# Patient Record
Sex: MALE | Race: WHITE | NOT HISPANIC OR LATINO | Employment: OTHER | URBAN - METROPOLITAN AREA
[De-identification: names, ages, dates, MRNs, and addresses within clinical notes are randomized per-mention and may not be internally consistent; named-entity substitution may affect disease eponyms.]

---

## 2021-03-17 ENCOUNTER — OFFICE VISIT (OUTPATIENT)
Dept: UROLOGY | Facility: CLINIC | Age: 81
End: 2021-03-17
Payer: COMMERCIAL

## 2021-03-17 ENCOUNTER — TELEPHONE (OUTPATIENT)
Dept: UROLOGY | Facility: CLINIC | Age: 81
End: 2021-03-17

## 2021-03-17 DIAGNOSIS — C61 PROSTATE CANCER (H): Primary | ICD-10-CM

## 2021-03-17 LAB
ALBUMIN UR-MCNC: NEGATIVE MG/DL
APPEARANCE UR: CLEAR
BILIRUB UR QL STRIP: NEGATIVE
COLOR UR AUTO: NORMAL
GLUCOSE UR STRIP-MCNC: NEGATIVE MG/DL
HGB UR QL STRIP: NEGATIVE
KETONES UR STRIP-MCNC: NEGATIVE MG/DL
LEUKOCYTE ESTERASE UR QL STRIP: NEGATIVE
NITRATE UR QL: NEGATIVE
PH UR STRIP: 7 PH (ref 5–7)
PSA SERPL-MCNC: 21.5 UG/L (ref 0–4)
SOURCE: NORMAL
SP GR UR STRIP: 1 (ref 1–1.03)
UROBILINOGEN UR STRIP-MCNC: 0 MG/DL (ref 0–2)

## 2021-03-17 PROCEDURE — 81003 URINALYSIS AUTO W/O SCOPE: CPT | Performed by: UROLOGY

## 2021-03-17 PROCEDURE — 84153 ASSAY OF PSA TOTAL: CPT | Performed by: UROLOGY

## 2021-03-17 PROCEDURE — 36415 COLL VENOUS BLD VENIPUNCTURE: CPT | Performed by: UROLOGY

## 2021-03-17 PROCEDURE — 99203 OFFICE O/P NEW LOW 30 MIN: CPT | Performed by: UROLOGY

## 2021-03-17 NOTE — PROGRESS NOTES
S: Patient is a pleasant 80-year-old male with history of prostate cancer status post radiation in 2001.  He has no urinary problems.  Has no bone pain or weight loss.  He has PSA has been going up for last several years.  In 2017 it was 8 and most recent PSA in 2020 it was 16.  He has not had any PSA done recently.  He is healthy otherwise.  No current outpatient medications on file.     Not on File  No past medical history on file.  No past surgical history on file.   No family history on file.  Social History     Socioeconomic History     Marital status:      Spouse name: Not on file     Number of children: Not on file     Years of education: Not on file     Highest education level: Not on file   Occupational History     Not on file   Social Needs     Financial resource strain: Not on file     Food insecurity     Worry: Not on file     Inability: Not on file     Transportation needs     Medical: Not on file     Non-medical: Not on file   Tobacco Use     Smoking status: Not on file   Substance and Sexual Activity     Alcohol use: Not on file     Drug use: Not on file     Sexual activity: Not on file   Lifestyle     Physical activity     Days per week: Not on file     Minutes per session: Not on file     Stress: Not on file   Relationships     Social connections     Talks on phone: Not on file     Gets together: Not on file     Attends Adventism service: Not on file     Active member of club or organization: Not on file     Attends meetings of clubs or organizations: Not on file     Relationship status: Not on file     Intimate partner violence     Fear of current or ex partner: Not on file     Emotionally abused: Not on file     Physically abused: Not on file     Forced sexual activity: Not on file   Other Topics Concern     Not on file   Social History Narrative     Not on file       REVIEW OF SYSTEMS  =================  C: NEGATIVE for fever, chills, change in weight  I: NEGATIVE for worrisome rashes,  moles or lesions  E/M: NEGATIVE for ear, mouth and throat problems  R: NEGATIVE for significant cough or SHORTNESS OF BREATH  CV:  NEGATIVE for chest pain, palpitations or peripheral edema  GI: NEGATIVE for nausea, abdominal pain, heartburn, or change in bowel habits  NEURO: NEGATIVE numbness/weakness  : see HPI  PSYCH: NEGATIVE depression/anxiety  LYmph: no new enlarged lymph nodes  Ortho: no new trauma/movements      Physical Exam:  There were no vitals taken for this visit.    Patient is pleasant, in no acute distress, good general condition.  Heart:  negative, PMI normal  Lung: no evidence of respiratory distress    Abdomen: Soft, nondistended, non tender. No masses. No rebound or guarding.   Exam: no cva tenderness  Skin: Warm and dry.  No redness.  Neuro: grossly normal  Musculaskeletal: moving all extremities  Psych normal mood and affect  Musculoskeletal  moving all extremities  Hematologic/Lymphatic/Immunologic: normal ant/post cervical, axillary, supraclavicular and inguinal nodes    Assessment/Plan:   Pleasant 80-year-old male with history of prostate cancer status post radiation treatment with PSA elevation for last several years.  We will check his PSA today.  Hormonal therapy briefly discussed with patient.

## 2021-03-17 NOTE — TELEPHONE ENCOUNTER
Left message for patient to call [phone] to advise of PSA result, Per MD result note, Patient needs Bone scan and CT scan and appointment for follow up to discuss. Orders in Epic.   Beatriz Katz CMA

## 2021-03-31 ENCOUNTER — HOSPITAL ENCOUNTER (OUTPATIENT)
Dept: NUCLEAR MEDICINE | Facility: CLINIC | Age: 81
Setting detail: NUCLEAR MEDICINE
End: 2021-03-31
Attending: UROLOGY
Payer: COMMERCIAL

## 2021-03-31 ENCOUNTER — HOSPITAL ENCOUNTER (OUTPATIENT)
Dept: CT IMAGING | Facility: CLINIC | Age: 81
Discharge: HOME | End: 2021-03-31
Attending: UROLOGY | Admitting: UROLOGY
Payer: COMMERCIAL

## 2021-03-31 DIAGNOSIS — C61 PROSTATE CANCER (H): ICD-10-CM

## 2021-03-31 LAB
CREAT BLD-MCNC: 0.9 MG/DL (ref 0.66–1.25)
GFR SERPL CREATININE-BSD FRML MDRD: 81 ML/MIN/{1.73_M2}

## 2021-03-31 PROCEDURE — 74177 CT ABD & PELVIS W/CONTRAST: CPT

## 2021-03-31 PROCEDURE — 250N000009 HC RX 250: Performed by: RADIOLOGY

## 2021-03-31 PROCEDURE — A9561 TC99M OXIDRONATE: HCPCS | Performed by: UROLOGY

## 2021-03-31 PROCEDURE — 82565 ASSAY OF CREATININE: CPT

## 2021-03-31 PROCEDURE — 250N000011 HC RX IP 250 OP 636: Performed by: RADIOLOGY

## 2021-03-31 PROCEDURE — 78306 BONE IMAGING WHOLE BODY: CPT

## 2021-03-31 PROCEDURE — 343N000001 HC RX 343: Performed by: UROLOGY

## 2021-03-31 RX ORDER — KIT FOR THE PREPARATION OF TECHNETIUM TC 99M MEBROFENIN 45 MG/10ML
5 INJECTION, POWDER, LYOPHILIZED, FOR SOLUTION INTRAVENOUS ONCE
Status: DISCONTINUED | OUTPATIENT
Start: 2021-03-31 | End: 2021-04-01 | Stop reason: HOSPADM

## 2021-03-31 RX ORDER — IOPAMIDOL 755 MG/ML
500 INJECTION, SOLUTION INTRAVASCULAR ONCE
Status: COMPLETED | OUTPATIENT
Start: 2021-03-31 | End: 2021-03-31

## 2021-03-31 RX ADMIN — Medication 26.3 MILLICURIE: at 07:57

## 2021-03-31 RX ADMIN — IOPAMIDOL 75 ML: 755 INJECTION, SOLUTION INTRAVENOUS at 08:10

## 2021-03-31 RX ADMIN — SODIUM CHLORIDE 60 ML: 9 INJECTION, SOLUTION INTRAVENOUS at 08:10

## 2021-04-21 ENCOUNTER — OFFICE VISIT (OUTPATIENT)
Dept: UROLOGY | Facility: CLINIC | Age: 81
End: 2021-04-21
Payer: COMMERCIAL

## 2021-04-21 VITALS — DIASTOLIC BLOOD PRESSURE: 79 MMHG | OXYGEN SATURATION: 100 % | HEART RATE: 54 BPM | SYSTOLIC BLOOD PRESSURE: 133 MMHG

## 2021-04-21 DIAGNOSIS — C61 PROSTATE CANCER (H): Primary | ICD-10-CM

## 2021-04-21 PROCEDURE — 99213 OFFICE O/P EST LOW 20 MIN: CPT | Performed by: UROLOGY

## 2021-04-21 NOTE — PATIENT INSTRUCTIONS
Cryotherapy for Prostate Cancer  Cryotherapy (also called cryosurgery or cryoablation) is the use of very cold temperatures to freeze and kill prostate cancer cells as well as most of the prostate. Even though it is sometimes being called cryosurgery, it is not actually a type of surgery.    When is cryotherapy used?  Cryotherapy is sometimes used if the cancer has come back after radiation therapy. It may be an option to treat men with low risk early-stage prostate cancer who cannot have surgery or radiation therapy. However, most doctors do not use cryotherapy as the first treatment for prostate cancer..    How is cryotherapy done?  This type of procedure requires spinal or epidural anesthesia (the lower half of your body is numbed) or general anesthesia (you are asleep).    The doctor uses transrectal ultrasound (TRUS) to guide several hollow probes (needles) through the skin between the anus and scrotum and into the prostate. Very cold gases are then passed through the needles to freeze and destroy the prostate. To be sure the prostate is destroyed without too much damage to nearby tissues, the doctor carefully watches the ultrasound during the procedure. Warm saltwater is passed through a catheter in the urethra during the procedure to keep it from freezing. The catheter is left in place for several weeks afterward to allow the bladder to empty while you recover.    After the procedure, you might need to stay in the hospital overnight, but many men go home the same day.    Cryotherapy is less invasive than surgery, so there is usually less blood loss, a shorter hospital stay, shorter recovery period, and less pain. But compared with surgery or radiation therapy, doctors know much less about the long-term effectiveness of cryotherapy. And as with brachytherapy, this may not be a good option for men with large prostate glands.    Possible side effects of cryotherapy  Side effects from cryotherapy tend to be worse  if it is done in men who have already had radiation therapy, compared to men who have it as the first form of treatment.    Most men have blood in their urine for a day or two after the procedure, as well as soreness in the area where the needles were placed. Swelling of the penis or scrotum is also common.    Freezing might also affect the bladder and rectum, which can lead to pain, burning sensations, and the need to empty the bladder and bowels often. Most men recover normal bowel and bladder function over time.    Freezing often damages the nerves near the prostate that control erections. Erectile dysfunction is more common after cryotherapy than after radical prostatectomy. For information on coping with erection problems and other sexuality issues, see Sexuality for the Man With Cancer.    Urinary incontinence (having problems controlling urine) is rare in men who have cryotherapy as their first treatment for prostate cancer, but it is more common in men who have already had radiation therapy.    After cryotherapy, less than 1% of men develop a fistula (an abnormal connection) between the rectum and bladder. This rare but serious problem can allow urine to leak into the rectum and often requires surgery to repair.    Written by  References         Patient Education     Hormone Therapy for Prostate Cancer  Androgens, like testosterone, are male hormones. They're made in the testicles. Prostate cancer cells need androgens to grow. Reducing the amount of androgens in the body or blocking prostate cancer cells from using them can help treat prostate cancer. You may hear this called androgen deprivation therapy or ADT. This treatment can shrink tumors or help keep them from growing. It does not cure prostate cancer, but it can help control it.   Hormone therapy may be used alone. Or, it might be used with other treatments, like radiation therapy or chemotherapy. It may be used non-stop (continuous ADT), or in  cycles (intermittent ADT). You will work with your healthcare provider to decide what's best for you.   How the therapy is done  Hormone therapy can be done in many ways, such as:     LHRH (luteinizing hormone-release hormone) agonists.  These are medicines that lower the amount of hormones made by the testicles. These are given as shots or as small implants that are put just under the skin (often on the inside of the arm). This is done from once a month to once a year. The implants give a steady dose of medicine over time. When starting an LHRH agonist, there's a brief spike in the testosterone level. This is called a flare. Depending on where the cancer is, it can cause pain and other problems. Because of this, LHRH agonists are often given along with anti-androgens (see below).    LHRH (luteinizing hormone-release hormone) antagonists.  This medicine works like the LHRH agonists, but it doesn't cause a spike in testosterone and tumor flare. It's given as a shot once a month.    Anti-androgens. These medicines block the receptors on cancer cells so they can't attach to androgens as a way to grow. These come in pill form and are taken by mouth. They are often used along with other forms of hormone therapy.    CYP17 inhibitors. These medicines stop the prostate cancer cells themselves, and other cells in the body from making androgens. They're taken as pills. They're most often used along with other forms of hormone therapy. (Which are needed to keep the testicles from making androgens.)    Other medicines. These may include estrogens or other medicines (such as ketoconazole) that help lower the levels of androgens in the body. They are used much less often than the medicines listed above.    Orchiectomy. This is surgery to remove the testicles. This stops the body from making most androgens. Artificial (prosthetic) testicles can be put in the scrotum to give the look of real testicles.  Possible side effects   Side  effects are much the same for most kinds of hormone therapy, but they can vary a bit between medicines. Possible side effects include:     Bone thinning (osteoporosis) and higher risk of breaking a bone    Breast-area tenderness or growth or breast tissue    Decrease in size of penis and testicles    Diarrhea    Hair loss    Problems getting or keeping an erection    Possible increased risk of heart disease, stroke, and diabetes    Less interest in sex    Loss of muscle mass and strength    Low red blood cell count (anemia)    Mood changes, such as depression, irritability, or anxiety    Nausea    Sudden increases in body heat (hot flashes)    Tiredness    Trouble with memory and concentration    Weight gain    Increased cholesterol levels    Dizziness    Muscle and joint pain  Coping with side effects  Some of the side effects are short-term. Others are long-lasting. It depends on the type of hormone therapy used, and how it affects your body. Most of the side effects of orchiectomy will last the rest of your life. Your healthcare provider can tell you more. To help cope with side effects, try the tips below.     Talk with your healthcare provider about your symptoms. You may be given medicines that can help you feel better and reduce problems.    If you have hot flashes, don t take hot showers. Don t use hot tubs or saunas.    Don t eat spicy food or drink alcohol. Avoid caffeine.    Get regular physical activity.    Eat a healthy diet.    Keep mentally active.    Work with your partner to manage sexual changes.    Try counseling or support groups.  Follow-up care  During the course of your treatment, you ll have regular visits with your healthcare provider. You may also need tests, like blood tests or a DEXA scan to check for osteoporosis. These let your healthcare provider check your health and see how well treatment is working. You and your healthcare provider will discuss the test results, as well as any side  effects you have. You ll also discuss whether you need other cancer treatments.   To learn more  For more information about cancer and treatment, visit the websites listed below:    American Cancer Society    National Cancer [place]    Ellie Sanchez last reviewed this educational content on 12/1/2019 2000-2021 [employer]. All rights reserved. This information is not intended as a substitute for professional medical care. Always follow your healthcare professional's instructions.

## 2021-04-21 NOTE — PROGRESS NOTES
Chief Complaint   Patient presents with     RECHANABEL JACKSON Godfrey is a 80 year old male who presents today for follow up of   Chief Complaint   Patient presents with     RECHECK    f/u for prostate cancer s/p XRT many years ago with recent psa of 21.5.  Recent CT and bone scan did not show any obvious mets.  There is longevity in family (father 90's, mother 100's).      No current outpatient medications on file.     No Known Allergies   No past medical history on file.  No past surgical history on file.  No family history on file.  Social History     Socioeconomic History     Marital status:      Spouse name: Not on file     Number of children: Not on file     Years of education: Not on file     Highest education level: Not on file   Occupational History     Not on file   Social Needs     Financial resource strain: Not on file     Food insecurity     Worry: Not on file     Inability: Not on file     Transportation needs     Medical: Not on file     Non-medical: Not on file   Tobacco Use     Smoking status: Never Smoker     Smokeless tobacco: Never Used   Substance and Sexual Activity     Alcohol use: Not on file     Drug use: Not on file     Sexual activity: Not on file   Lifestyle     Physical activity     Days per week: Not on file     Minutes per session: Not on file     Stress: Not on file   Relationships     Social connections     Talks on phone: Not on file     Gets together: Not on file     Attends Hinduism service: Not on file     Active member of club or organization: Not on file     Attends meetings of clubs or organizations: Not on file     Relationship status: Not on file     Intimate partner violence     Fear of current or ex partner: Not on file     Emotionally abused: Not on file     Physically abused: Not on file     Forced sexual activity: Not on file   Other Topics Concern     Not on file   Social History Narrative     Not on file       REVIEW OF SYSTEMS  =================  C:  NEGATIVE for fever, chills, change in weight  I: NEGATIVE for worrisome rashes, moles or lesions  E/M: NEGATIVE for ear, mouth and throat problems  R: NEGATIVE for significant cough or SHORTNESS OF BREATH  CV:  NEGATIVE for chest pain, palpitations or peripheral edema  GI: NEGATIVE for nausea, abdominal pain, heartburn, or change in bowel habits  NEURO: NEGATIVE numbness/weakness  : see HPI  PSYCH: NEGATIVE depression/anxiety  LYmph: no new enlarged lymph nodes  Ortho: no new trauma/movements    Physical Exam:  /79 (BP Location: Right arm, Patient Position: Chair, Cuff Size: Adult Regular)   Pulse 54   SpO2 100%    Patient is pleasant, in no acute distress, good general condition.  Lung: no evidence of respiratory distress    Abdomen: Soft, nondistended, non tender. No masses. No rebound or guarding.   Exam: normal male  Skin: Warm and dry.  No redness.  Psych: normal mood and affect  Neuro: alert and oriented  Musculaskeletal: moving all extremities    CT ABDOMEN AND PELVIS WITH CONTRAST 3/31/2021 8:19 AM     CLINICAL HISTORY: Prostate cancer (H), new diagnosis.     TECHNIQUE: CT scan of the abdomen and pelvis was performed following  injection of IV contrast. Multiplanar reformats were obtained. Dose  reduction techniques were used.     CONTRAST: 75mL, Isovue 370     COMPARISON: None.     FINDINGS:   LOWER CHEST: There are a few scattered small nodules in the inferior  right middle lobe numbering at least 4 measuring up to 0.4 cm.  Visualized portions of the lung bases and mediastinal contents are  otherwise unremarkable.     HEPATOBILIARY: Mild intrahepatic biliary ductal dilatation is noted.  This could be a normal appearance of the bile duct with mild  prominence due to the high resolution of the scan. Subcentimeter  hypoattenuating lesion in the right lobe of liver (image 41 series 3)  is of doubtful clinical significance and is too small to characterize.  It likely represents a cyst. Liver  otherwise enhances normally. The  gallbladder is grossly unremarkable. Small hypoattenuating structure  adjacent to head of pancreas just anterior to the inferior vena cava  and anterolateral to the aorta measures 2.6 x 1.7 x 1.7 cm in  transverse, AP and craniocaudal dimensions, respectively. This is of  uncertain clinical significance and etiology and has an average  density of slightly over 20 Hounsfield units on this postcontrast  study. This likely represents a hyperdense cystic structure, although  other lesions are difficult to exclude given slight increased density.  No definite extrahepatic common bile duct dilatation is seen. No  choledocholithiasis or cholelithiasis.     PANCREAS: Hypoattenuating structure in the neck/body of the pancreas  dorsally (image 51 series 3) measures up to 1.0 cm in diameter and  could represent pancreatic cyst or side branch IPMN. Pancreas  otherwise enhances normally. No pancreatic ductal dilatation is seen.     SPLEEN: Normal.     ADRENAL GLANDS: Normal.     KIDNEYS/BLADDER: The kidneys enhance normally without hydronephrosis,  nephrolithiasis, hydroureter, or ureteral calculus. Subcentimeter left  hypoattenuating renal lesion (image 14 series 2) likely represents a  cyst but is too small to characterize. Urinary bladder is grossly  unremarkable.     BOWEL: Colon is grossly of normal caliber without pericolonic  inflammatory change to suggest acute diverticulitis. The bowel is  somewhat difficult to follow due to the relative lack of  intraperitoneal adipose tissue limiting the bowel evaluation. Appendix  is not definitely seen. No pericecal inflammatory changes are  identified. Small bowel is grossly of normal caliber and appearance  given the limitations as described above. Stomach contains a moderate  amount of fluid and air but is otherwise unremarkable.     PELVIC ORGANS: Prostate gland heterogeneously enhances and contains a  few dystrophic calcifications. Seminal  vesicles are unremarkable.     ADDITIONAL FINDINGS: No adenopathy, free air, or free fluid is seen in  the peritoneal cavity. There is mild nonaneurysmal atherosclerosis.     MUSCULOSKELETAL: Tiny focal sclerotic lesions in the bilateral  proximal femurs and in the pelvis likely represent benign bone islands  given their small size and significant density. No definite aggressive  osseous lesion is seen. Levoconvex curvature of lower lumbar spine  centered at L4-5 is noted. There is disc height loss seen throughout  the lumbar spine but is worst at L4-5 and at L5-S1. No acute fracture  or malalignment is identified.                                                                      IMPRESSION:   1.  Multiple tiny focal sclerotic lesions in the bilateral proximal  femurs and in the pelvis likely represent benign bone islands given  their small size and significantly increased density. Blastic osseous  metastasis are considered less likely. No other evidence for osseous  metastasis.  2.  Hypoattenuating structure near the head of the pancreas, aorta,  inferior vena cava, and hepatic hilum and is a density slightly  greater than that expected for a simple cyst but still could represent  a mildly hyperdense cyst. This could represent an enteric duplication  cyst. Other lesions are considered less likely.  3.  Hypoattenuating structure in the neck/body of the pancreas  measuring up to 1 cm in diameter could represent benign cysts. Side  branch intraductal papillary mucinous neoplasm (IPMN) is also in the  differential and follow-up in one year is recommended. MRI is  preferred, but this can also be done with CT.  4.  Evaluation of solid and hollow organs of the abdomen and pelvis is  limited due to relative lack of intraperitoneal adipose tissue.  5. Multiple small nodules are seen in the visualized portions of the  inferior right lung. Further evaluation with CT chest is recommended  to evaluate the remaining portions  of the lungs. Otherwise,  recommendations for one or multiple incidental lung nodules < 6mm :    Low risk patients: No routine follow-up.    High risk patients: Optional follow-up CT at 12 months; if  unchanged, no further follow-up.     *Low Risk: Minimal or absent history of smoking or other known risk  factors.  *Nonsolid (ground glass) or partly solid nodules may require longer  follow-up to exclude indolent adenocarcinoma.  *Recommendations based on Guidelines for the Management of Incidental  Pulmonary Nodules Detected at CT: From the Fleischner Society 2017,  Radiology 2017.  6. No definite metastases are otherwise seen on today's study.     MACARIO MANCERA MD    Assessment/Plan:   (C61) Prostate cancer (H)  (primary encounter diagnosis)  Comment: not mets seen.  CT scan reviewed.  Patient will follow up with primary MD with regard to its findings.  Plan: Treatment options for prostate cancer after radiation therapy discussed.  We discussed observation, hormone therapy, and cryotherapy.  Surgery would be extremely risky given history of radiation in the past.  Information about these treatments provided to patient today.  Patient will call me with regard to his decision.

## 2024-10-22 ENCOUNTER — TRANSFERRED RECORDS (OUTPATIENT)
Dept: HEALTH INFORMATION MANAGEMENT | Facility: CLINIC | Age: 84
End: 2024-10-22
Payer: MEDICARE

## 2024-10-22 ENCOUNTER — HOSPITAL ENCOUNTER (OUTPATIENT)
Age: 84
End: 2024-10-22
Attending: STUDENT IN AN ORGANIZED HEALTH CARE EDUCATION/TRAINING PROGRAM
Payer: MEDICARE

## 2024-10-22 ENCOUNTER — LAB REQUISITION (OUTPATIENT)
Dept: LAB | Facility: CLINIC | Age: 84
End: 2024-10-22

## 2024-10-22 PROCEDURE — 87070 CULTURE OTHR SPECIMN AEROBIC: CPT | Performed by: PHYSICIAN ASSISTANT

## 2024-10-22 PROCEDURE — 87075 CULTR BACTERIA EXCEPT BLOOD: CPT | Performed by: PHYSICIAN ASSISTANT

## 2024-10-23 ENCOUNTER — APPOINTMENT (OUTPATIENT)
Dept: CARDIOLOGY | Facility: CLINIC | Age: 84
DRG: 871 | End: 2024-10-23
Attending: STUDENT IN AN ORGANIZED HEALTH CARE EDUCATION/TRAINING PROGRAM
Payer: COMMERCIAL

## 2024-10-23 ENCOUNTER — HOSPITAL ENCOUNTER (INPATIENT)
Facility: CLINIC | Age: 84
LOS: 2 days | Discharge: ANOTHER HOSPITAL | DRG: 871 | End: 2024-10-25
Attending: STUDENT IN AN ORGANIZED HEALTH CARE EDUCATION/TRAINING PROGRAM | Admitting: HOSPITALIST
Payer: COMMERCIAL

## 2024-10-23 DIAGNOSIS — S41.002A: ICD-10-CM

## 2024-10-23 DIAGNOSIS — S41.002A: Primary | ICD-10-CM

## 2024-10-23 PROBLEM — A41.9 SEPTIC SHOCK (H): Status: ACTIVE | Noted: 2024-10-23

## 2024-10-23 PROBLEM — R65.21 SEPTIC SHOCK (H): Status: ACTIVE | Noted: 2024-10-23

## 2024-10-23 PROBLEM — C61 PROSTATE CANCER (H): Status: ACTIVE | Noted: 2024-10-23

## 2024-10-23 LAB
ALBUMIN SERPL BCG-MCNC: 2.2 G/DL (ref 3.5–5.2)
ALBUMIN SERPL BCG-MCNC: 2.2 G/DL (ref 3.5–5.2)
ALBUMIN UR-MCNC: 30 MG/DL
ALP SERPL-CCNC: 88 U/L (ref 40–150)
ALT SERPL W P-5'-P-CCNC: 20 U/L (ref 0–70)
ANION GAP SERPL CALCULATED.3IONS-SCNC: 3 MMOL/L (ref 7–15)
ANION GAP SERPL CALCULATED.3IONS-SCNC: 4 MMOL/L (ref 7–15)
ANION GAP SERPL CALCULATED.3IONS-SCNC: 5 MMOL/L (ref 7–15)
ANION GAP SERPL CALCULATED.3IONS-SCNC: 7 MMOL/L (ref 7–15)
ANION GAP SERPL CALCULATED.3IONS-SCNC: 7 MMOL/L (ref 7–15)
APPEARANCE UR: CLEAR
AST SERPL W P-5'-P-CCNC: 22 U/L (ref 0–45)
BACTERIA #/AREA URNS HPF: ABNORMAL /HPF
BASOPHILS # BLD AUTO: 0 10E3/UL (ref 0–0.2)
BASOPHILS NFR BLD AUTO: 0 %
BILIRUB SERPL-MCNC: 0.7 MG/DL
BILIRUB UR QL STRIP: NEGATIVE
BUN SERPL-MCNC: 12.5 MG/DL (ref 8–23)
BUN SERPL-MCNC: 13 MG/DL (ref 8–23)
BUN SERPL-MCNC: 13.3 MG/DL (ref 8–23)
BUN SERPL-MCNC: 13.4 MG/DL (ref 8–23)
BUN SERPL-MCNC: 14.2 MG/DL (ref 8–23)
BUN SERPL-MCNC: 14.2 MG/DL (ref 8–23)
BUN SERPL-MCNC: 15.1 MG/DL (ref 8–23)
CALCIUM SERPL-MCNC: 7.4 MG/DL (ref 8.8–10.4)
CALCIUM SERPL-MCNC: 7.5 MG/DL (ref 8.8–10.4)
CALCIUM SERPL-MCNC: 7.5 MG/DL (ref 8.8–10.4)
CALCIUM SERPL-MCNC: 7.6 MG/DL (ref 8.8–10.4)
CALCIUM SERPL-MCNC: 7.7 MG/DL (ref 8.8–10.4)
CHLORIDE SERPL-SCNC: 92 MMOL/L (ref 98–107)
CHLORIDE SERPL-SCNC: 93 MMOL/L (ref 98–107)
CHLORIDE SERPL-SCNC: 93 MMOL/L (ref 98–107)
CHLORIDE SERPL-SCNC: 94 MMOL/L (ref 98–107)
CHLORIDE SERPL-SCNC: 94 MMOL/L (ref 98–107)
CHLORIDE SERPL-SCNC: 95 MMOL/L (ref 98–107)
CHLORIDE SERPL-SCNC: 95 MMOL/L (ref 98–107)
COLOR UR AUTO: YELLOW
CREAT SERPL-MCNC: 0.58 MG/DL (ref 0.67–1.17)
CREAT SERPL-MCNC: 0.6 MG/DL (ref 0.67–1.17)
CREAT SERPL-MCNC: 0.62 MG/DL (ref 0.67–1.17)
CREAT SERPL-MCNC: 0.62 MG/DL (ref 0.67–1.17)
CREAT SERPL-MCNC: 0.63 MG/DL (ref 0.67–1.17)
CREAT SERPL-MCNC: 0.65 MG/DL (ref 0.67–1.17)
CREAT SERPL-MCNC: 0.68 MG/DL (ref 0.67–1.17)
CRP SERPL-MCNC: 91.29 MG/L
EGFRCR SERPLBLD CKD-EPI 2021: >90 ML/MIN/1.73M2
EOSINOPHIL # BLD AUTO: 0 10E3/UL (ref 0–0.7)
EOSINOPHIL NFR BLD AUTO: 0 %
ERYTHROCYTE [DISTWIDTH] IN BLOOD BY AUTOMATED COUNT: 13.7 % (ref 10–15)
GLUCOSE BLDC GLUCOMTR-MCNC: 138 MG/DL (ref 70–99)
GLUCOSE SERPL-MCNC: 104 MG/DL (ref 70–99)
GLUCOSE SERPL-MCNC: 108 MG/DL (ref 70–99)
GLUCOSE SERPL-MCNC: 108 MG/DL (ref 70–99)
GLUCOSE SERPL-MCNC: 113 MG/DL (ref 70–99)
GLUCOSE SERPL-MCNC: 117 MG/DL (ref 70–99)
GLUCOSE SERPL-MCNC: 136 MG/DL (ref 70–99)
GLUCOSE SERPL-MCNC: 99 MG/DL (ref 70–99)
GLUCOSE UR STRIP-MCNC: NEGATIVE MG/DL
HCO3 SERPL-SCNC: 21 MMOL/L (ref 22–29)
HCO3 SERPL-SCNC: 21 MMOL/L (ref 22–29)
HCO3 SERPL-SCNC: 23 MMOL/L (ref 22–29)
HCO3 SERPL-SCNC: 23 MMOL/L (ref 22–29)
HCO3 SERPL-SCNC: 24 MMOL/L (ref 22–29)
HCO3 SERPL-SCNC: 24 MMOL/L (ref 22–29)
HCO3 SERPL-SCNC: 25 MMOL/L (ref 22–29)
HCT VFR BLD AUTO: 30.9 % (ref 40–53)
HGB BLD-MCNC: 11 G/DL (ref 13.3–17.7)
HGB UR QL STRIP: ABNORMAL
IMM GRANULOCYTES # BLD: 0.1 10E3/UL
IMM GRANULOCYTES NFR BLD: 1 %
INR PPP: 1.53 (ref 0.85–1.15)
KETONES UR STRIP-MCNC: 5 MG/DL
LEUKOCYTE ESTERASE UR QL STRIP: NEGATIVE
LVEF ECHO: NORMAL
LYMPHOCYTES # BLD AUTO: 1.2 10E3/UL (ref 0.8–5.3)
LYMPHOCYTES NFR BLD AUTO: 10 %
MCH RBC QN AUTO: 30.3 PG (ref 26.5–33)
MCHC RBC AUTO-ENTMCNC: 35.6 G/DL (ref 31.5–36.5)
MCV RBC AUTO: 85 FL (ref 78–100)
MONOCYTES # BLD AUTO: 0.7 10E3/UL (ref 0–1.3)
MONOCYTES NFR BLD AUTO: 6 %
MRSA DNA SPEC QL NAA+PROBE: NEGATIVE
MUCOUS THREADS #/AREA URNS LPF: PRESENT /LPF
NEUTROPHILS # BLD AUTO: 9.5 10E3/UL (ref 1.6–8.3)
NEUTROPHILS NFR BLD AUTO: 83 %
NITRATE UR QL: NEGATIVE
NRBC # BLD AUTO: 0 10E3/UL
NRBC BLD AUTO-RTO: 0 /100
OSMOLALITY SERPL: 260 MMOL/KG (ref 280–301)
OSMOLALITY UR: 632 MMOL/KG (ref 100–1200)
PH UR STRIP: 6 [PH] (ref 5–7)
PHOSPHATE SERPL-MCNC: 3.4 MG/DL (ref 2.5–4.5)
PLATELET # BLD AUTO: 162 10E3/UL (ref 150–450)
POTASSIUM SERPL-SCNC: 4.3 MMOL/L (ref 3.4–5.3)
POTASSIUM SERPL-SCNC: 4.3 MMOL/L (ref 3.4–5.3)
POTASSIUM SERPL-SCNC: 4.4 MMOL/L (ref 3.4–5.3)
POTASSIUM SERPL-SCNC: 4.4 MMOL/L (ref 3.4–5.3)
POTASSIUM SERPL-SCNC: 4.5 MMOL/L (ref 3.4–5.3)
POTASSIUM SERPL-SCNC: 4.5 MMOL/L (ref 3.4–5.3)
POTASSIUM SERPL-SCNC: 5 MMOL/L (ref 3.4–5.3)
PROCALCITONIN SERPL IA-MCNC: 0.56 NG/ML
PROT SERPL-MCNC: 4.5 G/DL (ref 6.4–8.3)
RBC # BLD AUTO: 3.63 10E6/UL (ref 4.4–5.9)
RBC URINE: 31 /HPF
SA TARGET DNA: POSITIVE
SODIUM SERPL-SCNC: 120 MMOL/L (ref 135–145)
SODIUM SERPL-SCNC: 121 MMOL/L (ref 135–145)
SODIUM SERPL-SCNC: 121 MMOL/L (ref 135–145)
SODIUM SERPL-SCNC: 122 MMOL/L (ref 135–145)
SODIUM SERPL-SCNC: 123 MMOL/L (ref 135–145)
SODIUM UR-SCNC: 45 MMOL/L
SP GR UR STRIP: 1.01 (ref 1–1.03)
SQUAMOUS EPITHELIAL: <1 /HPF
UROBILINOGEN UR STRIP-MCNC: NORMAL MG/DL
WBC # BLD AUTO: 11.4 10E3/UL (ref 4–11)
WBC URINE: 6 /HPF

## 2024-10-23 PROCEDURE — 84145 PROCALCITONIN (PCT): CPT | Performed by: STUDENT IN AN ORGANIZED HEALTH CARE EDUCATION/TRAINING PROGRAM

## 2024-10-23 PROCEDURE — 84300 ASSAY OF URINE SODIUM: CPT | Performed by: HOSPITALIST

## 2024-10-23 PROCEDURE — 83935 ASSAY OF URINE OSMOLALITY: CPT | Performed by: HOSPITALIST

## 2024-10-23 PROCEDURE — 250N000009 HC RX 250

## 2024-10-23 PROCEDURE — 81001 URINALYSIS AUTO W/SCOPE: CPT | Performed by: STUDENT IN AN ORGANIZED HEALTH CARE EDUCATION/TRAINING PROGRAM

## 2024-10-23 PROCEDURE — 85025 COMPLETE CBC W/AUTO DIFF WBC: CPT | Performed by: STUDENT IN AN ORGANIZED HEALTH CARE EDUCATION/TRAINING PROGRAM

## 2024-10-23 PROCEDURE — 36415 COLL VENOUS BLD VENIPUNCTURE: CPT

## 2024-10-23 PROCEDURE — 93306 TTE W/DOPPLER COMPLETE: CPT | Mod: 26 | Performed by: INTERNAL MEDICINE

## 2024-10-23 PROCEDURE — 85610 PROTHROMBIN TIME: CPT | Performed by: STUDENT IN AN ORGANIZED HEALTH CARE EDUCATION/TRAINING PROGRAM

## 2024-10-23 PROCEDURE — 99207 PR APP CREDIT; MD BILLING SHARED VISIT: CPT | Performed by: STUDENT IN AN ORGANIZED HEALTH CARE EDUCATION/TRAINING PROGRAM

## 2024-10-23 PROCEDURE — 87641 MR-STAPH DNA AMP PROBE: CPT | Performed by: HOSPITALIST

## 2024-10-23 PROCEDURE — 80069 RENAL FUNCTION PANEL: CPT

## 2024-10-23 PROCEDURE — 258N000003 HC RX IP 258 OP 636: Performed by: HOSPITALIST

## 2024-10-23 PROCEDURE — 99223 1ST HOSP IP/OBS HIGH 75: CPT | Mod: 95 | Performed by: HOSPITALIST

## 2024-10-23 PROCEDURE — 250N000011 HC RX IP 250 OP 636: Performed by: STUDENT IN AN ORGANIZED HEALTH CARE EDUCATION/TRAINING PROGRAM

## 2024-10-23 PROCEDURE — 200N000001 HC R&B ICU

## 2024-10-23 PROCEDURE — 86140 C-REACTIVE PROTEIN: CPT | Performed by: STUDENT IN AN ORGANIZED HEALTH CARE EDUCATION/TRAINING PROGRAM

## 2024-10-23 PROCEDURE — 36415 COLL VENOUS BLD VENIPUNCTURE: CPT | Performed by: HOSPITALIST

## 2024-10-23 PROCEDURE — 80048 BASIC METABOLIC PNL TOTAL CA: CPT | Performed by: STUDENT IN AN ORGANIZED HEALTH CARE EDUCATION/TRAINING PROGRAM

## 2024-10-23 PROCEDURE — G0463 HOSPITAL OUTPT CLINIC VISIT: HCPCS

## 2024-10-23 PROCEDURE — 36415 COLL VENOUS BLD VENIPUNCTURE: CPT | Performed by: STUDENT IN AN ORGANIZED HEALTH CARE EDUCATION/TRAINING PROGRAM

## 2024-10-23 PROCEDURE — 93306 TTE W/DOPPLER COMPLETE: CPT

## 2024-10-23 PROCEDURE — 3E033XZ INTRODUCTION OF VASOPRESSOR INTO PERIPHERAL VEIN, PERCUTANEOUS APPROACH: ICD-10-PCS | Performed by: HOSPITALIST

## 2024-10-23 PROCEDURE — 83930 ASSAY OF BLOOD OSMOLALITY: CPT | Performed by: HOSPITALIST

## 2024-10-23 PROCEDURE — 250N000011 HC RX IP 250 OP 636

## 2024-10-23 RX ORDER — VANCOMYCIN HYDROCHLORIDE 1 G/200ML
1000 INJECTION, SOLUTION INTRAVENOUS EVERY 12 HOURS
Status: DISCONTINUED | OUTPATIENT
Start: 2024-10-23 | End: 2024-10-24

## 2024-10-23 RX ORDER — ONDANSETRON 2 MG/ML
4 INJECTION INTRAMUSCULAR; INTRAVENOUS EVERY 6 HOURS PRN
Status: DISCONTINUED | OUTPATIENT
Start: 2024-10-23 | End: 2024-10-25 | Stop reason: HOSPADM

## 2024-10-23 RX ORDER — VIT C/E/ZN/COPPR/LUTEIN/ZEAXAN 60 MG-6 MG
1 CAPSULE ORAL DAILY
COMMUNITY

## 2024-10-23 RX ORDER — NICOTINE POLACRILEX 4 MG
15-30 LOZENGE BUCCAL
Status: DISCONTINUED | OUTPATIENT
Start: 2024-10-23 | End: 2024-10-25 | Stop reason: HOSPADM

## 2024-10-23 RX ORDER — SODIUM CHLORIDE 9 MG/ML
INJECTION, SOLUTION INTRAVENOUS CONTINUOUS
Status: DISCONTINUED | OUTPATIENT
Start: 2024-10-23 | End: 2024-10-25

## 2024-10-23 RX ORDER — ONDANSETRON 4 MG/1
4 TABLET, ORALLY DISINTEGRATING ORAL EVERY 6 HOURS PRN
Status: DISCONTINUED | OUTPATIENT
Start: 2024-10-23 | End: 2024-10-25 | Stop reason: HOSPADM

## 2024-10-23 RX ORDER — DEXTROSE MONOHYDRATE 25 G/50ML
25-50 INJECTION, SOLUTION INTRAVENOUS
Status: DISCONTINUED | OUTPATIENT
Start: 2024-10-23 | End: 2024-10-25 | Stop reason: HOSPADM

## 2024-10-23 RX ORDER — ROPIVACAINE IN 0.9% SOD CHL/PF 0.1 %
.01-.125 PLASTIC BAG, INJECTION (ML) EPIDURAL CONTINUOUS
Status: DISCONTINUED | OUTPATIENT
Start: 2024-10-23 | End: 2024-10-24

## 2024-10-23 RX ORDER — CEFTRIAXONE 2 G/1
2 INJECTION, POWDER, FOR SOLUTION INTRAMUSCULAR; INTRAVENOUS EVERY 24 HOURS
Status: DISCONTINUED | OUTPATIENT
Start: 2024-10-23 | End: 2024-10-23

## 2024-10-23 RX ORDER — CEFTRIAXONE 2 G/1
2 INJECTION, POWDER, FOR SOLUTION INTRAMUSCULAR; INTRAVENOUS EVERY 24 HOURS
Status: DISCONTINUED | OUTPATIENT
Start: 2024-10-23 | End: 2024-10-25

## 2024-10-23 RX ADMIN — NOREPINEPHRINE BITARTRATE 0.03 MCG/KG/MIN: 0.02 INJECTION, SOLUTION INTRAVENOUS at 01:10

## 2024-10-23 RX ADMIN — VANCOMYCIN HYDROCHLORIDE 1000 MG: 1 INJECTION, SOLUTION INTRAVENOUS at 09:06

## 2024-10-23 RX ADMIN — CEFTRIAXONE SODIUM 2 G: 2 INJECTION, POWDER, FOR SOLUTION INTRAMUSCULAR; INTRAVENOUS at 18:15

## 2024-10-23 RX ADMIN — VANCOMYCIN HYDROCHLORIDE 1000 MG: 1 INJECTION, SOLUTION INTRAVENOUS at 20:44

## 2024-10-23 RX ADMIN — SODIUM CHLORIDE: 9 INJECTION, SOLUTION INTRAVENOUS at 14:10

## 2024-10-23 RX ADMIN — SODIUM CHLORIDE: 9 INJECTION, SOLUTION INTRAVENOUS at 03:30

## 2024-10-23 ASSESSMENT — ACTIVITIES OF DAILY LIVING (ADL)
ADLS_ACUITY_SCORE: 0

## 2024-10-23 NOTE — DISCHARGE INSTRUCTIONS
"L posterior upper scapula wound(s): Every 3 days and prn  Clean with Vashe soak on gauze and apply to wound. Allow to stay on wound for 5-10 minutes. Then gently clean wound/surrounding skin and pat dry.  Cut a piece of Aquacel Ag to fit wound and apply.  Cover with Mepilex Border 6x6 dressing.     It is better to leave the wound covered instead of allowing it to \"air out.\" If you run out of these supplies before seeing your doctor, you can keep the wound covered with a non-stick bandage with gentle tape border or can order the Mepilex Border 6x6 (or \"silicone foam bandage 6x6\") on [employer]. You also can clean the wound in the shower with mild soap/water, rinse, and pat dry once you run out of Vashe.    [facility] and to schedule an appointment with dermatology to look at your wound.  "

## 2024-10-23 NOTE — H&P
[facility]    History and Physical - Hospitalist Service       Date of Admission:  10/23/2024    Assessment & Plan      Jayme Donahue is a 84 year old male admitted on 10/23/2024. He comes in for urosepsis.    UROSEPSIS  Noted at OSH and sent to [facility] for treatment.  Needs pressor on transport and will continue as needed.  Received 2.5 L of fluid and will continue.  Ceftriaxone and vancomycin.  Will get MRSA swab to see if can stop vanco  Urine cx at OSH?  Documents to be loaded.    HYPONATREMIA  Na was 118 at OSH and 120 here.  Likely due to dehydration.  Continue IVF and follow Na q6.    LEFT SCAPULAR LESION  Has been there for months per patient and has not had it evaluated.  Wound try to get biopsy when appropriate as it looks malignant.    HX PROSTATE CANCER  Initially treated in 2020?  PSA 7/2024 was 54 and in 9/2024 was 57.  Unclear what treatment he has gotten.    WEAKNESS  Due to illness and add PT/OT    CARDIAC MURMUR  Difficult to hear so will ask day provider to reassess.    DNR  Wife is POA        Diet:  regular  DVT Prophylaxis: Enoxaparin (Lovenox) SQ  Palmer Catheter: PRESENT, indication: ICU only: hourly urine output needed for patient care  Lines: None     Cardiac Monitoring: ACTIVE order. Indication: ICU  Code Status: No CPR- Do NOT Intubate      Clinically Significant Risk Factors Present on Admission         # Hyponatremia: Lowest Na = 120 mmol/L in last 2 days, will monitor as appropriate  # Hypochloremia: Lowest Cl = 92 mmol/L in last 2 days, will monitor as appropriate      # Hypoalbuminemia: Lowest albumin = 2.2 g/dL at 10/23/2024  1:26 AM, will monitor as appropriate       # Circulatory Shock: required vasopressors within past 24 hours                     Disposition Plan     Medically Ready for Discharge:            Carnia Luevano MD  Hospitalist Service  [facility]  Securely message with [employer] (more info)  Text page via [employer]  Paging/Directory     ______________________________________________________________________    Chief Complaint   weakness    History is obtained from the patient    History of Present Illness   Jayme Donahue is a 84 year old male who was transferred from OSH due to urosepsis and needing ICU level of care.  He states he ate bad ice cream on 10/18 and vomited all night.  He has felt poorly since then; weak and unable to drink much fluids.  No diarrhea/constipation.  No fever/chills. Denies abd pain. No dsypnea or oxygen need.  He states no trouble with urination and no hx retention.      Past Medical History    Past Medical History:   Diagnosis Date    Prostate cancer (H)        Past Surgical History   History reviewed. No pertinent surgical history.    Prior to Admission Medications   None        Review of Systems    The 10 point Review of Systems is negative other than noted in the HPI or here.     Social History   I have reviewed this patient's social history and updated it with pertinent information if needed.  Social History     Tobacco Use    Smoking status: Never    Smokeless tobacco: Never   Substance Use Topics    Alcohol use: Not Currently    Drug use: Never   Lives at home with wife, independent.      Family History           Allergies   No Known Allergies     Physical Exam   Vital Signs: Temp: 97  F (36.1  C) Temp src: Axillary BP: 97/62 Pulse: 66   Resp: 18 SpO2: 96 % O2 Device: None (Room air)    Weight: 130 lbs 0 oz    General Appearance: Wd thin ill appearing man  Respiratory: lcta,no dypsnea  Cardiovascular: rrr, 3/6 systolic murmur???  GI: no distension or abd pain on palpation  Skin: clear except on left scapula area round area of skin with abrasion? Raised edges and small area of eschar.      Medical Decision Making         Data     I have personally reviewed the following data over the past 24 hrs:    N/A  \   N/A   / N/A     120 (L) 92 (L) 15.1 /  117 (H)   4.4 24 0.68 \     ALT: N/A AST: N/A AP:  N/A TBILI: N/A   ALB: 2.2 (L) TOT PROTEIN: N/A LIPASE: N/A       Imaging results reviewed over the past 24 hrs:   No results found for this or any previous visit (from the past 24 hours).    As the provider for the telehealth service, I attest that I introduced myself to the patient and provided my credentials. Based on review of the patient s chart and/or a discussion with members of the patient s treatment team, I determined that telemedicine via a real-time, two-way, interactive audio and video platform is an appropriate means of providing this service. The patient and I mutually agreed that this visit is appropriate for telemedicine as well.  The RN, or tech on duty in the ER, assisted me with the patient.  The patient was located at [facility] and I am located in [place], [place].  The video portion of the visit was approximately 20 mins.

## 2024-10-23 NOTE — PHARMACY-VANCOMYCIN DOSING SERVICE
Pharmacy Vancomycin Initial Note  Date of Service 2024  Patient's  1940  84 year old, male    Indication: Sepsis and Urinary Tract Infection    Current estimated CrCl = Estimated Creatinine Clearance: 67.5 mL/min (based on SCr of 0.68 mg/dL).    Creatinine for last 3 days  10/23/2024:  1:26 AM Creatinine 0.68 mg/dL    Recent Vancomycin Level(s) for last 3 days  No results found for requested labs within last 3 days.      Vancomycin IV Administrations (past 72 hours)        No vancomycin orders with administrations in past 72 hours.                    Nephrotoxins and other renal medications (From now, onward)      Start     Dose/Rate Route Frequency Ordered Stop    10/23/24 0130  norepinephrine (LEVOPHED) 4 mg in  mL PERIPHERAL infusion         0.01-0.125 mcg/kg/min × 59 kg  2.2-27.7 mL/hr  Intravenous CONTINUOUS 10/23/24 0111              Contrast Orders - past 72 hours (72h ago, onward)      None            Insight[employer] Prediction of Planned Initial Vancomycin Regimen  Loading dose: Received 20mg/kg at OSH 10/22@2100 (1gm vs 1250mg)  Regimen: 1000 mg IV every 12 hours.  Start time: 03:15 on 10/23/2024  Exposure target: AUC24 (range)400-600 mg/L.hr   AUC24,ss: 668 mg/L.hr  Probability of AUC24 > 400: 94 %  Ctrough,ss: 21.5 mg/L  Probability of Ctrough,ss > 20: 57 %  Probability of nephrotoxicity (Lodise J CARLOS ): 20 %          Plan:  Start vancomycin  1000 mg IV q12h.  Note that this regimen is predicted to exceed AUC goal if therapy continues beyond 3 days but opted to use premade strength d/t IV fluid shortage.  Vancomycin monitoring method: AUC  Vancomycin therapeutic monitoring goal: 400-600 mg*h/L  Pharmacy will check vancomycin levels as appropriate in 1-3 Days.   Serum creatinine levels will be ordered daily for the first week of therapy and at least twice weekly for subsequent weeks.      Migdalia Mcwilliams, [facility]

## 2024-10-23 NOTE — PROGRESS NOTES
Intensivist Progress note    Chart reviewed including H&P, Progress notes, medications, laboratory studies and imaging.  85 YO with prostate cancer presented to outside hospital. Found to have hyponatremia.  States ate ice cream 10-18 and has felt poorly since that time.  Nausea and poor oral intake, denies diarrhea.  On presentation found to have Na 118, was given 2.5L.  Urinalysis done and was diagnosed with presumed urosepsis.  Transferred to [facility], started on Levophed, ceftriaxone and vancomycin.    Vitals  AF [id number]  /60  NE 0.04  RA 98%    Labs:  Na 120 to 123, K 4.43, HCO3 24, Glu 117, BIN 15, creat 0.68.  WBC 11.4, Hb 11. CRP 91. Ca 7.7, albumin 2.2.  Urine Na 45.      Assessment and Plan:    Renal- hyponatremia.  Spot urine sodium would suggest SIADH but history suggest hypovolemia; urine sodium may reflect the 2.5 L saline received at outside hospital.  Serum sodium is correcting slowly at appropriate rate, plan 8-10 meq per 24 hours.  If difficulting correcting need consider SIADH and obtain urine and serum osmoles.    2. ID- presumed urosepsis versus GI related illness.  Elevated CRP, WBC mildly elevated.  Need outside urinalysis.  Can check procalcitonin to assist to assess for infection.  - Continue ceftriaxone and vancomycin for now.  Await MRSA swab  - Await outside culture results    3. CV- sepsis with hypotension, inappropriate heart rate response to hypotension.,  Low serum calcium.  No history of being on beta blocker or calcium channel blocker.  - Check ionzed calcium and replete if low  - Continue to wean vasopressors      10-23 [id number] Addendum:  Urine laboratory studies reviewed. Consistent with SIADH; based on urine osmolality need to fluid restrict to 1L, this will correct sodium.      Jenifer Hernandez MD  [phone]

## 2024-10-23 NOTE — PROGRESS NOTES
Skin affirmation note    Admitting nurse completed full skin assessment, Phillip score and Phillip interventions. This writer agrees with the initial skin assessment findings.

## 2024-10-23 NOTE — MEDICATION SCRIBE - ADMISSION MEDICATION HISTORY
"Medication Scribe Admission Medication History    Admission medication history is complete. The information provided in this note is only as accurate as the sources available at the time of the update.    Information Source(s): Patient via in-person    Pertinent Information: Patient does not take any prescription medication. Supplements have not been taken in \"several days\" per patient due to current condition    Changes made to PTA medication list:  Added: All meds on PTA list  Deleted: None  Changed: None    Allergies reviewed with patient and updates made in EHR: yes    Medication History Completed By: Shanae Ferrell 10/23/2024 9:32 AM    PTA Med List   Medication Sig Last Dose/Taking    Capsicum, Cayenne, (CAYENNE PEPPER PO) Take 1 capsule by mouth daily. Past Month Morning    Misc Natural Products (SAW PALMETTO) CAPS Take 1 capsule by mouth daily. Past Month Morning    multivitamin  with lutein (OCUVITE WITH LUTEIN) CAPS per capsule Take 1 capsule by mouth daily. Past Month Morning     "

## 2024-10-23 NOTE — PROGRESS NOTES
End Of Shift Note    Situation: Urosepsis, dehydration    Plan: Rehydration, ABX    Subjective/Objective:    Neuro: A & O x 4    Cardiac: SR, Levophed for MAP over 65, afebrile    Resp: room air, LS clear dim    GI/: Palmer for retention placed at [place]. (Hx prostate ca, has undergone radiation )Swallow intact    Endo: WDL, non diabetic    MSK: Bedrest, normally walks with cane    Skin: Left scapula wopund, pt unsure how it happened    LDAs: PIV x 2, NS and Levo running

## 2024-10-23 NOTE — PROGRESS NOTES
"[facility]    Medicine Progress Note - Hospitalist Service    Date of Admission:  10/23/2024    Assessment & Plan   Jayme Donahue is a 84 year old male admitted on 10/23/2024. He comes in for hypotension and hyponatremia in the setting of dehydration/decreased intake.    Sepsis, unknown etiology (Gram positive bacteremia vs UTI)  Hypotension  Generalized weakness  WBC 11.4, Pro-Denis 0.56, UA with few bacteria, BP 80/50 requiring Levophed.  CRP 91.  He required vasopressors on transport to maintain adequate BP.  Patient reports ingesting some \"bad ice cream\" 10/18 and has not had great intake since that time.  Notable fatigue without any diarrhea or vomiting.  He received 2.5 L of fluid prior to admission and was started on IVF maintenance.  Started empirically on Ceftriaxone and vancomycin. Blood cultures from OSH with gram + organism and UA with gram + edgardo. In addition, scapular lesion also positive for gram + edgardo which could correlate to UA, but will await for final culture results and susceptibilities.   - Continue empiric Rocephin and vancomycin IV  - Awaiting urine cultures from outside hospital  - Continue Levophed to maintain MAP >65 mmHg, wean as able  - Continue IVF maintenance at 75 ml/hr  - Daily CBC  - PT/OT/care management consulted    Hyponatremia  Na was 118 at OSH and 120 here. Likely due to dehydration in setting of decreased oral intake.  Urine sodium 45, urine osmolality 632 which could be reflective of fluid bolus at OSH vs SIADH.  - Continue IVF and follow Na every 4 hours  - Goal correction is 8-10 mEq every 24 hours  - Regular diet  - Strict intake and output    Left scapular lesion  Has been there for months per patient and has not had it evaluated.  3.3 cm x 4 cm x 0.1 cm.  - Inpatient dermatology consulted  - WOC consulted  - Will need outpatient follow-up with PCP and dermatology for further evaluation for malignancy with skin biopsy    History of prostate " cancer  Initially [facility] in 2020, but [facility]. PSA 7/2024 was 54 and in 9/2024 was 57. Unclear what treatment he has gotten.  - Continue with outpatient follow-up    Systolic cardiac murmur  Murmur heard on exam that is 4/6.  Patient notes a history of murmur, however no previous workup or echocardiograms to compare.  - TTE pending    Moderate protein malnutrition  BMI 20.9, albumin 2.2, chronic wound with poor healing well indicating poor nutritional status.  -Ensure supplementation 3 times daily with meals        Diet: Regular Diet Adult    DVT Prophylaxis: Pneumatic Compression Devices  Palmer Catheter: PRESENT, indication: ICU only: hourly urine output needed for patient care  Lines: None     Cardiac Monitoring: ACTIVE order. Indication: ICU  Code Status: No CPR- Do NOT Intubate      Clinically Significant Risk Factors Present on Admission         # Hyponatremia: Lowest Na = 120 mmol/L in last 2 days, will monitor as appropriate  # Hypochloremia: Lowest Cl = 92 mmol/L in last 2 days, will monitor as appropriate      # Hypoalbuminemia: Lowest albumin = 2.2 g/dL at 10/23/2024  5:19 AM, will monitor as appropriate  # Coagulation Defect: INR = 1.53 (Ref range: 0.85 - 1.15) and/or PTT = N/A, will monitor for bleeding      # Circulatory Shock: required vasopressors within past 24 hours                     Disposition Plan     Medically Ready for Discharge: Anticipated in 2-4 Days             Albino Abel DO  Hospitalist Service  [facility]  Securely message with [employer] (more info)  Text page via [facility] Paging/Directory   ______________________________________________________________________    Interval History   No acute events overnight.  Patient notes that he feels better about the same if not better than when he did when he first came into the hospital.  Reports overall fatigue and malaise.  Denies any vomiting, nausea, or diarrhea.  Denies shortness of breath  or cough.  No new rashes other than the left scapular lesion that he says has been roughly the same.    Physical Exam   Vital Signs: Temp: 97.6  F (36.4  C) Temp src: Oral BP: 93/50 Pulse: 78   Resp: 20 SpO2: 100 % O2 Device: None (Room air)    Weight: 130 lbs 0 oz    Constitutional: awake, alert, cooperative, no apparent distress, and appears stated age  Respiratory: No increased work of breathing, good air exchange, clear to auscultation bilaterally, no crackles or wheezing  Cardiovascular: Normal apical impulse, regular rate and rhythm, normal S1 and S2, no S3 or S4, and no murmur noted  GI: No scars, normal bowel sounds, soft, non-distended, non-tender, no masses palpated, no hepatosplenomegally  Skin: Large lesion over scapula with rolled edges and pink granulation tissue at base, ecchymosis surrounding  Musculoskeletal: There is no redness, warmth, or swelling of the joints.  Full range of motion noted.  Motor strength is 5 out of 5 all extremities bilaterally.  Tone is normal.    Medical Decision Making       50 MINUTES SPENT BY ME on the date of service doing chart review, history, exam, documentation & further activities per the note.      Data     I have personally reviewed the following data over the past 24 hrs:    11.4 (H)  \   11.0 (L)   / 162     121 (L) 93 (L) 13.4 /  136 (H)   4.3 23 0.65 (L) \     ALT: 20 AST: 22 AP: 88 TBILI: 0.7   ALB: 2.2 (L) TOT PROTEIN: 4.5 (L) LIPASE: N/A     Procal: 0.56 (H) CRP: 91.29 (H) Lactic Acid: N/A       INR:  1.53 (H) PTT:  N/A   D-dimer:  N/A Fibrinogen:  N/A       Imaging results reviewed over the past 24 hrs:   No results found for this or any previous visit (from the past 24 hours).

## 2024-10-23 NOTE — CONSULTS
"[facility]  WO Nurse Inpatient Assessment     Consulted for: left posterior upper scapula    Summary: Pt with chronic wound present \"for a few months\" of unknown etiology. As pt doesn't relay pressure to area/wound doesn't appear pressure-related, wound appearance is concerning for possible malignancy. Recommend pt follow up with PCP and dermatology at [facility] after discharge.    Patient History (according to provider note(s):      Jayme Donahue is a 84 year old male admitted on 10/23/2024. He comes in for urosepsis.     UROSEPSIS  Noted at OSH and sent to [facility] for treatment.  Needs pressor on transport and will continue as needed.  Received 2.5 L of fluid and will continue.  Ceftriaxone and vancomycin.  Will get MRSA swab to see if can stop vanco  Urine cx at OSH?  Documents to be loaded.     HYPONATREMIA  Na was 118 at OSH and 120 here.  Likely due to dehydration.  Continue IVF and follow Na q6.     LEFT SCAPULAR LESION  Has been there for months per patient and has not had it evaluated.  Wound try to get biopsy when appropriate as it looks malignant.     HX PROSTATE CANCER  Initially treated in 2020?  PSA 7/2024 was 54 and in 9/2024 was 57.  Unclear what treatment he has gotten.    Assessment:      Areas visualized during today's visit: Focused: L posterior upper scapula    Wound location: L posterior upper scapula    Last photo: 10/23/24  Wound due to: Unknown Etiology - appearance concerning for possible malignancy  Wound history/plan of care: pt has been covering with a \"plastic covering\" so it doesn't drain on his clothes, tries to let it \"dry out\"  Wound base: >95% pale pink, 5% dark red/black, rolled edges     Palpation of the wound bed: normal      Drainage: moderate     Description of drainage: serosanguinous     Measurements (length x width x depth, in cm): 3.3cm x 4cm x 0.1cm     Tunneling: N/A     Undermining: N/A  Periwound skin: Intact      Color:  pale purple      " "Temperature: normal   Odor: none  Pain: mild and during dressing change,  \"I can feel it\"  Pain interventions prior to dressing change: patient tolerated well, slow and gentle cares , and distraction  Treatment goal: Infection control/prevention and Protection  STATUS: initial assessment  Supplies ordered: supplies stored on unit, discussed with RN, and discussed with patient       Treatment Plan:     L posterior upper scapula wound(s): Every 3 days and prn  Clean with Vashe soak on gauze and apply to wound. Allow to stay on wound for 5-10 minutes. Then gently clean wound/surrounding skin and pat dry.  Cut a piece of Aquacel Ag to fit wound and apply.  Cover with Mepilex Border 6x6 dressing.   Leave dressing in place and remove only for scheduled wound cares. No need to peel back for Q12H skin checks    Orders: Written    RECOMMEND PRIMARY TEAM ORDER: Case management consult - WOC will order to help coordinate [facility] follow up (if appropriate)  Education provided: plan of care, Off-loading pressure, and need for [facility] follow up with PCP and dermatology  Discussed plan of care with: Patient and Nurse  WOC nurse follow-up plan: weekly  Notify WOC if wound(s) deteriorate.  Nursing to notify the Provider(s) and re-consult the WOC Nurse if new skin concern.    DATA:     Current support surface: Standard  Low air loss (MARK pump, Isolibrium, Pulsate)  Containment of urine/stool:  n/a  BMI: Body mass index is 20.98 kg/m .   Active diet order: Orders Placed This Encounter      Regular Diet Adult     Output: I/O last 3 completed shifts:  In: 263.04 [P.O.:240; I.V.:23.04]  Out: 750 [Urine:750]     Labs:   Recent Labs   Lab 10/23/24  0519   ALBUMIN 2.2*   HGB 11.0*   INR 1.53*   WBC 11.4*     Pressure injury risk assessment:   Sensory Perception: 3-->slightly limited  Moisture: 4-->rarely moist  Activity: 3-->walks occasionally  Mobility: 3-->slightly limited  Nutrition: 2-->probably inadequate  Friction and Shear: 2-->potential " problem  Phillip Score: 17    Berta Montero RN, CWOCN   Pager no longer is use, please contact through [employer] group: [facility] [place]  Dept. Office Number: [facility]

## 2024-10-23 NOTE — PROGRESS NOTES
"[facility] [place]    Patient admitted to room 1002 at approximately 0045 via cart from emergency room. Patient was accompanied by transport tech.     Verbal SBAR report received from EMS and carol REED prior to patient arrival.     Patient trasferred to bed via air darrion. Patient alert and oriented X 3. The patient is not having any pain.  . Admission vital signs: Blood pressure 97/62, pulse 66, temperature 97  F (36.1  C), temperature source Axillary, resp. rate 18, height 1.676 m (5' 6\"), weight 59 kg (130 lb), SpO2 96%. Patient was oriented to plan of care, call light, bed controls, tv, telephone, bathroom, and visiting hours.     Risk Assessment    The following safety risks were identified during admission: fall. Yellow risk band applied: YES.     Skin Initial Assessment    This writer admitted this patient and completed a full skin assessment and Phillip score in the Adult PCS flowsheet.   Photo documentation of skin problem and/or wound competed via [employer] application (located under Media):  Yes    Appropriate interventions initiated as needed.     Secondary skin check completed by Aline REED.    Phillip Risk Assessment  Sensory Perception: 3-->slightly limited  Moisture: 3-->occasionally moist  Activity: 2-->chairfast  Mobility: 2-->very limited  Nutrition: 2-->probably inadequate  Friction and Shear: 1-->problem  Phillip Score: 13  Moisture Interventions: Urinary collection device  Nutrition Interventions: Maintain adequate hydration  Friction/Shear Interventions: HOB 30 degrees or less  Sensory/Activity/Mobility Interventions: Encourage activity/OOB, Turn: left/right positioning, Pillows between bony prominence  Mattress: Low air loss (MARK pump, Isolibrium, Skin Guard, Pulsate, Isoair, etc.)  Bed Frame: Standard width and length    Education    Patient has a [place] to Observation order: No  Observation education completed and documented: N/A      Belkis Brock RN      "

## 2024-10-23 NOTE — PLAN OF CARE
"End Of Shift Note      Subjective/Objective:    Neuro: alert and orientated x 4.  Wife and neighbor came to visit.     Cardiac: sinus rhythm.  Off levophed drip this afternoon.      Resp: lungs clear, diminished, on room air     GI/: mclain catheter placed in [facility], good urine output    Endo: blood sugar checks every 4 hours, stable.  BMP checks every 4 hours, last sodium 122.    MSK: assist of 2, walker and transfer belt.  Gait unsteady, sat in chair     Skin: buttocks red and blanchable.  Wound nurse evaluated left shoulder wound.    LDAs: PIV x 2      Goal Outcome Evaluation:  Problem: Adult Inpatient Plan of Care  Goal: Plan of Care Review  Description: The Plan of Care Review/Shift note should be completed every shift.  The Outcome Evaluation is a brief statement about your assessment that the patient is improving, declining, or no change.  This information will be displayed automatically on your shift  note.  Outcome: Progressing  Flowsheets (Taken 10/23/2024 1824)  Plan of Care Reviewed With: patient  Goal: Patient-Specific Goal (Individualized)  Description: You can add care plan individualizations to a care plan. Examples of Individualization might be:  \"Parent requests to be called daily at 9am for status\", \"I have a hard time hearing out of my right ear\", or \"Do not touch me to wake me up as it startles  me\".  Outcome: Progressing  Goal: Absence of Hospital-Acquired Illness or Injury  Outcome: Progressing  Intervention: Identify and Manage Fall Risk  Recent Flowsheet Documentation  Taken 10/23/2024 1600 by Genie Castro, RN  Safety Promotion/Fall Prevention:   activity supervised   lighting adjusted  Taken 10/23/2024 1200 by Genie Castro, RN  Safety Promotion/Fall Prevention:   activity supervised   lighting adjusted  Taken 10/23/2024 0800 by Genie Castro, RN  Safety Promotion/Fall Prevention:   activity supervised   lighting adjusted  Intervention: Prevent Skin Injury  Recent Flowsheet " Documentation  Taken 10/23/2024 1600 by Genie Castro RN  Body Position:   left   right   turned  Skin Protection: adhesive use limited  Taken 10/23/2024 1400 by Genie Castro RN  Body Position:   left   right   turned  Taken 10/23/2024 1200 by Genie Castro RN  Body Position:   left   right   turned  Skin Protection: adhesive use limited  Taken 10/23/2024 1000 by Genie Castro RN  Body Position: position changed independently  Taken 10/23/2024 0800 by Genie Castro RN  Body Position:   left   right   turned  Skin Protection: adhesive use limited  Goal: Optimal Comfort and Wellbeing  Outcome: Progressing  Intervention: Provide Person-Centered Care  Recent Flowsheet Documentation  Taken 10/23/2024 1600 by Genie Castro RN  Trust Relationship/Rapport:   care explained   choices provided  Taken 10/23/2024 1200 by Genie Castro RN  Trust Relationship/Rapport:   care explained   choices provided  Taken 10/23/2024 0800 by Genie Castro RN  Trust Relationship/Rapport:   care explained   choices provided  Goal: Readiness for Transition of Care  Outcome: Progressing     Problem: Risk for Delirium  Goal: Optimal Coping  Outcome: Progressing  Goal: Improved Behavioral Control  Outcome: Progressing  Intervention: Minimize Safety Risk  Recent Flowsheet Documentation  Taken 10/23/2024 1600 by Genie Castro RN  Enhanced Safety Measures: review medications for side effects with activity  Trust Relationship/Rapport:   care explained   choices provided  Taken 10/23/2024 1200 by Genie Castro RN  Enhanced Safety Measures: review medications for side effects with activity  Trust Relationship/Rapport:   care explained   choices provided  Taken 10/23/2024 0800 by Genie Castro RN  Enhanced Safety Measures: review medications for side effects with activity  Trust Relationship/Rapport:   care explained   choices provided  Goal: Improved Attention and Thought Clarity  Outcome: Progressing  Intervention: Maximize Cognitive  Function  Recent Flowsheet Documentation  Taken 10/23/2024 1600 by Genie Castro, DEREK  Sensory Stimulation Regulation: lighting decreased  Reorientation Measures:   calendar in view   clock in view  Taken 10/23/2024 1200 by Genie Castro RN  Sensory Stimulation Regulation: lighting decreased  Reorientation Measures:   calendar in view   clock in view  Taken 10/23/2024 0800 by Genie Castro RN  Sensory Stimulation Regulation: lighting decreased  Reorientation Measures:   calendar in view   clock in view  Goal: Improved Sleep  Outcome: Progressing     Problem: Sepsis/Septic Shock  Goal: Optimal Coping  Outcome: Progressing  Goal: Absence of Bleeding  Outcome: Progressing  Goal: Blood Glucose Level Within Targeted Range  Outcome: Progressing  Goal: Absence of Infection Signs and Symptoms  Outcome: Progressing  Intervention: Promote Recovery  Recent Flowsheet Documentation  Taken 10/23/2024 1600 by Genie Castro RN  Activity Management: activity adjusted per tolerance  Taken 10/23/2024 1200 by Genie Castro, RN  Activity Management: activity adjusted per tolerance  Taken 10/23/2024 0800 by Genie Castro RN  Activity Management: activity adjusted per tolerance  Goal: Optimal Nutrition Intake  Outcome: Progressing         Plan of Care Reviewed With: patient

## 2024-10-24 ENCOUNTER — APPOINTMENT (OUTPATIENT)
Dept: MRI IMAGING | Facility: CLINIC | Age: 84
DRG: 871 | End: 2024-10-24
Attending: STUDENT IN AN ORGANIZED HEALTH CARE EDUCATION/TRAINING PROGRAM
Payer: COMMERCIAL

## 2024-10-24 ENCOUNTER — APPOINTMENT (OUTPATIENT)
Dept: OCCUPATIONAL THERAPY | Facility: CLINIC | Age: 84
DRG: 871 | End: 2024-10-24
Attending: STUDENT IN AN ORGANIZED HEALTH CARE EDUCATION/TRAINING PROGRAM
Payer: COMMERCIAL

## 2024-10-24 ENCOUNTER — APPOINTMENT (OUTPATIENT)
Dept: PHYSICAL THERAPY | Facility: CLINIC | Age: 84
DRG: 871 | End: 2024-10-24
Attending: STUDENT IN AN ORGANIZED HEALTH CARE EDUCATION/TRAINING PROGRAM
Payer: COMMERCIAL

## 2024-10-24 LAB
ANION GAP SERPL CALCULATED.3IONS-SCNC: 5 MMOL/L (ref 7–15)
ANION GAP SERPL CALCULATED.3IONS-SCNC: 6 MMOL/L (ref 7–15)
ANION GAP SERPL CALCULATED.3IONS-SCNC: 6 MMOL/L (ref 7–15)
ANION GAP SERPL CALCULATED.3IONS-SCNC: 7 MMOL/L (ref 7–15)
ANION GAP SERPL CALCULATED.3IONS-SCNC: 7 MMOL/L (ref 7–15)
ANION GAP SERPL CALCULATED.3IONS-SCNC: 8 MMOL/L (ref 7–15)
BASOPHILS # BLD AUTO: 0 10E3/UL (ref 0–0.2)
BASOPHILS NFR BLD AUTO: 0 %
BUN SERPL-MCNC: 10.1 MG/DL (ref 8–23)
BUN SERPL-MCNC: 10.9 MG/DL (ref 8–23)
BUN SERPL-MCNC: 12.5 MG/DL (ref 8–23)
BUN SERPL-MCNC: 9.1 MG/DL (ref 8–23)
BUN SERPL-MCNC: 9.3 MG/DL (ref 8–23)
BUN SERPL-MCNC: 9.4 MG/DL (ref 8–23)
CALCIUM SERPL-MCNC: 7.5 MG/DL (ref 8.8–10.4)
CALCIUM SERPL-MCNC: 7.6 MG/DL (ref 8.8–10.4)
CALCIUM SERPL-MCNC: 7.7 MG/DL (ref 8.8–10.4)
CALCIUM SERPL-MCNC: 7.8 MG/DL (ref 8.8–10.4)
CHLORIDE SERPL-SCNC: 92 MMOL/L (ref 98–107)
CHLORIDE SERPL-SCNC: 93 MMOL/L (ref 98–107)
CHLORIDE SERPL-SCNC: 94 MMOL/L (ref 98–107)
CHLORIDE SERPL-SCNC: 94 MMOL/L (ref 98–107)
CREAT SERPL-MCNC: 0.47 MG/DL (ref 0.67–1.17)
CREAT SERPL-MCNC: 0.49 MG/DL (ref 0.67–1.17)
CREAT SERPL-MCNC: 0.51 MG/DL (ref 0.67–1.17)
CREAT SERPL-MCNC: 0.54 MG/DL (ref 0.67–1.17)
CREAT SERPL-MCNC: 0.54 MG/DL (ref 0.67–1.17)
CREAT SERPL-MCNC: 0.57 MG/DL (ref 0.67–1.17)
CRP SERPL-MCNC: 61.9 MG/L
EGFRCR SERPLBLD CKD-EPI 2021: >90 ML/MIN/1.73M2
EOSINOPHIL # BLD AUTO: 0.1 10E3/UL (ref 0–0.7)
EOSINOPHIL NFR BLD AUTO: 1 %
ERYTHROCYTE [DISTWIDTH] IN BLOOD BY AUTOMATED COUNT: 13.8 % (ref 10–15)
GLUCOSE SERPL-MCNC: 100 MG/DL (ref 70–99)
GLUCOSE SERPL-MCNC: 103 MG/DL (ref 70–99)
GLUCOSE SERPL-MCNC: 106 MG/DL (ref 70–99)
GLUCOSE SERPL-MCNC: 125 MG/DL (ref 70–99)
GLUCOSE SERPL-MCNC: 94 MG/DL (ref 70–99)
GLUCOSE SERPL-MCNC: 99 MG/DL (ref 70–99)
HCO3 SERPL-SCNC: 21 MMOL/L (ref 22–29)
HCO3 SERPL-SCNC: 21 MMOL/L (ref 22–29)
HCO3 SERPL-SCNC: 22 MMOL/L (ref 22–29)
HCO3 SERPL-SCNC: 23 MMOL/L (ref 22–29)
HCO3 SERPL-SCNC: 24 MMOL/L (ref 22–29)
HCO3 SERPL-SCNC: 25 MMOL/L (ref 22–29)
HCT VFR BLD AUTO: 31.3 % (ref 40–53)
HGB BLD-MCNC: 11.3 G/DL (ref 13.3–17.7)
HOLD SPECIMEN: NORMAL
IMM GRANULOCYTES # BLD: 0.1 10E3/UL
IMM GRANULOCYTES NFR BLD: 1 %
LYMPHOCYTES # BLD AUTO: 1.8 10E3/UL (ref 0.8–5.3)
LYMPHOCYTES NFR BLD AUTO: 19 %
MCH RBC QN AUTO: 30.6 PG (ref 26.5–33)
MCHC RBC AUTO-ENTMCNC: 36.1 G/DL (ref 31.5–36.5)
MCV RBC AUTO: 85 FL (ref 78–100)
MONOCYTES # BLD AUTO: 0.9 10E3/UL (ref 0–1.3)
MONOCYTES NFR BLD AUTO: 9 %
NEUTROPHILS # BLD AUTO: 6.7 10E3/UL (ref 1.6–8.3)
NEUTROPHILS NFR BLD AUTO: 70 %
NRBC # BLD AUTO: 0 10E3/UL
NRBC BLD AUTO-RTO: 0 /100
PLATELET # BLD AUTO: 183 10E3/UL (ref 150–450)
POTASSIUM SERPL-SCNC: 4.3 MMOL/L (ref 3.4–5.3)
POTASSIUM SERPL-SCNC: 4.5 MMOL/L (ref 3.4–5.3)
POTASSIUM SERPL-SCNC: 4.5 MMOL/L (ref 3.4–5.3)
POTASSIUM SERPL-SCNC: 4.6 MMOL/L (ref 3.4–5.3)
POTASSIUM SERPL-SCNC: 4.6 MMOL/L (ref 3.4–5.3)
POTASSIUM SERPL-SCNC: 4.8 MMOL/L (ref 3.4–5.3)
RBC # BLD AUTO: 3.69 10E6/UL (ref 4.4–5.9)
SODIUM SERPL-SCNC: 119 MMOL/L (ref 135–145)
SODIUM SERPL-SCNC: 121 MMOL/L (ref 135–145)
SODIUM SERPL-SCNC: 121 MMOL/L (ref 135–145)
SODIUM SERPL-SCNC: 123 MMOL/L (ref 135–145)
SODIUM SERPL-SCNC: 124 MMOL/L (ref 135–145)
SODIUM SERPL-SCNC: 124 MMOL/L (ref 135–145)
VANCOMYCIN SERPL-MCNC: 8 UG/ML
WBC # BLD AUTO: 9.6 10E3/UL (ref 4–11)

## 2024-10-24 PROCEDURE — 97165 OT EVAL LOW COMPLEX 30 MIN: CPT | Mod: GO

## 2024-10-24 PROCEDURE — 36415 COLL VENOUS BLD VENIPUNCTURE: CPT | Performed by: STUDENT IN AN ORGANIZED HEALTH CARE EDUCATION/TRAINING PROGRAM

## 2024-10-24 PROCEDURE — 250N000013 HC RX MED GY IP 250 OP 250 PS 637: Performed by: INTERNAL MEDICINE

## 2024-10-24 PROCEDURE — 99233 SBSQ HOSP IP/OBS HIGH 50: CPT | Performed by: STUDENT IN AN ORGANIZED HEALTH CARE EDUCATION/TRAINING PROGRAM

## 2024-10-24 PROCEDURE — 97530 THERAPEUTIC ACTIVITIES: CPT | Mod: GP

## 2024-10-24 PROCEDURE — 87149 DNA/RNA DIRECT PROBE: CPT | Performed by: STUDENT IN AN ORGANIZED HEALTH CARE EDUCATION/TRAINING PROGRAM

## 2024-10-24 PROCEDURE — 97161 PT EVAL LOW COMPLEX 20 MIN: CPT | Mod: GP

## 2024-10-24 PROCEDURE — 255N000002 HC RX 255 OP 636: Performed by: STUDENT IN AN ORGANIZED HEALTH CARE EDUCATION/TRAINING PROGRAM

## 2024-10-24 PROCEDURE — 87077 CULTURE AEROBIC IDENTIFY: CPT | Performed by: STUDENT IN AN ORGANIZED HEALTH CARE EDUCATION/TRAINING PROGRAM

## 2024-10-24 PROCEDURE — 250N000013 HC RX MED GY IP 250 OP 250 PS 637: Performed by: STUDENT IN AN ORGANIZED HEALTH CARE EDUCATION/TRAINING PROGRAM

## 2024-10-24 PROCEDURE — 120N000004 HC R&B MS OVERFLOW

## 2024-10-24 PROCEDURE — 86140 C-REACTIVE PROTEIN: CPT | Performed by: STUDENT IN AN ORGANIZED HEALTH CARE EDUCATION/TRAINING PROGRAM

## 2024-10-24 PROCEDURE — 97535 SELF CARE MNGMENT TRAINING: CPT | Mod: GO

## 2024-10-24 PROCEDURE — 250N000011 HC RX IP 250 OP 636: Performed by: STUDENT IN AN ORGANIZED HEALTH CARE EDUCATION/TRAINING PROGRAM

## 2024-10-24 PROCEDURE — 85025 COMPLETE CBC W/AUTO DIFF WBC: CPT | Performed by: STUDENT IN AN ORGANIZED HEALTH CARE EDUCATION/TRAINING PROGRAM

## 2024-10-24 PROCEDURE — 80048 BASIC METABOLIC PNL TOTAL CA: CPT | Performed by: STUDENT IN AN ORGANIZED HEALTH CARE EDUCATION/TRAINING PROGRAM

## 2024-10-24 PROCEDURE — 80202 ASSAY OF VANCOMYCIN: CPT | Performed by: STUDENT IN AN ORGANIZED HEALTH CARE EDUCATION/TRAINING PROGRAM

## 2024-10-24 PROCEDURE — 258N000003 HC RX IP 258 OP 636: Performed by: STUDENT IN AN ORGANIZED HEALTH CARE EDUCATION/TRAINING PROGRAM

## 2024-10-24 PROCEDURE — 72158 MRI LUMBAR SPINE W/O & W/DYE: CPT

## 2024-10-24 PROCEDURE — 87040 BLOOD CULTURE FOR BACTERIA: CPT | Performed by: STUDENT IN AN ORGANIZED HEALTH CARE EDUCATION/TRAINING PROGRAM

## 2024-10-24 PROCEDURE — A9585 GADOBUTROL INJECTION: HCPCS | Performed by: STUDENT IN AN ORGANIZED HEALTH CARE EDUCATION/TRAINING PROGRAM

## 2024-10-24 PROCEDURE — 258N000003 HC RX IP 258 OP 636: Performed by: INTERNAL MEDICINE

## 2024-10-24 RX ORDER — VANCOMYCIN HYDROCHLORIDE 1 G/200ML
1000 INJECTION, SOLUTION INTRAVENOUS EVERY 8 HOURS
Status: DISCONTINUED | OUTPATIENT
Start: 2024-10-24 | End: 2024-10-25 | Stop reason: HOSPADM

## 2024-10-24 RX ORDER — ACETAMINOPHEN 325 MG/1
650 TABLET ORAL EVERY 6 HOURS PRN
Status: DISCONTINUED | OUTPATIENT
Start: 2024-10-24 | End: 2024-10-25 | Stop reason: HOSPADM

## 2024-10-24 RX ORDER — SODIUM CHLORIDE 1 G/1
1 TABLET ORAL
Status: DISCONTINUED | OUTPATIENT
Start: 2024-10-24 | End: 2024-10-25 | Stop reason: HOSPADM

## 2024-10-24 RX ORDER — GADOBUTROL 604.72 MG/ML
6 INJECTION INTRAVENOUS ONCE
Status: COMPLETED | OUTPATIENT
Start: 2024-10-24 | End: 2024-10-24

## 2024-10-24 RX ADMIN — SODIUM CHLORIDE 1 G: 1 TABLET ORAL at 11:52

## 2024-10-24 RX ADMIN — SODIUM CHLORIDE 1 G: 1 TABLET ORAL at 08:02

## 2024-10-24 RX ADMIN — SODIUM CHLORIDE: 9 INJECTION, SOLUTION INTRAVENOUS at 22:37

## 2024-10-24 RX ADMIN — SODIUM CHLORIDE: 9 INJECTION, SOLUTION INTRAVENOUS at 11:25

## 2024-10-24 RX ADMIN — VANCOMYCIN HYDROCHLORIDE 1000 MG: 1 INJECTION, SOLUTION INTRAVENOUS at 18:39

## 2024-10-24 RX ADMIN — GADOBUTROL 6 ML: 604.72 INJECTION INTRAVENOUS at 16:37

## 2024-10-24 RX ADMIN — ACETAMINOPHEN 650 MG: 325 TABLET ORAL at 03:02

## 2024-10-24 RX ADMIN — CEFTRIAXONE SODIUM 2 G: 2 INJECTION, POWDER, FOR SOLUTION INTRAMUSCULAR; INTRAVENOUS at 17:36

## 2024-10-24 RX ADMIN — SODIUM CHLORIDE: 9 INJECTION, SOLUTION INTRAVENOUS at 03:39

## 2024-10-24 RX ADMIN — SODIUM CHLORIDE 1 G: 1 TABLET ORAL at 17:36

## 2024-10-24 RX ADMIN — VANCOMYCIN HYDROCHLORIDE 1000 MG: 1 INJECTION, SOLUTION INTRAVENOUS at 09:49

## 2024-10-24 RX ADMIN — ACETAMINOPHEN 650 MG: 325 TABLET ORAL at 15:30

## 2024-10-24 ASSESSMENT — ACTIVITIES OF DAILY LIVING (ADL)
ADLS_ACUITY_SCORE: 0
DEPENDENT_IADLS:: INDEPENDENT
ADLS_ACUITY_SCORE: 0

## 2024-10-24 NOTE — PROGRESS NOTES
Attempted MRI paperwork x 2.  Patient not wanting to complete MRI, says he is ready to get to TCU and his back hurts right now because his chair is reclined.  Explained purpose of MRI and he is not understanding.  He reports it is not necessary because he is heading to rehab soon and to not worry about his back, but he would be ok with doing the MRI tomorrow.  Every time RN sits down with patient to discuss, he is focused on RN retrieving items for him or the phone rings. Attempting to give tylenol at this time, but he currently is on the phone, giving his family his requests on how to divide his personal property and farmland.  He has taken one tablet of tylenol, will re approach again when off phone for second tablet and completion of MRI form and to re-explain purpose of imaging to encourage his understanding and compliance.

## 2024-10-24 NOTE — PHARMACY-VANCOMYCIN DOSING SERVICE
Pharmacy Vancomycin Note  Date of Service 2024  Patient's  1940   84 year old, male    Indication: Sepsis and Urinary Tract Infection  Day of Therapy: 3  Current vancomycin regimen: 1000 mg IV q12h  Current vancomycin monitoring method: AUC  Current vancomycin therapeutic monitoring goal: 400-600 mg*h/L    InsightRX Prediction of Current Vancomycin Regimen    Loading dose: 1250 mg IV once  Regimen: 1000 mg IV every 12 hours.  Exposure target: AUC24 (range)400-600 mg/L.hr   AUC24,ss: 388 mg/L.hr  Probability of AUC24 > 400: 43 %  Ctrough,ss: 9.1 mg/L  Probability of Ctrough,ss > 20: 0 %  Probability of nephrotoxicity (Lodise J CARLOS ): 5 %      Current estimated CrCl = Estimated Creatinine Clearance: 80.5 mL/min (A) (based on SCr of 0.57 mg/dL (L)).    Creatinine for last 3 days  10/23/2024:  1:26 AM Creatinine 0.68 mg/dL;  5:19 AM Creatinine 0.62 mg/dL;  5:19 AM Creatinine 0.62 mg/dL; 10:20 AM Creatinine 0.65 mg/dL;  2:08 PM Creatinine 0.60 mg/dL;  6:58 PM Creatinine 0.63 mg/dL;  9:44 PM Creatinine 0.58 mg/dL  10/24/2024:  1:53 AM Creatinine 0.54 mg/dL;  6:36 AM Creatinine 0.54 mg/dL; 10:07 AM Creatinine 0.57 mg/dL    Recent Vancomycin Levels (past 3 days)  10/24/2024:  6:36 AM Vancomycin 8.0 ug/mL    Vancomycin IV Administrations (past 72 hours)                     vancomycin (VANCOCIN) 1,000 mg in 200 mL dextrose intermittent infusion (mg) 1,000 mg New Bag 10/24/24 0949     1,000 mg New Bag 10/23/24 2044     1,000 mg New Bag  0906                    Nephrotoxins and other renal medications (From now, onward)      Start     Dose/Rate Route Frequency Ordered Stop    10/23/24 0900  vancomycin (VANCOCIN) 1,000 mg in 200 mL dextrose intermittent infusion         1,000 mg  200 mL/hr over 1 Hours Intravenous EVERY 12 HOURS 10/23/24 0323                 Contrast Orders - past 72 hours (72h ago, onward)      None            Interpretation of levels and current regimen:  Vancomycin level is reflective of  AUC less than 400    Has serum creatinine changed greater than 50% in last 72 hours: No    Renal Function appears relatively stable.    InsightRX Prediction of Planned New Vancomycin Regimen    Loading dose: N/A  Regimen: 1000 mg IV every 8 hours.  Start time: 18:00 on 10/24/2024  Exposure target: AUC24 (range)400-600 mg/L.hr   AUC24,ss: 581 mg/L.hr  Probability of AUC24 > 400: 99 %  Ctrough,ss: 15.7 mg/L  Probability of Ctrough,ss > 20: 14 %  Probability of nephrotoxicity (Lodise J CALROS 2009): 11 %      Plan:  Adjust vancomycin to 1000 mg IV every 8 hours to help achieve an AUC in the goal range.  Vancomycin monitoring method: AUC  Vancomycin therapeutic monitoring goal: 400-600 mg*h/L  Pharmacy will check vancomycin levels as appropriate in 1-3 Days.  Serum creatinine levels will be ordered every 48 hours.    Dimas Flores RPH

## 2024-10-24 NOTE — PLAN OF CARE
"Pt's VSS throughout the night. He did not sleep very well, only sleeping short periods of time. He is becoming more confused to events happening and forgot he was in the hospital, talking about the cardiac monitor \"putting things into him\".  He would occas pull on his mclain catheter and accidentally pulled out one IV.   He c/o back pain and wanted to \"get up and sit at the table so I can sleep better.... I take a pillow and put it on the table then sleep on it\". Unsure if this is actually how he is sleeping at home. Tylenol given for back pain with some relief but pt did not sleep much after this time.   Mclain cath patent with good output. Urine foul smelling, cloudy.   Sodium level falling throughout the night. NS increased to 125 ml/hr.   VSS, O2sat >94% RA.       "

## 2024-10-24 NOTE — PLAN OF CARE
Goal Outcome Evaluation:      Plan of Care Reviewed With: patient, spouse          Outcome Evaluation: TCU

## 2024-10-24 NOTE — PROGRESS NOTES
Per [facility] the pt had corynabacterium striatium in their BC/Urine/Wound, they sent the labs in for sensitivities to the [facility] and stated it will be about 3 days for them to result.  paged with this update.

## 2024-10-24 NOTE — PROGRESS NOTES
"[facility]    Medicine Progress Note - Hospitalist Service    Date of Admission:  10/23/2024    Assessment & Plan   Jayme Donahue is a 84 year old male admitted on 10/23/2024. He comes in for hypotension and hyponatremia in the setting of dehydration/decreased intake.    Sepsis, unknown etiology (Gram positive bacteremia vs UTI)  Corynebacterium striatum bacteremia   Concern for endocarditis and/or osteomyelitis    Hypotension  Generalized weakness  WBC 11.4, Pro-Denis 0.56, UA with few bacteria, BP 80/50 requiring Levophed.  CRP 91.  He required vasopressors on transport to maintain adequate BP.  Patient reports ingesting some \"bad ice cream\" 10/18 and has not had great intake since that time.  Notable fatigue without any diarrhea or vomiting.  He received 2.5 L of fluid prior to admission and was started on IVF maintenance.  Started empirically on Ceftriaxone and vancomycin. Blood cultures from OSH with gram + organism and UA with gram + edgardo. In addition, scapular lesion also positive for gram + edgardo which could correlate to UA, but will await for final culture results and susceptibilities.  10/24: Patient was weaned off vasopressors 10/23 without any recurrence of hypotension.  Patient notes significant back pain with hospital bed requiring chair to be able to sleep at night.  Patient denies any night sweats or fevers prior to admission, however depending on blood culture results may warrant lumbar imaging for investigation of vertebral osteomyelitis (although lower on the differential).  Our staff called [facility] and initial results showed Corynebacterium striatum in wound, urine, and blood cultures. Sent to [place] labs for final susceptibilities. Antibiogram from [facility] shows susceptibility to Vancomycin. Corynebacterium striatum can cause endocarditis in rare cases.   - Continue empiric Rocephin and vancomycin IV, awaiting final culture results to de-escalate  - Vitals per unit " routine  - Continue IVF maintenance at 125 ml/hr  - Repeat blood cultures  - MRI lumbar spine w/ and w/out contrast   - Daily CBC and BMP  - PT/OT/care management consulted, recommending TCU  - Infection Disease IP consult     Hyponatremia  Na was 118 at OSH and 120 here. Likely due to dehydration in setting of decreased oral intake.  Urine sodium 45, urine osmolality 632 which could be reflective of fluid bolus at OSH vs SIADH.  - Continue  ml/hr and follow Na every 6 hours  - Goal correction is 8-10 mEq every 24 hours  - Fluid restriction 1200 mL  - NaCl tabs 1 g 3 times daily  - Regular diet  - Strict intake and output  - Removed Palmer catheter    Left scapular lesion  Has been there for months per patient and has not had it evaluated.  3.3 cm x 4 cm x 0.1 cm.  - Unable to consult dermatology here at [facility] or inpatient  - WOC consulted  - Will need outpatient follow-up with PCP and dermatology for further evaluation for malignancy with skin biopsy    History of prostate cancer  Initially treated in 2020, but [facility]. PSA 7/2024 was 54 and in 9/2024 was 57. Unclear what treatment he has gotten.  - Continue with outpatient follow-up    Severe mitral regurgitation  Flail scallop vs valvular vegetation  Murmur heard on exam that is 4/6.  Patient notes a history of murmur, however no previous workup or echocardiograms to compare.  TTE with evidence of severe (4+) mitral regurgitation, moderate mitral valve prolapse, and mobile echo density on the atrial side of the mitral valve indicating either flail scallop or a vegetation.  Patient may warrant transfer for LINDSEY if initial blood culture results are positive for Staph aureus.  -Will need outpatient cardiology follow-up for potential valvular intervention vs further workup    Moderate protein malnutrition  BMI 20.9, albumin 2.2, chronic wound with poor healing well indicating poor nutritional status.  -Ensure supplementation 3 times  daily with meals        Diet: Regular Diet Adult  Snacks/Supplements Adult: Ensure Enlive; With Meals  Fluid restriction 1200 ML FLUID    DVT Prophylaxis: Pneumatic Compression Devices  Palmer Catheter: Not present  Lines: None     Cardiac Monitoring: None  Code Status: No CPR- Do NOT Intubate      Clinically Significant Risk Factors         # Hyponatremia: Lowest Na = 119 mmol/L in last 2 days, will monitor as appropriate  # Hypochloremia: Lowest Cl = 92 mmol/L in last 2 days, will monitor as appropriate      # Hypoalbuminemia: Lowest albumin = 2.2 g/dL at 10/23/2024  5:19 AM, will monitor as appropriate    # Coagulation Defect: INR = 1.53 (Ref range: 0.85 - 1.15) and/or PTT = N/A, will monitor for bleeding                         Disposition Plan     Medically Ready for Discharge: Anticipated in 2-4 Days             Albino Abel DO  Hospitalist Service  [facility]  Securely message with [employer] (more info)  Text page via [employer] Paging/Directory   ______________________________________________________________________    Interval History   No acute events overnight.  Patient with notable low back pain exacerbated by hospital bed.  Patient noted significant relief when sitting in the chair and was able to get some good sleep.  He was hesitant about removing the Palmer catheter, however I discussed infectious risk and concerns for prolonged retention the longer he left the catheter in place.  He was agreeable with the plan.  He otherwise had no acute concerns or complaints.  No further episodes of fever, hypotension, or infection.    Physical Exam   Vital Signs: Temp: 97.8  F (36.6  C) Temp src: Oral BP: 132/89 Pulse: 71   Resp: 18 SpO2: 97 % O2 Device: None (Room air)    Weight: 130 lbs 0 oz    Constitutional: awake, alert, cooperative, no apparent distress, and appears stated age  Respiratory: No increased work of breathing, good air exchange, clear to auscultation bilaterally, no crackles or  wheezing  Cardiovascular: Normal apical impulse, regular rate and rhythm, normal S1 and S2, no S3 or S4, and no murmur noted  GI: No scars, normal bowel sounds, soft, non-distended, non-tender, no masses palpated, no hepatosplenomegally  Skin: Large lesion over scapula with rolled edges and pink granulation tissue at base, ecchymosis surrounding  Musculoskeletal: Tenderness over spinous process L4-L5.  Full range of motion noted.  Motor strength is 5 out of 5 all extremities bilaterally.  Tone is normal.    Medical Decision Making       50 MINUTES SPENT BY ME on the date of service doing chart review, history, exam, documentation & further activities per the note.      Data     I have personally reviewed the following data over the past 24 hrs:    9.6  \   11.3 (L)   / 183     123 (L) 94 (L) 10.1 /  103 (H)   4.3 24 0.57 (L) \       Imaging results reviewed over the past 24 hrs:   Recent Results (from the past 24 hours)   Echocardiogram Complete   Result Value    LVEF  60-65%    Narrative    [id number]  [id number]  [id number]  [id number]^[email or web address]^JACQUELYN     [facility]  Echocardiography Laboratory  [address] [place] [address]     Name: FABIOLA ELI  MRN: [id number]  : 1940  Study Date: 10/23/2024 02:13 PM  Age: 84 yrs  Gender: Male  Patient Location: [facility]  Reason For Study: Cardiac Murmur  Ordering Physician: JACQUELYN KISER  Performed By: Corinna Holm RDCS     BSA: 1.7 m2  Height: 66 in  Weight: 130 lb  HR: 74  BP: 99/61 mmHg  ______________________________________________________________________________  Procedure  Complete Portable Echo Adult.  ______________________________________________________________________________  Interpretation Summary     Left ventricular systolic function is normal.  The visual ejection fraction is 60-65%.  No regional wall motion abnormalities noted.  There is severe (4+) mitral regurgitation.  Moderate mitral valve prolapse, posterior leaflet  There is mobile  echodensity on atrial side of mitral valve that likely  represents flail scallop although vegetation is also in differential. Flail  chord also noted.  Suggest LINDSEY for further evaluation if clinically appropriate. The study was  technically adequate. There is no comparison study available.  ______________________________________________________________________________  Left Ventricle  The left ventricle is normal in size. There is normal left ventricular wall  thickness. Left ventricular systolic function is normal. The visual ejection  fraction is 60-65%. Left ventricular diastolic function is indeterminate. No  regional wall motion abnormalities noted.     Right Ventricle  The right ventricle is normal size. The right ventricular systolic function is  normal.     Atria  Normal left atrial size. Right atrial size is normal.     Mitral Valve  Moderate mitral valve prolapse, posterior leaflet. There is severe (4+) mitral  regurgitation. The mitral regurgitant jet is anteriorly directed, which is  consistent with posterior leaflet pathology.     Tricuspid Valve  There is mild (1+) tricuspid regurgitation. The right ventricular systolic  pressure is approximated at 20.8 mmHg plus the right atrial pressure. IVC  diameter <2.1 cm collapsing >50% with sniff suggests a normal RA pressure of 3  mmHg.     Aortic Valve  There is mild trileaflet aortic sclerosis. There is mild (1+) aortic  regurgitation. No aortic stenosis is present.     Pulmonic Valve  The pulmonic valve is not well visualized.     Vessels  The aortic root is normal size.     Pericardium  There is no pericardial effusion.     Rhythm  Sinus rhythm was noted.  ______________________________________________________________________________  MMode/2D Measurements & Calculations     IVSd: 1.0 cm  LVIDd: 4.6 cm  LVIDs: 3.2 cm  LVPWd: 0.95 cm  FS: 32.2 %  LV mass(C)d: 157.0 grams  LV mass(C)dI: 94.3 grams/m2  Ao root diam: 3.7 cm  LA dimension: 2.9 cm  asc Aorta  Diam: 3.2 cm  LA/Ao: 0.78  Ao root diam index Ht(cm/m): 2.2  Ao root diam index BSA (cm/m2): 2.2  Asc Ao diam index BSA (cm/m2): 1.9  Asc Ao diam index Ht(cm/m): 1.9  LA Volume (BP): 50.6 ml     LA Volume Index (BP): 30.3 ml/m2  RV Base: 3.6 cm  RWT: 0.41  TAPSE: 2.2 cm     Doppler Measurements & Calculations  MV E max moises: 111.0 cm/sec  MV A max moises: 92.8 cm/sec  MV E/A: 1.2  MV dec slope: 470.9 cm/sec2  MV dec time: 0.24 sec  MR PISA: 11.9 cm2  MR ERO: 0.90 cm2  MR volume: 111.6 ml  PA acc time: 0.11 sec  TR max moises: 227.8 cm/sec  TR max P.8 mmHg  E/E' avg: 10.7  Lateral E/e': 11.1  Medial E/e': 10.3     RV S Moises: 13.7 cm/sec     ______________________________________________________________________________  Report approved by: Shemar Nice 10/23/2024 03:54 PM

## 2024-10-24 NOTE — PLAN OF CARE
"  End Of Shift Note    Subjective/Objective:    Neuro: alert and orientated, but some intermittent confusion noted this morning.  Does appear forgetful at times, when it comes to medical issues-such as his fluid restriction or how to use the walker.  He is very independent, and is ready to go to rehab.    Cardiac: Noted murmur in history, has ECHO 10/23.  No telemetry required.    Resp: room air, stable    GI/: mclain removed at 1130-patient had first void of 75 ml around 1630.  No stools this shift.  Last BM 10/23    Endo: Not known to be diabetic.    MSK: weak, uses walker and transfer belt and moves well for short distances in room with assist of one.     Skin:  dressing clean, dry and intact to left shoulder.  Buttocks red, intact and blanchable    LDAs: PIV infusing x 1    Currently at MRI.  Serial sodiums ordered, value improving.  On 1200 ml fluid restriction.     Problem: Adult Inpatient Plan of Care  Goal: Plan of Care Review  Description: The Plan of Care Review/Shift note should be completed every shift.  The Outcome Evaluation is a brief statement about your assessment that the patient is improving, declining, or no change.  This information will be displayed automatically on your shift  note.  Outcome: Progressing  Flowsheets (Taken 10/24/2024 1709)  Plan of Care Reviewed With: patient  Overall Patient Progress: improving  Goal: Patient-Specific Goal (Individualized)  Description: You can add care plan individualizations to a care plan. Examples of Individualization might be:  \"Parent requests to be called daily at 9am for status\", \"I have a hard time hearing out of my right ear\", or \"Do not touch me to wake me up as it startles  me\".  Outcome: Progressing  Goal: Absence of Hospital-Acquired Illness or Injury  Outcome: Progressing  Intervention: Identify and Manage Fall Risk  Recent Flowsheet Documentation  Taken 10/24/2024 1600 by Genie Castro RN  Safety Promotion/Fall Prevention:   activity " supervised   assistive device/personal items within reach   nonskid shoes/slippers when out of bed   patient and family education  Taken 10/24/2024 0800 by Genie Castro RN  Safety Promotion/Fall Prevention:   activity supervised   assistive device/personal items within reach   nonskid shoes/slippers when out of bed   patient and family education  Intervention: Prevent and Manage VTE (Venous Thromboembolism) Risk  Recent Flowsheet Documentation  Taken 10/24/2024 1600 by Genie Castro RN  VTE Prevention/Management: SCDs off (sequential compression devices)  Taken 10/24/2024 0800 by Genie Castro RN  VTE Prevention/Management: SCDs off (sequential compression devices)  Goal: Optimal Comfort and Wellbeing  Outcome: Progressing  Intervention: Monitor Pain and Promote Comfort  Recent Flowsheet Documentation  Taken 10/24/2024 1530 by Genie Castro RN  Pain Management Interventions: medication (see MAR)  Taken 10/24/2024 0800 by Genie Castro RN  Pain Management Interventions: repositioned  Goal: Readiness for Transition of Care  Outcome: Progressing     Problem: Risk for Delirium  Goal: Optimal Coping  Outcome: Progressing  Goal: Improved Behavioral Control  Outcome: Progressing  Intervention: Minimize Safety Risk  Recent Flowsheet Documentation  Taken 10/24/2024 1600 by Genie Castro RN  Enhanced Safety Measures: review medications for side effects with activity  Taken 10/24/2024 0800 by Genie Castro RN  Enhanced Safety Measures: review medications for side effects with activity  Goal: Improved Attention and Thought Clarity  Outcome: Progressing  Intervention: Maximize Cognitive Function  Recent Flowsheet Documentation  Taken 10/24/2024 1600 by Genie Castro RN  Sensory Stimulation Regulation: lighting decreased  Reorientation Measures:   calendar in view   clock in view  Taken 10/24/2024 0800 by Genie Castro RN  Sensory Stimulation Regulation: lighting decreased  Reorientation Measures:   calendar in view    clock in view  Goal: Improved Sleep  Outcome: Progressing     Problem: Sepsis/Septic Shock  Goal: Optimal Coping  Outcome: Progressing  Goal: Absence of Bleeding  Outcome: Progressing  Goal: Blood Glucose Level Within Targeted Range  Outcome: Progressing  Intervention: Optimize Glycemic Control  Recent Flowsheet Documentation  Taken 10/24/2024 1600 by Genie Castro RN  Hyperglycemia Management: blood glucose monitored  Taken 10/24/2024 0800 by Genie Castro RN  Hyperglycemia Management: blood glucose monitored  Goal: Absence of Infection Signs and Symptoms  Outcome: Progressing  Intervention: Promote Stabilization  Recent Flowsheet Documentation  Taken 10/24/2024 1600 by Genie Castro RN  Fluid/Electrolyte Management: fluids restricted  Taken 10/24/2024 0800 by Genie Castro RN  Fluid/Electrolyte Management: fluids restricted  Intervention: Promote Recovery  Recent Flowsheet Documentation  Taken 10/24/2024 1600 by Genie Castro, RN  Activity Management:   activity adjusted per tolerance   activity encouraged   standing at bedside  Taken 10/24/2024 1156 by Genie Castro, RN  Activity Management: ambulated to bathroom  Taken 10/24/2024 0800 by Genie Castro RN  Activity Management:   activity adjusted per tolerance   activity encouraged   standing at bedside  Goal: Optimal Nutrition Intake  Outcome: Progressing   Goal Outcome Evaluation:      Plan of Care Reviewed With: patient    Overall Patient Progress: improvingOverall Patient Progress: improving

## 2024-10-24 NOTE — PROGRESS NOTES
10/24/24 1200   Appointment Info   Signing Clinician's Name / Credentials (OT) Rochelle Nelson, OTR/L   Living Environment   People in Home spouse   Current Living Arrangements house   Home Accessibility no concerns   Living Environment Comments ramp to enter home. All needs met on main level.   Self-Care   Equipment Currently Used at Home none   Fall history within last six months no   Activity/Exercise/Self-Care Comment at baseline: independent with all ADLs/IADLs and related mobility. no AE used at baseline, however does have a walker at home if needed.   General Information   Onset of Illness/Injury or Date of Surgery 10/23/24   Referring Physician Albino Lawler,    Patient/Family Therapy Goal Statement (OT) to return home.   Additional Occupational Profile Info/Pertinent History of Current Problem Jayme Donahue is a 84 year old male admitted on 10/23/2024. He comes in for urosepsis.   Existing Precautions/Restrictions no known precautions/restrictions   Cognitive Status Examination   Orientation Status orientation to person, place and time   Cognitive Status Comments answers all questions appropriately. Very pleasant.   Pain Assessment   Patient Currently in Pain Yes, see Vital Sign flowsheet  (back pain. States he has not slept much lately d/t this.)   Strength Comprehensive (MMT)   Comment, General Manual Muscle Testing (MMT) Assessment B UE strength: WNL. General strength feels weaker than baseline. Fatigues with activity.   Transfers   Transfer Comments CGA for sit to stand using FWW. Needs cues to lean forward to grasp walker.   Balance   Balance Comments Ambulates to/from doorway with CGA and FWW.   Activities of Daily Living   BADL Assessment/Intervention toileting;lower body dressing   Lower Body Dressing Assessment/Training   Comment, (Lower Body Dressing) Not assessed- will address tomorrow.   Toileting   Comment, (Toileting) Per RN pt just returned from bathroom. per RN Ax1 for toilet transfer.  "  Clinical Impression   Criteria for Skilled Therapeutic Interventions Met (OT) Yes, treatment indicated   OT Diagnosis decreased independence with ADLs and related mobility   OT Problem List-Impairments impacting ADL problems related to;activity tolerance impaired;balance;strength   Assessment of Occupational Performance 3-5 Performance Deficits   Identified Performance Deficits LB dressing, toilet transfer, functional mobility, showering   Planned Therapy Interventions (OT) ADL retraining;progressive activity/exercise   Clinical Decision Making Complexity (OT) problem focused assessment/low complexity   Risk & Benefits of therapy have been explained patient;participants included;participants voiced agreement with care plan;current/potential barriers reviewed;risks/benefits reviewed;care plan/treatment goals reviewed;evaluation/treatment results reviewed   OT Total Evaluation Time   OT Eval, Low Complexity Minutes (06769) 10   OT Goals   Therapy Frequency (OT) Daily   OT Predicted Duration/Target Date for Goal Attainment 10/31/24   OT Goals Lower Body Dressing;Hygiene/Grooming;Toilet Transfer/Toileting   OT: Hygiene/Grooming supervision/stand-by assist;while standing   OT: Lower Body Dressing Supervision/stand-by assist   OT: Toilet Transfer/Toileting Supervision/stand-by assist   Interventions   Interventions Quick Adds Self-Care/Home Management   Self-Care/Home Management   Self-Care/Home Mgmt/ADL, Compensatory, Meal Prep Minutes (27134) 10   Symptoms Noted During/After Treatment (Meal Preparation/Planning Training) fatigue   Treatment Detail/Skilled Intervention Reports walk to/from doorway as \"5/10\" on OMNI effort scale. Needs education/cues on general body mechanics for sit to stand. Educated pt on IP OT role, POC- verbalizes understanding. Pt left seated up in chair with all needs within reach and chair alarm on.   OT Discharge Planning   OT Plan daily. LB dressing, toilet transfer, standing g/h   OT " Discharge Recommendation (DC Rec) Transitional Care Facility   OT Rationale for DC Rec TCU to increase independence with ADLs and related mobility. Depending on length of stay may be able to progress to home with home therapy   OT Brief overview of current status CGA and FWW. Needs cues   Total Session Time   Timed Code Treatment Minutes 10   Total Session Time (sum of timed and untimed services) 20

## 2024-10-24 NOTE — CONSULTS
Care Management Initial Consult    General Information  Assessment completed with: Patient, Spouse Pamela    Type of CM/SW Visit: Initial Assessment    Primary Care Provider verified and updated as needed: Yes   Readmission within the last 30 days: no previous admission in last 30 days      Reason for Consult: discharge planning  Advance Care Planning:          Communication Assessment  Patient's communication style: spoken language (English or Bilingual)    Hearing Difficulty or Deaf: no   Wear Glasses or Blind: no    Cognitive  Cognitive/Neuro/Behavioral: .WDL except, mood/behavior  Level of Consciousness: alert, intermittent confusion  Arousal Level: opens eyes spontaneously  Orientation: oriented x 4  Mood/Behavior: calm, cooperative  Best Language: 0 - No aphasia  Speech: clear, spontaneous, illogical    Living Environment:   People in home: spouse     Current living Arrangements: house      Able to return to prior arrangements: yes     Family/Social Support:  Care provided by: self  Provides care for: no one  Marital Status:   Support system: Wife  Pamela       Description of Support System: Supportive, Involved    Support Assessment: Adequate family and caregiver support, Adequate social supports    Current Resources:   Patient receiving home care services: No     Community Resources: None  Equipment currently used at home: none  Supplies currently used at home: None    Employment/Financial:  Employment Status: retired        Financial Concerns: none      Does the patient's insurance plan have a 3 day qualifying hospital stay waiver?  Yes     Which insurance plan 3 day waiver is available? Alternative insurance waiver    Will the waiver be used for post-acute placement? Yes    Lifestyle & Psychosocial Needs:  Social Drivers of Health     Food Insecurity: Low Risk  (10/23/2024)    Food Insecurity     Within the past 12 months, did you worry that your food would run out before you got money to buy  more?: No     Within the past 12 months, did the food you bought just not last and you didn t have money to get more?: No   Depression: Not on file   Housing Stability: Low Risk  (10/23/2024)    Housing Stability     Do you have housing? : Yes     Are you worried about losing your housing?: No   Tobacco Use: Low Risk  (10/23/2024)    Patient History     Smoking Tobacco Use: Never     Smokeless Tobacco Use: Never     Passive Exposure: Not on file   Financial Resource Strain: Low Risk  (10/23/2024)    Financial Resource Strain     Within the past 12 months, have you or your family members you live with been unable to get utilities (heat, electricity) when it was really needed?: No   Alcohol Use: Not on file   Transportation Needs: Low Risk  (10/23/2024)    Transportation Needs     Within the past 12 months, has lack of transportation kept you from medical appointments, getting your medicines, non-medical meetings or appointments, work, or from getting things that you need?: No   Physical Activity: Not on file   Interpersonal Safety: High Risk (10/23/2024)    Interpersonal Safety     Do you feel physically and emotionally safe where you currently live?: No     Within the past 12 months, have you been hit, slapped, kicked or otherwise physically hurt by someone?: No     Within the past 12 months, have you been humiliated or emotionally abused in other ways by your partner or ex-partner?: No   Stress: Not on file   Social Connections: Not on file   Health Literacy: Not on file     Functional Status:  Prior to admission patient needed assistance:   Dependent ADLs:: Independent  Dependent IADLs:: Independent     Mental Health Status:  Mental Health Status: No Current Concerns       Chemical Dependency Status:  Chemical Dependency Status: No Current Concerns           Values/Beliefs:  Spiritual, Cultural Beliefs, Quaker Practices, Values that affect care: no             Discussed  Partnership in Safe Discharge Planning   document with patient/family: Yes      Additional Information:  Care Management met with patient at bedside to complete initial assessment. CM also spoke briefly with patient's wife Pamela yesterday.     Patient resides with wife in a farmhouse in [place]. At baseline, patient is independent with all ADLs and IADLs. Patient continues to drive and manages own medications. Patient has no services or HHC at home and does not use any DME.     PT/OT saw patient his AM and are recommending TCU cares at this time. Discussed TCU with patient and also discussed HHC at home if patient improves. Patient is in agreement with TCU but would like to be as close to home ([place]). TCU referrals sent to:    Destination    Service Provider Request Status Services Address Phone Fax Patient Preferred   [facility]-LTC/SNF (SNF) Pending - Request Sent -- [address] [facility] [id number] [phone]    [facility]SWING BED UNIT (IP) Pending - Request Sent -- [address] [facility] [id number] [phone]    [facility]. (SNF) Pending - Request Sent -- [address] [address] [phone] --   Current Capacity last updated by Juliane Spence MA on 12/4/2023 10:54 AM    Peter: [phone]            [facility] [place] (SNF) Pending - Request Sent -- [address], [address], [facility] [address] [phone] --     CM will reassess if patient's recommendation changes.     Plan: TCU - refs pending. Patient prefers [place] SNF/Swing bed  Transport: Family/friends vs Agency - patient is considering both. Patient is aware of private pay costs associated with transport      [facility]  Inpatient Care Coordinator   [facility] [phone]  [facility] [phone]

## 2024-10-24 NOTE — PROGRESS NOTES
10/24/24 1135   Appointment Info   Signing Clinician's Name / Credentials (PT) Damari Gil, PT   Living Environment   People in Home spouse   Current Living Arrangements house   Home Accessibility no concerns   Living Environment Comments ramp to enter home, all needs met on main level   Self-Care   Equipment Currently Used at Home none   Fall history within last six months no   Activity/Exercise/Self-Care Comment indep with mobility without device, ADL's. has 2WW and 4WW at home if needed.   General Information   Onset of Illness/Injury or Date of Surgery 10/23/24   Referring Physician Albino Lawler DO   Patient/Family Therapy Goals Statement (PT) return home   Pertinent History of Current Problem (include personal factors and/or comorbidities that impact the POC) Jayme Donahue is a 84 year old male admitted on 10/23/2024. He comes in for urosepsis.   Cognition   Affect/Mental Status (Cognition) WFL   Orientation Status (Cognition) oriented x 4   Follows Commands (Cognition) WFL   Pain Assessment   Patient Currently in Pain Yes, see Vital Sign flowsheet  (no pain complaints during sesssion, reports back pain is improved with sitting in chair)   Range of Motion (ROM)   Range of Motion ROM is WFL   Strength (Manual Muscle Testing)   Strength (Manual Muscle Testing) Deficits observed during functional mobility   Strength Comments general LE weakness from reduced mobility   Bed Mobility   Comment, (Bed Mobility) up in chair, bed mobility not addressed   Transfers   Comment, (Transfers) sit>stand from recliner with 2WW and CGA   Gait/Stairs (Locomotion)   [place] Level (Gait) contact guard   Assistive Device (Gait) walker, front-wheeled   Distance in Feet (Gait) 30   Pattern (Gait) step-through   Deviations/Abnormal Patterns (Gait) gait speed decreased   Clinical Impression   Criteria for Skilled Therapeutic Intervention Yes, treatment indicated   PT Diagnosis (PT) impaired functional mobility   Influenced by  the following impairments LE weakness, reduced activity tolerance, back pain   Functional limitations due to impairments impaired transfers, gait   Clinical Presentation (PT Evaluation Complexity) stable   Clinical Presentation Rationale clinical reasoning   Clinical Decision Making (Complexity) low complexity   Planned Therapy Interventions (PT) balance training;gait training;home exercise program;patient/family education;strengthening;transfer training;progressive activity/exercise;risk factor education;home program guidelines   Risk & Benefits of therapy have been explained evaluation/treatment results reviewed;care plan/treatment goals reviewed;risks/benefits reviewed;current/potential barriers reviewed;participants voiced agreement with care plan;participants included;patient   PT Total Evaluation Time   PT Eval, Low Complexity Minutes (68331) 10   Physical Therapy Goals   PT Frequency 5x/week   PT Predicted Duration/Target Date for Goal Attainment 10/31/24   PT Goals Transfers;Gait   PT: Transfers Supervision/stand-by assist;Bed to/from chair;Assistive device   PT: Gait Supervision/stand-by assist;Standard walker;100 feet  (2WW v. no device)   Interventions   Interventions Quick Adds Therapeutic Activity   Therapeutic Activity   Therapeutic Activities: dynamic activities to improve functional performance Minutes (72078) 8   Symptoms Noted During/After Treatment Fatigue   Treatment Detail/Skilled Intervention edu on role of PT to assess mobility and assist in safe discharge plan, see above for mobility in room. use of 2WW for mobility given pt reports of feeling weaker than baseline, fatigues quickly. pt also having just returned from bathroom with nursing staff. discussed continued PT to increase indep and ease with mobility, pt in agreement. remains in chair, chair alarm on and call light in reach.   PT Discharge Planning   PT Plan Thurs 1/5; progress gait with 2WW, LE strengthening/endurancce   PT Discharge  Recommendation (DC Rec) Transitional Care Facility   PT Rationale for DC Rec rec TCU today as pt requires Ax1 for mobility in room, using walker and typically does not use device, pt needs to be indep with mobility in order to return home   PT Brief overview of current status sit>stand with CGA, amb 30 feet with 2WW and CGA   PT Equipment Needed at Discharge   (has 2WW and 4WW)   Physical Therapy Time and Intention   Timed Code Treatment Minutes 8   Total Session Time (sum of timed and untimed services) 18

## 2024-10-25 ENCOUNTER — HOSPITAL ENCOUNTER (INPATIENT)
Facility: CLINIC | Age: 84
LOS: 19 days | Discharge: NURSING, REHAB OR LONG-TERM CARE | DRG: 853 | End: 2024-11-13
Attending: INTERNAL MEDICINE | Admitting: HOSPITALIST
Payer: MEDICARE

## 2024-10-25 VITALS
WEIGHT: 130 LBS | DIASTOLIC BLOOD PRESSURE: 71 MMHG | BODY MASS INDEX: 20.89 KG/M2 | RESPIRATION RATE: 16 BRPM | HEART RATE: 82 BPM | SYSTOLIC BLOOD PRESSURE: 112 MMHG | TEMPERATURE: 97.6 F | HEIGHT: 66 IN | OXYGEN SATURATION: 100 %

## 2024-10-25 DIAGNOSIS — A41.9 SEPTIC SHOCK (H): ICD-10-CM

## 2024-10-25 DIAGNOSIS — K04.7 PERIAPICAL ABSCESS: ICD-10-CM

## 2024-10-25 DIAGNOSIS — Z95.2 S/P MVR (MITRAL VALVE REPLACEMENT): ICD-10-CM

## 2024-10-25 DIAGNOSIS — R78.81 BACTEREMIA: Primary | ICD-10-CM

## 2024-10-25 DIAGNOSIS — I05.9 ENDOCARDITIS OF MITRAL VALVE: ICD-10-CM

## 2024-10-25 DIAGNOSIS — C61 PROSTATE CANCER (H): ICD-10-CM

## 2024-10-25 DIAGNOSIS — R65.21 SEPTIC SHOCK (H): ICD-10-CM

## 2024-10-25 DIAGNOSIS — K02.9 DENTAL CARIES: ICD-10-CM

## 2024-10-25 DIAGNOSIS — R33.9 URINARY RETENTION: ICD-10-CM

## 2024-10-25 DIAGNOSIS — T14.8XXA OPEN WOUND: ICD-10-CM

## 2024-10-25 DIAGNOSIS — Z29.9 PREVENTIVE MEASURE: ICD-10-CM

## 2024-10-25 LAB
ANION GAP SERPL CALCULATED.3IONS-SCNC: 6 MMOL/L (ref 7–15)
ANION GAP SERPL CALCULATED.3IONS-SCNC: 9 MMOL/L (ref 7–15)
BUN SERPL-MCNC: 8.5 MG/DL (ref 8–23)
BUN SERPL-MCNC: 8.7 MG/DL (ref 8–23)
CALCIUM SERPL-MCNC: 7.7 MG/DL (ref 8.8–10.4)
CALCIUM SERPL-MCNC: 7.8 MG/DL (ref 8.8–10.4)
CHLORIDE SERPL-SCNC: 91 MMOL/L (ref 98–107)
CHLORIDE SERPL-SCNC: 92 MMOL/L (ref 98–107)
CREAT SERPL-MCNC: 0.5 MG/DL (ref 0.67–1.17)
CREAT SERPL-MCNC: 0.52 MG/DL (ref 0.67–1.17)
CRP SERPL-MCNC: 52.42 MG/L
EGFRCR SERPLBLD CKD-EPI 2021: >90 ML/MIN/1.73M2
EGFRCR SERPLBLD CKD-EPI 2021: >90 ML/MIN/1.73M2
ERYTHROCYTE [DISTWIDTH] IN BLOOD BY AUTOMATED COUNT: 13.5 % (ref 10–15)
GLUCOSE SERPL-MCNC: 101 MG/DL (ref 70–99)
GLUCOSE SERPL-MCNC: 99 MG/DL (ref 70–99)
HCO3 SERPL-SCNC: 21 MMOL/L (ref 22–29)
HCO3 SERPL-SCNC: 25 MMOL/L (ref 22–29)
HCT VFR BLD AUTO: 33.5 % (ref 40–53)
HGB BLD-MCNC: 12.1 G/DL (ref 13.3–17.7)
HOLD SPECIMEN: NORMAL
MCH RBC QN AUTO: 30.2 PG (ref 26.5–33)
MCHC RBC AUTO-ENTMCNC: 36.1 G/DL (ref 31.5–36.5)
MCV RBC AUTO: 84 FL (ref 78–100)
PLATELET # BLD AUTO: 209 10E3/UL (ref 150–450)
POTASSIUM SERPL-SCNC: 4.2 MMOL/L (ref 3.4–5.3)
POTASSIUM SERPL-SCNC: 4.2 MMOL/L (ref 3.4–5.3)
RBC # BLD AUTO: 4.01 10E6/UL (ref 4.4–5.9)
SODIUM SERPL-SCNC: 121 MMOL/L (ref 135–145)
SODIUM SERPL-SCNC: 122 MMOL/L (ref 135–145)
SODIUM SERPL-SCNC: 122 MMOL/L (ref 135–145)
SODIUM SERPL-SCNC: 123 MMOL/L (ref 135–145)
VANCOMYCIN SERPL-MCNC: 15.7 UG/ML
WBC # BLD AUTO: 8.7 10E3/UL (ref 4–11)

## 2024-10-25 PROCEDURE — 250N000011 HC RX IP 250 OP 636: Performed by: STUDENT IN AN ORGANIZED HEALTH CARE EDUCATION/TRAINING PROGRAM

## 2024-10-25 PROCEDURE — 250N000013 HC RX MED GY IP 250 OP 250 PS 637: Performed by: STUDENT IN AN ORGANIZED HEALTH CARE EDUCATION/TRAINING PROGRAM

## 2024-10-25 PROCEDURE — 82947 ASSAY GLUCOSE BLOOD QUANT: CPT | Performed by: STUDENT IN AN ORGANIZED HEALTH CARE EDUCATION/TRAINING PROGRAM

## 2024-10-25 PROCEDURE — 80048 BASIC METABOLIC PNL TOTAL CA: CPT | Performed by: STUDENT IN AN ORGANIZED HEALTH CARE EDUCATION/TRAINING PROGRAM

## 2024-10-25 PROCEDURE — 99207 PR NO CHARGE LOS: CPT | Performed by: INTERNAL MEDICINE

## 2024-10-25 PROCEDURE — G0427 INPT/ED TELECONSULT70: HCPCS | Mod: 95 | Performed by: STUDENT IN AN ORGANIZED HEALTH CARE EDUCATION/TRAINING PROGRAM

## 2024-10-25 PROCEDURE — 36415 COLL VENOUS BLD VENIPUNCTURE: CPT | Performed by: STUDENT IN AN ORGANIZED HEALTH CARE EDUCATION/TRAINING PROGRAM

## 2024-10-25 PROCEDURE — 84295 ASSAY OF SERUM SODIUM: CPT | Performed by: STUDENT IN AN ORGANIZED HEALTH CARE EDUCATION/TRAINING PROGRAM

## 2024-10-25 PROCEDURE — 36415 COLL VENOUS BLD VENIPUNCTURE: CPT | Performed by: HOSPITALIST

## 2024-10-25 PROCEDURE — 120N000001 HC R&B MED SURG/OB

## 2024-10-25 PROCEDURE — 99223 1ST HOSP IP/OBS HIGH 75: CPT | Mod: AI | Performed by: HOSPITALIST

## 2024-10-25 PROCEDURE — 85018 HEMOGLOBIN: CPT | Performed by: STUDENT IN AN ORGANIZED HEALTH CARE EDUCATION/TRAINING PROGRAM

## 2024-10-25 PROCEDURE — 84295 ASSAY OF SERUM SODIUM: CPT | Performed by: HOSPITALIST

## 2024-10-25 PROCEDURE — 86140 C-REACTIVE PROTEIN: CPT | Performed by: STUDENT IN AN ORGANIZED HEALTH CARE EDUCATION/TRAINING PROGRAM

## 2024-10-25 PROCEDURE — 250N000011 HC RX IP 250 OP 636

## 2024-10-25 PROCEDURE — 99207 PR NO BILLABLE SERVICE THIS VISIT: CPT | Performed by: STUDENT IN AN ORGANIZED HEALTH CARE EDUCATION/TRAINING PROGRAM

## 2024-10-25 PROCEDURE — 250N000013 HC RX MED GY IP 250 OP 250 PS 637: Performed by: HOSPITALIST

## 2024-10-25 PROCEDURE — 80202 ASSAY OF VANCOMYCIN: CPT

## 2024-10-25 RX ORDER — CALCIUM CARBONATE 500 MG/1
1000 TABLET, CHEWABLE ORAL 4 TIMES DAILY PRN
Status: DISCONTINUED | OUTPATIENT
Start: 2024-10-25 | End: 2024-11-04

## 2024-10-25 RX ORDER — LIDOCAINE 40 MG/G
CREAM TOPICAL
Status: DISCONTINUED | OUTPATIENT
Start: 2024-10-25 | End: 2024-10-31

## 2024-10-25 RX ORDER — SODIUM CHLORIDE 1 G/1
1 TABLET ORAL
Status: DISCONTINUED | OUTPATIENT
Start: 2024-10-25 | End: 2024-10-26

## 2024-10-25 RX ORDER — AMOXICILLIN 250 MG
2 CAPSULE ORAL 2 TIMES DAILY PRN
Status: DISCONTINUED | OUTPATIENT
Start: 2024-10-25 | End: 2024-11-04

## 2024-10-25 RX ORDER — ACETAMINOPHEN 650 MG/1
650 SUPPOSITORY RECTAL EVERY 4 HOURS PRN
Status: DISCONTINUED | OUTPATIENT
Start: 2024-10-25 | End: 2024-11-04

## 2024-10-25 RX ORDER — AMOXICILLIN 250 MG
1 CAPSULE ORAL 2 TIMES DAILY PRN
Status: DISCONTINUED | OUTPATIENT
Start: 2024-10-25 | End: 2024-11-04

## 2024-10-25 RX ORDER — VANCOMYCIN HCL IN 5 % DEXTROSE 1.25 G/25
1250 PLASTIC BAG, INJECTION (ML) INTRAVENOUS EVERY 12 HOURS
Status: DISCONTINUED | OUTPATIENT
Start: 2024-10-25 | End: 2024-10-28

## 2024-10-25 RX ORDER — ONDANSETRON 4 MG/1
4 TABLET, ORALLY DISINTEGRATING ORAL EVERY 6 HOURS PRN
Status: DISCONTINUED | OUTPATIENT
Start: 2024-10-25 | End: 2024-11-04

## 2024-10-25 RX ORDER — ACETAMINOPHEN 325 MG/1
650 TABLET ORAL EVERY 4 HOURS PRN
Status: DISCONTINUED | OUTPATIENT
Start: 2024-10-25 | End: 2024-11-04

## 2024-10-25 RX ORDER — PROCHLORPERAZINE 25 MG
12.5 SUPPOSITORY, RECTAL RECTAL EVERY 12 HOURS PRN
Status: DISCONTINUED | OUTPATIENT
Start: 2024-10-25 | End: 2024-11-04

## 2024-10-25 RX ORDER — PROCHLORPERAZINE MALEATE 5 MG/1
5 TABLET ORAL EVERY 6 HOURS PRN
Status: DISCONTINUED | OUTPATIENT
Start: 2024-10-25 | End: 2024-11-04

## 2024-10-25 RX ORDER — DOXYCYCLINE 100 MG/1
100 CAPSULE ORAL EVERY 12 HOURS SCHEDULED
Status: DISCONTINUED | OUTPATIENT
Start: 2024-10-25 | End: 2024-10-30

## 2024-10-25 RX ORDER — ONDANSETRON 2 MG/ML
4 INJECTION INTRAMUSCULAR; INTRAVENOUS EVERY 6 HOURS PRN
Status: DISCONTINUED | OUTPATIENT
Start: 2024-10-25 | End: 2024-11-04

## 2024-10-25 RX ADMIN — VANCOMYCIN HYDROCHLORIDE 1000 MG: 1 INJECTION, SOLUTION INTRAVENOUS at 02:16

## 2024-10-25 RX ADMIN — SODIUM CHLORIDE 1 G: 1 TABLET ORAL at 09:28

## 2024-10-25 RX ADMIN — VANCOMYCIN HYDROCHLORIDE 1000 MG: 1 INJECTION, SOLUTION INTRAVENOUS at 10:29

## 2024-10-25 RX ADMIN — ACETAMINOPHEN 650 MG: 325 TABLET ORAL at 16:53

## 2024-10-25 RX ADMIN — DOXYCYCLINE HYCLATE 100 MG: 100 CAPSULE ORAL at 19:49

## 2024-10-25 RX ADMIN — SODIUM CHLORIDE 1 G: 1 TABLET ORAL at 12:50

## 2024-10-25 RX ADMIN — SODIUM CHLORIDE TAB 1 GM 1 G: 1 TAB at 21:15

## 2024-10-25 RX ADMIN — VANCOMYCIN HYDROCHLORIDE 1250 MG: 1.25 INJECTION, SOLUTION INTRAVITREAL at 21:08

## 2024-10-25 ASSESSMENT — ACTIVITIES OF DAILY LIVING (ADL)
ADLS_ACUITY_SCORE: 0

## 2024-10-25 NOTE — CONSULTS
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 Corynebacterium endocarditis requires at least 4 weeks of therapy with vancomycin, while wound infection/bacteremia would be ~2 weeks. We do not want to undertreat an endocarditis (as this could lead to valve destruction and disease metastasis) or overtreat a skin-sourced infection (as no antibiotic/intravascular device is without risk).  Continue vancomycin  Discontinue ceftriaxone  Will follow-up 10/23 blood cultures.  If any further growth in these blood cultures, would continue daily blood culture collection until we document clearance.    Discussion:  85yo M who presented to [facility] with sepsis likely 2/2 disseminated Corynebacterium striatum infection, currently on vancomycin and ceftriaxone.    Unclear what the initial source of his Corynebacterium infection is, though my highest suspicion is his chronic left scapular skin lesions given the typical ecology of this organism.     He states he has \"always had a murmur\", possibly representing lifelong valvular pathology - this context is an increased risk for Corynebacterium striatum endocarditis (tends to prefer prosthetic or abnormal valve tissue). Given this, as well as his disseminated growth of the organism, I strongly recommend LINDSEY to clarify the nature of the mobile mass on his mitral valve. This would help delineate his treatment duration.     For now, would narrow to vancomycin monotherapy and keep an eye on his blood cultures - further positives should " "prompt more daily culture collection until we document clearance.    Cyril Chase MD  Division of Infectious Diseases and International Medicine  P: [phone]    I spent at least 60 minutes on the day of this encounter on chart review, patient interview/exam, and note preparation. This visit was performed remotely as a telemedicine encounter using the [employer] platform.        History of Present Illness:     83yo M with h/o prostate cancer (no documentation of treatment history, apparently treated with radiation in 2002 per pt) who presented to OSH presumably on 10/22 (no records available or described in transfer/admission notes) with complaints of weakness and poor PO intake. Per H&P, only detail pt provided is that he ate \"bad ice cream\" on 10/18 and had nausea/vomiting that evening, with progressive weakness and poor PO intake since that time, with no fevers or chills noted. Denied abdominal pain, diarrhea.     At presentation at OSH ([facility]), pt was noted to be hypnatremic to 118. Blood cultures were collected on 10/22 which grew Corynebacterium striatum in 2 of 2 bottles. Similarly, his urine culture the same day grew Corynebacterium striatum, as did a wound culture (unclear what source this is - perhaps his scapular lesions? No records available and not described in H&P). The wound culture also has a yet-to-be-identified second organism per lab (called them today). All isolates have been sent to the [facility] for susceptibility testing. It's unclear from sparse documentation if hypotension was present on admission at OSH or if this developed afterward, but he eventually required pressor support and was started on ceftriaxone and vancomycin at some point before transfer.     On presentation here, pt was noted to be afebrile with WBC 11.4, procal 0.56. He was continued on vancomycin and ceftriaxone. Repeat blood cultures were collected and have shown no growth to date. Admitting " "telehealth physician noted a possible systolic murmur. A TTE was performed 10/23 demonstrating \"mobile echodensity on atrial side of mitral valve that likely represents flail scallop although vegetation is also in differential\". Pt was also complaining of back pain, prompting MR lumbar spine being ordered which did not show rajat paraspinal abscess or osteomyelitis.     Pt has a left scapular lesion that has apparently been present for several months per patient. There is no dermatology available at [facility], so pt has been instructed to follow-up as an outpatient for evaluation of possible malignancy.          Review of Systems:     10-system ROS performed and negative unless otherwise stated above.         Current Antimicrobials     Vancomycin  Ceftriaxone       Past Medical History:     Past Medical History:   Diagnosis Date    Prostate cancer (H)      History reviewed. No pertinent surgical history.       Family History:   History reviewed. No pertinent family history.       Social History:     Social History     Socioeconomic History    Marital status:      Spouse name: Not on file    Number of children: Not on file    Years of education: Not on file    Highest education level: Not on file   Occupational History    Not on file   Tobacco Use    Smoking status: Never    Smokeless tobacco: Never   Substance and Sexual Activity    Alcohol use: Not Currently    Drug use: Never    Sexual activity: Not on file   Other Topics Concern    Not on file   Social History Narrative    Not on file     Social Drivers of Health     Financial Resource Strain: Low Risk  (10/23/2024)    Financial Resource Strain     Within the past 12 months, have you or your family members you live with been unable to get utilities (heat, electricity) when it was really needed?: No   Food Insecurity: Low Risk  (10/23/2024)    Food Insecurity     Within the past 12 months, did you worry that your food would run out before you got money to buy " more?: No     Within the past 12 months, did the food you bought just not last and you didn t have money to get more?: No   Transportation Needs: Low Risk  (10/23/2024)    Transportation Needs     Within the past 12 months, has lack of transportation kept you from medical appointments, getting your medicines, non-medical meetings or appointments, work, or from getting things that you need?: No   Physical Activity: Not on file   Stress: Not on file   Social Connections: Not on file   Interpersonal Safety: High Risk (10/23/2024)    Interpersonal Safety     Do you feel physically and emotionally safe where you currently live?: No     Within the past 12 months, have you been hit, slapped, kicked or otherwise physically hurt by someone?: No     Within the past 12 months, have you been humiliated or emotionally abused in other ways by your partner or ex-partner?: No   Housing Stability: Low Risk  (10/23/2024)    Housing Stability     Do you have housing? : Yes     Are you worried about losing your housing?: No          Physical Exam:   Physical exam performed via the [employer] cart with bedside nursing assistance.    Ranges for vital signs:  Temp:  [97  F (36.1  C)-97.9  F (36.6  C)] 97.3  F (36.3  C)  Pulse:  [74-90] 74  Resp:  [16-20] 16  BP: ()/(70-84) 94/71  SpO2:  [97 %-99 %] 98 %    Intake/Output Summary (Last 24 hours) at 10/25/2024 1223  Last data filed at 10/25/2024 1002  Gross per 24 hour   Intake 3790.17 ml   Output 1650 ml   Net 2140.17 ml     Exam:  GENERAL:  well-developed, well-nourished, sitting in bed in no acute distress.   ENT:  Head is normocephalic, atraumatic. Oropharynx is moist without exudates or ulcers.  EYES:  Eyes have anicteric sclerae.    NECK:  Supple.  LUNGS:  Breathing easily on room aira.  SKIN:  No acute rashes.  Scapular wound not examined ([facility] photos reviewed).  NEUROLOGIC:  Grossly nonfocal.         Laboratory Data:   Reviewed.  Pertinent for:    Microbiology:  Culture    Date Value Ref Range Status   10/24/2024 No growth after 12 hours  Preliminary   10/22/2024 Culture in progress  Preliminary   10/22/2024 Corynebacterium striatum (A)  Preliminary     Comment:     Organism identified by client.   10/22/2024 Culture in progress  Preliminary   10/22/2024 No anaerobic organisms isolated after 1 day  Preliminary   10/22/2024 Culture in progress  Preliminary     Metabolic Studies       Recent Labs   Lab Test 10/25/24  0619 10/25/24  0205 10/24/24  2227 10/24/24  1803 10/24/24  1405 10/24/24  1007 10/23/24  0519 10/23/24  0126   * 123* 124* 121* 124* 123*   < > 120*   POTASSIUM 4.2 4.2 4.5 4.5 4.8 4.3   < > 4.4   CHLORIDE 91* 92* 93* 92* 94* 94*   < > 92*   CO2 21* 25 25 21* 23 24   < > 24   ANIONGAP 9 6* 6* 8 7 5*   < > 4*   BUN 8.7 8.5 9.1 9.4 9.3 10.1   < > 15.1   CR 0.50* 0.52* 0.49* 0.51* 0.47* 0.57*   < > 0.68   GFRESTIMATED >90 >90 >90 >90 >90 >90   < > >90   GLC 99 101* 99 125* 100* 103*   < > 117*   AMALIA 7.8* 7.7* 7.8* 7.7* 7.6* 7.5*   < > 7.7*   PHOS  --   --   --   --   --   --   --  3.4    < > = values in this interval not displayed.      Hepatic Studies    Recent Labs   Lab Test 10/23/24  0519 10/23/24  0126   BILITOTAL 0.7  --    ALKPHOS 88  --    ALBUMIN 2.2* 2.2*   AST 22  --    ALT 20  --      Hematology Studies      Recent Labs   Lab Test 10/25/24  0619 10/24/24  0637 10/23/24  0519   WBC 8.7 9.6 11.4*   HGB 12.1* 11.3* 11.0*   HCT 33.5* 31.3* 30.9*    183 162     Urine Studies     Recent Labs   Lab Test 10/23/24  0456 03/17/21  1113   URINEPH 6.0 7.0   NITRITE Negative Negative   LEUKEST Negative Negative   WBCU 6*  --      Vancomycin Levels     Recent Labs   Lab Test 10/24/24  0636   VANCOMYCIN 8.0         Latest Ref Rng & Units 10/25/2024     6:19 AM 10/25/2024     2:05 AM 10/24/2024    10:27 PM 10/24/2024     6:03 PM 10/24/2024     2:05 PM   Transplant Immunosuppression Labs   Creat 0.67 - 1.17 mg/dL 0.50  0.52  0.49  0.51  0.47    Urea Nitrogen 8.0 -  23.0 mg/dL 8.7  8.5  9.1  9.4  9.3    WBC 4.0 - 11.0 10e3/uL 8.7               Imaging:   MR LUMBAR SPINE W/O & W CONTRAST  Result Date: 10/24/2024  IMPRESSION: 1.  Endplate edema and enhancement on the right at L4-L5 and on the left at L5-S1. No endplate erosion or significant paraspinal or epidural phlegmon. Favor this to represent Modic type I degenerative endplate change, though early changes of discitis could be considered in the appropriate clinical context. Short-term follow-up imaging could be considered to assess for stability. 2.  Multilevel spondylosis with stenoses at L4-L5 and L5-S1, as detailed. No spinal canal stenosis.    Echocardiogram Complete  Result Date: 10/23/2024  Interpretation Summary  Left ventricular systolic function is normal. The visual ejection fraction is 60-65%. No regional wall motion abnormalities noted. There is severe (4+) mitral regurgitation. Moderate mitral valve prolapse, posterior leaflet There is mobile echodensity on atrial side of mitral valve that likely represents flail scallop although vegetation is also in differential. Flail chord also noted. Suggest LINDSEY for further evaluation if clinically appropriate. The study was technically adequate. There is no comparison study available.      Video-Visit Details  Type of service:  Video Telemedicine Visit   Video Start Time: 12:59pm  Video End Time: 1:22pm  Originating Location (pt. Location): [facility]  Distant Location (provider location):  Offsite  Platform used for Video Visit: John

## 2024-10-25 NOTE — PROGRESS NOTES
"[facility]    Medicine Progress Note - Hospitalist Service    Date of Admission:  10/23/2024    Assessment & Plan   Jayme Donahue is a 84 year old male admitted on 10/23/2024. He comes in for hypotension and hyponatremia in the setting of dehydration/decreased intake.    Sepsis (Corynebacterium striatum bacteremia)  Corynebacterium striatum bacteremia   Concern for endocarditis and/or vertebral osteomyelitis    Hypotension - resolved  Generalized weakness  Lumbago  WBC 11.4, Pro-Denis 0.56, UA with few bacteria, BP 80/50 requiring Levophed.  CRP 91.  He required vasopressors on transport to maintain adequate BP.  Patient reports ingesting some \"bad ice cream\" 10/18 and has not had great intake since that time.  Notable fatigue without any diarrhea or vomiting.  He received 2.5 L of fluid prior to admission and was started on IVF maintenance.  Started empirically on Ceftriaxone and vancomycin. Blood cultures from OSH with gram + organism and UA with gram + edgardo. In addition, scapular lesion also positive for gram + edgarod which could correlate to UA, but will await for final culture results and susceptibilities.  10/24: Patient was weaned off vasopressors 10/23 without any recurrence of hypotension.  Patient notes significant back pain with hospital bed requiring chair to be able to sleep at night.  Patient denies any night sweats or fevers prior to admission, however depending on blood culture results may warrant lumbar imaging for investigation of vertebral osteomyelitis (although lower on the differential).  Our staff called [facility] lab and initial results showed Corynebacterium striatum in wound, urine, and blood cultures. Sent to [place] labs for final susceptibilities. Antibiogram from [facility] shows susceptibility to Vancomycin. Corynebacterium striatum can cause endocarditis in rare cases. 10/25: Corynebacterium culture pending.  Patient still complaining of back pain.  Has remained afebrile " with decreasing CRP.  Repeat blood cultures pending.  MRI lumbar spine shows endplate edema and enhancement of L4-L5 which could represent degenerative endplate changes although early discitis could not be ruled out definitively.  - Continue vancomycin IV  - Vitals per unit routine  - Discontinued IVF  - Repeat blood cultures (10/24) NGTD  - Daily CBC and BMP  - PT/OT/care management consulted, recommending TCU  - Infection Disease IP consult   - Initiating transfer for LINDSEY and further workup of endocarditis    Addendum: Spoke with ID on the phone, recommending transfer for LINDSEY and further evaluation for endocarditis given high suspicion and previous known mitral murmur.  Will de-escalate antibiotics to vancomycin monotherapy.  Awaiting culture data from 10/23, however if it remains negative will treat this as definitive clearance.    Hyponatremia  Na was 118 at OSH and 120 here. Likely due to dehydration in setting of decreased oral intake.  Urine sodium 45, urine osmolality 632 which could be reflective of fluid bolus at OSH vs SIADH.  - Discontinue IVF, assessing for more fluid restriction and improvement with salt tabs  - Goal correction is 8-10 mEq every 24 hours  - Fluid restriction 1200 mL  - NaCl tabs 1 g 3 times daily  - Regular diet  - Strict intake and output    Left scapular lesion  Has been there for months per patient and has not had it evaluated.  3.3 cm x 4 cm x 0.1 cm.  Wound culture positive for corynebacterium stratum, which is present in both blood and urine.  - WOC consulted  - Will need outpatient follow-up with PCP and dermatology for further evaluation for malignancy with skin biopsy    History of prostate cancer  Initially treated in 2020, but [facility]. PSA 7/2024 was 54 and in 9/2024 was 57. Unclear what treatment he has gotten.  - Continue with outpatient follow-up    Severe mitral regurgitation  Flail scallop vs valvular vegetation  Murmur heard on exam that is  4/6.  Patient notes a history of murmur, however no previous workup or echocardiograms to compare.  TTE with evidence of severe (4+) mitral regurgitation, moderate mitral valve prolapse, and mobile echo density on the atrial side of the mitral valve indicating either flail scallop or a vegetation.  Patient may warrant transfer for LINDSEY if repeat blood culture results are positive for corynebacterium while remaining on vancomycin.  -Will need outpatient cardiology follow-up for potential valvular intervention vs further workup    Moderate protein malnutrition  BMI 20.9, albumin 2.2, chronic wound with poor healing well indicating poor nutritional status.  -Ensure supplementation 3 times daily with meals        Diet: Regular Diet Adult  Snacks/Supplements Adult: Ensure Enlive; With Meals  Fluid restriction 1200 ML FLUID    DVT Prophylaxis: Pneumatic Compression Devices  Palmer Catheter: Not present  Lines: None     Cardiac Monitoring: None  Code Status: No CPR- Do NOT Intubate      Clinically Significant Risk Factors         # Hyponatremia: Lowest Na = 119 mmol/L in last 2 days, will monitor as appropriate  # Hypochloremia: Lowest Cl = 91 mmol/L in last 2 days, will monitor as appropriate      # Hypoalbuminemia: Lowest albumin = 2.2 g/dL at 10/23/2024  5:19 AM, will monitor as appropriate    # Coagulation Defect: INR = 1.53 (Ref range: 0.85 - 1.15) and/or PTT = N/A, will monitor for bleeding                  # Financial/Environmental Concerns: none         Disposition Plan     Medically Ready for Discharge: Anticipated in 2-4 Days             Albino Abel DO  Hospitalist Service  [facility]  Securely message with [employer] (more info)  Text page via [employer] Paging/Directory   ______________________________________________________________________    Interval History   No acute events overnight.  Patient's sodium has been difficult to increase over the last couple days on IVF.  Awaiting final  culture results and final discussion with infectious disease.  Patient continues to have persistent low back pain that is relieved by sitting in the chair and PRNs.  He otherwise has no acute concerns or complaints.      Physical Exam   Vital Signs: Temp: 97.3  F (36.3  C) Temp src: Oral BP: 94/71 Pulse: 74   Resp: 16 SpO2: 98 % O2 Device: None (Room air)    Weight: 130 lbs 0 oz    Constitutional: awake, alert, cooperative, no apparent distress, and appears stated age  Respiratory: No increased work of breathing, good air exchange, clear to auscultation bilaterally, no crackles or wheezing  Cardiovascular: Normal apical impulse, regular rate and rhythm, normal S1 and S2, no S3 or S4, and no murmur noted  GI: No scars, normal bowel sounds, soft, non-distended, non-tender, no masses palpated, no hepatosplenomegally  Skin: Large lesion over scapula with rolled edges and pink granulation tissue at base, ecchymosis surrounding  Musculoskeletal: Tenderness over spinous process L4-L5.  Full range of motion noted.  Motor strength is 5 out of 5 all extremities bilaterally.  Tone is normal.    Medical Decision Making       45 MINUTES SPENT BY ME on the date of service doing chart review, history, exam, documentation & further activities per the note.      Data     I have personally reviewed the following data over the past 24 hrs:    8.7  \   12.1 (L)   / 209     121 (L) 91 (L) 8.7 /  99   4.2 21 (L) 0.50 (L) \     Procal: N/A CRP: 52.42 (H) Lactic Acid: N/A         Imaging results reviewed over the past 24 hrs:   Recent Results (from the past 24 hours)   MR LUMBAR SPINE W/O & W CONTRAST    Narrative    EXAM: MR LUMBAR SPINE W/O and W CONTRAST  LOCATION: [facility]  DATE: 10/24/2024    INDICATION: Vertebral osteomyelitis; Low back pain; rule out infection; Low back pain with rest or at night; No known automatically detected potential contraindications to imaging.  COMPARISON: None.  CONTRAST: 6 mL  [facility]  TECHNIQUE: Routine Lumbar Spine MRI without and with IV contrast.    FINDINGS:   Nomenclature is based on 5 lumbar type vertebral bodies. Normal alignment. Normal vertebral body heights. Diffuse heterogeneous marrow signal intensity. Mild-to-moderate endplate edema and enhancement on the right at L4-L5. Mild endplate edema and   enhancement on the left at L5-S1. No significant endplate erosion. Normal distal spinal cord and cauda equina with conus medullaris at mid L1. Perineural cysts are noted remodeling the sacrum. Mild presacral edema. Diffuse subcutaneous edema within the   low back, nonspecific. Unremarkable visualized bony pelvis.    T12-L1: Minimal disc space narrowing with mild disc bulge. No herniation. Minimal facet arthropathy. No spinal canal or foraminal stenosis.     L1-L2: Normal disc height. Minimal disc bulge without herniation. Mild facet arthropathy. No spinal canal or foraminal stenosis.    L2-L3: Mild disc space narrowing. Mild disc bulge without herniation. Mild facet arthropathy. No spinal canal or foraminal stenosis.     L3-L4: Mild disc space narrowing with mild disc bulge. No herniation. Mild facet arthropathy. No spinal canal or foraminal stenosis.    L4-L5: Moderate to severe disc space narrowing, preferentially on the right, with mild to moderate endplate edema and enhancement. No significant herniation. Mild marginal osteophyte. Mild facet arthropathy. No spinal canal stenosis. Mild right lateral   recess and foraminal stenoses. No left foraminal stenosis.    L5-S1: Moderate to severe disc space narrowing on the left with preferential left-sided marginal osteophyte. No herniation. Mild facet arthropathy. No spinal canal stenosis. Moderate left foraminal stenosis. Mild right foraminal stenosis.      Impression    IMPRESSION:  1.  Endplate edema and enhancement on the right at L4-L5 and on the left at L5-S1. No endplate erosion or significant paraspinal or epidural phlegmon.  Favor this to represent Modic type I degenerative endplate change, though early changes of discitis   could be considered in the appropriate clinical context. Short-term follow-up imaging could be considered to assess for stability.  2.  Multilevel spondylosis with stenoses at L4-L5 and L5-S1, as detailed. No spinal canal stenosis.

## 2024-10-25 NOTE — PROGRESS NOTES
Care Management Follow Up    Length of Stay (days): 2    Expected Discharge Date: 10/25/2024     Concerns to be Addressed: discharge planning     Patient plan of care discussed at interdisciplinary rounds: Yes    Anticipated Discharge Disposition: Transitional Care              Anticipated Discharge Services: None  Anticipated Discharge DME: None    Patient/family educated on Medicare website which has current facility and service quality ratings: yes  Education Provided on the Discharge Plan: Yes  Patient/Family in Agreement with the Plan: yes    Referrals Placed by CM/SW:    Private pay costs discussed: Not applicable    Discussed  Partnership in Safe Discharge Planning  document with patient/family: No     Handoff Completed: No, handoff not indicated or clinically appropriate    Additional Information:  Per MD at IDT rounds pt is NOT medically stable for discharge today. CM continues to follow for discharge planning.    Per chart review only VA insurance listed, no medicare noted. CM met bedside with patient and discussed, pt stated he does have medicare.CM found pt has Medicare A&B Beneficiary ID:[id number]    CM updated current TCU referrals with medicare information. CM placed calls to pending TCUs. Patient would like to remain as close to [place] as possible. Paper copy fax sent to pts preferred TCU in [facility]. See internal notes for details. [facility] and [facility] both to review patient further on Monday.    Destination    Service Provider Request Status Services Address Phone Fax Patient Preferred   [facility] BED UNIT (IP) Pending - Request Sent -- [address] [facility] [address] [phone]    Internal Comment last updated by Matilde Monroe RN 10/25/2024  2:12 PM    10/25- spoke with RN at hospital floor of building who stated they dont do epic and will need paper copy faxed. CM faxed paper copy to facility for  consideration.-[facility] (SNF) Pending - Request Sent -- [address] [address] [phone] --   Current Capacity last updated by Juliane Spence MA on 12/4/2023 10:54 AM    Peter: [phone]            Internal Comment last updated by Matilde Monroe, RN 10/25/2024  2:15 PM    10/25- called and lvm for lola in admissions-[facility]isabet [facility]             [facility] (SNF) Pending - Request Sent -- [address], [address] [id number] [phone] --   Internal Comment last updated by Matilde Monroe RN 10/25/2024  2:16 PM    10/25 1425-lvm for Agyamiletha with medicare information  10/25 1253 Note seen that Ana is reviewing- I did let Suad know also-sb            [facility] ([facility]) Pending - Request Sent -- [address] [facility] [address] [phone] --   Current Capacity last updated by Thom Wells MA on 10/10/2024 10:56 AM    admissions - [facility] [phone]            Internal Comment last updated by Matilde Monroe RN 10/25/2024  2:21 PM    10/25- lvm for Aracelis in admissions-[facility] [place] () Pending - Request Sent -- [address] [facility] [address] [phone] --   [facility] ([facility]) Pending - Request Sent -- [address] [facility] [address] [phone] --   Current Capacity last updated by Doni Rogers, RN on 9/1/2023  3:01 PM    Has secure memory unit            [facility] (SNF) Declined  Bed Not Available -- [address], [facility] [address] [phone]    Internal Comment last updated by Sammi Garcia 10/25/2024  9:46 AM    10/25/24 0937: I called admissions and I left a voicemail for Migdalia. SA              UPDATE: Per MD pt will be transferring for LINDSEY.    Plan: TCU-ref pending    Elisabet Monroe RNCM  Care Transitions Registered  Nurse  Tele: [phone]

## 2024-10-25 NOTE — PLAN OF CARE
Goal Outcome Evaluation:      Plan of Care Reviewed With: patient    Overall Patient Progress: improvingOverall Patient Progress: improving     Pt remains med surg status; able to ambulate with walker, one person assist, and gait belt.  Pt has slept in chair all night complaining of back pain when in bed.  He remains on NS drip at 125ml/ hour; no other needs noted at this time.

## 2024-10-25 NOTE — H&P
[facility]    History and Physical - Hospitalist Service       Date of Admission:  10/25/2024    Assessment & Plan      Jayme Donahue is an 84 year old male who has been transferred from [facility] to [facility] for LINDSEY and ongoing management of Corynebacterium bacteremia and an abnormality that was seen on his mitral valve on TTE.     Corynebacterium striatum bacteremia  Corynebacterium UTI  Septic shock secondary to above, resolved  Severe mitral regurgitation  Possible endocarditis  Low back pain  Generalized weakness  Jayme Donahue initially developed nausea and poor appetite on 10/18/2024.  He presented to an outside hospital and was found to be hyponatremic with a sodium of 118 and diagnosed with sepsis secondary to a UTI.  Subsequently became hypotensive and was transferred to [facility] ICU and started on vasopressors on 10/23/2024.  He was treated with vancomycin and Rocephin.  Levophed weaned off on the night of 10/23/2024.  Blood cultures returned positive for corynebacterium striatum. TTE on 10/23/2024 showed severe mitral regurgitation and a mobile echodensity on the atrial side of the mitral valve that likely represents flail scallop although vegetation is also in the differential.  Complained of low back pain, lumbar spine MRI on 10/24/2024 showed some likely degenerative endplate changes at right L4-L5 and left L5-S1, although early changes of discitis could also be in the differential.  ID was consulted on 10/25/2024 and recommended continuing vancomycin and transferring to a facility that could perform LINDSEY for further evaluation of the mitral valve abnormality.  Admit to inpatient.  Continue vancomycin, pharmacy to dose.  Add doxycycline for Stenotrophomonas maltophilia growing on culture from left scapular skin lesion.  Blood culture on 10/24/2024 negative to date, follow-up on results.  LINDSEY ordered,  anticipate this will be done on Monday, 10/28/2024.  Consult ID, appreciate their assistance.  PT/OT consult, appreciate their assistance.  Care transitions consult, appreciate their assistance.  Anticipate need for TCU upon discharge for ongoing therapies and IV antibiotics.    Hyponatremia  Sodium reportedly 118 at outside hospital when he first presented.  Felt to be secondary to dehydration, sodium mildly improved with IV fluids.  Sodium 120 on 10/23/2024 when he arrived at [facility].  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  He was started on salt tablets and placed on a fluid restriction.  Sodium ranged between 119-123 while he was at [facility].  Has some lower extremity edema, but otherwise appears close to euvolemic.  Check sodium now and monitor every 6 hours.  Continue salt tablets and fluid restriction for now.  Will ask Nephrology to evaluate the patient, appreciate their assistance.    Left scapular lesion  Lesion culture positive for Stenotrophomonas maltophilia  Appearance is concerning for malignancy, see photos in epic on 10/23/2024.  Patient states this lesion has been present for several months, he is not sure how it started.  He has been covering it at home to manage drainage from the lesion.  WOC nurse consulted at [facility].  Continue wound cares as ordered by WOC nurse at previous facility.  Culture of lesion from outside hospital is now growing Stenotrophomonas maltophilia.  Will add doxycycline.  ID consulted as noted above.    History of prostate cancer  Reportedly treated in 2020, but details unclear.  PSA in July 2024 was 54, PSA in September 2024 was 57.  Follow-up with outpatient provider regarding further evaluation/management.    Moderate protein calorie malnutrition  Consult dietitian.          Diet: Combination Diet Regular Diet Adult    DVT Prophylaxis: Pneumatic Compression Devices  Palmer Catheter: Not present  Lines: None     Cardiac Monitoring:  "None  Code Status: No CPR- Do NOT Intubate      Clinically Significant Risk Factors Present on Admission         # Hyponatremia: Lowest Na = 119 mmol/L in last 2 days, will monitor as appropriate  # Hypochloremia: Lowest Cl = 91 mmol/L in last 2 days, will monitor as appropriate         # Coagulation Defect: INR = 1.53 (Ref range: 0.85 - 1.15) and/or PTT = N/A, will monitor for bleeding                # Financial/Environmental Concerns:           Disposition Plan     Medically Ready for Discharge: Anticipated in 2-4 Days           Yovani Aldrich MD  Hospitalist Service  [facility]  Securely message with [employer] (more info)  Text page via [facility] Paging/Directory     ______________________________________________________________________    Chief Complaint   Corynebacterium bacteremia and possible endocarditis, transferred from [facility] to [facility] for LINDSEY and ongoing management    History is obtained from the patient    History of Present Illness   Jayme Donahue is an 84 year old male who has been transferred from [facility] for LINDSEY and ongoing management of Corynebacterium bacteremia and an abnormality that was seen on his mitral valve on TTE.  He initially started to feel ill on October 18 after eating some ice cream.  Had nausea and poor appetite.  No diarrhea.  He presented to an outside hospital and was found to have hyponatremia with a sodium of 118.  He was also diagnosed with a UTI and sepsis.  He became hypotensive and was transferred to the [facility] for ICU level care including vasopressors.  Blood culture came back positive for corynebacterium stratum.  Transthoracic echocardiogram was obtained and showed an abnormality on his mitral valve, which per cardiology report \"likely represents flail scallop although vegetation is also in the differential.\"  He had an infectious disease telemedicine consult on " 10/25/2024 and it was recommended that he be transferred to a facility that could perform transesophageal echocardiogram for further evaluation of the mitral valve abnormality.    He was seen in his room on the medical unit.  Currently feels pretty good.  Has some low back pain with movement, no discomfort at rest.  Denies fevers, chills, sweats, chest pain, palpitations, shortness breath, nausea, abdominal pain, urinary symptoms.  Has had some loose stools.  He was not taking any prescription medications prior to this current illness.  Denies any significant past medical history.  Patient's wife was updated by phone this evening.    Past Medical History    Past Medical History:   Diagnosis Date    Cataract     Iliac artery aneurysm (H)     Prostate cancer (H)      Past Surgical History   History reviewed. No pertinent surgical history.    Prior to Admission Medications   Prior to Admission Medications   Prescriptions Last Dose Informant Patient Reported? Taking?   Capsicum, Cayenne, (CAYENNE PEPPER PO) Past Month Self Yes Yes   Sig: Take 1 capsule by mouth daily.   Misc Natural Products (SAW PALMETTO) CAPS Past Month Self Yes Yes   Sig: Take 1 capsule by mouth daily.   multivitamin  with lutein (OCUVITE WITH LUTEIN) CAPS per capsule Past Month Self Yes Yes   Sig: Take 1 capsule by mouth daily.      Facility-Administered Medications: None        Review of Systems    The 10 point Review of Systems is negative other than noted in the HPI or here.    Social History   I have reviewed this patient's social history and updated it with pertinent information if needed.  Social History     Tobacco Use    Smoking status: Never    Smokeless tobacco: Never   Substance Use Topics    Alcohol use: Not Currently    Drug use: Never     Allergies   No Known Allergies     Physical Exam   Vital Signs: Temp: 98.1  F (36.7  C) Temp src: Oral BP: 127/76     Resp: 17 SpO2: 98 % O2 Device: None (Room air)    Weight: 0 lbs 0  oz    Constitutional: awake, alert, cooperative, no apparent distress, laying in the hospital bed  Respiratory: no increased work of breathing, clear to auscultation bilaterally, no crackles or wheezing  Cardiovascular: regular rate and rhythm, normal S1 and S2, systolic murmur noted  GI: normal bowel sounds, soft, non-distended, non-tender  Skin: warm, dry, lesion over left scapula - see photo in Epic from 10/23/2024  Musculoskeletal: 1-2+ bilateral lower extremity pitting edema present  Neurologic: awake, alert, oriented, moves all extremities    Medical Decision Making       80 MINUTES SPENT BY ME on the date of service doing chart review, history, exam, documentation & further activities per the note.      Data   NOTE: Data reviewed over the past 24 hrs contributes toward MDM complexity

## 2024-10-25 NOTE — PROGRESS NOTES
Ana from [facility] called for update on possible transferring him there,  updated that they called.

## 2024-10-25 NOTE — PROGRESS NOTES
"Transfer Type: [facility]  Transfer Triage Note    Date of call: 10/25/24  Time of call: 2:59 PM    Current Patient Location:  [facility]  Current Level of Care: Med Surg    Vitals: Temp: 97.3  F (36.3  C) Temp src: Oral BP: 94/71 Pulse: 74   Resp: 16 SpO2: 98 % Height: 167.6 cm (5' 6\") Weight: 59 kg (130 lb)  O2 Device: None (Room air) at    Diagnosis: Corynebacterium striatum bacteremia   Reason for requested transfer: Further diagnostic work up, management, and consultation for specialized care   Isolation Needs: None    Care everywhere has been updated and reviewed: Yes  Necessary images have been sent through PACS: Yes    If patient is transferring for specialty care or specific procedure, the specialist required has participated in the transfer call and agreed with need for transfer and anticipated timeline: No    Transfer accepted: Yes  Stability of Patient: Patient is vitally stable, with no critical labs, and will likely remain stable throughout the transfer process  Is the patient appropriate for [facility]? No, What specific [facility] needs are anticipated? Cardiology - LINDSEY, ID, NSG  Level of Care Needed: Med Surg  Telemetry Needed:  None  Expected Time of Arrival for Transfer: 8-24 hours  Arrival Location:  [facility]     Recommendations for Management and Stabilization: Not needed    Additional Comments:     84 year old male with PMH of prostate cancer who was admitted to [facility] from OSH ([facility]) with hyponatremia to 118 and Corynebacterium striatium bacteremia (2/2 bottles). Urine culture and wound culture from scapular lesion also grew Corynebacterium striatum. Developed hypotension/sepsis and required ICU transfer for pressors and Vanc/CTX at the time of transfer to [facility]. Since hospitalization at [facility], TTE on 10/23 showed mobile echodensity on atrial side of mitral valve that likely represents flail scallop though vegetation on differential\". Due to " back pain, MRI L spine also obtained which did not show e/o abscess or OM. Of note, patient also has a chronic left scapular wound that has not previously been evaluated by Dermatology.    Na remains low, now improved to 122 from 118 at the time of admission. Receiving fluid restriction and salt tabs. Currently getting Na checks q6h.    ID was consulted and recommend transfer to facility capable of performing LINDSEY to evaluate for endocarditis. Blood cultures from 10/24 with NGTD. Accepted to med/surg bed without telemetry at [facility].     Adriana Rios MD

## 2024-10-26 ENCOUNTER — APPOINTMENT (OUTPATIENT)
Dept: OCCUPATIONAL THERAPY | Facility: CLINIC | Age: 84
End: 2024-10-26
Attending: HOSPITALIST
Payer: MEDICARE

## 2024-10-26 ENCOUNTER — APPOINTMENT (OUTPATIENT)
Dept: PHYSICAL THERAPY | Facility: CLINIC | Age: 84
End: 2024-10-26
Attending: HOSPITALIST
Payer: MEDICARE

## 2024-10-26 LAB
ANION GAP SERPL CALCULATED.3IONS-SCNC: 5 MMOL/L (ref 7–15)
BACTERIA WND CULT: NORMAL
BUN SERPL-MCNC: 7.8 MG/DL (ref 8–23)
CALCIUM SERPL-MCNC: 7.9 MG/DL (ref 8.8–10.4)
CHLORIDE SERPL-SCNC: 96 MMOL/L (ref 98–107)
CREAT SERPL-MCNC: 0.53 MG/DL (ref 0.67–1.17)
EGFRCR SERPLBLD CKD-EPI 2021: >90 ML/MIN/1.73M2
ENTEROCOCCUS FAECALIS: NOT DETECTED
ENTEROCOCCUS FAECIUM: NOT DETECTED
ERYTHROCYTE [DISTWIDTH] IN BLOOD BY AUTOMATED COUNT: 13.9 % (ref 10–15)
GLUCOSE SERPL-MCNC: 87 MG/DL (ref 70–99)
HCO3 SERPL-SCNC: 27 MMOL/L (ref 22–29)
HCT VFR BLD AUTO: 31.4 % (ref 40–53)
HGB BLD-MCNC: 11.3 G/DL (ref 13.3–17.7)
LISTERIA SPECIES (DETECTED/NOT DETECTED): NOT DETECTED
MCH RBC QN AUTO: 30 PG (ref 26.5–33)
MCHC RBC AUTO-ENTMCNC: 36 G/DL (ref 31.5–36.5)
MCV RBC AUTO: 83 FL (ref 78–100)
PLATELET # BLD AUTO: 224 10E3/UL (ref 150–450)
POTASSIUM SERPL-SCNC: 4.3 MMOL/L (ref 3.4–5.3)
RBC # BLD AUTO: 3.77 10E6/UL (ref 4.4–5.9)
SODIUM SERPL-SCNC: 124 MMOL/L (ref 135–145)
SODIUM SERPL-SCNC: 125 MMOL/L (ref 135–145)
SODIUM SERPL-SCNC: 128 MMOL/L (ref 135–145)
STAPHYLOCOCCUS AUREUS: NOT DETECTED
STAPHYLOCOCCUS EPIDERMIDIS: NOT DETECTED
STAPHYLOCOCCUS LUGDUNENSIS: NOT DETECTED
STAPHYLOCOCCUS SPECIES: NOT DETECTED
STREPTOCOCCUS AGALACTIAE: NOT DETECTED
STREPTOCOCCUS ANGINOSUS GROUP: NOT DETECTED
STREPTOCOCCUS PNEUMONIAE: NOT DETECTED
STREPTOCOCCUS PYOGENES: NOT DETECTED
STREPTOCOCCUS SPECIES: NOT DETECTED
WBC # BLD AUTO: 8.2 10E3/UL (ref 4–11)

## 2024-10-26 PROCEDURE — 84295 ASSAY OF SERUM SODIUM: CPT | Performed by: HOSPITALIST

## 2024-10-26 PROCEDURE — 99232 SBSQ HOSP IP/OBS MODERATE 35: CPT | Performed by: INTERNAL MEDICINE

## 2024-10-26 PROCEDURE — 87086 URINE CULTURE/COLONY COUNT: CPT | Performed by: INTERNAL MEDICINE

## 2024-10-26 PROCEDURE — 36415 COLL VENOUS BLD VENIPUNCTURE: CPT | Performed by: INTERNAL MEDICINE

## 2024-10-26 PROCEDURE — 97161 PT EVAL LOW COMPLEX 20 MIN: CPT | Mod: GP

## 2024-10-26 PROCEDURE — 97530 THERAPEUTIC ACTIVITIES: CPT | Mod: GP

## 2024-10-26 PROCEDURE — 80048 BASIC METABOLIC PNL TOTAL CA: CPT | Performed by: HOSPITALIST

## 2024-10-26 PROCEDURE — 84295 ASSAY OF SERUM SODIUM: CPT | Performed by: INTERNAL MEDICINE

## 2024-10-26 PROCEDURE — 87040 BLOOD CULTURE FOR BACTERIA: CPT | Performed by: INTERNAL MEDICINE

## 2024-10-26 PROCEDURE — 250N000011 HC RX IP 250 OP 636

## 2024-10-26 PROCEDURE — 36415 COLL VENOUS BLD VENIPUNCTURE: CPT | Performed by: HOSPITALIST

## 2024-10-26 PROCEDURE — 97535 SELF CARE MNGMENT TRAINING: CPT | Mod: GO

## 2024-10-26 PROCEDURE — 250N000013 HC RX MED GY IP 250 OP 250 PS 637: Performed by: INTERNAL MEDICINE

## 2024-10-26 PROCEDURE — 99222 1ST HOSP IP/OBS MODERATE 55: CPT | Performed by: INTERNAL MEDICINE

## 2024-10-26 PROCEDURE — 250N000013 HC RX MED GY IP 250 OP 250 PS 637: Performed by: HOSPITALIST

## 2024-10-26 PROCEDURE — 97165 OT EVAL LOW COMPLEX 30 MIN: CPT | Mod: GO

## 2024-10-26 PROCEDURE — 120N000001 HC R&B MED SURG/OB

## 2024-10-26 PROCEDURE — 85014 HEMATOCRIT: CPT | Performed by: HOSPITALIST

## 2024-10-26 RX ORDER — TAMSULOSIN HYDROCHLORIDE 0.4 MG/1
0.4 CAPSULE ORAL DAILY
Status: DISCONTINUED | OUTPATIENT
Start: 2024-10-26 | End: 2024-11-13 | Stop reason: HOSPADM

## 2024-10-26 RX ADMIN — SODIUM CHLORIDE TAB 1 GM 1 G: 1 TAB at 12:38

## 2024-10-26 RX ADMIN — SODIUM CHLORIDE TAB 1 GM 1 G: 1 TAB at 09:58

## 2024-10-26 RX ADMIN — VANCOMYCIN HYDROCHLORIDE 1250 MG: 1.25 INJECTION, SOLUTION INTRAVITREAL at 21:34

## 2024-10-26 RX ADMIN — DOXYCYCLINE HYCLATE 100 MG: 100 CAPSULE ORAL at 21:34

## 2024-10-26 RX ADMIN — VANCOMYCIN HYDROCHLORIDE 1250 MG: 1.25 INJECTION, SOLUTION INTRAVITREAL at 09:59

## 2024-10-26 RX ADMIN — DOXYCYCLINE HYCLATE 100 MG: 100 CAPSULE ORAL at 09:58

## 2024-10-26 NOTE — PLAN OF CARE
Goal Outcome Evaluation:    Orientation: Aox4, calm and cooperative    Pain management : No c/o pain, PRN given on route     Vitals/Tele: VSS, on RA    IV Access/drains:  PIV SL    Diet: Regular diet    Mobility: Ax1 GB + Walker    GI/: Cont.    Wound/Skin: blanchable redness to sacrum/coccyx    Discharge Plan: TBD      See Flow sheets for assessment

## 2024-10-26 NOTE — PROGRESS NOTES
[facility]    Hospitalist Progress Note    Interval History   - Feels thirsty, encouraged fluid restriction. Reports he urinated over 10 times last night.  - Discussed with Nephrology, improving sodium can hold consult  - Likely LINDSEY on Monday    Assessment & Plan   Summary: Jayme Donahue is a 84 year old male with PMH prostate cancer, who was admitted on 10/25/2024 as a transfer from [facility] with Corynebacterium bacteremia and concern for endocarditis.    Corynebacterium striatum bacteremia  Corynebacterium UTI  Septic shock secondary to above, resolved  Severe mitral regurgitation  Possible endocarditis  Low back pain  Generalized weakness  Jayme Donahue initially developed nausea and poor appetite on 10/18/2024.  He presented to an outside hospital and was found to be hyponatremic with a sodium of 118 and diagnosed with sepsis secondary to a UTI.  Subsequently became hypotensive and was transferred to [facility] ICU and started on vasopressors on 10/23/2024.  He was treated with vancomycin and Rocephin.  Levophed weaned off on the night of 10/23/2024.  Blood cultures returned positive for corynebacterium striatum. TTE on 10/23/2024 showed severe mitral regurgitation and a mobile echodensity on the atrial side of the mitral valve that likely represents flail scallop although vegetation is also in the differential.  Complained of low back pain, lumbar spine MRI on 10/24/2024 showed some likely degenerative endplate changes at right L4-L5 and left L5-S1, although early changes of discitis could also be in the differential.  ID was consulted on 10/25/2024 and recommended continuing vancomycin and transferring to a facility that could perform LINDSEY for further evaluation of the mitral valve abnormality.  - Continue vancomycin, pharmacy to dose.  - Add doxycycline for Stenotrophomonas maltophilia growing on culture from left scapular skin lesion.  - Blood culture on  10/24/2024 growing GPCs likely corynebacterium, repeat blood cultures 10/26 and check daily until negative  - LINDSEY ordered, anticipate this will be done on Monday, 10/28/2024.  - Consult ID, appreciate their assistance.  - PT/OT consult, appreciate their assistance.  - Care transitions consult, appreciate their assistance.  Anticipate need for TCU upon discharge for ongoing therapies and IV antibiotics.    Hyponatremia, suspect SIADH  Sodium reportedly 118 at outside hospital when he first presented.  Felt to be secondary to dehydration, sodium mildly improved with IV fluids.  Sodium 120 on 10/23/2024 when he arrived at [facility].  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  He was started on salt tablets and placed on a fluid restriction.  Sodium ranged between 119-123 while he was at [facility].   Sodium improved to 128 with fluid restriction 10/26  - Continue fluid restriction  - Decrease sodium checks to q8h  - Stop salt tablets  - Nephrology consult not needed today    Left scapular lesion  Lesion culture positive for Stenotrophomonas maltophilia  Appearance is concerning for malignancy, see photos in epic on 10/23/2024.  Patient states this lesion has been present for several months, he is not sure how it started.  He has been covering it at home to manage drainage from the lesion.  - WOC nurse consulted at [facility].  - Continue wound cares as ordered by WOC nurse at previous facility.  -  Continue doxycycline    History of prostate cancer  LUTS  Reportedly treated in 2020, but details unclear.  PSA in July 2024 was 54, PSA in September 2024 was 57. Having urinary urgency, LUTS this admission  - Start tamsulosin empirically  - Monitor for retention check PVRs    Moderate protein calorie malnutrition  - Consult dietitian.    Clinically Significant Risk Factors Present on Admission         # Hyponatremia: Lowest Na = 121 mmol/L in last 2 days, will monitor as appropriate  # Hypochloremia:  Lowest Cl = 91 mmol/L in last 2 days, will monitor as appropriate                   # Severe Malnutrition: based on nutrition assessment     # Financial/Environmental Concerns:            PT/OT: ordered  Diet: Combination Diet Regular Diet Adult  Fluid restriction 1200 ML FLUID    DVT Prophylaxis: Pneumatic Compression Devices  Palmer Catheter: Not present  Lines: None     Cardiac Monitoring: None  Code Status: No CPR- Do NOT Intubate    Medically Ready for Discharge: Anticipated in 2-4 Days      Shin Rodríguez MD  Hospitalist Service  [facility]  Securely message with [employer] (more info)  Text page via mDialog Paging/Directory     Data reviewed today: I reviewed all new labs and imaging results over the last 24 hours.    Physical Exam   Temp: 98  F (36.7  C) Temp src: Oral BP: 117/61 Pulse: 69   Resp: 18 SpO2: 95 % O2 Device: None (Room air)    There were no vitals filed for this visit.  Vital Signs with Ranges  Temp:  [97.6  F (36.4  C)-98.1  F (36.7  C)] 98  F (36.7  C)  Pulse:  [69-82] 69  Resp:  [16-18] 18  BP: (112-127)/(61-76) 117/61  SpO2:  [95 %-100 %] 95 %  I/O last 3 completed shifts:  In: -   Out: 2650 [Urine:2650]  O2 requirements: not needed    Constitutional: Male in NAD  HEENT: Eyes nonicteric, oral mucosa moist  Cardiovascular: RRR, normal S1/2, no m/r/g  Respiratory: CTAB, no wheezing or crackles  Vascular: No LE pitting edema  GI: Normoactive bowel sounds, nontender, nondistended  Skin/Integumen: No rashes  Neuro/Psych: Appropriate affect and mood. A&Ox3, moves all extremities    Medications   Current Facility-Administered Medications   Medication Dose Route Frequency Provider Last Rate Last Admin     Current Facility-Administered Medications   Medication Dose Route Frequency Provider Last Rate Last Admin    doxycycline hyclate (VIBRAMYCIN) capsule 100 mg  100 mg Oral Q12H [facility] (08/20) Yovani Aldrich MD   100 mg at 10/26/24 0958    sodium chloride (PF) 0.9% PF  flush 3 mL  3 mL Intracatheter Q8H Yovani Aldrich MD   3 mL at 10/26/24 1002    sodium chloride tablet 1 g  1 g Oral TID w/meals Yovani Aldrich MD   1 g at 10/26/24 1238    tamsulosin (FLOMAX) capsule 0.4 mg  0.4 mg Oral Daily Shin Rodríguez MD        vancomycin (VANCOCIN) 1,250 mg in 250 mL dextrose intermittent infusion  1,250 mg Intravenous Q12H Antoinette Sinclair RPH   1,250 mg at 10/26/24 0959       Data   Recent Labs   Lab 10/26/24  0834 10/26/24  0025 10/25/24  1930 10/25/24  1219 10/25/24  0619 10/25/24  0205 10/24/24  1007 10/24/24  0637 10/23/24  0944 10/23/24  0519 10/23/24  0126 10/23/24  0126   WBC 8.2  --   --   --  8.7  --   --  9.6  --  11.4*   < >  --    HGB 11.3*  --   --   --  12.1*  --   --  11.3*  --  11.0*   < >  --    MCV 83  --   --   --  84  --   --  85  --  85   < >  --      --   --   --  209  --   --  183  --  162   < >  --    INR  --   --   --   --   --   --   --   --   --  1.53*  --   --    *  128* 124* 122*   < > 121* 123*   < >  --    < > 123*  123*  --  120*   POTASSIUM 4.3  --   --   --  4.2 4.2   < >  --    < > 4.5  4.5  --  4.4   CHLORIDE 96*  --   --   --  91* 92*   < >  --    < > 95*  95*  --  92*   CO2 27  --   --   --  21* 25   < >  --    < > 21*  21*  --  24   BUN 7.8*  --   --   --  8.7 8.5   < >  --    < > 14.2  14.2  --  15.1   CR 0.53*  --   --   --  0.50* 0.52*   < >  --    < > 0.62*  0.62*  --  0.68   ANIONGAP 5*  --   --   --  9 6*   < >  --    < > 7  7  --  4*   AMALIA 7.9*  --   --   --  7.8* 7.7*   < >  --    < > 7.6*  7.6*  --  7.7*   GLC 87  --   --   --  99 101*   < >  --    < > 108*  108*  --  117*   ALBUMIN  --   --   --   --   --   --   --   --   --  2.2*  --  2.2*   PROTTOTAL  --   --   --   --   --   --   --   --   --  4.5*  --   --    BILITOTAL  --   --   --   --   --   --   --   --   --  0.7  --   --    ALKPHOS  --   --   --   --   --   --   --   --   --  88  --   --    ALT  --   --   --   --   --   --   --    --   --  20  --   --    AST  --   --   --   --   --   --   --   --   --  22  --   --     < > = values in this interval not displayed.       Imaging:   No results found for this or any previous visit (from the past 24 hours).

## 2024-10-26 NOTE — PHARMACY-VANCOMYCIN DOSING SERVICE
Pharmacy Vancomycin Note  Date of Service 2024  Patient's  1940   84 year old, male    Indication: Bacteremia and possible endocarditis  Day of Therapy: 4  Current vancomycin regimen:  1000 mg IV q8h  Current vancomycin monitoring method: AUC  Current vancomycin therapeutic monitoring goal: 400-600 mg*h/L    InsightRX Prediction of Current Vancomycin Regimen  Regimen: 1000 mg IV every 8 hours.  Start time: 22:29 on 10/25/2024  Exposure target: AUC24 (range)400-600 mg/L.hr   AUC24,ss: 591 mg/L.hr  Probability of AUC24 > 400: 100 %  Ctrough,ss: 17 mg/L  Probability of Ctrough,ss > 20: 14 %  Probability of nephrotoxicity (Lodise J CARLOS ): 13 %    Current estimated CrCl = Estimated Creatinine Clearance: 91.8 mL/min (A) (based on SCr of 0.5 mg/dL (L)).    Creatinine for last 3 days  10/23/2024:  1:26 AM Creatinine 0.68 mg/dL;  5:19 AM Creatinine 0.62 mg/dL;  5:19 AM Creatinine 0.62 mg/dL; 10:20 AM Creatinine 0.65 mg/dL;  2:08 PM Creatinine 0.60 mg/dL;  6:58 PM Creatinine 0.63 mg/dL;  9:44 PM Creatinine 0.58 mg/dL  10/24/2024:  1:53 AM Creatinine 0.54 mg/dL;  6:36 AM Creatinine 0.54 mg/dL; 10:07 AM Creatinine 0.57 mg/dL;  2:05 PM Creatinine 0.47 mg/dL;  6:03 PM Creatinine 0.51 mg/dL; 10:27 PM Creatinine 0.49 mg/dL  10/25/2024:  2:05 AM Creatinine 0.52 mg/dL;  6:19 AM Creatinine 0.50 mg/dL    Recent Vancomycin Levels (past 3 days)  10/24/2024:  6:36 AM Vancomycin 8.0 ug/mL  10/25/2024:  7:30 PM Vancomycin 15.7 ug/mL    Vancomycin IV Administrations (past 72 hours)                     vancomycin (VANCOCIN) 1,000 mg in 200 mL dextrose intermittent infusion (mg) 1,000 mg New Bag 10/25/24 1029     1,000 mg New Bag  0216     1,000 mg New Bag 10/24/24 1839    vancomycin (VANCOCIN) 1,000 mg in 200 mL dextrose intermittent infusion (mg) 1,000 mg New Bag 10/24/24 0949     1,000 mg New Bag 10/23/24 2044     1,000 mg New Bag  0906                    Nephrotoxins and other renal medications (From now, onward)       Start     Dose/Rate Route Frequency Ordered Stop    10/25/24 2100  vancomycin (VANCOCIN) 1,250 mg in 250 mL dextrose intermittent infusion         1,250 mg  over 90 Minutes Intravenous EVERY 12 HOURS 10/25/24 2032                 Contrast Orders - past 72 hours (72h ago, onward)      None            Interpretation of levels and current regimen:  Vancomycin level is reflective of -600    Has serum creatinine changed greater than 50% in last 72 hours: No    Urine output:  unable to determine, appears to have regular occurences    Renal Function: Stable    InsightRX Prediction of Planned New Vancomycin Regimen  Regimen: 1250 mg IV every 12 hours.  Start time: 21:00 on 10/25/2024  Exposure target: AUC24 (range)400-600 mg/L.hr   AUC24,ss: 494 mg/L.hr  Probability of AUC24 > 400: 92 %  Ctrough,ss: 12.6 mg/L  Probability of Ctrough,ss > 20: 0 %  Probability of nephrotoxicity (Lodise J CARLOS 2009): 8 %      Plan:  Given 84yr old and vanco level has almost doubled with last dose increase, will change to Vancomycin 1250mg IV q12h  Vancomycin monitoring method: AUC  Vancomycin therapeutic monitoring goal: 400-600 mg*h/L  Pharmacy will check vancomycin levels as appropriate in 1-3 Days.  Serum creatinine levels will be ordered daily for the first week of therapy and at least twice weekly for subsequent weeks.    Antoinette Sinclair [facility]

## 2024-10-26 NOTE — CONSULTS
[facility]    Infectious Disease Consultation     Date of Admission:  10/25/2024  Date of Consult (When I saw the patient): 10/26/24    Assessment & Plan   Jayme Donahue is a 84 year old male who was admitted on 10/25/2024.     Impression:  83 yo with PMH prostate cancer, who was admitted on 10/25/2024 as a transfer from [facility] with Corynebacterium bacteremia and concern for endocarditis.   Admitted with weakness and poor oral intake to an outside hospital  Blood cultures from 10/22 grew out Corynebacterium striatum, 2 out of 2  The same day he also had positive urine cultures for Corynebacterium stratum  And a wound culture from the scapular lesion positive for Corynebacterium striatum  With TTE done on 10/23 positive for a mobile echodensity on the atrial side of the mitral valve that likely represents flail scallop although vegetation is also in differential  Patient also complained of back pain but there is no abscesses or osteomyelitis on the back MRI  Of note patient has a left scapular lesion that has been present for several months  He is currently on vancomycin  He was transferred to [facility] for a LINDSEY    Recommendations   Continue on vancomycin   Follow up on the LINDSEY   Given positive blood culture for gram-positive bacilli from 10/20 repeat a set of blood cultures today  Consider biopsy on the shoulder chronic wound       Kike Belcher MD    Reason for Consult   Reason for consult: I was asked to evaluate this patient for corynebacterium striatum bacteremia.    Primary Care Physician   [facility]    Chief Complaint   Nausea   Fever     History is obtained from the patient and medical records    History of Present Illness   Jayme Donahue is a 84 year old male who presents as a transfer from outside hospital with Corynebacterium bacteremia with spillover in the urine as well  Has a chronic wound on the left scapula unclear etiology the wound was  cultured also tested positive for Corynebacterium    Past Medical History   I have reviewed this patient's medical history and updated it with pertinent information if needed.   Past Medical History:   Diagnosis Date    Cataract     Iliac artery aneurysm (H)     Prostate cancer (H)        Past Surgical History   I have reviewed this patient's surgical history and updated it with pertinent information if needed.  History reviewed. No pertinent surgical history.    Prior to Admission Medications   Prior to Admission Medications   Prescriptions Last Dose Informant Patient Reported? Taking?   Capsicum, Cayenne, (CAYENNE PEPPER PO) Past Month Self Yes Yes   Sig: Take 1 capsule by mouth daily.   Misc Natural Products (SAW PALMETTO) CAPS Past Month Self Yes Yes   Sig: Take 1 capsule by mouth daily.   multivitamin  with lutein (OCUVITE WITH LUTEIN) CAPS per capsule Past Month Self Yes Yes   Sig: Take 1 capsule by mouth daily.      Facility-Administered Medications: None     Allergies   No Known Allergies    Immunization History     There is no immunization history on file for this patient.    Social History   I have reviewed this patient's social history and updated it with pertinent information if needed. Jayme Donahue  reports that he has never smoked. He has never used smokeless tobacco. He reports that he does not currently use alcohol. He reports that he does not use drugs.    Family History   I have reviewed this patient's family history and updated it with pertinent information if needed.   History reviewed. No pertinent family history.    Review of Systems   The 10 point Review of Systems is negative    Physical Exam   Temp: 98  F (36.7  C) Temp src: Oral BP: 117/61 Pulse: 69   Resp: 18 SpO2: 95 % O2 Device: None (Room air)    Vital Signs with Ranges  Temp:  [97.6  F (36.4  C)-98.1  F (36.7  C)] 98  F (36.7  C)  Pulse:  [69-82] 69  Resp:  [16-18] 18  BP: (112-127)/(61-76) 117/61  SpO2:  [95 %-100 %] 95 %  0 lbs 0  "oz  There is no height or weight on file to calculate BMI.    GENERAL APPEARANCE:  awake  EYES: Eyes grossly normal to inspection  NECK: no adenopathy  RESP: lungs clear   CV: regular rates and rhythm  LYMPHATICS: normal ant/post cervical and supraclavicular nodes  ABDOMEN: soft, nontender  MS: extremities normal  SKIN: wound on the scapula         Data   All laboratory and imaging data in the past 24 hours reviewed  No results for input(s): \"CULT\" in the last 168 hours.  No lab results found.    Invalid input(s): \"UC\"       All cultures:  Recent Labs   Lab 10/24/24  1444 10/22/24  1830 10/22/24  1725 10/22/24  1710 10/22/24  1645   CULTURE Positive on the 2nd day of incubation*  Gram positive bacilli, resembling diphtheroids* Corynebacterium striatum* Corynebacterium striatum*  Corynebacterium species* Culture in progress  1+ Gram negative bacilli*  4+ Normal arminda  1+ Stenotrophomonas maltophilia*  4+ Mixed Anaerobic Organisms Present Corynebacterium striatum*      Blood culture:  Results for orders placed or performed during the hospital encounter of 10/23/24   Blood Culture Arm, Right    Collection Time: 10/24/24  2:44 PM    Specimen: Arm, Right; Blood   Result Value Ref Range    Culture Positive on the 2nd day of incubation (A)     Culture Gram positive bacilli, resembling diphtheroids (AA)       Urine culture:  No results found for this or any previous visit.          "

## 2024-10-26 NOTE — PROGRESS NOTES
Patient packed up and all belongings sent with him to travel via ambulance to [facility] to help manage his endocarditis and bacteremia. Alert and oriented, forgetful pleasant. Disheveled appearance. Denies pain. Vitals stable. No nausea or emesis. Edematous buttocks down to feet 2+ with pitting edema in legs and hips. Wife updated to plan of care. Wife was here and Md talked with them. Piv in place saline locked. Left shoulder wound with dressing in place not changed today. Wife given phone number to [facility] unit 66.

## 2024-10-26 NOTE — CONSULTS
"CLINICAL NUTRITION SERVICES  -  ASSESSMENT NOTE      Recommendations Ordered by Registered Dietitian (RD):   Ensure Plus High Protein 3x/day      Malnutrition:   Severe malnutrition          REASON FOR ASSESSMENT  Jayme Donahue is a 84 year old male seen by Registered Dietitian for Provider Order - Malnutrition. Pt transferred from [facility] for LINDSEY and ongoing management of bacteremia and abnormality seen on mitral valve. Pt originally began feeling ill 10/18/24 presenting to outside hospital with hyponatremia.     NUTRITION HISTORY  Pt reports following a regular diet at home. Reportedly his appetite has been poor for the past 2 weeks. Weight of 130 lbs has been stable for months.       CURRENT NUTRITION ORDERS  Diet Order:     Regular + 1200 ml Fluid Restriction     Current Intake/Tolerance:  OK intake thus far, ate half of scrambled eggs and banana for breakfast today.       NUTRITION FOCUSED PHYSICAL ASSESSMENT FOR DIAGNOSING MALNUTRITION     Observed:    Muscle wasting (refer to documentation in Malnutrition section) and Subcutaneous fat loss (refer to documentation in Malnutrition section)    ANTHROPOMETRICS  Height: 5' 6\"  Weight: No weight this admission, will order weight. Was 130 lbs (59 kg) on 10/23 at [facility].   Weight Status:  Normal BMI  IBW: 64.5 kg    Weight History:   Wt Readings from Last 10 Encounters:   10/23/24 59 kg (130 lb)       LABS  Labs reviewed  Na 128, Cl 96, Cr 0.53    MEDICATIONS  Medications reviewed  NaCl 1 g TID    ASSESSED NUTRITION NEEDS PER APPROVED PRACTICE GUIDELINES:    Dosing Weight 59 kg (actual)   Estimated Energy Needs: 8792-8329 kcals (25-30 Kcal/Kg)  Justification: maintenance  Estimated Protein Needs: 70-90+ grams protein (1.2-1.5 g pro/Kg)  Justification: hypercatabolism with acute illness  Estimated Fluid Needs: 1200  mL   Justification: Restriction per MD, hyponatremic     MALNUTRITION:  % Weight Loss:  None noted - pt reports stable for " months.   % Intake:  </= 50% for >/= 5 days (severe malnutrition)  Subcutaneous Fat Loss:  Global, severe  Muscle Loss:  Global, severe  Fluid Retention:  None noted    Malnutrition Diagnosis: Severe malnutrition  In Context of:  Acute on chronic illness or disease    NUTRITION DIAGNOSIS:  Inadequate oral intake related to low appetite as evidenced by eating </= 50% needs for the past 2 weeks, observed severe fat and muscle loss     INTERVENTIONS  Recommendations / Nutrition Prescription  Assisted patient in ordering lunch today. See above for other recommendations.     Goals  Pt to consume at least 75% of three nutritionally adequate meals per day (or equivalent via snacks/supplements).       MONITORING AND EVALUATION:  Progress towards goals will be monitored and evaluated per protocol and Practice Guidelines    Nicole High, ROSALIA, LD, CNSC

## 2024-10-26 NOTE — PLAN OF CARE
Orientation: A&Ox4, forgetful.    Vitals/Tele: VSS RA, denies pain this shift.    IV Access/drains: PIV SL    Diet: Regular, FR <1200ml    Mobility: A1 GB&W    Na: 122, 124    GI/: Continent B&B.     Wound/Skin: BLE edema +2. LUE scapula wound, covered with mepilex - CDI.     Consults: Cards following. PT/OT, nutrition, WOC.    Discharge Plan: Pending LINDSEY.       See Flow sheets for assessment

## 2024-10-26 NOTE — PROGRESS NOTES
"   10/26/24 0840   Appointment Info   Signing Clinician's Name / Credentials (OT) MAYTE Petty   Student Supervision On-site supervision provided   Rehab Comments (OT) 1 eval, 2 selfcare   Living Environment   People in Home spouse   Current Living Arrangements house   Home Accessibility stairs to enter home   Number of Stairs, Main Entrance 5   Stair Railings, Main Entrance railings safe and in good condition   Transportation Anticipated family or friend will provide   Living Environment Comments Pt reports 5 stairs to enter home however uses a ramp. Pt reports a walk-in shower w/ grabbars, and shower chair. Tall toilet.   Self-Care   Usual Activity Tolerance good   Current Activity Tolerance moderate   Equipment Currently Used at Home walker, standard;walker, rolling   Fall history within last six months no   Activity/Exercise/Self-Care Comment Pt reports (I) ADLs   Instrumental Activities of Daily Living (IADL)   Previous Responsibilities finances;meal prep;housekeeping;laundry;driving   IADL Comments Pt reports sharing IADLs with wife. \"We both cook, clean, and do finances\".   General Information   Onset of Illness/Injury or Date of Surgery 10/25/24   Referring Physician Yovani Aldrich MD   Patient/Family Therapy Goal Statement (OT) return home   Additional Occupational Profile Info/Pertinent History of Current Problem chart review: Pt is a \"84 year old male who has been transferred from [facility] to [facility] for LINDSEY and ongoing management of Corynebacterium bacteremia and an abnormality that was seen on his mitral valve on TTE\"   Existing Precautions/Restrictions fall   Cognitive Status Examination   Orientation Status orientation to person, place and time   Visual Perception   Visual Impairment/Limitations   (wears for driving)   Sensory   Sensory Comments no n/t   Pain Assessment   Patient Currently in Pain Yes, see Vital Sign " flowsheet  (lower back pain 4/10 with movement)   Range of Motion Comprehensive   General Range of Motion no range of motion deficits identified   Strength Comprehensive (MMT)   Comment, General Manual Muscle Testing (MMT) Assessment not formally assessed   Bed Mobility   Bed Mobility supine-sit;sit-supine   Supine-Sit Ashe (Bed Mobility) minimum assist (75% patient effort)   Sit-Supine [place]he (Bed Mobility) minimum assist (75% patient effort)   Transfers   Transfers sit-stand transfer;toilet transfer   Sit-Stand Transfer   Sit-Stand Ashe (Transfers) contact guard   Assistive Device (Sit-Stand Transfers) walker, front-wheeled   Toilet Transfer   Type (Toilet Transfer) stand-sit;sit-stand   Ashe Level (Toilet Transfer) contact guard   Assistive Device (Toilet Transfer) raised toilet seat;walker, front-wheeled   Activities of Daily Living   BADL Assessment/Intervention lower body dressing;grooming;toileting   Lower Body Dressing Assessment/Training   Comment, (Lower Body Dressing) SBA   Position (Lower Body Dressing) supine   Grooming Assessment/Training   Position (Grooming) unsupported standing   [place]he Level (Grooming) contact guard assist   Toileting   Assistive Devices (Toileting) raised toilet seat   [place]he Level (Toileting) contact guard assist   Clinical Impression   Criteria for Skilled Therapeutic Interventions Met (OT) Yes, treatment indicated   OT Diagnosis Decreased (I) ADL   OT Problem List-Impairments impacting ADL problems related to;activity tolerance impaired   Assessment of Occupational Performance 1-3 Performance Deficits   Identified Performance Deficits LB dressing, act luis for I/ADLs,   Planned Therapy Interventions (OT) ADL retraining;IADL retraining;progressive activity/exercise   Clinical Decision Making Complexity (OT) problem focused assessment/low complexity   Risk & Benefits of therapy have been explained evaluation/treatment results reviewed;care  plan/treatment goals reviewed;risks/benefits reviewed;current/potential barriers reviewed;patient   OT Total Evaluation Time   OT Eval, Low Complexity Minutes (97175) 10   OT Goals   Therapy Frequency (OT) 5 times/week   OT Predicted Duration/Target Date for Goal Attainment 11/02/24   OT Goals Hygiene/Grooming;Upper Body Dressing;Lower Body Dressing;Toilet Transfer/Toileting   OT: Hygiene/Grooming modified independent;while standing   OT: Upper Body Dressing Modified independent;including set-up/clothing retrieval   OT: Lower Body Dressing Modified independent;including set-up/clothing retrieval   OT: Toilet Transfer/Toileting Modified independent   Interventions   Interventions Quick Adds Self-Care/Home Management   Self-Care/Home Management   Self-Care/Home Mgmt/ADL, Compensatory, Meal Prep Minutes (43723) 25   Treatment Detail/Skilled Intervention Pt greeted supine in bed with breakfast. Agreeable to OT. Pt completed LB dressing to doff/don BLE socks supine in bed with SBA, as he reports he typically completes in bed or EOB.  Supine>EOB MIN  A to upright trunk. Pt able to reposition self toward EOB. EOB > stand w/FWW CGA. Pt ambualted to bathroom w/FWW CGA for safety, no overt LOB noted. Pt able to doff brief over hips, SBA. Pt completed stand>sit onto raised toilet seat CGA and use of L grabbar and sink edge to lower self. Pt able to void and complete pericares, SBA. MOD A to doff/don brief over BLE seated on rasied toilet seat. Sit>stand from raised toilet seat CGA w/FWW. Pt complete x1 g/h task standing sinkside CGA. Pt ambulated to EOB w/FWW CGA. Stand>EOB CGA to lower self. MIN A to lift BLE over EOB. Pt able to reposition self in bed, SBA and extended time. Pt remained supine in bed with HOB elevated to breakfast. Call light in reach, bed alarm on, and all needs met.   OT Discharge Planning   OT Plan LB dressing AE as appropriate, g/h sinkside, act luis for I/ADLs   OT Discharge Recommendation (DC Rec)  Transitional Care Facility   OT Rationale for DC Rec Pt currently below baseline. Pt limited by decreased act luis and presenting symptoms. Pt resides with wife in a one level home and completes all I/ADLs (I)ly. Pt may benefit from further level of care such as TCU to increase safety and (I) in I/ADLs prior to d/c home with spouse.   OT Brief overview of current status Goals of therapy will be to address safe mobility and make recs for d/c to next level of care. Pt and RN will continue to follow all falls reisk precautions as documented by RN staff while hospitalized. LB dressing, toileting   Total Session Time   Timed Code Treatment Minutes 25   Total Session Time (sum of timed and untimed services) 35

## 2024-10-26 NOTE — PROGRESS NOTES
10/26/24 1100   Appointment Info   Signing Clinician's Name / Credentials (PT) Shana Etienne, ELY   Living Environment   People in Home spouse   Current Living Arrangements house   Home Accessibility stairs to enter home   Number of Stairs, Main Entrance 5   Stair Railings, Main Entrance railings safe and in good condition;railing on right side (ascending)   Transportation Anticipated family or friend will provide   Self-Care   Usual Activity Tolerance good   Current Activity Tolerance fair   Regular Exercise No   Equipment Currently Used at Home walker, rolling   Fall history within last six months no   General Information   Onset of Illness/Injury or Date of Surgery 10/25/24   Referring Physician Dr. Aldrich   Patient/Family Therapy Goals Statement (PT) To go home   Pertinent History of Current Problem (include personal factors and/or comorbidities that impact the POC) Pt is an 84 year old male admitted for urosepsis.   Existing Precautions/Restrictions fall   Cognition   Affect/Mental Status (Cognition) WFL   Orientation Status (Cognition) oriented x 4   Follows Commands (Cognition) WFL   Pain Assessment   Patient Currently in Pain No   Range of Motion (ROM)   Range of Motion ROM is WFL   Strength (Manual Muscle Testing)   Strength (Manual Muscle Testing) Deficits observed during functional mobility   Bed Mobility   Comment, (Bed Mobility) Min A   Transfers   Comment, (Transfers) Declines 2/2 fatigue   Gait/Stairs (Locomotion)   Comment, (Gait/Stairs) Declines 2/2 fatigue   Balance   Balance Comments Good in sitting   Clinical Impression   Criteria for Skilled Therapeutic Intervention Yes, treatment indicated   PT Diagnosis (PT) Impaired ambulation   Influenced by the following impairments Decreased strength, decreased endurance, decreased balance   Functional limitations due to impairments Difficulty with bed mobility, transfers, ambulaiton, stair management   Clinical Presentation (PT Evaluation Complexity)  stable   Clinical Presentation Rationale VSS, pain controlled   Clinical Decision Making (Complexity) low complexity   Planned Therapy Interventions (PT) bed mobility training;balance training;gait training;patient/family education;stair training;transfer training   Risk & Benefits of therapy have been explained evaluation/treatment results reviewed;care plan/treatment goals reviewed;risks/benefits reviewed;current/potential barriers reviewed;participants voiced agreement with care plan;participants included;patient   PT Total Evaluation Time   PT Eval, Low Complexity Minutes (80968) 12   Physical Therapy Goals   PT Frequency 5x/week   PT Predicted Duration/Target Date for Goal Attainment 11/02/24   PT Goals Transfers;Gait;Bed Mobility   PT: Bed Mobility Independent;Supine to/from sit   PT: Transfers Supervision/stand-by assist;Bed to/from chair;Assistive device   PT: Gait Supervision/stand-by assist;Standard walker;100 feet   PT Discharge Planning   PT Plan Assess transfers and ambulation, general strengthening   PT Discharge Recommendation (DC Rec) Transitional Care Facility   PT Rationale for DC Rec At baseline, pt lives in a house with his wife. Pt reports independence with mobility prior to admit. This date, Pt is well below baseline and would be a considerable fall risk if he returned to home environment. Pt currently requires assist with all mobility and has decreased activity tolerance. Pt will benefit from continued therapy at TCU to address impairments and increase mobility and functional independence prior to returning home.   PT Brief overview of current status Goals of therapy will be to address safe mobility and make recs for d/c to next level of care. Pt and RN will continue to follow all falls risk precautions as documented by RN staff while hospitalized. Assist of 1.   Physical Therapy Time and Intention   Total Session Time (sum of timed and untimed services) 12

## 2024-10-26 NOTE — CONSULTS
Discussed with .  Sodium improving with current measures.  Consult canceled for now.  Please call us back with questions.

## 2024-10-27 LAB
ANION GAP SERPL CALCULATED.3IONS-SCNC: 4 MMOL/L (ref 7–15)
BACTERIA UR CULT: NO GROWTH
BUN SERPL-MCNC: 12.2 MG/DL (ref 8–23)
CALCIUM SERPL-MCNC: 7.9 MG/DL (ref 8.8–10.4)
CHLORIDE SERPL-SCNC: 93 MMOL/L (ref 98–107)
CREAT SERPL-MCNC: 0.58 MG/DL (ref 0.67–1.17)
EGFRCR SERPLBLD CKD-EPI 2021: >90 ML/MIN/1.73M2
GLUCOSE SERPL-MCNC: 95 MG/DL (ref 70–99)
HCO3 SERPL-SCNC: 29 MMOL/L (ref 22–29)
POTASSIUM SERPL-SCNC: 4.3 MMOL/L (ref 3.4–5.3)
SODIUM SERPL-SCNC: 126 MMOL/L (ref 135–145)
VANCOMYCIN SERPL-MCNC: 16.9 UG/ML

## 2024-10-27 PROCEDURE — 36415 COLL VENOUS BLD VENIPUNCTURE: CPT | Performed by: INTERNAL MEDICINE

## 2024-10-27 PROCEDURE — 80051 ELECTROLYTE PANEL: CPT | Performed by: INTERNAL MEDICINE

## 2024-10-27 PROCEDURE — 250N000013 HC RX MED GY IP 250 OP 250 PS 637: Performed by: HOSPITALIST

## 2024-10-27 PROCEDURE — 250N000013 HC RX MED GY IP 250 OP 250 PS 637: Performed by: INTERNAL MEDICINE

## 2024-10-27 PROCEDURE — 120N000001 HC R&B MED SURG/OB

## 2024-10-27 PROCEDURE — 84295 ASSAY OF SERUM SODIUM: CPT | Performed by: INTERNAL MEDICINE

## 2024-10-27 PROCEDURE — 87040 BLOOD CULTURE FOR BACTERIA: CPT | Performed by: INTERNAL MEDICINE

## 2024-10-27 PROCEDURE — 80202 ASSAY OF VANCOMYCIN: CPT | Performed by: HOSPITALIST

## 2024-10-27 PROCEDURE — 80048 BASIC METABOLIC PNL TOTAL CA: CPT | Performed by: INTERNAL MEDICINE

## 2024-10-27 PROCEDURE — 250N000011 HC RX IP 250 OP 636

## 2024-10-27 PROCEDURE — 99232 SBSQ HOSP IP/OBS MODERATE 35: CPT | Performed by: INTERNAL MEDICINE

## 2024-10-27 RX ADMIN — TAMSULOSIN HYDROCHLORIDE 0.4 MG: 0.4 CAPSULE ORAL at 09:31

## 2024-10-27 RX ADMIN — DOXYCYCLINE HYCLATE 100 MG: 100 CAPSULE ORAL at 21:12

## 2024-10-27 RX ADMIN — VANCOMYCIN HYDROCHLORIDE 1250 MG: 1.25 INJECTION, SOLUTION INTRAVITREAL at 09:37

## 2024-10-27 RX ADMIN — DOXYCYCLINE HYCLATE 100 MG: 100 CAPSULE ORAL at 09:31

## 2024-10-27 RX ADMIN — VANCOMYCIN HYDROCHLORIDE 1250 MG: 1.25 INJECTION, SOLUTION INTRAVITREAL at 21:12

## 2024-10-27 NOTE — CONSULTS
Care Management Initial Consult    General Information  Assessment completed with: chart review.  Care Management  consult completed at [facility].  See their consult under Chart review dated 10/24/24.  At [facility] patient was seen by therapy and TCU was recommended.  Primary Care Provider verified and updated as needed:     Readmission within the last 30 days:           Advance Care Planning:            Communication Assessment  Patient's communication style:               Cognitive  Cognitive/Neuro/Behavioral: WDL  Level of Consciousness: alert  Arousal Level: opens eyes spontaneously  Orientation: oriented x 4  Mood/Behavior: calm, cooperative  Best Language: 0 - No aphasia  Speech: clear, spontaneous, illogical    Living Environment:   People in home:       Current living Arrangements:        Able to return to prior arrangements:         Family/Social Support:  Care provided by:    Provides care for:       Support system:            Description of Support System:           Current Resources:   Patient receiving home care services:          Community Resources:    Equipment currently used at home: walker, rolling  Supplies currently used at home:      Employment/Financial:  Employment Status:          Financial Concerns:             Does the patient's insurance plan have a 3 day qualifying hospital stay waiver?  No    Lifestyle & Psychosocial Needs:  Social Drivers of Health     Food Insecurity: Low Risk  (10/23/2024)    Food Insecurity     Within the past 12 months, did you worry that your food would run out before you got money to buy more?: No     Within the past 12 months, did the food you bought just not last and you didn t have money to get more?: No   Depression: Not on file   Housing Stability: Low Risk  (10/23/2024)    Housing Stability     Do you have housing? : Yes     Are you worried about losing your housing?: No   Tobacco Use: Low Risk  (10/25/2024)    Patient History     Smoking  Tobacco Use: Never     Smokeless Tobacco Use: Never     Passive Exposure: Not on file   Financial Resource Strain: Low Risk  (10/23/2024)    Financial Resource Strain     Within the past 12 months, have you or your family members you live with been unable to get utilities (heat, electricity) when it was really needed?: No   Alcohol Use: Not on file   Transportation Needs: Low Risk  (10/23/2024)    Transportation Needs     Within the past 12 months, has lack of transportation kept you from medical appointments, getting your medicines, non-medical meetings or appointments, work, or from getting things that you need?: No   Physical Activity: Not on file   Interpersonal Safety: High Risk (10/23/2024)    Interpersonal Safety     Do you feel physically and emotionally safe where you currently live?: No     Within the past 12 months, have you been hit, slapped, kicked or otherwise physically hurt by someone?: No     Within the past 12 months, have you been humiliated or emotionally abused in other ways by your partner or ex-partner?: No   Stress: Not on file   Social Connections: Not on file   Health Literacy: Not on file       Functional Status:  Prior to admission patient needed assistance:              Mental Health Status:          Chemical Dependency Status:                Values/Beliefs:  Spiritual, Cultural Beliefs, Anglican Practices, Values that affect care:                 Discussed  Partnership in Safe Discharge Planning  document with patient/family:     Additional Information: While at [facility] these referrals were  made for TCU.    Service Provider Request Status Services Address Phone Fax Patient Preferred   [facility]TC/SNF (SNF) Pending - Request Sent -- [address] [phone]    [facility]-SWING BED UNIT (IP) Pending - Request Sent -- [address] [phone]    [facility] (SNF) Pending -  Request Sent -- [address] [facility] [address] [phone] --        Current Capacity last updated by Juliane Spence MA on 12/4/2023 10:54 AM      Peter: [phone]            [facility] (SNF) Pending - Request Sent -- [address] , [address] [id number] [phone] --          Next Steps:   Follow medical work up and recommendations.    If TCU continues to be recommended will contact the referrals made.    Care Management staff will meet patient and spouse to review plans.    CRYS BanksSW

## 2024-10-27 NOTE — DISCHARGE SUMMARY
"[facility]  Hospitalist Discharge Summary      Date of Admission:  10/23/2024  Date of Discharge:  10/25/2024  5:15 PM  Discharging Provider: Albino Abel DO  Discharge Service: Hospitalist Service    Discharge Diagnoses   Sepsis (Corynebacterium striatum bacteremia)  Corynebacterium striatum bacteremia   Corynebacterium UTI  Concern for endocarditis and/or vertebral osteomyelitis    Hypotension - resolved  Generalized weakness  Lumbago  Hyponatremia   Left scapular lesion   History of prostate cancer  Severe mitral regurgitation  Flail scallop vs valvular vegetation  Moderate protein malnutrition    Clinically Significant Risk Factors          Follow-ups Needed After Discharge       Discharge Disposition   Discharged to [facility].   Condition at discharge: Stable    Hospital Course   Jayme Donahue is a 84 year old male admitted on 10/23/2024. He comes in for hypotension and hyponatremia in the setting of dehydration/decreased intake.    Sepsis (Corynebacterium striatum bacteremia)  Corynebacterium striatum bacteremia   Concern for endocarditis and/or vertebral osteomyelitis    Hypotension - resolved  Generalized weakness  Lumbago  WBC 11.4, Pro-Denis 0.56, UA with few bacteria, BP 80/50 requiring Levophed.  CRP 91.  He required vasopressors on transport to maintain adequate BP.  Patient reports ingesting some \"bad ice cream\" 10/18 and has not had great intake since that time.  Notable fatigue without any diarrhea or vomiting.  He received 2.5 L of fluid prior to admission and was started on IVF maintenance.  Started empirically on Ceftriaxone and vancomycin. Blood cultures from OSH with gram + organism and UA with gram + edgardo. In addition, scapular lesion also positive for gram + edgardo which could correlate to UA, but will await for final culture results and susceptibilities.  10/24: Patient was weaned off vasopressors 10/23 without any recurrence of hypotension.  Patient notes " significant back pain with hospital bed requiring chair to be able to sleep at night.  Patient denies any night sweats or fevers prior to admission, however depending on blood culture results may warrant lumbar imaging for investigation of vertebral osteomyelitis (although lower on the differential).  Our staff called [facility] and initial results showed Corynebacterium striatum in wound, urine, and blood cultures. Sent to [facility] labs for final susceptibilities. Antibiogram from [facility] shows susceptibility to Vancomycin. Corynebacterium striatum can cause endocarditis in rare cases. 10/25: Corynebacterium culture pending.  Patient still complaining of back pain.  Has remained afebrile with decreasing CRP.  Repeat blood cultures pending.  MRI lumbar spine shows endplate edema and enhancement of L4-L5 which could represent degenerative endplate changes although early discitis could not be ruled out definitively.  - Transfer for LINDSEY and further evaluation for endocarditis given high suspicion and previous known mitral murmur  - Continue vancomycin IV  - Repeat blood cultures (10/24) positive for gram positive bacilli, resembling diptheroids (likely Corynebacterium)  - PT/OT/care management consulted, recommending TCU  - Infection Disease IP consult     Hyponatremia  Na was 118 at OSH and 120 here. Likely due to dehydration in setting of decreased oral intake.  Urine sodium 45, urine osmolality 632 which could be reflective of fluid bolus at OSH vs SIADH.  - Discontinued IVF, assessing for more fluid restriction and improvement with salt tabs  - Goal correction is 8-10 mEq every 24 hours  - Fluid restriction 1200 mL  - NaCl tabs 1 g 3 times daily  - Regular diet  - Strict intake and output    Left scapular lesion  Has been there for months per patient and has not had it evaluated.  3.3 cm x 4 cm x 0.1 cm.  Wound culture positive for corynebacterium stratum, which is present in both blood and urine.  - WOC consulted  -  Will need outpatient follow-up with PCP and dermatology for further evaluation if still unresolved following treatment of Corynebacterium     History of prostate cancer  Initially treated in 2020, but [facility]. PSA 7/2024 was 54 and in 9/2024 was 57. Unclear what treatment he has gotten.  - Continue with outpatient follow-up    Severe mitral regurgitation  Flail scallop vs valvular vegetation  Murmur heard on exam that is 4/6.  Patient notes a history of murmur, however no previous workup or echocardiograms to compare.  TTE with evidence of severe (4+) mitral regurgitation, moderate mitral valve prolapse, and mobile echo density on the atrial side of the mitral valve indicating either flail scallop or a vegetation.  Patient may warrant transfer for LINDSEY if repeat blood culture results are positive for corynebacterium while remaining on vancomycin.  -Will need outpatient cardiology follow-up for potential valvular intervention vs further workup    Moderate protein malnutrition  BMI 20.9, albumin 2.2, chronic wound with poor healing well indicating poor nutritional status.  -Ensure supplementation 3 times daily with meals    Consultations This Hospital Stay   PHARMACY TO DOSE VANCO  WOUND OSTOMY CONTINENCE NURSE  IP CONSULT  PHARMACY TO DOSE VANCO  CARE MANAGEMENT / SOCIAL WORK IP CONSULT  DERMATOLOGY IP CONSULT  OCCUPATIONAL THERAPY ADULT IP CONSULT  PHYSICAL THERAPY ADULT IP CONSULT  CARE MANAGEMENT / SOCIAL WORK IP CONSULT  INFECTIOUS DISEASES IP CONSULT    Code Status   No CPR- Do NOT Intubate    Time Spent on this Encounter   IAlbino DO, personally saw the patient today and spent greater than 30 minutes discharging this patient.       Albino Abel DO  [facility]  [address] [place] [id number]  Phone: [phone]  Fax: [phone]  ______________________________________________________________________    Physical Exam   Vital Signs:                     Weight: 130 lbs 0 oz    Constitutional: awake, alert, cooperative, no apparent distress, and appears stated age  Respiratory: No increased work of breathing, good air exchange, clear to auscultation bilaterally, no crackles or wheezing  Cardiovascular: Normal apical impulse, regular rate and rhythm, normal S1 and S2, no S3 or S4, and no murmur noted  GI: No scars, normal bowel sounds, soft, non-distended, non-tender, no masses palpated, no hepatosplenomegally  Skin: Large lesion over scapula with rolled edges and pink granulation tissue at base, ecchymosis surrounding  Musculoskeletal: Tenderness over spinous process L4-L5.  Full range of motion noted.  Motor strength is 5 out of 5 all extremities bilaterally.  Tone is normal.       Primary Care Physician   [facility]    Discharge Orders   No discharge procedures on file.    Discharge Follow-up Details         Significant Results and Procedures   Most Recent 3 CBC's:  Recent Labs   Lab Test 10/26/24  0834 10/25/24  0619 10/24/24  0637   WBC 8.2 8.7 9.6   HGB 11.3* 12.1* 11.3*   MCV 83 84 85    209 183     Most Recent 3 BMP's:  Recent Labs   Lab Test 10/27/24  1600 10/27/24  0811 10/26/24  2252 10/26/24  1513 10/26/24  0834 10/25/24  1219 10/25/24  0619   * 126*  126* 125*   < > 128*  128*   < > 121*   POTASSIUM  --  4.3  --   --  4.3  --  4.2   CHLORIDE  --  93*  --   --  96*  --  91*   CO2  --  29  --   --  27  --  21*   BUN  --  12.2  --   --  7.8*  --  8.7   CR  --  0.58*  --   --  0.53*  --  0.50*   ANIONGAP  --  4*  --   --  5*  --  9   AMALIA  --  7.9*  --   --  7.9*  --  7.8*   GLC  --  95  --   --  87  --  99    < > = values in this interval not displayed.     Most Recent 2 LFT's:  Recent Labs   Lab Test 10/23/24  0519   AST 22   ALT 20   ALKPHOS 88   BILITOTAL 0.7     Most Recent Urinalysis:  Recent Labs   Lab Test 10/23/24  0456   COLOR Yellow   APPEARANCE Clear   URINEGLC Negative   URINEBILI Negative    URINEKETONE 5*   SG 1.015   UBLD Moderate*   URINEPH 6.0   PROTEIN 30*   NITRITE Negative   LEUKEST Negative   RBCU 31*   WBCU 6*   ,   Results for orders placed or performed during the hospital encounter of 10/23/24   MR LUMBAR SPINE W/O & W CONTRAST    Narrative    EXAM: MR LUMBAR SPINE W/O and W CONTRAST  LOCATION: [facility]  DATE: 10/24/2024    INDICATION: Vertebral osteomyelitis; Low back pain; rule out infection; Low back pain with rest or at night; No known automatically detected potential contraindications to imaging.  COMPARISON: None.  CONTRAST: 6 mL Gadavist  TECHNIQUE: Routine Lumbar Spine MRI without and with IV contrast.    FINDINGS:   Nomenclature is based on 5 lumbar type vertebral bodies. Normal alignment. Normal vertebral body heights. Diffuse heterogeneous marrow signal intensity. Mild-to-moderate endplate edema and enhancement on the right at L4-L5. Mild endplate edema and   enhancement on the left at L5-S1. No significant endplate erosion. Normal distal spinal cord and cauda equina with conus medullaris at mid L1. Perineural cysts are noted remodeling the sacrum. Mild presacral edema. Diffuse subcutaneous edema within the   low back, nonspecific. Unremarkable visualized bony pelvis.    T12-L1: Minimal disc space narrowing with mild disc bulge. No herniation. Minimal facet arthropathy. No spinal canal or foraminal stenosis.     L1-L2: Normal disc height. Minimal disc bulge without herniation. Mild facet arthropathy. No spinal canal or foraminal stenosis.    L2-L3: Mild disc space narrowing. Mild disc bulge without herniation. Mild facet arthropathy. No spinal canal or foraminal stenosis.     L3-L4: Mild disc space narrowing with mild disc bulge. No herniation. Mild facet arthropathy. No spinal canal or foraminal stenosis.    L4-L5: Moderate to severe disc space narrowing, preferentially on the right, with mild to moderate endplate edema and enhancement. No  significant herniation. Mild marginal osteophyte. Mild facet arthropathy. No spinal canal stenosis. Mild right lateral   recess and foraminal stenoses. No left foraminal stenosis.    L5-S1: Moderate to severe disc space narrowing on the left with preferential left-sided marginal osteophyte. No herniation. Mild facet arthropathy. No spinal canal stenosis. Moderate left foraminal stenosis. Mild right foraminal stenosis.      Impression    IMPRESSION:  1.  Endplate edema and enhancement on the right at L4-L5 and on the left at L5-S1. No endplate erosion or significant paraspinal or epidural phlegmon. Favor this to represent Modic type I degenerative endplate change, though early changes of discitis   could be considered in the appropriate clinical context. Short-term follow-up imaging could be considered to assess for stability.  2.  Multilevel spondylosis with stenoses at L4-L5 and L5-S1, as detailed. No spinal canal stenosis.   Echocardiogram Complete     Value    LVEF  60-65%    Narrative    [id number]  [id number]  [id number]  [id number]^MARGI^JACQUELYN     [facility]  Echocardiography Laboratory  [address] [place] [address]     Name: FABIOLA ELI  MRN: [id number]  : 1940  Study Date: 10/23/2024 02:13 PM  Age: 84 yrs  Gender: Male  Patient Location: [facility]  Reason For Study: Cardiac Murmur  Ordering Physician: JACQUELYN KISER  Performed By: Corinna Holm RDCS     BSA: 1.7 m2  Height: 66 in  Weight: 130 lb  HR: 74  BP: 99/61 mmHg  ______________________________________________________________________________  Procedure  Complete Portable Echo Adult.  ______________________________________________________________________________  Interpretation Summary     Left ventricular systolic function is normal.  The visual ejection fraction is 60-65%.  No regional wall motion abnormalities noted.  There is severe (4+) mitral regurgitation.  Moderate mitral valve prolapse, posterior leaflet  There is mobile  echodensity on atrial side of mitral valve that likely  represents flail scallop although vegetation is also in differential. Flail  chord also noted.  Suggest LINDSEY for further evaluation if clinically appropriate. The study was  technically adequate. There is no comparison study available.  ______________________________________________________________________________  Left Ventricle  The left ventricle is normal in size. There is normal left ventricular wall  thickness. Left ventricular systolic function is normal. The visual ejection  fraction is 60-65%. Left ventricular diastolic function is indeterminate. No  regional wall motion abnormalities noted.     Right Ventricle  The right ventricle is normal size. The right ventricular systolic function is  normal.     Atria  Normal left atrial size. Right atrial size is normal.     Mitral Valve  Moderate mitral valve prolapse, posterior leaflet. There is severe (4+) mitral  regurgitation. The mitral regurgitant jet is anteriorly directed, which is  consistent with posterior leaflet pathology.     Tricuspid Valve  There is mild (1+) tricuspid regurgitation. The right ventricular systolic  pressure is approximated at 20.8 mmHg plus the right atrial pressure. IVC  diameter <2.1 cm collapsing >50% with sniff suggests a normal RA pressure of 3  mmHg.     Aortic Valve  There is mild trileaflet aortic sclerosis. There is mild (1+) aortic  regurgitation. No aortic stenosis is present.     Pulmonic Valve  The pulmonic valve is not well visualized.     Vessels  The aortic root is normal size.     Pericardium  There is no pericardial effusion.     Rhythm  Sinus rhythm was noted.  ______________________________________________________________________________  MMode/2D Measurements & Calculations     IVSd: 1.0 cm  LVIDd: 4.6 cm  LVIDs: 3.2 cm  LVPWd: 0.95 cm  FS: 32.2 %  LV mass(C)d: 157.0 grams  LV mass(C)dI: 94.3 grams/m2  Ao root diam: 3.7 cm  LA dimension: 2.9 cm  asc Aorta  Diam: 3.2 cm  LA/Ao: 0.78  Ao root diam index Ht(cm/m): 2.2  Ao root diam index BSA (cm/m2): 2.2  Asc Ao diam index BSA (cm/m2): 1.9  Asc Ao diam index Ht(cm/m): 1.9  LA Volume (BP): 50.6 ml     LA Volume Index (BP): 30.3 ml/m2  RV Base: 3.6 cm  RWT: 0.41  TAPSE: 2.2 cm     Doppler Measurements & Calculations  MV E max moises: 111.0 cm/sec  MV A max moises: 92.8 cm/sec  MV E/A: 1.2  MV dec slope: 470.9 cm/sec2  MV dec time: 0.24 sec  MR PISA: 11.9 cm2  MR ERO: 0.90 cm2  MR volume: 111.6 ml  PA acc time: 0.11 sec  TR max moises: 227.8 cm/sec  TR max P.8 mmHg  E/E' avg: 10.7  Lateral E/e': 11.1  Medial E/e': 10.3     RV S Moises: 13.7 cm/sec     ______________________________________________________________________________  Report approved by: Shemra Nice 10/23/2024 03:54 PM             Discharge Medications   Discharge Medication List as of 10/25/2024  4:51 PM        CONTINUE these medications which have NOT CHANGED    Details   Capsicum, Cayenne, (CAYENNE PEPPER PO) Take 1 capsule by mouth daily., Historical      Misc Natural Products (SAW PALMETTO) CAPS Take 1 capsule by mouth daily., Historical      multivitamin  with lutein (OCUVITE WITH LUTEIN) CAPS per capsule Take 1 capsule by mouth daily., Historical           Allergies   No Known Allergies

## 2024-10-27 NOTE — PHARMACY-VANCOMYCIN DOSING SERVICE
Pharmacy Vancomycin Note  Date of Service 2024  Patient's  1940   84 year old, male    Indication: Bacteremia and Possible Endocarditis  Day of Therapy: 6  Current vancomycin regimen:  1250 mg IV q12h  Current vancomycin monitoring method: AUC  Current vancomycin therapeutic monitoring goal: 400-600 mg*h/L    InsightRX Prediction of Current Vancomycin Regimen  Regimen: 1250 mg IV every 12 hours.  Start time: 21:37 on 10/27/2024  Exposure target: AUC24 (range)400-600 mg/L.hr   AUC24,ss: 572 mg/L.hr  Probability of AUC24 > 400: 100 %  Ctrough,ss: 15.6 mg/L  Probability of Ctrough,ss > 20: 2 %  Probability of nephrotoxicity (Lodise J CARLOS ): 11 %    Current estimated CrCl = Estimated Creatinine Clearance: 79.1 mL/min (A) (based on SCr of 0.58 mg/dL (L)).    Creatinine for last 3 days  10/24/2024: 10:07 AM Creatinine 0.57 mg/dL;  2:05 PM Creatinine 0.47 mg/dL;  6:03 PM Creatinine 0.51 mg/dL; 10:27 PM Creatinine 0.49 mg/dL  10/25/2024:  2:05 AM Creatinine 0.52 mg/dL;  6:19 AM Creatinine 0.50 mg/dL  10/26/2024:  8:34 AM Creatinine 0.53 mg/dL  10/27/2024:  8:11 AM Creatinine 0.58 mg/dL    Recent Vancomycin Levels (past 3 days)  10/25/2024:  7:30 PM Vancomycin 15.7 ug/mL  10/27/2024:  8:11 AM Vancomycin 16.9 ug/mL    Vancomycin IV Administrations (past 72 hours)                     vancomycin (VANCOCIN) 1,250 mg in 250 mL dextrose intermittent infusion (mg) 1,250 mg Given 10/27/24 0937     1,250 mg Given 10/26/24 2134     1,250 mg Given  59     1,250 mg Given 10/25/24 2108    vancomycin (VANCOCIN) 1,000 mg in 200 mL dextrose intermittent infusion (mg) 1,000 mg New Bag 10/25/24 1029     1,000 mg New Bag  0216     1,000 mg New Bag 10/24/24 1839    vancomycin (VANCOCIN) 1,000 mg in 200 mL dextrose intermittent infusion (mg) 1,000 mg New Bag 10/24/24 8115                    Nephrotoxins and other renal medications (From now, onward)      Start     Dose/Rate Route Frequency Ordered Stop    10/25/24 5235   vancomycin (VANCOCIN) 1,250 mg in 250 mL dextrose intermittent infusion         1,250 mg  over 90 Minutes Intravenous EVERY 12 HOURS 10/25/24 2032                 Contrast Orders - past 72 hours (72h ago, onward)      None            Interpretation of levels and current regimen:  Vancomycin level is reflective of -600    Has serum creatinine changed greater than 50% in last 72 hours: No    Urine output:  unable to determine    Renal Function: Stable      Plan:  Continue Current Dose  Vancomycin monitoring method: AUC  Vancomycin therapeutic monitoring goal: 400-600 mg*h/L  Pharmacy will check vancomycin levels as appropriate in 1-3 Days.  Serum creatinine levels will be ordered daily for the first week of therapy and at least twice weekly for subsequent weeks.    Reji Arnold [facility]

## 2024-10-27 NOTE — PROGRESS NOTES
Shift Summary [id number]    Admitting Diagnosis: Septic shock (H)  Bacteremia   Vitals ; WNL  Pain : denied pain.   A&Ox4  Voiding : bedside urinal. Continent.   Mobility : Axz1, walker GB.   Tele : NA.   CMS : WNL, except edema in BLE.   Lung Sounds clear on room air.   GI : hypoactive bowel sounds. Continent of bowels.   Dressing : posterior scapular dressing CDI.     Orders Placed This Encounter      Combination Diet Regular Diet Adult       Plan:     LINDSEY on Monday.  Discharge pending.

## 2024-10-27 NOTE — PLAN OF CARE
Goal Outcome Evaluation:  Orientation: A&Ox4, forgetful.     Vitals/Tele: VSS RA, denies pain this shift.     IV Access/drains: PIV SL     Diet: Regular, FR <1200ml     Mobility: A1 GB&W     Na: 124-128     GI/: Continent B&B.      Wound/Skin: BLE edema +2. LUE scapula wound, covered with mepilex - CDI.      Consults: Cards following. PT/OT, nutrition, WOC.     Discharge Plan: Pending LINDSEY on Monday. Denied any pain or discomfort.

## 2024-10-27 NOTE — PROGRESS NOTES
[facility]    Hospitalist Progress Note    Interval History   - Has three cups of water on his table.  e states he feels thirsty.  Encouraged fluid restriction and its effect on sodium  - Likely LINDSEY on Monday    Assessment & Plan   Summary: Jayme Donahue is a 84 year old male with PMH prostate cancer, who was admitted on 10/25/2024 as a transfer from [facility] with Corynebacterium bacteremia and concern for endocarditis.    Corynebacterium striatum bacteremia  Corynebacterium UTI  Septic shock secondary to above, resolved  Severe mitral regurgitation  Possible endocarditis  Low back pain  Generalized weakness  Jayme Donahue initially developed nausea and poor appetite on 10/18/2024.  He presented to an outside hospital and was found to be hyponatremic with a sodium of 118 and diagnosed with sepsis secondary to a UTI.  Subsequently became hypotensive and was transferred to [facility] ICU and started on vasopressors on 10/23/2024.  He was treated with vancomycin and Rocephin.  Levophed weaned off on the night of 10/23/2024.  Blood cultures returned positive for corynebacterium striatum. TTE on 10/23/2024 showed severe mitral regurgitation and a mobile echodensity on the atrial side of the mitral valve that likely represents flail scallop although vegetation is also in the differential.  Complained of low back pain, lumbar spine MRI on 10/24/2024 showed some likely degenerative endplate changes at right L4-L5 and left L5-S1, although early changes of discitis could also be in the differential.  ID was consulted on 10/25/2024 and recommended continuing vancomycin and transferring to a facility that could perform LINDSEY for further evaluation of the mitral valve abnormality.  - Continue vancomycin, pharmacy to dose.  - Add doxycycline for Stenotrophomonas maltophilia growing on culture from left scapular skin lesion.  - Blood culture on 10/24/2024 growing GPCs likely  corynebacterium, repeat blood cultures 10/26 and check daily until negative  - LINDSEY ordered, anticipate this will be done on Monday, 10/28/2024.  - Consult ID, appreciate their assistance.  - PT/OT consult, appreciate their assistance.  - Care transitions consult, appreciate their assistance.  Anticipate need for TCU upon discharge for ongoing therapies and IV antibiotics.    Hyponatremia, suspect SIADH  Sodium reportedly 118 at outside hospital when he first presented.  Felt to be secondary to dehydration, sodium mildly improved with IV fluids.  Sodium 120 on 10/23/2024 when he arrived at [facility].  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  He was started on salt tablets and placed on a fluid restriction.  Sodium ranged between 119-123 while he was at [facility].   Sodium improved to 128 with fluid restriction 10/26   Sodium down ton 125 on 10/27, patient not clearly following fluid restriction  - Continue fluid restriction 1.2L, encouraged patient to follow  - Decrease sodium checks to q8h  - Stop salt tablets    Left scapular lesion  Lesion culture positive for Stenotrophomonas maltophilia  Appearance is concerning for malignancy, see photos in epic on 10/23/2024.  Patient states this lesion has been present for several months, he is not sure how it started.  He has been covering it at home to manage drainage from the lesion.  - WOC nurse consulted at [facility].  - Continue wound cares as ordered by WOC nurse at previous facility.  -  Continue doxycycline    History of prostate cancer  LUTS  Reportedly treated in 2020, but details unclear.  PSA in July 2024 was 54, PSA in September 2024 was 57. Having urinary urgency, LUTS this admission  - Start tamsulosin empirically  - Monitor for retention check PVRs    Moderate protein calorie malnutrition  - Consult dietitian.    Clinically Significant Risk Factors         # Hyponatremia: Lowest Na = 122 mmol/L in last 2 days, will monitor as  appropriate  # Hypochloremia: Lowest Cl = 93 mmol/L in last 2 days, will monitor as appropriate                     # Severe Malnutrition: based on nutrition assessment, PRESENT ON ADMISSION   # Financial/Environmental Concerns:            PT/OT: ordered  Diet: Combination Diet Regular Diet Adult  Fluid restriction 1200 ML FLUID    DVT Prophylaxis: Pneumatic Compression Devices  Palmer Catheter: Not present  Lines: None     Cardiac Monitoring: None  Code Status: No CPR- Do NOT Intubate    Medically Ready for Discharge: Anticipated in 2-4 Days      Shin Rodríguez MD  Hospitalist Service  [facility]  Securely message with [employer] (more info)  Text page via [employer] Paging/Directory     Data reviewed today: I reviewed all new labs and imaging results over the last 24 hours.    Physical Exam   Temp: 98.2  F (36.8  C) Temp src: Oral BP: 118/69 Pulse: 77   Resp: 18 SpO2: 93 % O2 Device: None (Room air)    There were no vitals filed for this visit.  Vital Signs with Ranges  Temp:  [98.2  F (36.8  C)-98.9  F (37.2  C)] 98.2  F (36.8  C)  Pulse:  [77-88] 77  Resp:  [18] 18  BP: (104-118)/(65-69) 118/69  SpO2:  [93 %-96 %] 93 %  I/O last 3 completed shifts:  In: 716 [P.O.:716]  Out: 1125 [Urine:1125]  O2 requirements: not needed    Constitutional: Male in NAD  HEENT: Eyes nonicteric, oral mucosa moist  Cardiovascular: RRR, normal S1/2, systolic murmur  Respiratory: CTAB, no wheezing or crackles  Vascular: No LE pitting edema  GI: Normoactive bowel sounds, nontender, nondistended  Skin/Integumen: No rashes  Neuro/Psych: Appropriate affect and mood. A&Ox3, moves all extremities    Medications   Current Facility-Administered Medications   Medication Dose Route Frequency Provider Last Rate Last Admin     Current Facility-Administered Medications   Medication Dose Route Frequency Provider Last Rate Last Admin    doxycycline hyclate (VIBRAMYCIN) capsule 100 mg  100 mg Oral Q12H [facility] (08/20) Yovani Aldrich  MD Domenico   100 mg at 10/27/24 0931    sodium chloride (PF) 0.9% PF flush 3 mL  3 mL Intracatheter Q8H Yovani Aldrich MD   3 mL at 10/27/24 1122    tamsulosin (FLOMAX) capsule 0.4 mg  0.4 mg Oral Daily Shin Rodríguez MD   0.4 mg at 10/27/24 0931    vancomycin (VANCOCIN) 1,250 mg in 250 mL dextrose intermittent infusion  1,250 mg Intravenous Q12H Antoinette Sinclair [facility]   1,250 mg at 10/27/24 0937       Data   Recent Labs   Lab 10/27/24  0811 10/26/24  2252 10/26/24  1513 10/26/24  0834 10/25/24  1219 10/25/24  0619 10/24/24  1007 10/24/24  0637 10/23/24  0944 10/23/24  0519 10/23/24  0126 10/23/24  0126   WBC  --   --   --  8.2  --  8.7  --  9.6  --  11.4*   < >  --    HGB  --   --   --  11.3*  --  12.1*  --  11.3*  --  11.0*   < >  --    MCV  --   --   --  83  --  84  --  85  --  85   < >  --    PLT  --   --   --  224  --  209  --  183  --  162   < >  --    INR  --   --   --   --   --   --   --   --   --  1.53*  --   --    *  126* 125* 128* 128*  128*   < > 121*   < >  --    < > 123*  123*  --  120*   POTASSIUM 4.3  --   --  4.3  --  4.2   < >  --    < > 4.5  4.5  --  4.4   CHLORIDE 93*  --   --  96*  --  91*   < >  --    < > 95*  95*  --  92*   CO2 29  --   --  27  --  21*   < >  --    < > 21*  21*  --  24   BUN 12.2  --   --  7.8*  --  8.7   < >  --    < > 14.2  14.2  --  15.1   CR 0.58*  --   --  0.53*  --  0.50*   < >  --    < > 0.62*  0.62*  --  0.68   ANIONGAP 4*  --   --  5*  --  9   < >  --    < > 7  7  --  4*   AMALIA 7.9*  --   --  7.9*  --  7.8*   < >  --    < > 7.6*  7.6*  --  7.7*   GLC 95  --   --  87  --  99   < >  --    < > 108*  108*  --  117*   ALBUMIN  --   --   --   --   --   --   --   --   --  2.2*  --  2.2*   PROTTOTAL  --   --   --   --   --   --   --   --   --  4.5*  --   --    BILITOTAL  --   --   --   --   --   --   --   --   --  0.7  --   --    ALKPHOS  --   --   --   --   --   --   --   --   --  88  --   --    ALT  --   --   --   --   --   --   --   --    --  20  --   --    AST  --   --   --   --   --   --   --   --   --  22  --   --     < > = values in this interval not displayed.       Imaging:   No results found for this or any previous visit (from the past 24 hours).

## 2024-10-27 NOTE — PLAN OF CARE
Goal Outcome Evaluation:       Reason for Admission:  Corynebacterium bacteremia and concern for endocarditis.     Cognitive/Mentation: A/Ox 4  Neuros/CMS: gen weakness   VS: stable.   /GI: Continent. Last BM 10/27/24.   Pulmonary: LS diminished.  Pain: denies.     Drains/Lines: PIV patent intact and SL  Skin: redness coccyx, bruising on arm posterior scapula dressing CDI  Activity: Assist x A1GB .  Diet: reg with thin liquids. Takes pills whole.     Therapies recs: TCU  Discharge: pending    Aggression Stoplight Tool: green    Awaiting LINDSEY Monday.

## 2024-10-27 NOTE — CONSULTS
Care Management Follow Up    Length of Stay (days): 2    Expected Discharge Date: 10/29/2024     Concerns to be Addressed:       Patient plan of care discussed at interdisciplinary rounds: Yes    Anticipated Discharge Disposition:  TCU     Anticipated Discharge Services:    Anticipated Discharge DME:      Patient/family educated on Medicare website which has current facility and service quality ratings:    Education Provided on the Discharge Plan:    Patient/Family in Agreement with the Plan:      Referrals Placed by CM/SW:    Private pay costs discussed: Not applicable    Discussed  Partnership in Safe Discharge Planning  document with patient/family: No     Handoff Completed: No, handoff not indicated or clinically appropriate    Additional Information:  PT recommending TCU. Per chart review, referrals sent while patient was at [facility]. Pt's preference is to be near the [facility]. Referrals re-sent.    VIRGILIO Escobedo  Social Work  [facility]

## 2024-10-27 NOTE — PLAN OF CARE
Orientation: A&Ox4, forgetful    Vitals/Tele: VSS RA, denies pain.     IV Access/drains: PIV SL    Diet: Regular, FR: 1200ml     Mobility: A1 GB&W    GI/: Continent B&B    Wound/Skin: RUE shoulder wound covered with mepilex CDI    Consults: Cards, PT/OT, Nutrition, WOC    Discharge Plan: Pending LNIDSEY.       See Flow sheets for assessment

## 2024-10-28 LAB
BACTERIA BLD CULT: ABNORMAL
BACTERIA BLD CULT: ABNORMAL
BACTERIA WND CULT: ABNORMAL
SODIUM SERPL-SCNC: 124 MMOL/L (ref 135–145)
SODIUM SERPL-SCNC: 125 MMOL/L (ref 135–145)
SODIUM SERPL-SCNC: 125 MMOL/L (ref 135–145)

## 2024-10-28 PROCEDURE — 99232 SBSQ HOSP IP/OBS MODERATE 35: CPT | Performed by: INTERNAL MEDICINE

## 2024-10-28 PROCEDURE — 120N000001 HC R&B MED SURG/OB

## 2024-10-28 PROCEDURE — 250N000013 HC RX MED GY IP 250 OP 250 PS 637: Performed by: INTERNAL MEDICINE

## 2024-10-28 PROCEDURE — 250N000013 HC RX MED GY IP 250 OP 250 PS 637: Performed by: HOSPITALIST

## 2024-10-28 PROCEDURE — 36415 COLL VENOUS BLD VENIPUNCTURE: CPT | Performed by: INTERNAL MEDICINE

## 2024-10-28 PROCEDURE — 258N000003 HC RX IP 258 OP 636: Performed by: HOSPITALIST

## 2024-10-28 PROCEDURE — G0463 HOSPITAL OUTPT CLINIC VISIT: HCPCS

## 2024-10-28 PROCEDURE — 250N000011 HC RX IP 250 OP 636: Performed by: HOSPITALIST

## 2024-10-28 PROCEDURE — 84295 ASSAY OF SERUM SODIUM: CPT | Performed by: INTERNAL MEDICINE

## 2024-10-28 PROCEDURE — 87040 BLOOD CULTURE FOR BACTERIA: CPT | Performed by: INTERNAL MEDICINE

## 2024-10-28 RX ORDER — VANCOMYCIN 1.75 G/350ML
1250 INJECTION, SOLUTION INTRAVENOUS EVERY 12 HOURS
Status: DISCONTINUED | OUTPATIENT
Start: 2024-10-28 | End: 2024-10-28

## 2024-10-28 RX ORDER — VANCOMYCIN HCL IN 5 % DEXTROSE 1.25 G/25
1250 PLASTIC BAG, INJECTION (ML) INTRAVENOUS EVERY 12 HOURS
Status: DISCONTINUED | OUTPATIENT
Start: 2024-10-28 | End: 2024-10-28

## 2024-10-28 RX ADMIN — TAMSULOSIN HYDROCHLORIDE 0.4 MG: 0.4 CAPSULE ORAL at 08:41

## 2024-10-28 RX ADMIN — VANCOMYCIN HYDROCHLORIDE 1250 MG: 10 INJECTION, POWDER, LYOPHILIZED, FOR SOLUTION INTRAVENOUS at 23:10

## 2024-10-28 RX ADMIN — VANCOMYCIN HYDROCHLORIDE 1250 MG: 10 INJECTION, POWDER, LYOPHILIZED, FOR SOLUTION INTRAVENOUS at 12:11

## 2024-10-28 RX ADMIN — DOXYCYCLINE HYCLATE 100 MG: 100 CAPSULE ORAL at 20:18

## 2024-10-28 RX ADMIN — DOXYCYCLINE HYCLATE 100 MG: 100 CAPSULE ORAL at 08:41

## 2024-10-28 NOTE — PLAN OF CARE
Mental Status: A&O x4, forgetful at times  Activity/dangle: SB Ast w/ GB/W  Diet: Regular diet w/ 1200 mL fluid restriction  Pain: Denies pain  Palmer/Voiding: Voiding in the bathroom  Tele/Restraints/Iso: N/A  02/LDA: Room air. IV saline locked  D/C Date: TBD  Other Info: NPO at midnight for LINDSEY. Bladder scanned at 1725 for 278 . Left scapula dressing changed by [facility] nurse. Sodium lab to be drawn at 2200.

## 2024-10-28 NOTE — PLAN OF CARE
Goal Outcome Evaluation:       10/27/24-10/28/24 [id number]  Orientation: A&Ox4, forgetful     Vitals/Tele: VSS RA, denies pain.      IV Access/drains: 2 PIV SL     Diet: Regular, FR: 1200ml      Mobility: A1 GBW     GI/: Continent B&B, to BSC or urinal. PVR of 251 after using urinal, says he thinks he does better when he stands/uses the commode     Wound/Skin: LUE posterior shoulder wound covered with mepilex CDI, mepi on coccyx for protection.     Labs: Na 125     Consults: Cards, PT/OT, Nutrition, WOC     Discharge Plan: Pending LINDSEY on Monday. Mid/PICC line needed for long term abx before discharge.

## 2024-10-28 NOTE — PROGRESS NOTES
[facility]    Hospitalist Progress Note    Interval History   - Adhering to fluid restriction. Retaining some urine 200-250mL  - Ate breakfast this morning and LINDSEY deferred to tomorrow    Assessment & Plan   Summary: Jayme Donahue is a 84 year old male with PMH prostate cancer, who was admitted on 10/25/2024 as a transfer from [facility] with Corynebacterium bacteremia and concern for endocarditis.    Corynebacterium striatum bacteremia  Corynebacterium UTI  Septic shock secondary to above, resolved  Severe mitral regurgitation  Possible endocarditis  Low back pain  Generalized weakness  Jayme Donahue initially developed nausea and poor appetite on 10/18/2024.  He presented to an outside hospital and was found to be hyponatremic with a sodium of 118 and diagnosed with sepsis secondary to a UTI.  Subsequently became hypotensive and was transferred to [facility] ICU and started on vasopressors on 10/23/2024.  He was treated with vancomycin and Rocephin.  Levophed weaned off on the night of 10/23/2024.  Blood cultures returned positive for corynebacterium striatum. TTE on 10/23/2024 showed severe mitral regurgitation and a mobile echodensity on the atrial side of the mitral valve that likely represents flail scallop although vegetation is also in the differential.  Complained of low back pain, lumbar spine MRI on 10/24/2024 showed some likely degenerative endplate changes at right L4-L5 and left L5-S1, although early changes of discitis could also be in the differential.  ID was consulted on 10/25/2024 and recommended continuing vancomycin and transferring to a facility that could perform LINDSEY for further evaluation of the mitral valve abnormality.  - Continue vancomycin, pharmacy to dose.  - Add doxycycline for Stenotrophomonas maltophilia growing on culture from left scapular skin lesion.  - Blood culture on 10/24/2024 growing GPCs likely corynebacterium, repeat blood  cultures 10/26 and check daily until negative  - LINDSEY ordered, anticipate this will be done on Monday, 10/28/2024.  - Consult ID, appreciate their assistance.  - PT/OT consult, appreciate their assistance.  - Care transitions consult, appreciate their assistance.  Anticipate need for TCU upon discharge for ongoing therapies and IV antibiotics.    Hyponatremia, suspect SIADH  Sodium reportedly 118 at outside hospital when he first presented.  Felt to be secondary to dehydration, sodium mildly improved with IV fluids.  Sodium 120 on 10/23/2024 when he arrived at [facility].  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  He was started on salt tablets and placed on a fluid restriction.  Sodium ranged between 119-123 while he was at [facility].   Sodium improved to 128 with fluid restriction 10/26   Sodium down ton 125 on 10/27, 10/28  - Continue fluid restriction 1.2L, encouraged patient to follow  - Decrease sodium checks to q8h  - Stop salt tablets    Left scapular lesion  Lesion culture positive for Stenotrophomonas maltophilia  Appearance is concerning for malignancy, see photos in epic on 10/23/2024.  Patient states this lesion has been present for several months, he is not sure how it started.  He has been covering it at home to manage drainage from the lesion.  - WOC nurse consulted at [facility].  - Continue wound cares as ordered by WOC nurse at previous facility.  - Continue doxycycline    History of prostate cancer  LUTS  Reportedly treated in 2020, but details unclear.  PSA in July 2024 was 54, PSA in September 2024 was 57. Having urinary urgency, LUTS this admission  - Start tamsulosin empirically  - Monitor for retention check PVRs    Moderate protein calorie malnutrition  - Consult dietitian.    Clinically Significant Risk Factors         # Hyponatremia: Lowest Na = 125 mmol/L in last 2 days, will monitor as appropriate  # Hypochloremia: Lowest Cl = 93 mmol/L in last 2 days, will  monitor as appropriate                     # Severe Malnutrition: based on nutrition assessment, PRESENT ON ADMISSION   # Financial/Environmental Concerns:            PT/OT: ordered  Diet: Combination Diet Regular Diet Adult  Fluid restriction 1200 ML FLUID    DVT Prophylaxis: Pneumatic Compression Devices  Palmer Catheter: Not present  Lines: None     Cardiac Monitoring: None  Code Status: No CPR- Do NOT Intubate    Medically Ready for Discharge: Anticipated in 2-4 Days      Shin Rodríguez MD  Hospitalist Service  [facility]  Securely message with [employer] (more info)  Text page via [employer] Paging/Directory     Data reviewed today: I reviewed all new labs and imaging results over the last 24 hours.    Physical Exam   Temp: 98  F (36.7  C) Temp src: Oral BP: 111/64 Pulse: 78   Resp: 16 SpO2: 94 % O2 Device: None (Room air)    There were no vitals filed for this visit.  Vital Signs with Ranges  Temp:  [98  F (36.7  C)-98.9  F (37.2  C)] 98  F (36.7  C)  Pulse:  [77-90] 78  Resp:  [16-18] 16  BP: (102-121)/(60-71) 111/64  SpO2:  [94 %-97 %] 94 %  I/O last 3 completed shifts:  In: 480 [P.O.:480]  Out: 100 [Urine:100]  O2 requirements: not needed    Constitutional: Male in NAD  HEENT: Eyes nonicteric, oral mucosa moist  Cardiovascular: RRR, normal S1/2, systolic murmur  Respiratory: CTAB, no wheezing or crackles  Vascular: No LE pitting edema  GI: Normoactive bowel sounds, nontender, nondistended  Skin/Integumen: No rashes  Neuro/Psych: Appropriate affect and mood. A&Ox3, moves all extremities    Medications   Current Facility-Administered Medications   Medication Dose Route Frequency Provider Last Rate Last Admin     Current Facility-Administered Medications   Medication Dose Route Frequency Provider Last Rate Last Admin    doxycycline hyclate (VIBRAMYCIN) capsule 100 mg  100 mg Oral Q12H [facility] (08/20) Yovani Aldrich MD   100 mg at 10/28/24 0841    sodium chloride (PF) 0.9% PF flush 3  mL  3 mL Intracatheter Q8H Yovani Aldrich MD   3 mL at 10/28/24 0332    tamsulosin (FLOMAX) capsule 0.4 mg  0.4 mg Oral Daily Shin Rodríguez MD   0.4 mg at 10/28/24 0841    vancomycin (VANCOCIN) 1,250 mg in 0.9% NaCl 262.5 mL intermittent infusion  1,250 mg Intravenous Q12H Yovani Aldrich MD           Data   Recent Labs   Lab 10/28/24  0616 10/27/24  2336 10/27/24  1600 10/27/24  0811 10/26/24  1513 10/26/24  0834 10/25/24  1219 10/25/24  0619 10/24/24  1007 10/24/24  0637 10/23/24  0944 10/23/24  0519 10/23/24  0126 10/23/24  0126   WBC  --   --   --   --   --  8.2  --  8.7  --  9.6  --  11.4*   < >  --    HGB  --   --   --   --   --  11.3*  --  12.1*  --  11.3*  --  11.0*   < >  --    MCV  --   --   --   --   --  83  --  84  --  85  --  85   < >  --    PLT  --   --   --   --   --  224  --  209  --  183  --  162   < >  --    INR  --   --   --   --   --   --   --   --   --   --   --  1.53*  --   --    * 125* 126* 126*  126*   < > 128*  128*   < > 121*   < >  --    < > 123*  123*  --  120*   POTASSIUM  --   --   --  4.3  --  4.3  --  4.2   < >  --    < > 4.5  4.5  --  4.4   CHLORIDE  --   --   --  93*  --  96*  --  91*   < >  --    < > 95*  95*  --  92*   CO2  --   --   --  29  --  27  --  21*   < >  --    < > 21*  21*  --  24   BUN  --   --   --  12.2  --  7.8*  --  8.7   < >  --    < > 14.2  14.2  --  15.1   CR  --   --   --  0.58*  --  0.53*  --  0.50*   < >  --    < > 0.62*  0.62*  --  0.68   ANIONGAP  --   --   --  4*  --  5*  --  9   < >  --    < > 7  7  --  4*   AMALIA  --   --   --  7.9*  --  7.9*  --  7.8*   < >  --    < > 7.6*  7.6*  --  7.7*   GLC  --   --   --  95  --  87  --  99   < >  --    < > 108*  108*  --  117*   ALBUMIN  --   --   --   --   --   --   --   --   --   --   --  2.2*  --  2.2*   PROTTOTAL  --   --   --   --   --   --   --   --   --   --   --  4.5*  --   --    BILITOTAL  --   --   --   --   --   --   --   --   --   --   --  0.7  --   --    ALKPHOS   --   --   --   --   --   --   --   --   --   --   --  88  --   --    ALT  --   --   --   --   --   --   --   --   --   --   --  20  --   --    AST  --   --   --   --   --   --   --   --   --   --   --  22  --   --     < > = values in this interval not displayed.       Imaging:   No results found for this or any previous visit (from the past 24 hours).

## 2024-10-28 NOTE — PROGRESS NOTES
[facility]    Infectious Disease Progress Note    Date of Service (when I saw the patient): 10/28/2024     Assessment & Plan   Jayme Donahue is a 84 year old male who was admitted on 10/25/2024.     Impression:  85 yo with PMH prostate cancer, who was admitted on 10/25/2024 as a transfer from [facility] with Corynebacterium bacteremia and concern for endocarditis.   Admitted with weakness and poor oral intake to an outside hospital  Blood cultures from 10/22 grew out Corynebacterium striatum, 2 out of 2  The same day he also had positive urine cultures for Corynebacterium stratum  And a wound culture from the scapular lesion positive for Corynebacterium striatum  With TTE done on 10/23 positive for a mobile echodensity on the atrial side of the mitral valve that likely represents flail scallop although vegetation is also in differential  Patient also complained of back pain but there is no abscesses or osteomyelitis on the back MRI  Of note patient has a left scapular lesion that has been present for several months  He is currently on vancomycin  He was transferred to [facility] for a LINDSEY     Recommendations   Continue on vancomycin   Follow up on the LINDSEY   Consider biopsy on the shoulder chronic wound   Polymicrobial culture from a wound swab from the shoulder, this is a chronic wound a swab culture is bound ot show bacteria but this is not an indicator of infection          Kike Belcher MD    Interval History   Tolerating antibiotics ok   No new rashes or issues with antibiotics   Labs reviewed   No changes to past medical, social or family history   Tiered       Physical Exam   Temp: 98  F (36.7  C) Temp src: Oral BP: 111/64 Pulse: 78   Resp: 16 SpO2: 94 % O2 Device: None (Room air)    There were no vitals filed for this visit.  Vital Signs with Ranges  Temp:  [98  F (36.7  C)-98.9  F (37.2  C)] 98  F (36.7  C)  Pulse:  [77-90] 78  Resp:  [16-18] 16  BP: (102-121)/(60-71)  "111/64  SpO2:  [94 %-97 %] 94 %    Constitutional: Awake, alert, cooperative, no apparent distress  Lungs: Clear to auscultation bilaterally, no crackles or wheezing  Cardiovascular: Regular rate and rhythm, normal S1 and S2, and no murmur noted  Abdomen: Normal bowel sounds, soft, non-distended, non-tender  Skin: No rashes, no cyanosis, no edema  Other:    Medications   Current Facility-Administered Medications   Medication Dose Route Frequency Provider Last Rate Last Admin     Current Facility-Administered Medications   Medication Dose Route Frequency Provider Last Rate Last Admin    doxycycline hyclate (VIBRAMYCIN) capsule 100 mg  100 mg Oral Q12H [facility] (08/20) Yovani Aldrich MD   100 mg at 10/28/24 0841    sodium chloride (PF) 0.9% PF flush 3 mL  3 mL Intracatheter Q8H Yovani Aldrich MD   3 mL at 10/28/24 0332    tamsulosin (FLOMAX) capsule 0.4 mg  0.4 mg Oral Daily Shin Rodríguez MD   0.4 mg at 10/28/24 0841    vancomycin (VANCOCIN) 1,250 mg in 0.9% NaCl 262.5 mL intermittent infusion  1,250 mg Intravenous Q12H Yovani Aldrich MD           Data   All microbiology laboratory data reviewed.  Recent Labs   Lab Test 10/26/24  0834 10/25/24  0619 10/24/24  0637   WBC 8.2 8.7 9.6   HGB 11.3* 12.1* 11.3*   HCT 31.4* 33.5* 31.3*   MCV 83 84 85    209 183     Recent Labs   Lab Test 10/27/24  0811 10/26/24  0834 10/25/24  0619   CR 0.58* 0.53* 0.50*     No lab results found.  No lab results found.    Invalid input(s): \"UC\"    All cultures:  Recent Labs   Lab 10/27/24  0811 10/26/24  2252 10/26/24  1811 10/26/24  1513 10/24/24  1444 10/22/24  1830 10/22/24  1725 10/22/24  1710 10/22/24  1645   CULTURE No growth after 1 day No growth after 1 day No Growth No growth after 1 day Positive on the 2nd day of incubation*  Gram positive bacilli, resembling diphtheroids* Corynebacterium striatum* Corynebacterium striatum*  Corynebacterium species* 1+ Acinetobacter species, not baumannii* "  4+ Normal arminda  1+ Stenotrophomonas maltophilia*  1+ Staphylococcus aureus*  4+ Mixed Anaerobic Organisms Present Corynebacterium striatum*      Blood culture:  Results for orders placed or performed during the hospital encounter of 10/25/24   Blood Culture Peripheral Blood    Collection Time: 10/27/24  8:11 AM    Specimen: Peripheral Blood   Result Value Ref Range    Culture No growth after 1 day    Blood Culture Arm, Right    Collection Time: 10/26/24 10:52 PM    Specimen: Arm, Right; Blood   Result Value Ref Range    Culture No growth after 1 day    Blood Culture Hand, Left    Collection Time: 10/26/24  3:13 PM    Specimen: Hand, Left; Blood   Result Value Ref Range    Culture No growth after 1 day    Results for orders placed or performed during the hospital encounter of 10/23/24   Blood Culture Arm, Right    Collection Time: 10/24/24  2:44 PM    Specimen: Arm, Right; Blood   Result Value Ref Range    Culture Positive on the 2nd day of incubation (A)     Culture Gram positive bacilli, resembling diphtheroids (AA)       Urine culture:  Results for orders placed or performed during the hospital encounter of 10/25/24   Urine Culture    Collection Time: 10/26/24  6:11 PM    Specimen: Urine, Midstream   Result Value Ref Range    Culture No Growth

## 2024-10-28 NOTE — PLAN OF CARE
Goal Outcome Evaluation:    Orientation: A&Ox4, can be forgetful     Vitals/Tele: VSS RA, denies pain, only on movement   IV Access/drains: PIV SL  Diet: Regular, FR: 1200ml/ NPO at MN for LINDSEY tomorrow  Mobility: A1 GBW  GI/: Continent B&B, to BSC or urinal. PVR of 262 ml and 200ml. Continue to check for residuals. Place mclain if persistently over 250 on the 2nd time in a row. Says he thinks he does better when he stands to urinate.  Wound/Skin: LUE posterior shoulder wound covered with mepilex CDI, mepi on coccyx for protection.   Labs: Na 124. MD notified. Q8 checks. Blood culture positive/wound culture  Consults: Cards, PT/OT, Nutrition, WOC  Discharge Plan:LINDSEY tomorrow 10/29. Mid/PICC line needed for long term abx before discharge.

## 2024-10-28 NOTE — CONSULTS
[facility]  WOC Nurse Inpatient Assessment     Consulted for:  Left scapula    Summary:  Chronic wound of unclear etiology, noted during recent hospitalization at [facility] with concern for possibly malignancy.  Wound currently appears clean and stable, will continue gentle wound cares.  Pt to follow-up with Derm as output as desired.      Patient History (according to provider note(s):      Jayme Donahue is a 84 year old male with PMH prostate cancer, who was admitted on 10/25/2024 as a transfer from [facility] with Corynebacterium bacteremia and concern for endocarditis.     Areas Assessed:      Areas visualized during today's visit:  left upper back    Wound location: Left upper back/scapula area    Last photo: 10-28-24      Wound due to: Unknown Etiology; concern for possible malignancy  Wound history/plan of care: pt seen by [facility] service 10/23/24 while in hospital at [facility].   Wound base: pink moist smooth tissue, rolled edge     Palpation of the wound bed: normal      Drainage: moderate     Description of drainage: serosanguinous     Measurements (length x width x depth, in cm): approx 3.5 x 4 x 0.2cm, heart-shaped  Periwound skin: Intact and Erythema- blanchable      Color: pink      Temperature: normal   Odor: none  Pain: mild, tender  Pain interventions prior to dressing change: slow and gentle cares   Treatment goal: Drainage control, Infection control/prevention, and Maintain (prevention of deterioration)  STATUS: initial assessment  Supplies ordered: supplies stored on unit       Treatment Plan:     Left scapula wound(s): Every other day and prn:  Cleanse with Vashe-moist gauze.  Allow the moist gauze to soak for at least 2 minutes, then remove and wipe clean.  Let dry.   Apply Aquacel Ag to wound, cut to fit.  Cover with Mepilex 4x4. No need to peel back for skin checks between wound cares.    Offload/cushion area with pillows as able.     Follow-up with Derm as  outpatient.      Orders: Written    RECOMMEND PRIMARY TEAM ORDER: None, at this time  Education provided: plan of care  Discussed plan of care with: Patient and Nurse  WO nurse follow-up plan: weekly  Notify WOC if wound(s) deteriorate.  Nursing to notify the Provider(s) and re-consult the [facility] Nurse if new skin concern.    DATA:     Current support surface: Standard  Standard gel mattress (Isoflex)  Containment of urine/stool: Urinal and bedside commode  BMI: There is no height or weight on file to calculate BMI.   Active diet order: Orders Placed This Encounter      Combination Diet Regular Diet Adult     Output: I/O last 3 completed shifts:  In: 600 [P.O.:600]  Out: 400 [Urine:400]     Labs:   Recent Labs   Lab 10/26/24  0834 10/24/24  0637 10/23/24  0519   ALBUMIN  --   --  2.2*   HGB 11.3*   < > 11.0*   INR  --   --  1.53*   WBC 8.2   < > 11.4*    < > = values in this interval not displayed.     Pressure injury risk assessment:   Sensory Perception: 3-->slightly limited  Moisture: 4-->rarely moist  Activity: 3-->walks occasionally  Mobility: 3-->slightly limited  Nutrition: 3-->adequate  Friction and Shear: 2-->potential problem  Phillip Score: 18    Shantelle Horton RN CWOCN  -Securely message with [employer] ([facility] [employer])  Preferred  -[facility] Office Phone: [phone] (messages checked periodically Mon-Fri 8a-4p)

## 2024-10-28 NOTE — CONSULTS
"SPIRITUAL HEALTH SERVICES Consult Note  [facility]. [address] medical specialties    Referral Source/Reason for Visit: assessment of spiritual and emotional needs because patient responded \"Yes\" to the question in the admission assessment, \"Do you have any spiritual or Caodaism beliefs that will affect your care?\"    Summary and Recommendations -  Initial needs assessment; provided spiritual and emotional support as Avelino spoke of his Caodaism irais, home and family. Shared prayer at Avelino's request.  Plan: unit  will continue to follow at patient request    Alexus Zhang MDiv [facility]  Staff   Please place consult order for routine Spiritual Health Services referrals.  [facility] available 24/7 for emergent requests either by having the on-call  paged or by entering an ASAP/STAT consult in Epic (this will also page the on-call ).    Assessment    Saw pt Jayme Donahue at bedside for 20 minutes.    Patient / Family Understanding of Illness and Goals of Care - Avelino did not talk about the reason for his hospitalization, spoke of being transferred from the [facility] and described the procedure tomorrow: \"they will put a tube down my throat to see what's going on with my heart.\" Avelino said he anticipates being here a few more days and then perhaps discharge to home or back to the [facility]. Avelino said an important step is him to be strong, stable enough to be independent to get up at night and use the rest room.  I set pillows under Avelino's arms in the lounge chair at his request.    Distress and Loss -       Strengths, Coping, and Resources - Avelino spoke of gratitude, and his dependence on the Lord as mcdonnell sources of coping. Avelino said he is intentional about not having the TV on in his room so he can focus on prayer. Avelino's wife Pamela has been able to visit, and he is grateful for neighbors and his [employer] family who are supporting her while he is far from home.  Avelino shared " "stories of still living in the home he was born in and the farm he was raised on.    Meaning, Beliefs, and Spirituality - Avelino spoke of the Bible and saving relationship with the Lord as \"the most important things\" and that his irais as stated in the EMR as [employer] as accurate. Avelino recited his favorite verse - Proverbs 3:5-6 and requested prayer.    "

## 2024-10-28 NOTE — PLAN OF CARE
Goal Outcome Evaluation:  10/27  [id number]  Orientation: A&Ox4, forgetful     Vitals/Tele: VSS RA, denies pain.      IV Access/drains: PIV SL     Diet: Regular, FR: 1200ml      Mobility: A1 GB&W     GI/: Continent B&B, to BSC multiple times.      Wound/Skin: RUE shoulder wound covered with mepilex CDI, mepi on coccyx for protection.      Consults: Cards, PT/OT, Nutrition, WOC     Discharge Plan: Pending LINDSEY possibly on Monday. Mid/PICC line needed for long term abx before discharge.

## 2024-10-29 ENCOUNTER — APPOINTMENT (OUTPATIENT)
Dept: OCCUPATIONAL THERAPY | Facility: CLINIC | Age: 84
End: 2024-10-29
Attending: INTERNAL MEDICINE
Payer: MEDICARE

## 2024-10-29 ENCOUNTER — APPOINTMENT (OUTPATIENT)
Dept: CARDIOLOGY | Facility: CLINIC | Age: 84
End: 2024-10-29
Attending: HOSPITALIST
Payer: MEDICARE

## 2024-10-29 LAB
ANION GAP SERPL CALCULATED.3IONS-SCNC: 2 MMOL/L (ref 7–15)
ANION GAP SERPL CALCULATED.3IONS-SCNC: 3 MMOL/L (ref 7–15)
BUN SERPL-MCNC: 10 MG/DL (ref 8–23)
BUN SERPL-MCNC: 10.6 MG/DL (ref 8–23)
CALCIUM SERPL-MCNC: 8.1 MG/DL (ref 8.8–10.4)
CALCIUM SERPL-MCNC: 8.3 MG/DL (ref 8.8–10.4)
CHLORIDE SERPL-SCNC: 85 MMOL/L (ref 98–107)
CHLORIDE SERPL-SCNC: 89 MMOL/L (ref 98–107)
CREAT SERPL-MCNC: 0.51 MG/DL (ref 0.67–1.17)
CREAT SERPL-MCNC: 0.61 MG/DL (ref 0.67–1.17)
CREAT UR-MCNC: 21.4 MG/DL
EGFRCR SERPLBLD CKD-EPI 2021: >90 ML/MIN/1.73M2
EGFRCR SERPLBLD CKD-EPI 2021: >90 ML/MIN/1.73M2
FRACT EXCRET NA UR+SERPL-RTO: 2.4 %
GLUCOSE SERPL-MCNC: 117 MG/DL (ref 70–99)
GLUCOSE SERPL-MCNC: 90 MG/DL (ref 70–99)
HCO3 SERPL-SCNC: 29 MMOL/L (ref 22–29)
HCO3 SERPL-SCNC: 33 MMOL/L (ref 22–29)
OSMOLALITY UR: 360 MMOL/KG (ref 100–1200)
POTASSIUM SERPL-SCNC: 4.2 MMOL/L (ref 3.4–5.3)
POTASSIUM SERPL-SCNC: 4.9 MMOL/L (ref 3.4–5.3)
SODIUM SERPL-SCNC: 120 MMOL/L (ref 135–145)
SODIUM SERPL-SCNC: 120 MMOL/L (ref 135–145)
SODIUM SERPL-SCNC: 121 MMOL/L (ref 135–145)
SODIUM UR-SCNC: 121 MMOL/L
TSH SERPL DL<=0.005 MIU/L-ACNC: 2.65 UIU/ML (ref 0.3–4.2)
VANCOMYCIN SERPL-MCNC: 16.5 UG/ML

## 2024-10-29 PROCEDURE — 93325 DOPPLER ECHO COLOR FLOW MAPG: CPT

## 2024-10-29 PROCEDURE — 93312 ECHO TRANSESOPHAGEAL: CPT | Mod: 26 | Performed by: INTERNAL MEDICINE

## 2024-10-29 PROCEDURE — 80202 ASSAY OF VANCOMYCIN: CPT | Performed by: HOSPITALIST

## 2024-10-29 PROCEDURE — 93320 DOPPLER ECHO COMPLETE: CPT | Mod: 26 | Performed by: INTERNAL MEDICINE

## 2024-10-29 PROCEDURE — 250N000011 HC RX IP 250 OP 636: Performed by: HOSPITALIST

## 2024-10-29 PROCEDURE — 84300 ASSAY OF URINE SODIUM: CPT | Performed by: INTERNAL MEDICINE

## 2024-10-29 PROCEDURE — 84295 ASSAY OF SERUM SODIUM: CPT | Performed by: INTERNAL MEDICINE

## 2024-10-29 PROCEDURE — 250N000013 HC RX MED GY IP 250 OP 250 PS 637: Performed by: INTERNAL MEDICINE

## 2024-10-29 PROCEDURE — 87040 BLOOD CULTURE FOR BACTERIA: CPT | Performed by: INTERNAL MEDICINE

## 2024-10-29 PROCEDURE — 36415 COLL VENOUS BLD VENIPUNCTURE: CPT | Performed by: INTERNAL MEDICINE

## 2024-10-29 PROCEDURE — 258N000003 HC RX IP 258 OP 636: Performed by: HOSPITALIST

## 2024-10-29 PROCEDURE — 99152 MOD SED SAME PHYS/QHP 5/>YRS: CPT | Performed by: INTERNAL MEDICINE

## 2024-10-29 PROCEDURE — 250N000013 HC RX MED GY IP 250 OP 250 PS 637: Performed by: STUDENT IN AN ORGANIZED HEALTH CARE EDUCATION/TRAINING PROGRAM

## 2024-10-29 PROCEDURE — 80048 BASIC METABOLIC PNL TOTAL CA: CPT | Performed by: INTERNAL MEDICINE

## 2024-10-29 PROCEDURE — 250N000009 HC RX 250: Performed by: INTERNAL MEDICINE

## 2024-10-29 PROCEDURE — 84520 ASSAY OF UREA NITROGEN: CPT | Performed by: INTERNAL MEDICINE

## 2024-10-29 PROCEDURE — 76376 3D RENDER W/INTRP POSTPROCES: CPT | Mod: 26 | Performed by: INTERNAL MEDICINE

## 2024-10-29 PROCEDURE — 93325 DOPPLER ECHO COLOR FLOW MAPG: CPT | Mod: 26 | Performed by: INTERNAL MEDICINE

## 2024-10-29 PROCEDURE — 120N000001 HC R&B MED SURG/OB

## 2024-10-29 PROCEDURE — 84443 ASSAY THYROID STIM HORMONE: CPT | Performed by: INTERNAL MEDICINE

## 2024-10-29 PROCEDURE — 93320 DOPPLER ECHO COMPLETE: CPT

## 2024-10-29 PROCEDURE — 999N000184 HC STATISTIC TELEMETRY

## 2024-10-29 PROCEDURE — 99222 1ST HOSP IP/OBS MODERATE 55: CPT | Performed by: INTERNAL MEDICINE

## 2024-10-29 PROCEDURE — 99232 SBSQ HOSP IP/OBS MODERATE 35: CPT | Performed by: INTERNAL MEDICINE

## 2024-10-29 PROCEDURE — 97535 SELF CARE MNGMENT TRAINING: CPT | Mod: GO | Performed by: OCCUPATIONAL THERAPIST

## 2024-10-29 PROCEDURE — 250N000013 HC RX MED GY IP 250 OP 250 PS 637: Performed by: HOSPITALIST

## 2024-10-29 PROCEDURE — 999N000183 HC STATISTIC TEE INCLUDES SEDATION

## 2024-10-29 PROCEDURE — 82570 ASSAY OF URINE CREATININE: CPT | Performed by: INTERNAL MEDICINE

## 2024-10-29 PROCEDURE — 82435 ASSAY OF BLOOD CHLORIDE: CPT | Performed by: INTERNAL MEDICINE

## 2024-10-29 PROCEDURE — 250N000011 HC RX IP 250 OP 636: Performed by: INTERNAL MEDICINE

## 2024-10-29 PROCEDURE — 83935 ASSAY OF URINE OSMOLALITY: CPT | Performed by: INTERNAL MEDICINE

## 2024-10-29 PROCEDURE — 82947 ASSAY GLUCOSE BLOOD QUANT: CPT | Performed by: INTERNAL MEDICINE

## 2024-10-29 RX ORDER — DEXTROSE MONOHYDRATE 25 G/50ML
9.5 INJECTION, SOLUTION INTRAVENOUS
Status: DISCONTINUED | OUTPATIENT
Start: 2024-10-29 | End: 2024-10-29

## 2024-10-29 RX ORDER — ACETAMINOPHEN 325 MG/1
650 TABLET ORAL EVERY 4 HOURS PRN
Status: DISCONTINUED | OUTPATIENT
Start: 2024-10-29 | End: 2024-10-29

## 2024-10-29 RX ORDER — NALOXONE HYDROCHLORIDE 0.4 MG/ML
0.4 INJECTION, SOLUTION INTRAMUSCULAR; INTRAVENOUS; SUBCUTANEOUS
Status: DISCONTINUED | OUTPATIENT
Start: 2024-10-29 | End: 2024-10-29

## 2024-10-29 RX ORDER — LIDOCAINE HYDROCHLORIDE 40 MG/ML
1.5 SOLUTION TOPICAL ONCE
Status: DISCONTINUED | OUTPATIENT
Start: 2024-10-29 | End: 2024-10-29

## 2024-10-29 RX ORDER — FLUMAZENIL 0.1 MG/ML
0.2 INJECTION, SOLUTION INTRAVENOUS
Status: DISCONTINUED | OUTPATIENT
Start: 2024-10-29 | End: 2024-10-29

## 2024-10-29 RX ORDER — MAGNESIUM HYDROXIDE/ALUMINUM HYDROXICE/SIMETHICONE 120; 1200; 1200 MG/30ML; MG/30ML; MG/30ML
30 SUSPENSION ORAL EVERY 8 HOURS PRN
Status: ACTIVE | OUTPATIENT
Start: 2024-10-29 | End: 2024-10-31

## 2024-10-29 RX ORDER — FENTANYL CITRATE 50 UG/ML
25 INJECTION, SOLUTION INTRAMUSCULAR; INTRAVENOUS
Status: DISCONTINUED | OUTPATIENT
Start: 2024-10-29 | End: 2024-10-29

## 2024-10-29 RX ORDER — LIDOCAINE 40 MG/G
CREAM TOPICAL
Status: DISCONTINUED | OUTPATIENT
Start: 2024-10-29 | End: 2024-10-31 | Stop reason: HOSPADM

## 2024-10-29 RX ORDER — LIDOCAINE 50 MG/G
OINTMENT TOPICAL ONCE
Status: DISCONTINUED | OUTPATIENT
Start: 2024-10-29 | End: 2024-10-29

## 2024-10-29 RX ORDER — NALOXONE HYDROCHLORIDE 0.4 MG/ML
0.2 INJECTION, SOLUTION INTRAMUSCULAR; INTRAVENOUS; SUBCUTANEOUS
Status: DISCONTINUED | OUTPATIENT
Start: 2024-10-29 | End: 2024-10-29

## 2024-10-29 RX ORDER — SODIUM CHLORIDE 9 MG/ML
1000 INJECTION, SOLUTION INTRAVENOUS CONTINUOUS
Status: DISCONTINUED | OUTPATIENT
Start: 2024-10-29 | End: 2024-10-29

## 2024-10-29 RX ORDER — GLYCOPYRROLATE 0.2 MG/ML
0.1 INJECTION, SOLUTION INTRAMUSCULAR; INTRAVENOUS ONCE
Status: DISCONTINUED | OUTPATIENT
Start: 2024-10-29 | End: 2024-10-29

## 2024-10-29 RX ORDER — BENZOCAINE/MENTHOL 6 MG-10 MG
1 LOZENGE MUCOUS MEMBRANE 3 TIMES DAILY PRN
Status: ACTIVE | OUTPATIENT
Start: 2024-10-29 | End: 2024-10-31

## 2024-10-29 RX ADMIN — FENTANYL CITRATE 25 MCG: 50 INJECTION, SOLUTION INTRAMUSCULAR; INTRAVENOUS at 13:09

## 2024-10-29 RX ADMIN — MIDAZOLAM 0.5 MG: 1 INJECTION INTRAMUSCULAR; INTRAVENOUS at 13:10

## 2024-10-29 RX ADMIN — DOXYCYCLINE HYCLATE 100 MG: 100 CAPSULE ORAL at 20:26

## 2024-10-29 RX ADMIN — MIDAZOLAM 0.5 MG: 1 INJECTION INTRAMUSCULAR; INTRAVENOUS at 13:20

## 2024-10-29 RX ADMIN — GLYCOPYRROLATE 0.1 MG: 0.2 INJECTION, SOLUTION INTRAMUSCULAR; INTRAVENOUS at 12:57

## 2024-10-29 RX ADMIN — VANCOMYCIN HYDROCHLORIDE 1250 MG: 10 INJECTION, POWDER, LYOPHILIZED, FOR SOLUTION INTRAVENOUS at 11:59

## 2024-10-29 RX ADMIN — MIDAZOLAM 0.5 MG: 1 INJECTION INTRAMUSCULAR; INTRAVENOUS at 13:15

## 2024-10-29 RX ADMIN — LIDOCAINE HYDROCHLORIDE 1.5 ML: 40 SOLUTION TOPICAL at 12:58

## 2024-10-29 RX ADMIN — MIDAZOLAM 1 MG: 1 INJECTION INTRAMUSCULAR; INTRAVENOUS at 13:07

## 2024-10-29 RX ADMIN — Medication 15 G: at 22:38

## 2024-10-29 RX ADMIN — TOPICAL ANESTHETIC 1 ML: 200 SPRAY DENTAL; PERIODONTAL at 13:02

## 2024-10-29 RX ADMIN — FENTANYL CITRATE 50 MCG: 50 INJECTION, SOLUTION INTRAMUSCULAR; INTRAVENOUS at 13:07

## 2024-10-29 RX ADMIN — DOXYCYCLINE HYCLATE 100 MG: 100 CAPSULE ORAL at 08:16

## 2024-10-29 RX ADMIN — TAMSULOSIN HYDROCHLORIDE 0.4 MG: 0.4 CAPSULE ORAL at 08:16

## 2024-10-29 NOTE — PROGRESS NOTES
Care Management Follow Up    Length of Stay (days): 4    Expected Discharge Date: 10/31/2024     Concerns to be Addressed:       Patient plan of care discussed at interdisciplinary rounds: Yes    Anticipated Discharge Disposition: Skilled Nursing Facility              Anticipated Discharge Services:    Anticipated Discharge DME:      Patient/family educated on Medicare website which has current facility and service quality ratings:    Education Provided on the Discharge Plan:    Patient/Family in Agreement with the Plan:      Referrals Placed by CM/SW:    Private pay costs discussed: Not applicable    Discussed  Partnership in Safe Discharge Planning  document with patient/family: No     Handoff Completed: No, handoff not indicated or clinically appropriate    Additional Information:  Writer spoke with  who states pt's sodium has dropped and he is unsure if pt's LINDSEY will be scheduled now for tomorrow.  states that he anticipates pt will be in hospital for two more days.     Next Steps: Awaiting pt's medical stability.    WILLY Shepherd  Social Work  [facility]

## 2024-10-29 NOTE — CONSULTS
[facility]    RENAL CONSULTATION NOTE    REFERRING MD:  Dr. Rodríguez    REASON FOR CONSULTATION:  hyponatremia    DATE OF CONSULTATION: 10/29/24    SHORTHAND KEY FOR MY NOTES:  c = with, s = without, p = after, a = before, x = except, asx = asymptomatic, tx = transplant or treatment, sx = symptoms or symptomatic, cx = canceled or culture, rxn = reaction, yday = yesterday, nl = normal, abx = antibiotics, fxn = function, dx = diagnosis, dz = disease, m/h = melena/hematochezia, c/d/l/ha = cramping/dizziness/lightheadedness/headache, d/c = discharge or diarrhea/constipation, f/c/n/v = fevers/chills/nausea/vomiting, cp/sob = chest pain/shortness of breath, tbv = total body volume, rxn = reaction, tdc = tunneled dialysis catheter, pta = prior to admission, hd = hemodialysis, pd = peritoneal dialysis, hhd = home hemodialysis, edw = estimated dry wt    HPI: Jayme Donahue is a 84 year old male c h/o prostate CA who was admitted to [facility] on 10/23 c urosepsis + severe hyponatremia and then transferred to [facility] on 10/25/2024 for eval of endocarditis and now has worsening hyponatremia.  Notes from Mikael Luevano ([facility]) + Elinor + Marcos ([facility]) were reviewed.    Pt was initially c/o abd pain p eating some fast food.  He vomited all night so he presented for eval at the [place] [facility].  His Na was 118 and he was started on IVF.  He had very low pressures and was started on pressors.  As a result, he was transferred to the [facility] ICU.  He was found to have Corynebacterium Striatum bacteremia and UTI and there was concern for endocarditis.  As a result, he was sent to [facility].    When he was dc'd from [facility], his Na was 123 and he was started on salt tabs + fluid restriction.  Here, the Na reached a peak of 128 so the NaCl tabs were stopped and the fluid restriction was continued.  Since stopping the salt tabs, his Na has dropped to 120 today.  He has been having back pain and is not taking any pain meds.  In  addition, he is retaining urine.  He states he is eating well and likes the food here.    He denies any confusion, twitching, or n/v.    ROS:  A complete review of systems was performed and is x as noted above.    PMH:    Past Medical History:   Diagnosis Date    Cataract     Iliac artery aneurysm (H)     Prostate cancer (H)      PSH:    History reviewed. No pertinent surgical history.  MEDICATIONS:    Current Facility-Administered Medications   Medication Dose Route Frequency Provider Last Rate Last Admin    doxycycline hyclate (VIBRAMYCIN) capsule 100 mg  100 mg Oral Q12H [facility] (08/20) Yovani Aldrich MD   100 mg at 10/29/24 0816    sodium chloride (PF) 0.9% PF flush 3 mL  3 mL Intravenous Q8H Yovani Aldrich MD   3 mL at 10/29/24 1615    sodium chloride (PF) 0.9% PF flush 3 mL  3 mL Intracatheter Q8H Yovani Aldrich MD   3 mL at 10/29/24 1203    tamsulosin (FLOMAX) capsule 0.4 mg  0.4 mg Oral Daily Shin Rodríguez MD   0.4 mg at 10/29/24 0816    vancomycin (VANCOCIN) 1,250 mg in 0.9% NaCl 262.5 mL intermittent infusion  1,250 mg Intravenous Q12H Yovani Aldrich MD   1,250 mg at 10/29/24 1159     ALLERGIES:    Allergies as of 10/25/2024    (No Known Allergies)     FH:    History reviewed. No pertinent family history.  SH:    Social History     Socioeconomic History    Marital status:      Spouse name: Not on file    Number of children: Not on file    Years of education: Not on file    Highest education level: Not on file   Occupational History    Not on file   Tobacco Use    Smoking status: Never    Smokeless tobacco: Never   Substance and Sexual Activity    Alcohol use: Not Currently    Drug use: Never    Sexual activity: Not on file   Other Topics Concern    Not on file   Social History Narrative    Not on file     Social Drivers of Health     Financial Resource Strain: Low Risk  (10/28/2024)    Financial Resource Strain     Within the past 12 months, have you or  your family members you live with been unable to get utilities (heat, electricity) when it was really needed?: No   Food Insecurity: Low Risk  (10/28/2024)    Food Insecurity     Within the past 12 months, did you worry that your food would run out before you got money to buy more?: No     Within the past 12 months, did the food you bought just not last and you didn t have money to get more?: No   Transportation Needs: Low Risk  (10/28/2024)    Transportation Needs     Within the past 12 months, has lack of transportation kept you from medical appointments, getting your medicines, non-medical meetings or appointments, work, or from getting things that you need?: No   Physical Activity: Not on file   Stress: Not on file   Social Connections: Not on file   Interpersonal Safety: Low Risk  (10/28/2024)    Interpersonal Safety     Do you feel physically and emotionally safe where you currently live?: Yes     Within the past 12 months, have you been hit, slapped, kicked or otherwise physically hurt by someone?: No     Within the past 12 months, have you been humiliated or emotionally abused in other ways by your partner or ex-partner?: No   Recent Concern: Interpersonal Safety - High Risk (10/23/2024)    Interpersonal Safety     Do you feel physically and emotionally safe where you currently live?: No     Within the past 12 months, have you been hit, slapped, kicked or otherwise physically hurt by someone?: No     Within the past 12 months, have you been humiliated or emotionally abused in other ways by your partner or ex-partner?: No   Housing Stability: Low Risk  (10/28/2024)    Housing Stability     Do you have housing? : Yes     Are you worried about losing your housing?: No     PHYSICAL EXAM:    /74 (BP Location: Left arm)   Pulse 73   Temp 98  F (36.7  C) (Oral)   Resp 15   Wt 60.8 kg (134 lb)   SpO2 97%   BMI 21.63 kg/m      GENERAL: awake, alert, NAD  HEENT:  cachectic, temporal wasting  CV: RRR c 3/6  m nl S1/S2; no ble edema  RESP: CTA B c good efforts  GI: abd s/nt/nd, BS present; no masses  MUSCULOSKELETAL: extremities nl - no gross deformities noted  NEURO:  strength weak and symmetric  PSYCH: mood good, affect appropriate  LINES:  + PIV    LABS:      CBC RESULTS:     Recent Labs   Lab 10/26/24  0834 10/25/24  0619 10/24/24  0637 10/23/24  0519   WBC 8.2 8.7 9.6 11.4*   RBC 3.77* 4.01* 3.69* 3.63*   HGB 11.3* 12.1* 11.3* 11.0*   HCT 31.4* 33.5* 31.3* 30.9*    209 183 162     BMP RESULTS:  Recent Labs   Lab 10/29/24  1445 10/29/24  0600 10/29/24  0023 10/28/24  1417 10/28/24  0616 10/27/24  2336 10/27/24  1600 10/27/24  0811 10/26/24  1513 10/26/24  0834 10/25/24  1219 10/25/24  0619 10/25/24  0205 10/24/24  2227   * 121*  121* 121* 124* 125* 125*   < > 126*  126*   < > 128*  128*   < > 121* 123* 124*   POTASSIUM  --  4.9  --   --   --   --   --  4.3  --  4.3  --  4.2 4.2 4.5   CHLORIDE  --  89*  --   --   --   --   --  93*  --  96*  --  91* 92* 93*   CO2  --  29  --   --   --   --   --  29  --  27  --  21* 25 25   BUN  --  10.0  --   --   --   --   --  12.2  --  7.8*  --  8.7 8.5 9.1   CR  --  0.51*  --   --   --   --   --  0.58*  --  0.53*  --  0.50* 0.52* 0.49*   GLC  --  90  --   --   --   --   --  95  --  87  --  99 101* 99   AMALIA  --  8.1*  --   --   --   --   --  7.9*  --  7.9*  --  7.8* 7.7* 7.8*    < > = values in this interval not displayed.     INR  Recent Labs   Lab 10/23/24  0519   INR 1.53*      DIAGNOSTICS:  Personally reviewed - lumbar spine MRI report    A/P:  Jayme Donahue is a 84 year old male c severe hyponatremia.    1.  Hyponatremia.  Pt's initial and follow up urine studies were suggestive of SIADH and given the pain this is likely to be the case.  He also has a h/o prostate CA that may not have been completely treated and he is cachectic.  He responded to salt tabs and the drop is coincident c stopping them.  He states he is eating well, so we will check again in the AM.    so we will consider resuming them, but he states he is eating ok.  Also if he is retaining urine, this could contribute.  He is asx.  A.  If no improvement in Na overnight, then will do a full workup for hypoNa.  B.  Stop NS.  C.  Check labs in AM.  D.  Will consider resuming salt tabs.    2.  Endocarditis.  Pt is on broad abx per ID.  He may need surgery for curative tx.  A.  Continue abx.  B.  Plans per Cards, CV Surg, ID.    3.  FEN.  Other electrolytes are fine.  A.  Reg diet.    Thank you for this consultation. We will follow c you.  Please call if any questions.  Case d/w Dr. Rodríguez.    Attestation:   I have reviewed today's relevant vital signs, notes, medications, labs and imaging.    Minesh Fajardo MD  [facility] - Nephrology  [phone]

## 2024-10-29 NOTE — PRE-PROCEDURE
GENERAL PRE-PROCEDURE:   Procedure:  LINDSEY  Date/Time:  10/29/2024 12:53 PM    Written consent obtained?: Yes    Risks and benefits: Risks, benefits and alternatives were discussed    Consent given by:  Patient  Patient states understanding of procedure being performed: Yes    Patient's understanding of procedure matches consent: Yes    Procedure consent matches procedure scheduled: Yes    Expected level of sedation:  Moderate  Appropriately NPO:  Yes  ASA Class:  2  Mallampati  :  Grade 2- soft palate, base of uvula, tonsillar pillars, and portion of posterior pharyngeal wall visible  Lungs:  Lungs clear with good breath sounds bilaterally  Heart:  Normal heart sounds and rate and systolic murmur  History & Physical reviewed:  History and physical reviewed and no updates needed  Statement of review:  I have reviewed the lab findings, diagnostic data, medications, and the plan for sedation    The risks and benefits of LINDSEY have been discussed with the patient in detail. Patient denies any swallowing difficulty or active upper GI bleeding problems. The patient understands the above mentioned risks and is willing to proceed with the LINDSEY.

## 2024-10-29 NOTE — PROGRESS NOTES
[facility]    Hospitalist Progress Note    Interval History   - Sodium down to 121 this morning patient reports adherence to fluid restriction also NPO overnight, may need hypertonic saline will have Nephrology consulted to assist  - LINDSEY pending this morning    Assessment & Plan   Summary: Jayme Donahue is a 84 year old male with PMH prostate cancer, who was admitted on 10/25/2024 as a transfer from [facility] with Corynebacterium bacteremia and concern for endocarditis.    Corynebacterium striatum bacteremia  Corynebacterium UTI  Septic shock secondary to above, resolved  Severe mitral regurgitation  Possible endocarditis  Low back pain  Generalized weakness  Jayme Donahue initially developed nausea and poor appetite on 10/18/2024.  He presented to an outside hospital and was found to be hyponatremic with a sodium of 118 and diagnosed with sepsis secondary to a UTI.  Subsequently became hypotensive and was transferred to [facility] ICU and started on vasopressors on 10/23/2024.  He was treated with vancomycin and Rocephin.  Levophed weaned off on the night of 10/23/2024.  Blood cultures returned positive for corynebacterium striatum. TTE on 10/23/2024 showed severe mitral regurgitation and a mobile echodensity on the atrial side of the mitral valve that likely represents flail scallop although vegetation is also in the differential.  Complained of low back pain, lumbar spine MRI on 10/24/2024 showed some likely degenerative endplate changes at right L4-L5 and left L5-S1, although early changes of discitis could also be in the differential.  ID was consulted on 10/25/2024 and recommended continuing vancomycin and transferring to a facility that could perform LINDSEY for further evaluation of the mitral valve abnormality.  - Continue vancomycin, pharmacy to dose.  - Add doxycycline for Stenotrophomonas maltophilia growing on culture from left scapular skin lesion.  -  Blood culture on 10/24/2024 growing GPCs likely corynebacterium, repeat blood cultures 10/26 and check daily until negative  - LINDSEY ordered, anticipate this will be done on Monday, 10/28/2024.  - Consult ID, appreciate their assistance.  - PT/OT consult, appreciate their assistance.  - Care transitions consult, appreciate their assistance.  Anticipate need for TCU upon discharge for ongoing therapies and IV antibiotics.    Hyponatremia, suspect SIADH  Sodium reportedly 118 at outside hospital when he first presented.  Felt to be secondary to dehydration, sodium mildly improved with IV fluids.  Sodium 120 on 10/23/2024 when he arrived at [facility].  Urine studies at [facility] consistent with SIADH.  He was started on salt tablets and placed on a fluid restriction.  Sodium ranged between 119-123 while he was at [facility].   Sodium improved to 128 with fluid restriction 10/26   Sodium down ton 125 on 10/27, 10/28   Sodium down to 121 on 10/29, despite patient reporting adhering to fluid restriction  - Continue fluid restriction 1.2L, encouraged patient to follow  - Continue sodium checks to q8h  - Recheck urine studies  - Nephrology consult, question possibly benefit of hypertonic saline    Left scapular lesion  Lesion culture positive for Stenotrophomonas maltophilia  Appearance is concerning for malignancy, see photos in epic on 10/23/2024.  Patient states this lesion has been present for several months, he is not sure how it started.  He has been covering it at home to manage drainage from the lesion.  - WOC nurse consulted at [facility].  - Continue wound cares as ordered by WOC nurse at previous facility.  - Continue doxycycline    History of prostate cancer  LUTS  Reportedly treated in 2020, but details unclear.  PSA in July 2024 was 54, PSA in September 2024 was 57. Having urinary urgency, LUTS this admission  - Start tamsulosin empirically  - Monitor for retention check PVRs, mclain for PVR >  250ml x2    Moderate protein calorie malnutrition  - Consult dietitian.    Clinically Significant Risk Factors         # Hyponatremia: Lowest Na = 121 mmol/L in last 2 days, will monitor as appropriate  # Hypochloremia: Lowest Cl = 89 mmol/L in last 2 days, will monitor as appropriate                     # Severe Malnutrition: based on nutrition assessment, PRESENT ON ADMISSION   # Financial/Environmental Concerns:            PT/OT: ordered  Diet: NPO per Anesthesia Guidelines for Procedure/Surgery Except for: Meds  Fluid restriction 1200 ML FLUID    DVT Prophylaxis: Pneumatic Compression Devices  Palmer Catheter: Not present  Lines: None     Cardiac Monitoring: ACTIVE order. Indication: Procedural area  Code Status: No CPR- Do NOT Intubate    Medically Ready for Discharge: Anticipated in 2-4 Days      Shin Rodríguez MD  Hospitalist Service  [facility]  Securely message with [employer] (more info)  Text page via [employer] Paging/Directory     Data reviewed today: I reviewed all new labs and imaging results over the last 24 hours.    Physical Exam   Temp: 98.6  F (37  C) Temp src: Oral BP: 132/67 Pulse: 76   Resp: 18 SpO2: 95 % O2 Device: None (Room air)    Vitals:    10/29/24 0404   Weight: 60.8 kg (134 lb)     Vital Signs with Ranges  Temp:  [98.2  F (36.8  C)-98.6  F (37  C)] 98.6  F (37  C)  Pulse:  [76-93] 76  Resp:  [16-18] 18  BP: (118-132)/(67-78) 132/67  SpO2:  [95 %-97 %] 95 %  I/O last 3 completed shifts:  In: 930 [P.O.:930]  Out: 300 [Urine:300]  O2 requirements: not needed    Constitutional: Male in NAD  HEENT: Eyes nonicteric, oral mucosa moist  Cardiovascular: RRR, normal S1/2, systolic murmur  Respiratory: CTAB, no wheezing or crackles  Vascular: No LE pitting edema  GI: Normoactive bowel sounds, nontender, nondistended  Skin/Integumen: No rashes  Neuro/Psych: Appropriate affect and mood. A&Ox3, moves all extremities    Medications   Current Facility-Administered Medications    Medication Dose Route Frequency Provider Last Rate Last Admin    May take oral meds with a sip of water, the morning of LINDSEY procedure.   Does not apply Continuous PRN Yovani Aldrich MD         Current Facility-Administered Medications   Medication Dose Route Frequency Provider Last Rate Last Admin    doxycycline hyclate (VIBRAMYCIN) capsule 100 mg  100 mg Oral Q12H [facility] (08/20) Yovani Aldrich MD   100 mg at 10/29/24 0816    sodium chloride (PF) 0.9% PF flush 3 mL  3 mL Intravenous Q8H Yovani Aldrich MD   3 mL at 10/29/24 0816    sodium chloride (PF) 0.9% PF flush 3 mL  3 mL Intracatheter Q8H Yovani Aldrich MD   3 mL at 10/28/24 2310    tamsulosin (FLOMAX) capsule 0.4 mg  0.4 mg Oral Daily Shin Rodríguez MD   0.4 mg at 10/29/24 0816    vancomycin (VANCOCIN) 1,250 mg in 0.9% NaCl 262.5 mL intermittent infusion  1,250 mg Intravenous Q12H Yovani Aldrich MD   1,250 mg at 10/28/24 2310       Data   Recent Labs   Lab 10/29/24  0600 10/29/24  0023 10/28/24  1417 10/27/24  1600 10/27/24  0811 10/26/24  1513 10/26/24  0834 10/25/24  1219 10/25/24  0619 10/24/24  1007 10/24/24  0637 10/23/24  0944 10/23/24  0519 10/23/24  0126 10/23/24  0126   WBC  --   --   --   --   --   --  8.2  --  8.7  --  9.6  --  11.4*   < >  --    HGB  --   --   --   --   --   --  11.3*  --  12.1*  --  11.3*  --  11.0*   < >  --    MCV  --   --   --   --   --   --  83  --  84  --  85  --  85   < >  --    PLT  --   --   --   --   --   --  224  --  209  --  183  --  162   < >  --    INR  --   --   --   --   --   --   --   --   --   --   --   --  1.53*  --   --    *  121* 121* 124*   < > 126*  126*   < > 128*  128*   < > 121*   < >  --    < > 123*  123*  --  120*   POTASSIUM 4.9  --   --   --  4.3  --  4.3  --  4.2   < >  --    < > 4.5  4.5  --  4.4   CHLORIDE 89*  --   --   --  93*  --  96*  --  91*   < >  --    < > 95*  95*  --  92*   CO2 29  --   --   --  29  --  27  --  21*   < >   --    < > 21*  21*  --  24   BUN 10.0  --   --   --  12.2  --  7.8*  --  8.7   < >  --    < > 14.2  14.2  --  15.1   CR 0.51*  --   --   --  0.58*  --  0.53*  --  0.50*   < >  --    < > 0.62*  0.62*  --  0.68   ANIONGAP 3*  --   --   --  4*  --  5*  --  9   < >  --    < > 7  7  --  4*   AMALIA 8.1*  --   --   --  7.9*  --  7.9*  --  7.8*   < >  --    < > 7.6*  7.6*  --  7.7*   GLC 90  --   --   --  95  --  87  --  99   < >  --    < > 108*  108*  --  117*   ALBUMIN  --   --   --   --   --   --   --   --   --   --   --   --  2.2*  --  2.2*   PROTTOTAL  --   --   --   --   --   --   --   --   --   --   --   --  4.5*  --   --    BILITOTAL  --   --   --   --   --   --   --   --   --   --   --   --  0.7  --   --    ALKPHOS  --   --   --   --   --   --   --   --   --   --   --   --  88  --   --    ALT  --   --   --   --   --   --   --   --   --   --   --   --  20  --   --    AST  --   --   --   --   --   --   --   --   --   --   --   --  22  --   --     < > = values in this interval not displayed.       Imaging:   No results found for this or any previous visit (from the past 24 hours).

## 2024-10-29 NOTE — PROGRESS NOTES
MD Notification     Notified Person: MD     Notified Person Name:Dr Willingham     Notification Date/Time:0315     Notification Interaction:vocera massage     Purpose of Notification:NA-121     Orders Received:no new orders, con to monitor     Comments:

## 2024-10-29 NOTE — PLAN OF CARE
Goal Outcome Evaluation:                    10/28/24 3676-5755  Orientation: A&Ox4, forgetful  Vitals/Tele: VSS RA, denies pain   IV Access/drains: PIV SL  Diet: Regular, FR: 1200ml/ NPO at [place] for LINDSEY today  Mobility: A1 GBW  GI/: Continent B&B, to BSC or urinal. PVR of 206/ml and 360ml. Continue to check for residuals. Place mclain if persistently over 250 on the 2nd time in a row. Says he thinks he does better when he stands to urinate.  Wound/Skin: LUE posterior shoulder wound covered with mepilex CDI, mepi on coccyx for protection.   Labs: Na 124/121. MD notified. Q8 checks. Blood culture positive/wound culture  Consults: Cards, PT/OT, Nutrition, WOC  Discharge Plan: pending LINDSEY today 10/29. Mid/PICC line needed for long term abx before discharge.   Pt alert/pleasant, forgetful at times, up to BR voiding adequately, continues on Abxs.

## 2024-10-29 NOTE — PROGRESS NOTES
Care Suites Procedure Nursing Note    Patient Information  Name: Jayme Donahue  Age: 84 year old    Procedure  Procedure: LINDSEY  Procedure start time: 13:05  Procedure complete time: 13:28  Pt tolerated well. VSS. See Procedural Sedation Flowsheet.   Total sedation given 2.5 mg Versed & 75 mcg Fentanyl.   Detailed report called to DEREK Bess. Pt to be NPO until 14:30.     Pt transferred to 6605 per cart.  Pt  A/O. Resp even & unlabored upon transfer.

## 2024-10-29 NOTE — PROVIDER NOTIFICATION
"Patient PVR 770cc. Paged Dr Rodríguez via VM regarding this. Dr Rodríguez replied back via VM, \"Can you put in a mclain thanks\".  "

## 2024-10-29 NOTE — PLAN OF CARE
Goal Outcome Evaluation:       10/29/24 07:00-15:00    Orientation: A&Ox4, forgetful  Vitals/Tele: VSS RA, denies pain, only when moving  IV Access/drains: PIV SL  Diet: Regular, FR: 1200ml, (50ml) clear then regular diet (post procedure)  Mobility: A1 GBW  GI/: Continent B&B, to BSC or urinal. PVR of 271. Continue to check for residuals. Place mclain if persistently over 250 on the 2nd time in a row. Says he thinks he does better when he stands to urinate.  Wound/Skin: LUE posterior shoulder wound covered with mepilex CDI changed 10/28, mepi on coccyx for protection.   Labs: Na 121. Q8 checks. Blood culture positive/wound culture  Consults: Cards, PT/OT, Nutrition, WOC  Discharge Plan: LINDSEY [id number]/29. Mid/PICC line needed for long term abx before discharge.

## 2024-10-29 NOTE — CONSULTS
SPIRITUAL HEALTH SERVICES  SPIRITUAL ASSESSMENT Consult Note  [facility]. [address] medical specialties     REFERRAL SOURCE: Patient requests ongoing spiritual support    Brief visit with Avelino who recently returned from his procedure and was groggy/sleepy.    Nursing staff stated Avelino's  was on the phone; unsuccessful transfer from nursing station likely due to Avelino's grogginess.    I shared prayer at Avelino's request.    Plan: unit  will continue to follow.    Alexus Zhang MDiv [facility]  Staff   Please place consult order for routine Spiritual Health Services referrals.  [facility] available 24/7 for emergent requests either by having the on-call  paged or by entering an ASAP/STAT consult in Epic (this will also page the on-call ).

## 2024-10-29 NOTE — PROVIDER NOTIFICATION
"Mclain inserted. Paged Dr Rodríguez requesting diagnosis for mclain placement and he replied, \"Urology should be putting their own orders.  "

## 2024-10-30 ENCOUNTER — APPOINTMENT (OUTPATIENT)
Dept: CT IMAGING | Facility: CLINIC | Age: 84
End: 2024-10-30
Attending: PHYSICIAN ASSISTANT
Payer: MEDICARE

## 2024-10-30 ENCOUNTER — APPOINTMENT (OUTPATIENT)
Dept: OCCUPATIONAL THERAPY | Facility: CLINIC | Age: 84
End: 2024-10-30
Attending: INTERNAL MEDICINE
Payer: MEDICARE

## 2024-10-30 ENCOUNTER — APPOINTMENT (OUTPATIENT)
Dept: ULTRASOUND IMAGING | Facility: CLINIC | Age: 84
End: 2024-10-30
Attending: PHYSICIAN ASSISTANT
Payer: MEDICARE

## 2024-10-30 ENCOUNTER — APPOINTMENT (OUTPATIENT)
Dept: PHYSICAL THERAPY | Facility: CLINIC | Age: 84
End: 2024-10-30
Attending: INTERNAL MEDICINE
Payer: MEDICARE

## 2024-10-30 ENCOUNTER — APPOINTMENT (OUTPATIENT)
Dept: GENERAL RADIOLOGY | Facility: CLINIC | Age: 84
End: 2024-10-30
Attending: INTERNAL MEDICINE
Payer: MEDICARE

## 2024-10-30 LAB
ALBUMIN SERPL BCG-MCNC: 2.7 G/DL (ref 3.5–5.2)
ANION GAP SERPL CALCULATED.3IONS-SCNC: 6 MMOL/L (ref 7–15)
BUN SERPL-MCNC: 20.4 MG/DL (ref 8–23)
CALCIUM SERPL-MCNC: 8.2 MG/DL (ref 8.8–10.4)
CHLORIDE SERPL-SCNC: 88 MMOL/L (ref 98–107)
CREAT SERPL-MCNC: 0.55 MG/DL (ref 0.67–1.17)
EGFRCR SERPLBLD CKD-EPI 2021: >90 ML/MIN/1.73M2
ERYTHROCYTE [DISTWIDTH] IN BLOOD BY AUTOMATED COUNT: 13.9 % (ref 10–15)
GLUCOSE SERPL-MCNC: 96 MG/DL (ref 70–99)
HCO3 SERPL-SCNC: 29 MMOL/L (ref 22–29)
HCT VFR BLD AUTO: 28.9 % (ref 40–53)
HGB BLD-MCNC: 10.5 G/DL (ref 13.3–17.7)
MCH RBC QN AUTO: 30.2 PG (ref 26.5–33)
MCHC RBC AUTO-ENTMCNC: 36.3 G/DL (ref 31.5–36.5)
MCV RBC AUTO: 83 FL (ref 78–100)
PHOSPHATE SERPL-MCNC: 2.7 MG/DL (ref 2.5–4.5)
PLATELET # BLD AUTO: 280 10E3/UL (ref 150–450)
POTASSIUM SERPL-SCNC: 4.7 MMOL/L (ref 3.4–5.3)
RBC # BLD AUTO: 3.48 10E6/UL (ref 4.4–5.9)
SODIUM SERPL-SCNC: 118 MMOL/L (ref 135–145)
SODIUM SERPL-SCNC: 123 MMOL/L (ref 135–145)
WBC # BLD AUTO: 8.8 10E3/UL (ref 4–11)

## 2024-10-30 PROCEDURE — 36415 COLL VENOUS BLD VENIPUNCTURE: CPT | Performed by: INTERNAL MEDICINE

## 2024-10-30 PROCEDURE — 70486 CT MAXILLOFACIAL W/O DYE: CPT | Mod: MG

## 2024-10-30 PROCEDURE — 250N000013 HC RX MED GY IP 250 OP 250 PS 637: Performed by: INTERNAL MEDICINE

## 2024-10-30 PROCEDURE — 99232 SBSQ HOSP IP/OBS MODERATE 35: CPT | Performed by: INTERNAL MEDICINE

## 2024-10-30 PROCEDURE — 82040 ASSAY OF SERUM ALBUMIN: CPT | Performed by: INTERNAL MEDICINE

## 2024-10-30 PROCEDURE — 97535 SELF CARE MNGMENT TRAINING: CPT | Mod: GO

## 2024-10-30 PROCEDURE — 71046 X-RAY EXAM CHEST 2 VIEWS: CPT

## 2024-10-30 PROCEDURE — 258N000003 HC RX IP 258 OP 636: Performed by: HOSPITALIST

## 2024-10-30 PROCEDURE — 99223 1ST HOSP IP/OBS HIGH 75: CPT | Performed by: PHYSICIAN ASSISTANT

## 2024-10-30 PROCEDURE — 97530 THERAPEUTIC ACTIVITIES: CPT | Mod: GP

## 2024-10-30 PROCEDURE — 84295 ASSAY OF SERUM SODIUM: CPT | Performed by: STUDENT IN AN ORGANIZED HEALTH CARE EDUCATION/TRAINING PROGRAM

## 2024-10-30 PROCEDURE — 97530 THERAPEUTIC ACTIVITIES: CPT | Mod: GO

## 2024-10-30 PROCEDURE — 85014 HEMATOCRIT: CPT | Performed by: INTERNAL MEDICINE

## 2024-10-30 PROCEDURE — 99233 SBSQ HOSP IP/OBS HIGH 50: CPT | Performed by: INTERNAL MEDICINE

## 2024-10-30 PROCEDURE — 36415 COLL VENOUS BLD VENIPUNCTURE: CPT | Performed by: STUDENT IN AN ORGANIZED HEALTH CARE EDUCATION/TRAINING PROGRAM

## 2024-10-30 PROCEDURE — 93880 EXTRACRANIAL BILAT STUDY: CPT

## 2024-10-30 PROCEDURE — 120N000001 HC R&B MED SURG/OB

## 2024-10-30 PROCEDURE — 84295 ASSAY OF SERUM SODIUM: CPT | Performed by: INTERNAL MEDICINE

## 2024-10-30 PROCEDURE — 87040 BLOOD CULTURE FOR BACTERIA: CPT | Performed by: INTERNAL MEDICINE

## 2024-10-30 PROCEDURE — 250N000013 HC RX MED GY IP 250 OP 250 PS 637: Performed by: HOSPITALIST

## 2024-10-30 PROCEDURE — 84520 ASSAY OF UREA NITROGEN: CPT | Performed by: INTERNAL MEDICINE

## 2024-10-30 PROCEDURE — 99222 1ST HOSP IP/OBS MODERATE 55: CPT | Performed by: INTERNAL MEDICINE

## 2024-10-30 PROCEDURE — 250N000011 HC RX IP 250 OP 636: Performed by: HOSPITALIST

## 2024-10-30 RX ORDER — LORAZEPAM 0.5 MG/1
0.5 TABLET ORAL
Status: CANCELLED | OUTPATIENT
Start: 2024-10-30

## 2024-10-30 RX ORDER — SODIUM CHLORIDE 9 MG/ML
INJECTION, SOLUTION INTRAVENOUS CONTINUOUS
Status: CANCELLED | OUTPATIENT
Start: 2024-10-30

## 2024-10-30 RX ORDER — LIDOCAINE 40 MG/G
CREAM TOPICAL
Status: CANCELLED | OUTPATIENT
Start: 2024-10-30

## 2024-10-30 RX ORDER — ASPIRIN 325 MG
325 TABLET ORAL ONCE
Status: CANCELLED | OUTPATIENT
Start: 2024-10-30 | End: 2024-10-30

## 2024-10-30 RX ORDER — ASPIRIN 81 MG/1
243 TABLET, CHEWABLE ORAL ONCE
Status: CANCELLED | OUTPATIENT
Start: 2024-10-30

## 2024-10-30 RX ORDER — SODIUM CHLORIDE 1 G/1
1 TABLET ORAL
Status: DISCONTINUED | OUTPATIENT
Start: 2024-10-30 | End: 2024-11-04

## 2024-10-30 RX ORDER — POTASSIUM CHLORIDE 1500 MG/1
20 TABLET, EXTENDED RELEASE ORAL
Status: CANCELLED | OUTPATIENT
Start: 2024-10-30

## 2024-10-30 RX ORDER — LORAZEPAM 2 MG/ML
0.5 INJECTION INTRAMUSCULAR
Status: CANCELLED | OUTPATIENT
Start: 2024-10-30

## 2024-10-30 RX ADMIN — DOXYCYCLINE HYCLATE 100 MG: 100 CAPSULE ORAL at 08:59

## 2024-10-30 RX ADMIN — VANCOMYCIN HYDROCHLORIDE 1250 MG: 10 INJECTION, POWDER, LYOPHILIZED, FOR SOLUTION INTRAVENOUS at 00:12

## 2024-10-30 RX ADMIN — VANCOMYCIN HYDROCHLORIDE 1250 MG: 10 INJECTION, POWDER, LYOPHILIZED, FOR SOLUTION INTRAVENOUS at 12:08

## 2024-10-30 RX ADMIN — TAMSULOSIN HYDROCHLORIDE 0.4 MG: 0.4 CAPSULE ORAL at 09:00

## 2024-10-30 RX ADMIN — SODIUM CHLORIDE TAB 1 GM 1 G: 1 TAB at 17:11

## 2024-10-30 NOTE — PROGRESS NOTES
[facility]    Infectious Disease Progress Note    Date of Service (when I saw the patient): 10/30/2024     Assessment & Plan   Jayme Donahue is a 84 year old male who was admitted on 10/25/2024.     Impression:  83 yo with PMH prostate cancer, who was admitted on 10/25/2024 as a transfer from [facility] with Corynebacterium bacteremia and concern for endocarditis.   Admitted with weakness and poor oral intake to an outside hospital  Blood cultures from 10/22 grew out Corynebacterium striatum, 2 out of 2  The same day he also had positive urine cultures for Corynebacterium stratum  And a wound culture from the scapular lesion positive for Corynebacterium striatum  With TTE done on 10/23 positive for a mobile echodensity on the atrial side of the mitral valve that likely represents flail scallop although vegetation is also in differential  Patient also complained of back pain but there is no abscesses or osteomyelitis on the back MRI  Of note patient has a left scapular lesion that has been present for several months  He is currently on vancomycin  He was transferred to [facility] for a LINDSEY     Recommendations   Polymicrobial culture from a wound swab from the shoulder, this is a chronic wound a swab culture is bound ot show bacteria but this is not an indicator of infection   LINDSEY:  Large vegetation noted on the atrial aspect of the mitral valve. They measure about 1.5 to 1.7 cm. There is associated flail segment and distorted mitral valve leaflet anatomy. There seems to be prolapse of the posterior mitral valve leaflet.   Consult Cardiology/ CV Surgery   Continue on vancomycin  Please consult gen surg for biopsy on he chronic left scapula wound           Kike Belcher MD    Interval History   Tolerating antibiotics ok   No new rashes or issues with antibiotics   Labs reviewed   No changes to past medical, social or family history   Tiered       Physical Exam   Temp: 98.2  F  (36.8  C) Temp src: Oral BP: 107/57 Pulse: 93   Resp: 15 SpO2: 97 % O2 Device: None (Room air) Oxygen Delivery: 2 LPM  Vitals:    10/29/24 0404   Weight: 60.8 kg (134 lb)     Vital Signs with Ranges  Temp:  [97.8  F (36.6  C)-98.5  F (36.9  C)] 98.2  F (36.8  C)  Pulse:  [68-93] 93  Resp:  [15-16] 15  BP: (105-136)/(57-87) 107/57  SpO2:  [95 %-99 %] 97 %    Constitutional: Awake, alert, cooperative, no apparent distress  Lungs: Clear to auscultation bilaterally, no crackles or wheezing  Cardiovascular: Regular rate and rhythm, normal S1 and S2, and no murmur noted  Abdomen: Normal bowel sounds, soft, non-distended, non-tender  Skin: skin wound on the scapula appears to the same   Other:    Medications   Current Facility-Administered Medications   Medication Dose Route Frequency Provider Last Rate Last Admin    May take oral meds with a sip of water, the morning of LINDSEY procedure.   Does not apply Continuous PRN Yovani Aldrich MD         Current Facility-Administered Medications   Medication Dose Route Frequency Provider Last Rate Last Admin    doxycycline hyclate (VIBRAMYCIN) capsule 100 mg  100 mg Oral Q12H [facility] Yovani Aldrich MD   100 mg at 10/30/24 0859    sodium chloride (PF) 0.9% PF flush 3 mL  3 mL Intravenous Q8H Yovani Aldrich MD   3 mL at 10/30/24 0901    sodium chloride (PF) 0.9% PF flush 3 mL  3 mL Intracatheter Q8H Yovani Aldrich MD   3 mL at 10/29/24 2026    tamsulosin (FLOMAX) capsule 0.4 mg  0.4 mg Oral Daily Shin Rodríguez MD   0.4 mg at 10/30/24 0900    Urea (URE-NA) packet 15 g  15 g Oral Q24H Tenzin Dumont MD   15 g at 10/29/24 2238    vancomycin (VANCOCIN) 1,250 mg in 0.9% NaCl 262.5 mL intermittent infusion  1,250 mg Intravenous Q12H Yovani Aldrich MD   1,250 mg at 10/30/24 0012       Data   All microbiology laboratory data reviewed.  Recent Labs   Lab Test 10/30/24  0627 10/26/24  0834 10/25/24  0619   WBC 8.8 8.2 8.7   HGB  "10.5* 11.3* 12.1*   HCT 28.9* 31.4* 33.5*   MCV 83 83 84    224 209     Recent Labs   Lab Test 10/30/24  0627 10/29/24  2017 10/29/24  0600   CR 0.55* 0.61* 0.51*     No lab results found.  No lab results found.    Invalid input(s): \"UC\"    All cultures:  Recent Labs   Lab 10/29/24  0600 10/28/24  0616 10/27/24  0811 10/26/24  2252 10/26/24  1811 10/26/24  1513 10/24/24  1444   CULTURE No growth after 1 day No growth after 2 days No growth after 3 days No growth after 3 days No Growth No growth after 3 days Positive on the 2nd day of incubation*  Corynebacterium striatum*      Blood culture:  Results for orders placed or performed during the hospital encounter of 10/25/24   Blood Culture Arm, Left    Collection Time: 10/29/24  6:00 AM    Specimen: Arm, Left; Blood   Result Value Ref Range    Culture No growth after 1 day    Blood Culture Arm, Right    Collection Time: 10/28/24  6:16 AM    Specimen: Arm, Right; Blood   Result Value Ref Range    Culture No growth after 2 days    Blood Culture Peripheral Blood    Collection Time: 10/27/24  8:11 AM    Specimen: Peripheral Blood   Result Value Ref Range    Culture No growth after 3 days    Blood Culture Arm, Right    Collection Time: 10/26/24 10:52 PM    Specimen: Arm, Right; Blood   Result Value Ref Range    Culture No growth after 3 days    Blood Culture Hand, Left    Collection Time: 10/26/24  3:13 PM    Specimen: Hand, Left; Blood   Result Value Ref Range    Culture No growth after 3 days    Results for orders placed or performed during the hospital encounter of 10/23/24   Blood Culture Arm, Right    Collection Time: 10/24/24  2:44 PM    Specimen: Arm, Right; Blood   Result Value Ref Range    Culture Positive on the 2nd day of incubation (A)     Culture Corynebacterium striatum (AA)       Urine culture:  Results for orders placed or performed during the hospital encounter of 10/25/24   Urine Culture    Collection Time: 10/26/24  6:11 PM    Specimen: Urine, " Midstream   Result Value Ref Range    Culture No Growth

## 2024-10-30 NOTE — PROGRESS NOTES
"CLINICAL NUTRITION SERVICES - REASSESSMENT NOTE    Recommendations Ordered by Registered Dietitian (RD):   Continue Ensure Max TID   Malnutrition: 10/26  % Weight Loss:  None noted - pt reports stable for months.   % Intake:  </= 50% for >/= 5 days (severe malnutrition)  Subcutaneous Fat Loss:  Global, severe  Muscle Loss:  Global, severe  Fluid Retention:  None noted     Malnutrition Diagnosis: Severe malnutrition  In Context of:  Acute on chronic illness or disease     EVALUATION OF PROGRESS TOWARD GOALS   Diet:  Regular Diet + 1200 mL fluid restriction  Supplement: Ensure Max TID (strawberry)    Intake/Tolerance:    - Intakes documented: 10/26: 100/75, 10/27: 100/100/100, 10/28: 100/100, 10/29: 50, 10/30: 25/25  - Ordering small-adequate size meals TID per healthtouch (1 meal yesterday, NPO for procedure)    ASSESSED NUTRITION NEEDS:  Dosing Weight: 59 kg (actual)   Estimated Energy Needs: 4303-5100 kcals (25-30 Kcal/Kg)  Justification: maintenance  Estimated Protein Needs: 70-90+ grams protein (1.2-1.5 g pro/Kg)  Justification: hypercatabolism with acute illness  Estimated Fluid Needs: 1200  mL   Justification: Restriction per MD, hyponatremic     NEW FINDINGS:   RD met with patient today at bedside. Patient is very grateful for the food options at [facility] and states \"the food is so good\" and \"such good variety\". He reports a good appetite and is tolerating Ensure Max TID.     Labs: Na 123 (L) Cr 0.55 (L)  Meds: Reviewed  Skin: Reviewed (WOC consulted 10/28 for L upper back/scapula wound - non pressure injury); 1-2+ BLE edema  GI: BM 10/29    Weight trending: no updated weights     Previous Goals:   Pt to consume at least 75% of three nutritionally adequate meals per day (or equivalent via snacks/supplements).   Evaluation: Met    Previous Nutrition Diagnosis:   Inadequate oral intake related to low appetite as evidenced by eating </= 50% needs for the past 2 weeks, observed severe fat and muscle loss   Evaluation: " Improving    CURRENT NUTRITION DIAGNOSIS  No nutrition diagnosis identified at this time    INTERVENTIONS  Recommendations / Nutrition Prescription  Continue Ensure Max TID    Implementation  General/healthful diet  Medical Food Supplement    Goals  PO intakes >/= 75% nutritionally adequate meals per day (or equivalent via snack/supplement)    MONITORING AND EVALUATION:  Progress towards goals will be monitored and evaluated per protocol and Practice Guidelines    Rafaela Wilburn RD, LD

## 2024-10-30 NOTE — PROVIDER NOTIFICATION
MD Notification    Notified Person: MD    Notified Person Name: Dr. Willingham    Notification Date/Time: 10/30/24 0042    Notification Interaction: [employer] messaging    Purpose of Notification: Pt had LINDSEY today and is having a sore throat tonight. Can he have throat lozenges?    Orders Received: Lozenge ordered by MD    Comments:

## 2024-10-30 NOTE — CONSULTS
SPIRITUAL HEALTH SERVICES  SPIRITUAL ASSESSMENT Consult Note  [facility]. [address] medical specialties     REFERRAL SOURCE: patient requests ongoing spiritual support    Saw pt Jayme Donahue at bedside for 10 minutes.    Patient / Family Understanding of Illness and Goals of Care - Avelino spoke of being on fluid restriction and that today the goal is to get his sodium levels up. He is also looking forward to meeting with the cardiologist to discuss surgery.    Distress and Loss -     Strengths, Coping, and Resources - Avelino said he has spoken with his  a few times during his hospital stay.    Avelino shared stories of travel and some of his favorite [employer] such as [email or web address] and [email or web address].    Meaning, Beliefs, and Spirituality - Avelino welcomed words of blessing and requests ongoing support.    Plan: unit  will continue to follow    Alexus Zhang MDiv [facility]  Staff   Please place consult order for routine Spiritual Health Services referrals.  [facility] available 24/7 for emergent requests either by having the on-call  paged or by entering an ASAP/STAT consult in Epic (this will also page the on-call ).

## 2024-10-30 NOTE — PLAN OF CARE
DATE & TIME: 10/30/24 0189-2376    Cognitive Concerns/ Orientation : Pt A/Ox4, forgetful   BEHAVIOR & AGGRESSION TOOL COLOR: Green   ABNL VS/O2: VSS on RA  MOBILITY: Assist x1 with gait belt and walker, fall risk  PAIN MANAGMENT: Denies  DIET: Regular, 1200ml fluid restriction  BOWEL/BLADDER: Continent of bowel. Mclain patent with adequate output, mclain for retention  ABNL LAB/BG: Na 120/118  DRAIN/DEVICES: IV SL  TELEMETRY RHYTHM: SR  SKIN: LUE shoulder wound, mepilex CDI. Coccyx blanchable redness, mepilex CDI.  D/C DATE: Discharge pending progress  OTHER IMPORTANT INFO: Nephrology consult.

## 2024-10-30 NOTE — PROGRESS NOTES
[facility]    Hospitalist Progress Note    Interval History   - Sodium down to 118 now up to 123 this morning. New treatment include mclain (Nephrologist) and Urena (overnight hospitalist)  - Updated wife Pamela    Assessment & Plan   Summary: Jayme Donahue is a 84 year old male with PMH prostate cancer, who was admitted on 10/25/2024 as a transfer from [facility] with Corynebacterium bacteremia and concern for endocarditis.    Corynebacterium striatum mitral valve endocarditis  Corynebacterium bacteremia  Corynebacterium UTI  Severe mitral regurgitation due to above  Septic shock secondary to above, resolved  Low back pain  Generalized weakness  Jayme Donahue initially developed nausea and poor appetite on 10/18/2024.  He presented to an outside hospital and was found to be hyponatremic with a sodium of 118 and diagnosed with sepsis secondary to a UTI.  Subsequently became hypotensive and was transferred to [facility] ICU and started on vasopressors on 10/23/2024.  He was treated with vancomycin and Rocephin.  Levophed weaned off on the night of 10/23/2024.  Blood cultures returned positive for corynebacterium striatum. TTE on 10/23/2024 showed severe mitral regurgitation and a mobile echodensity on the atrial side of the mitral valve that likely represents flail scallop although vegetation is also in the differential.  Complained of low back pain, lumbar spine MRI on 10/24/2024 showed some likely degenerative endplate changes at right L4-L5 and left L5-S1, although early changes of discitis could also be in the differential.  ID was consulted on 10/25/2024 and patient transferred to [facility] for LINDSEY. LINDSEY on 10/29 shows mitral valve endocarditis.  - Continue vancomycin, pharmacy to dose.  - Add doxycycline for Stenotrophomonas maltophilia growing on culture from left scapular skin lesion.  - Blood culture on 10/24/2024 growing GPCs likely corynebacterium, repeat  blood cultures 10/26-10/30 have remained negative  - ID Consulted, appreciate their assistance.  - PT/OT consult, appreciate their assistance--TCU recommended  - SW already consulted    Hyponatremia, suspect SIADH  Sodium reportedly 118 at outside hospital when he first presented.  Felt to be secondary to dehydration, sodium mildly improved with IV fluids.  Sodium 120 on 10/23/2024 when he arrived at [facility].  Urine studies at [facility] consistent with SIADH.  He was started on salt tablets and placed on a fluid restriction.  Sodium ranged between 119-123 while he was at [facility].   Sodium improved to 128 with fluid restriction 10/26   Sodium down ton 125 on 10/27, 10/28   Sodium down to 121 on 10/29, despite patient reporting adhering to fluid restriction. Patient found to be retaining urine and mclain placed.  - Continue fluid restriction 1.2L  - Continue sodium checks q8h  - Continue Urena 15g daily started 10/29  - Nephrology consult appreciated   - Mclain    Left scapular lesion  Lesion culture positive for Stenotrophomonas maltophilia  Appearance is concerning for malignancy, see photos in epic on 10/23/2024.  Patient states this lesion has been present for several months, he is not sure how it started.  He has been covering it at home to manage drainage from the lesion.  - WO nurse consulted at [facility].  - Continue wound cares as ordered by [facility] nurse at previous facility.  - Continue doxycycline    History of prostate cancer  LUTS  Urinary retention s/p mclain 10/29  Reportedly treated in 2020, but details unclear.  PSA in July 2024 was 54, PSA in September 2024 was 57. Having urinary urgency, LUTS this admission. Noted to be retaining urine and mclain placed 10/29.  - Continue tamsulosin empirically  - Continue mclain at discharge, consider trial to void in 1-2 weeks    Moderate protein calorie malnutrition  - Consult dietitian.    Clinically Significant Risk Factors         #  Hyponatremia: Lowest Na = 118 mmol/L in last 2 days, will monitor as appropriate  # Hypochloremia: Lowest Cl = 85 mmol/L in last 2 days, will monitor as appropriate      # Hypoalbuminemia: Lowest albumin = 2.7 g/dL at 10/30/2024  6:27 AM, will monitor as appropriate                # Severe Malnutrition: based on nutrition assessment    # Financial/Environmental Concerns:            PT/OT: ordered  Diet: Fluid restriction 1200 ML FLUID  Fluid restriction 1200 ML FLUID  Regular Diet Adult    DVT Prophylaxis: Pneumatic Compression Devices  Palmer Catheter: PRESENT, indication: Acute retention or obstruction  Lines: None     Cardiac Monitoring: None  Code Status: No CPR- Do NOT Intubate    Medically Ready for Discharge: Anticipated Tomorrow      Shin Rodríguez MD  Hospitalist Service  [facility]  Securely message with [employer] (more info)  Text page via [employer] Paging/Directory     Data reviewed today: I reviewed all new labs and imaging results over the last 24 hours.    Physical Exam   Temp: 98.2  F (36.8  C) Temp src: Oral BP: 107/57 Pulse: 93   Resp: 16 SpO2: 97 % O2 Device: None (Room air) Oxygen Delivery: 2 LPM  Vitals:    10/29/24 0404   Weight: 60.8 kg (134 lb)     Vital Signs with Ranges  Temp:  [97.8  F (36.6  C)-98.5  F (36.9  C)] 98.2  F (36.8  C)  Pulse:  [68-93] 93  Resp:  [15-16] 16  BP: (105-136)/(57-87) 107/57  SpO2:  [95 %-99 %] 97 %  I/O last 3 completed shifts:  In: 717 [P.O.:717]  Out: 2600 [Urine:2600]  O2 requirements: not needed    Constitutional: Male in NAD  HEENT: Eyes nonicteric, oral mucosa moist  Cardiovascular: RRR, normal S1/2, systolic murmur  Respiratory: CTAB, no wheezing or crackles  Vascular: No LE pitting edema  GI: Normoactive bowel sounds, nontender, nondistended  Skin/Integumen: No rashes  Neuro/Psych: Appropriate affect and mood. A&Ox3, moves all extremities    Medications   Current Facility-Administered Medications   Medication Dose Route Frequency  Provider Last Rate Last Admin    May take oral meds with a sip of water, the morning of LINDSEY procedure.   Does not apply Continuous PRN Yovani Aldrich MD         Current Facility-Administered Medications   Medication Dose Route Frequency Provider Last Rate Last Admin    doxycycline hyclate (VIBRAMYCIN) capsule 100 mg  100 mg Oral Q12H [facility] (08/20) Yovani Aldrich MD   100 mg at 10/30/24 0859    sodium chloride (PF) 0.9% PF flush 3 mL  3 mL Intravenous Q8H Yovani Aldrich MD   3 mL at 10/30/24 0901    sodium chloride (PF) 0.9% PF flush 3 mL  3 mL Intracatheter Q8H Yovani Aldrich MD   3 mL at 10/29/24 2026    tamsulosin (FLOMAX) capsule 0.4 mg  0.4 mg Oral Daily Shin Rodríguez MD   0.4 mg at 10/30/24 0900    Urea (URE-NA) packet 15 g  15 g Oral Q24H Tenzin Dumont MD   15 g at 10/29/24 2238    vancomycin (VANCOCIN) 1,250 mg in 0.9% NaCl 262.5 mL intermittent infusion  1,250 mg Intravenous Q12H Yovani Aldrich MD   1,250 mg at 10/30/24 0012       Data   Recent Labs   Lab 10/30/24  0627 10/30/24  0222 10/29/24  2017 10/29/24  1445 10/29/24  0600 10/26/24  1513 10/26/24  0834 10/25/24  1219 10/25/24  0619   WBC 8.8  --   --   --   --   --  8.2  --  8.7   HGB 10.5*  --   --   --   --   --  11.3*  --  12.1*   MCV 83  --   --   --   --   --  83  --  84     --   --   --   --   --  224  --  209   *  123* 118* 120*   < > 121*  121*   < > 128*  128*   < > 121*   POTASSIUM 4.7  --  4.2  --  4.9   < > 4.3  --  4.2   CHLORIDE 88*  --  85*  --  89*   < > 96*  --  91*   CO2 29  --  33*  --  29   < > 27  --  21*   BUN 20.4  --  10.6  --  10.0   < > 7.8*  --  8.7   CR 0.55*  --  0.61*  --  0.51*   < > 0.53*  --  0.50*   ANIONGAP 6*  --  2*  --  3*   < > 5*  --  9   AMALIA 8.2*  --  8.3*  --  8.1*   < > 7.9*  --  7.8*   GLC 96  --  117*  --  90   < > 87  --  99   ALBUMIN 2.7*  --   --   --   --   --   --   --   --     < > = values in this interval not displayed.        Imaging:   Recent Results (from the past 24 hours)   Transesophageal Echocardiogram    Narrative    [id number]  [facility]  [id number]  [id number]^PATRICK^PARKER^SAJI     [facility]  Echocardiography Laboratory  [address] [place], [place] [address]     Name: FABIOLA ELI  MRN: [id number]  : 1940  Study Date: 10/29/2024 01:02 PM  Age: 84 yrs  Gender: Male  Patient Location: [facility]  Ordering Physician: PARKER NGUYEN  Referring Physician: JACQUELYN KISER  Performed By: Christin Bunch     BSA: 1.7 m2  Height: 66 in  Weight: 134 lb  HR: 90  BP: 132/83 mmHg  ______________________________________________________________________________  Procedure  Complete LINDSEY Adult. LINDSEY Probe serial #[id number] was used during the procedure.  The heart rate, respiratory rate and response to care were monitored  throughout the procedure with the assistance of the nurse.  ______________________________________________________________________________  Interpretation Summary     This was a transesophageal echocardiogram done for evaluation of endocarditis  & MR. Study was technically difficult. Probe insertion was technically quite  challenging. Patient was short of breath and coughing a lot despite sedation.  I had to request one of my partners Dr. Galicia who was kind to come and assist  with LINDSEY probe insertion.     Normal left ventricular size and systolic function.  Normal RV size and systolic function.  No evidence of left atrial appendage thrombus. No color shunt across the  interatrial septum.  Large vegetation noted on the atrial aspect of the mitral valve. They measure  about 1.5 to 1.7 cm. There is associated flail segment and distorted mitral  valve leaflet anatomy. There seems to be prolapse of the posterior mitral  valve leaflet. 3D imaging was quite challenging due to patient tolerance of  the procedure. We had to terminate prematurely.  Severe eccentric anteriorly directed degenerative mitral valve  regurgitation  is noted due to mitral valve endocarditis/ prolapse and flail. I was not able  to get 2D and 3D quantification of the mitral regurgitation due to patient  tolerance of the procedure but visually MR is clearly severe.  No pericardial effusion.  ______________________________________________________________________________  LINDSEY  Versed (2.5mg) was given intravenously. Fentanyl (75mcg) was given  intravenously. I determined this patient to be an appropriate candidate for  the planned sedation and procedure and have reassessed the patient immediately  prior to sedation and procedure. Total sedation time: 19 minutes minutes of  continuous bedside 1:1 monitoring. Consent to the procedure was obtained prior  to sedation. Prior to the exam, the oral cavity was checked and no  overcrowding was noted. The transesophageal probe insertion was technically  difficult. There were no complications associated with this procedure. The LINDSEY  was terminated due to patient intolerance of procedure.     Left Ventricle  The left ventricle is normal in size. Left ventricular systolic function is  normal.     Right Ventricle  The right ventricle is normal in size and function.     Atria  The left atrium is moderately dilated. Right atrial size is normal. There is  no color Doppler evidence of an atrial shunt. No thrombus is detected in the  left atrial appendage.     Mitral Valve  There is severe (4+) mitral regurgitation.     Tricuspid Valve  The tricuspid valve is normal in structure and function.     Aortic Valve  The aortic valve is normal in structure and function.     Pulmonic Valve  The pulmonic valve is not well seen, but is grossly normal.     Vessels  The aortic root is normal size. Normal size ascending aorta.     Pericardial/Pleural  There is no pericardial effusion.  ______________________________________________________________________________  Report approved by: Shemar Chan 10/29/2024 01:45 PM      ______________________________________________________________________________

## 2024-10-30 NOTE — DISCHARGE SUMMARY
CARDIOTHORACIC SURGERY CONSULT NOTE  10/30/2024      Reason for Consult: mitral valve endocarditis with severe mitral regurgitation      ASSESSMENT/PLAN:   Jayme Donahue is a 84 year old male with a past medical history of prostate cancer who was admitted on 10/25/24 from [facility] presented initially with nausea and poor appetite on 10/18/2024. He presented to an outside hospital and was found to be hyponatremic with a sodium of 118 and diagnosed with sepsis secondary to a UTI.  Blood cultures were obtained and returned positive for corynebacterium striatum. Subsequently became hypotensive and was transferred to [facility] ICU and started on vasopressors on 10/23/2024.  He was treated with vancomycin and Rocephin.  Levophed weaned off on the night of 10/23/2024.  Blood cultures returned positive for corynebacterium striatum. TTE on 10/23/2024 showed severe mitral regurgitation and a mobile echodensity on the atrial side of the mitral valve that likely represents flail scallop although vegetation is also in the differential. Complained of low back pain, lumbar spine MRI on 10/24/2024 showed some likely degenerative endplate changes at right L4-L5 and left L5-S1, although early changes of discitis could also be in the differential.  ID was consulted on 10/25/2024 and patient transferred to [facility] for LINDSEY. LINDSEY on 10/29 shows mitral valve endocarditis with a large vegetation noted on the atrial aspect of the MV measuring 1.5-1.7 cm with severe mitral regurg. Normal LV EF and RV is normal in size and function. CT surgery was consulted due to severe mitral regurgitation and large vegetation.    Pt states that he hasn't been to the dentist for some time. Denies any pain in his teeth but states that his dental health is not good.     Pt is currently on vancomycin for endocarditis and doxycycline for L scapular wound.     - Will need coronary angiogram and CT of teeth  - Will obtain bilateral  carotid duplex US, CT chest w/o contrast  - Other cares per primary team  - Thank you for the opportunity to participate in the care of this patient.  - may be able to do MICS MVR, will d/w Dr Ellison, will await angiogram prior to ordering MICS CT  - also d/w ID and will plan to have a biopsy of L scapular wound  - pt is willing to suspend DNR/DNI for surgery and recovery period    STS Risk Score for isolated MVR    Procedure Type: Isolated MVR  Perioperative Outcome Estimate %  Operative Mortality 4.19%  Morbidity & Mortality 14.4%  Stroke 2.1%  Renal Failure 1.08%  Reoperation 4.58%  Prolonged Ventilation 6.38%  Deep Sternal Wound Infection 0.034%  Long Hospital Stay (>14 days) 15.4%  Short Hospital Stay (<6 days)* 15.2%      Patient and plan discussed with attending, Dr Hardy.      Abdi Hodges PA-C  Cardiothoracic Surgery  Pager [phone]        ________________________________________________________________________________________________    HPI:   Pt is noted to have a left scapular lesion which he states has been present for several months. Per notes he is to follow up as an outpatient. Picture available in [facility] nurse consult note on 10/23/24. Culture from L scapular wound positive for stenotrophomonas maltophilia that ID added doxycycline.    Pt was noted to have urinary retention and a mclain was placed on 10/29 as well as flomax being started. Pt is also noted to have moderate protein calorie malnutrition.     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 Currently being treated with fluid restriction, sodium checks q 8 hours and urena 15 g daily.     Pt has been  since 1987 and home is currently 100 miles away. Prior to this pt states that he was driving and grocery shopping. Pt is also a  ([employer]).    Patient currently denies any complaints. He otherwise denies any chest pain, SOB, lightheadedness, dizziness, palpitations, LE edema.     PMH:  Past Medical History:    Diagnosis Date    Cataract     Iliac artery aneurysm (H)     Prostate cancer (H)        PSH:  History reviewed. No pertinent surgical history.    FH:  History reviewed. No pertinent family history.  States that he has a brother who had his mitral valve replaced a couple of times.    SH:  Social History     Socioeconomic History    Marital status:      Spouse name: None    Number of children: None    Years of education: None    Highest education level: None   Tobacco Use    Smoking status: Never    Smokeless tobacco: Never   Substance and Sexual Activity    Alcohol use: Not Currently    Drug use: Never     Social Drivers of Health     Financial Resource Strain: Low Risk  (10/28/2024)    Financial Resource Strain     Within the past 12 months, have you or your family members you live with been unable to get utilities (heat, electricity) when it was really needed?: No   Food Insecurity: Low Risk  (10/28/2024)    Food Insecurity     Within the past 12 months, did you worry that your food would run out before you got money to buy more?: No     Within the past 12 months, did the food you bought just not last and you didn t have money to get more?: No   Transportation Needs: Low Risk  (10/28/2024)    Transportation Needs     Within the past 12 months, has lack of transportation kept you from medical appointments, getting your medicines, non-medical meetings or appointments, work, or from getting things that you need?: No   Interpersonal Safety: Low Risk  (10/28/2024)    Interpersonal Safety     Do you feel physically and emotionally safe where you currently live?: Yes     Within the past 12 months, have you been hit, slapped, kicked or otherwise physically hurt by someone?: No     Within the past 12 months, have you been humiliated or emotionally abused in other ways by your partner or ex-partner?: No   Recent Concern: Interpersonal Safety - High Risk (10/23/2024)    Interpersonal Safety     Do you feel physically  and emotionally safe where you currently live?: No     Within the past 12 months, have you been hit, slapped, kicked or otherwise physically hurt by someone?: No     Within the past 12 months, have you been humiliated or emotionally abused in other ways by your partner or ex-partner?: No   Housing Stability: Low Risk  (10/28/2024)    Housing Stability     Do you have housing? : Yes     Are you worried about losing your housing?: No       Home Meds:  Medications Prior to Admission   Medication Sig Dispense Refill Last Dose/Taking    Capsicum, Cayenne, (CAYENNE PEPPER PO) Take 1 capsule by mouth daily.   Past Month    Misc Natural Products (SAW PALMETTO) CAPS Take 1 capsule by mouth daily.   Past Month    multivitamin  with lutein (OCUVITE WITH LUTEIN) CAPS per capsule Take 1 capsule by mouth daily.   Past Month     Allergies:  No Known Allergies  ROS: 10 point ROS neg other than the symptoms noted above in the HPI.      Physical Exam:  Temp:  [97.8  F (36.6  C)-98.5  F (36.9  C)] 98.2  F (36.8  C)  Pulse:  [68-93] 93  Resp:  [15-16] 15  BP: (105-136)/(57-87) 107/57  SpO2:  [95 %-99 %] 97 %  Gen: up in chair, comfortable, -O2, very thin with ribs very prominent  CV: reg rate, -edema LE, +systolic murmur III/VI  Pulm: clear to auscultation bilaterally, no rhonchi or wheezes  Abd: soft, non-tender, no guarding, +BM  Neuro: grossly normal  Psych: calm, cooperative       Labs:  ABG No lab results found in last 7 days.  CBC  Recent Labs   Lab 10/30/24  0627 10/26/24  0834 10/25/24  0619 10/24/24  0637   WBC 8.8 8.2 8.7 9.6   HGB 10.5* 11.3* 12.1* 11.3*    224 209 183     BMP  Recent Labs   Lab 10/30/24  0627 10/30/24  0222 10/29/24  2017 10/29/24  1445 10/29/24  0600 10/27/24  1600 10/27/24  0811   *  123* 118* 120* 120* 121*  121*   < > 126*  126*   POTASSIUM 4.7  --  4.2  --  4.9  --  4.3   CHLORIDE 88*  --  85*  --  89*  --  93*   CO2 29  --  33*  --  29  --  29   BUN 20.4  --  10.6  --  10.0  --  12.2    CR 0.55*  --  0.61*  --  0.51*  --  0.58*   GLC 96  --  117*  --  90  --  95    < > = values in this interval not displayed.     LFT  Recent Labs   Lab 10/30/24  0627   ALBUMIN 2.7*     PancreasNo lab results found in last 7 days.    Imaging:  Recent Results (from the past 24 hours)   Transesophageal Echocardiogram    Narrative    [id number]  [id number]  [id number]^PATRICK^PARKER^SAJI     [facility]  Echocardiography Laboratory  [address] [place] [id number]     Name: FABIOLA ELI  MRN: [id number]  : 1940  Study Date: 10/29/2024 01:02 PM  Age: 84 yrs  Gender: Male  Patient Location: [facility]  Ordering Physician: PARKER NGUYEN  Referring Physician: JACQUELYN KISER  Performed By: Christin Bunch     BSA: 1.7 m2  Height: 66 in  Weight: 134 lb  HR: 90  BP: 132/83 mmHg  ______________________________________________________________________________  Procedure  Complete LINDSEY Adult. LINDSEY Probe serial #[id number] was used during the procedure.  The heart rate, respiratory rate and response to care were monitored  throughout the procedure with the assistance of the nurse.  ______________________________________________________________________________  Interpretation Summary     This was a transesophageal echocardiogram done for evaluation of endocarditis  & MR. Study was technically difficult. Probe insertion was technically quite  challenging. Patient was short of breath and coughing a lot despite sedation.  I had to request one of my partners Dr. Galicia who was kind to come and assist  with LINDSEY probe insertion.     Normal left ventricular size and systolic function.  Normal RV size and systolic function.  No evidence of left atrial appendage thrombus. No color shunt across the  interatrial septum.  Large vegetation noted on the atrial aspect of the mitral valve. They measure  about 1.5 to 1.7 cm. There is associated flail segment and distorted mitral  valve leaflet anatomy. There seems  to be prolapse of the posterior mitral  valve leaflet. 3D imaging was quite challenging due to patient tolerance of  the procedure. We had to terminate prematurely.  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 I was not able  to get 2D and 3D quantification of the mitral regurgitation due to patient  tolerance of the procedure but visually MR is clearly severe.  No pericardial effusion.  ______________________________________________________________________________  LINDSEY  Versed (2.5mg) was given intravenously. Fentanyl (75mcg) was given  intravenously. I determined this patient to be an appropriate candidate for  the planned sedation and procedure and have reassessed the patient immediately  prior to sedation and procedure. Total sedation time: 19 minutes minutes of  continuous bedside 1:1 monitoring. Consent to the procedure was obtained prior  to sedation. Prior to the exam, the oral cavity was checked and no  overcrowding was noted. The transesophageal probe insertion was technically  difficult. There were no complications associated with this procedure. The LINDSEY  was terminated due to patient intolerance of procedure.     Left Ventricle  The left ventricle is normal in size. Left ventricular systolic function is  normal.     Right Ventricle  The right ventricle is normal in size and function.     Atria  The left atrium is moderately dilated. Right atrial size is normal. There is  no color Doppler evidence of an atrial shunt. No thrombus is detected in the  left atrial appendage.     Mitral Valve  There is severe (4+) mitral regurgitation.     Tricuspid Valve  The tricuspid valve is normal in structure and function.     Aortic Valve  The aortic valve is normal in structure and function.     Pulmonic Valve  The pulmonic valve is not well seen, but is grossly normal.     Vessels  The aortic root is normal size. Normal  size ascending aorta.     Pericardial/Pleural  There is no pericardial effusion.  ______________________________________________________________________________  Report approved by: Shemar Chan 10/29/2024 01:45 PM     ______________________________________________________________________________

## 2024-10-30 NOTE — PROGRESS NOTES
Progress Note for Nursing Student Meagan Monte    DATE & TIME: 10/30/24 7522-9510      Cognitive Concerns/ Orientation : Pt A/Ox4, forgetful     BEHAVIOR & AGGRESSION TOOL COLOR: Green     WNL VS/O2: VSS on RA    MOBILITY: Assist x1 with gait belt and walker, fall risk     PAIN MANAGMENT: Denies    DIET: Regular, 1200ml fluid restriction    BOWEL/BLADDER: Continent of bowel. Mclain patent with adequate output, mclain for retention    ABNL LAB/BG: Na 123    DRAIN/DEVICES: IV SL   IV Vancomycin 1,250 mg in 0.9% NaCl 262.5 mL Completed    TELEMETRY: Discontinued    ASSESSMENT  HEART: WNL, strong, pulses are strong  LUNG: Clear bilaterally   BOWEL SOUNDS: Active  NEURO: A/O x4, forgetful at times  GI:Continent, passing gas   : Mclain cath.   SKIN: LUE shoulder wound, mepilex CDI. Coccyx blanchable redness, mepilex CDI.     D/C DATE: Discharge pending progress    OTHER IMPORTANT INFO: Nephrology consult.

## 2024-10-30 NOTE — PHARMACY-VANCOMYCIN DOSING SERVICE
Pharmacy Vancomycin Note  Date of Service 2024  Patient's  1940   84 year old, male    Indication: Bacteremia and possible endocarditis  Day of Therapy: 8  Current vancomycin regimen:  1250 mg IV q12h  Current vancomycin monitoring method: AUC  Current vancomycin therapeutic monitoring goal: 400-600 mg*h/L    InsightRX Prediction of Current Vancomycin Regimen  Loading dose: N/A  Regimen: 1250 mg IV every 12 hours.  Start time: 23:59 on 10/29/2024  Exposure target: AUC24 (range)400-600 mg/L.hr   AUC24,ss: 575 mg/L.hr  Probability of AUC24 > 400: 100 %  Ctrough,ss: 15.7 mg/L  Probability of Ctrough,ss > 20: 1 %  Probability of nephrotoxicity (Lodise J CARLOS ): 11 %    Current estimated CrCl = Estimated Creatinine Clearance: 77.5 mL/min (A) (based on SCr of 0.61 mg/dL (L)).    Creatinine for last 3 days  10/27/2024:  8:11 AM Creatinine 0.58 mg/dL  10/29/2024:  6:00 AM Creatinine 0.51 mg/dL;  8:17 PM Creatinine 0.61 mg/dL    Recent Vancomycin Levels (past 3 days)  10/27/2024:  8:11 AM Vancomycin 16.9 ug/mL  10/29/2024:  8:17 PM Vancomycin 16.5 ug/mL    Vancomycin IV Administrations (past 72 hours)                     vancomycin (VANCOCIN) 1,250 mg in 0.9% NaCl 262.5 mL intermittent infusion (mg) 1,250 mg New Bag 10/29/24 1159     1,250 mg New Bag 10/28/24 2310     1,250 mg New Bag  1211    vancomycin (VANCOCIN) 1,250 mg in 250 mL dextrose intermittent infusion (mg) 1,250 mg Given 10/27/24 2112     1,250 mg Given  0937                    Nephrotoxins and other renal medications (From now, onward)      Start     Dose/Rate Route Frequency Ordered Stop    10/28/24 0830  vancomycin (VANCOCIN) 1,250 mg in 0.9% NaCl 262.5 mL intermittent infusion         1,250 mg  over 90 Minutes Intravenous EVERY 12 HOURS 10/28/24 0830                 Contrast Orders - past 72 hours (72h ago, onward)      None            Interpretation of levels and current regimen:  Vancomycin level is reflective of -600    Has  serum creatinine changed greater than 50% in last 72 hours: No    Urine output:  good urine output    Renal Function: Stable    Plan:  Continue Current Dose  Vancomycin monitoring method: AUC  Vancomycin therapeutic monitoring goal: 400-600 mg*h/L  Pharmacy will check vancomycin levels as appropriate in 3-5 Days.  Serum creatinine levels will be ordered daily for the first week of therapy and at least twice weekly for subsequent weeks.    Akil Aliheyder, RPH

## 2024-10-30 NOTE — PLAN OF CARE
Goal Outcome Evaluation:                      DATE & TIME: 10/29/24, pm shift    Cognitive Concerns/ Orientation : A&O x 4   BEHAVIOR & AGGRESSION TOOL COLOR: Green  CIWA SCORE: NA   ABNL VS/O2: VSS. On RA.  MOBILITY: A x 1 with belt and walker.  PAIN MANAGMENT: Denies. Reported pain only with ambulation.  DIET: Regular, fair appetite. 1200 fluid restriction.  BOWEL/BLADDER: urine rentention, mclain inserted per MD request with good output. No bm.  ABNL LAB/BG: Na 120, oncall Dr updated, Urea packet q 24 hrs and Na check q 4 hrs ordered. Next Na check due at 0200. Cr 0.61. Blood culture result pending.  DRAIN/DEVICES: R PIV SL.  TELEMETRY RHYTHM: NSR  SKIN: Scattered bruises. Left scapular wound dressing CDI. WOC following.  TESTS/PROCEDURES: LINDSEY done today.  D/C DATE: discharge to TCU pending improvement  Discharge Barriers: pending improvement.  OTHER IMPORTANT INFO: SW/Cardiology following. On iv Vancomycin q 12 hrs. On po Doxycycline.

## 2024-10-30 NOTE — PLAN OF CARE
DATE & TIME: 10/30/24 8437-7755   Cognitive Concerns/ Orientation : Pt A/Ox4, forgetful   BEHAVIOR & AGGRESSION TOOL COLOR: Green   ABNL VS/O2: VSS on RA  MOBILITY: Assist x1 with gait belt and walker, fall risk, up in chair with PT today  PAIN MANAGMENT: Denies  DIET: Regular, 1200ml fluid restriction  BOWEL/BLADDER: Continent of bowel. Mclain patent with adequate output, mclain for acute retention  ABNL LAB/BG: Na 123  DRAIN/DEVICES: IV SL  TELEMETRY RHYTHM: N/A  SKIN: LUE shoulder wound, mepilex CDI. Coccyx blanchable redness, mepilex CDI.  D/C DATE: Discharge pending possible Angio, CT of teeth  OTHER IMPORTANT INFO: ID, cardiology , SW following,cardiothoracic surgery consulted

## 2024-10-30 NOTE — PROGRESS NOTES
[facility]     Renal Progress Note       SHORTHAND KEY FOR MY NOTES:  c = with, s = without, p = after, a = before, x = except, asx = asymptomatic, tx = transplant or treatment, sx = symptoms or symptomatic, cx = canceled or culture, rxn = reaction, yday = yesterday, nl = normal, abx = antibiotics, fxn = function, dx = diagnosis, dz = disease, m/h = melena/hematochezia, c/d/l/ha = cramping/dizziness/lightheadedness/headache, d/c = discharge or diarrhea/constipation, f/c/n/v = fevers/chills/nausea/vomiting, cp/sob = chest pain/shortness of breath, tbv = total body volume, rxn = reaction, tdc = tunneled dialysis catheter, pta = prior to admission, hd = hemodialysis, pd = peritoneal dialysis, hhd = home hemodialysis, edw = estimated dry wt         Assessment/Plan:     1.  Severe hyponatremia.  Pt's Na dropped to 118 overnight and he was started on UreNa, which is not very palatable.  He prefers the salt tabs.  Today, the Na is 123 and he is asx.  Thyroid fxn is ok.  A.  Start salt tabs in place of Gregorio.  B.  Follow Na daily.  C.  Check CXR.     2.  Endocarditis.  Pt is on broad abx per ID.  He may need surgery for curative tx.  A.  Continue abx.  B.  Plans per Cards, CV Surg, ID.     3.  FEN.  Other electrolytes are fine.  A.  Reg diet.    Case d/w Dr. Rodríguez.        Interval History:     Pt is feeling ok.  He denies any n/v/twitching.  Urinating well via the mclain.  He has a good appetite.          Medications and Allergies:     Current Facility-Administered Medications   Medication Dose Route Frequency Provider Last Rate Last Admin    sodium chloride (PF) 0.9% PF flush 3 mL  3 mL Intravenous Q8H Yovani Aldrich MD   3 mL at 10/30/24 1711    sodium chloride (PF) 0.9% PF flush 3 mL  3 mL Intracatheter Q8H Yovani Aldrich MD   3 mL at 10/30/24 1208    sodium chloride tablet 1 g  1 g Oral TID w/meals Minesh Fajardo MD   1 g at 10/30/24 1711    tamsulosin (FLOMAX) capsule 0.4 mg   0.4 mg Oral Daily Shin Rodríguez MD   0.4 mg at 10/30/24 0900    vancomycin (VANCOCIN) 1,250 mg in 0.9% NaCl 262.5 mL intermittent infusion  1,250 mg Intravenous Q12H Yovani Aldrich MD   1,250 mg at 10/30/24 1208     No Known Allergies       Physical Exam:     Vitals were reviewed     , Blood pressure 110/61, pulse 86, temperature 98.4  F (36.9  C), temperature source Oral, resp. rate 16, weight 60.8 kg (134 lb), SpO2 97%.  Wt Readings from Last 3 Encounters:   10/29/24 60.8 kg (134 lb)   10/23/24 59 kg (130 lb)     Intake/Output Summary (Last 24 hours) at 10/30/2024 1713  Last data filed at 10/30/2024 1426  Gross per 24 hour   Intake 1073 ml   Output 2550 ml   Net -1477 ml     GENERAL APPEARANCE: pleasant, NAD, alert  HEENT:  cachectic  RESP: CTA B c good efforts  CV: RRR, nl S1/S2   ABDOMEN: s/nt/nd  EXTREMITIES/SKIN: no ble edema  OTHER:  + mclain         Data:     CBC RESULTS:     Recent Labs   Lab 10/30/24  0627 10/26/24  0834 10/25/24  0619 10/24/24  0637   WBC 8.8 8.2 8.7 9.6   RBC 3.48* 3.77* 4.01* 3.69*   HGB 10.5* 11.3* 12.1* 11.3*   HCT 28.9* 31.4* 33.5* 31.3*    224 209 183     Basic Metabolic Panel:  Recent Labs   Lab 10/30/24  1400 10/30/24  0627 10/30/24  0222 10/29/24  2017 10/29/24  1445 10/29/24  0600 10/27/24  1600 10/27/24  0811 10/26/24  1513 10/26/24  0834 10/25/24  1219 10/25/24  0619   * 123*  123* 118* 120* 120* 121*  121*   < > 126*  126*   < > 128*  128*   < > 121*   POTASSIUM  --  4.7  --  4.2  --  4.9  --  4.3  --  4.3  --  4.2   CHLORIDE  --  88*  --  85*  --  89*  --  93*  --  96*  --  91*   CO2  --  29  --  33*  --  29  --  29  --  27  --  21*   BUN  --  20.4  --  10.6  --  10.0  --  12.2  --  7.8*  --  8.7   CR  --  0.55*  --  0.61*  --  0.51*  --  0.58*  --  0.53*  --  0.50*   GLC  --  96  --  117*  --  90  --  95  --  87  --  99   AMALIA  --  8.2*  --  8.3*  --  8.1*  --  7.9*  --  7.9*  --  7.8*    < > = values in this interval not displayed.      INRNo lab results found in last 7 days.   Attestation:   I have reviewed today's relevant vital signs, notes, medications, labs and imaging.    Minesh Fajardo MD  [facility] - Nephrology  [phone]

## 2024-10-30 NOTE — SIGNIFICANT EVENT
Significant Event Note    Time of event: 10:08 PM October 29, 2024    Description of event:  Na 120    Plan:  Urena 15g daily  Na q4    Tenzin Dumont MD

## 2024-10-30 NOTE — PROVIDER NOTIFICATION
MD Notification    Notified Person: MD    Notified Person Name: Dr. Willingham    Notification Date/Time: 10/30/24 0256    Notification Interaction: Vocera message    Purpose of Notification: Critical Na 118    Orders Received: Per MD continue serial checks and fluid restriction. Nephrology consulted for the AM.

## 2024-10-30 NOTE — CONSULTS
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  We discussed the implications of his mitral valve endocarditis and severe MR, and the appropriate next steps in management.  He had just finished speaking with my colleague, Dr. Hardy, from CV surgery, and felt very informed about his condition.  Briefly, I explained that I agree with Dr. Hardy that surgical resection/MVR is certainly the treatment of choice.  After thinking about this more, he is open to doing this, and rescinding his DNR/DNI for the surgery.  We talked about appropriate preoperative planning, and from a cardiology standpoint we would recommend a diagnostic coronary angiogram.  We discussed the risks, benefits, and alternatives to this, including the need to temporarily rescind his DNR/DNI order and changed to full code around the time of the procedure, and he is in agreement with this.  Otherwise, we will defer to ID for antibiotic management, and CV surgery for additional preoperative testing recommendations and timing of MVR.      -Recommend diagnostic coronary angiography.  Risks, benefits, and alternatives discussed with patient who agrees to proceed.  Consent is placed in chart.  -Patient is okay with resending DNR/DNI order for the angiogram.  We will change CODE STATUS to full code tomorrow in preparation for this, but we will leave him DNR/DNI until then.  -N.p.o. after midnight  -Antibiotic recommendations per ID  -Final decision to proceed with MVR and  surgical timing/technique per CV surgery        High complexity     Tarik Bingham MD        Reason for Consult   Reason for consult: Patient is being evaluated for mitral valve endocarditis.    History of Present Illness   Jayme Donahue is a 84 year old male admitted with mitral valve endocarditis and severe MR.  Medical history is notable for prostate cancer in the early 2000's s/p radiation with increasing PSA over the past few years.  Otherwise no significant chronic medical issues.  He is a never smoker.  He is a  and [facility]    He was transferred from [facility] earlier this week following an admission for sepsis with corynebacterium stratum bacteremia.  Echocardiogram done at that time showed a mobile echodensity on the atrial side of the mitral valve with significant MR.  This was followed up with a LINDSEY earlier today, and this confirmed a large echodensity roughly 1.6 cm in length with an associated flail segment and severe anteriorly directed MR.  He is being followed by ID and cardiothoracic surgery.    In addition to reviewing the images of his above echocardiograms, I also reviewed his last several days and lab work, his microbial cultures, and spoke with Dr. Hardy from CV Surgery regarding patient's treatment plan.      Prior to Admission Medications   Prior to Admission Medications   Prescriptions Last Dose Informant Patient Reported? Taking?   Capsicum, Cayenne, (CAYENNE PEPPER PO) Past Month Self Yes Yes   Sig: Take 1 capsule by mouth daily.   Misc Natural Products (SAW PALMETTO) CAPS Past Month Self Yes Yes   Sig: Take 1 capsule by mouth daily.   multivitamin  with lutein (OCUVITE WITH LUTEIN) CAPS per capsule Past Month Self Yes Yes   Sig: Take 1 capsule by mouth daily.      Facility-Administered Medications: None     Current Facility-Administered Medications   Medication Dose Route Frequency Provider Last Rate Last Admin    acetaminophen  (TYLENOL) tablet 650 mg  650 mg Oral Q4H PRN Yovani Aldrich MD        Or    acetaminophen (TYLENOL) Suppository 650 mg  650 mg Rectal Q4H PRN Yovani Aldrich MD        alum & mag hydroxide-simethicone (MAALOX) suspension 30 mL  30 mL Oral Q8H PRN Yovani Aldrich MD        benzocaine-menthol (CHLORASEPTIC) 6-10 MG lozenge 1 lozenge  1 lozenge Buccal Q1H PRN Dennis Willingham MD        calcium carbonate (TUMS) chewable tablet 1,000 mg  1,000 mg Oral 4x Daily PRN Yovani Aldrich MD        doxycycline hyclate (VIBRAMYCIN) capsule 100 mg  100 mg Oral Q12H BAILEY (08/20) Yovani Aldrich MD   100 mg at 10/30/24 0859    hydrocortisone (CORTAID) 1 % cream 1 g  1 g Topical TID PRN Yovani Aldrich MD        lidocaine (LMX4) cream   Topical Q1H PRN Yovani Aldrich MD        lidocaine (LMX4) cream   Topical Q1H PRN Yovani Aldrich MD        lidocaine 1 % 0.1-1 mL  0.1-1 mL Other Q1H PRN Yovani Aldrich MD        lidocaine 1 % 1 mL  1 mL Other Q1H PRN Yovani Aldrich MD        May take oral meds with a sip of water, the morning of LINDSEY procedure.   Does not apply Continuous PRN Yovani Aldrich MD        ondansetron (ZOFRAN ODT) ODT tab 4 mg  4 mg Oral Q6H PRN Yovani Aldrich MD        Or    ondansetron (ZOFRAN) injection 4 mg  4 mg Intravenous Q6H PRN Yovani Aldrich MD        prochlorperazine (COMPAZINE) injection 5 mg  5 mg Intravenous Q6H PRN Yovani Aldrich MD        Or    prochlorperazine (COMPAZINE) tablet 5 mg  5 mg Oral Q6H PRN Yovani Aldrich MD        Or    prochlorperazine (COMPAZINE) suppository 12.5 mg  12.5 mg Rectal Q12H PRN Yovani Aldrich MD        senna-docusate (SENOKOT-S/PERICOLACE) 8.6-50 MG per tablet 1 tablet  1 tablet Oral BID PRN Yovani Aldrich MD        Or    senna-docusate (SENOKOT-S/PERICOLACE) 8.6-50 MG per tablet 2 tablet  2 tablet Oral BID PRN  Yovani Aldrich MD        sodium chloride (PF) 0.9% PF flush 3 mL  3 mL Intravenous Q1H PRN Yovani Aldrich MD        sodium chloride (PF) 0.9% PF flush 3 mL  3 mL Intravenous Q8H Yovani Aldrich MD   3 mL at 10/30/24 0901    sodium chloride (PF) 0.9% PF flush 3 mL  3 mL Intracatheter Q8H Yovani Aldrich MD   3 mL at 10/30/24 1208    sodium chloride (PF) 0.9% PF flush 3 mL  3 mL Intracatheter q1 min prn Yovani Aldrich MD        sodium chloride tablet 1 g  1 g Oral TID w/meals Minesh Fajardo MD        tamsulosin (FLOMAX) capsule 0.4 mg  0.4 mg Oral Daily Shin Rodríguez MD   0.4 mg at 10/30/24 0900    vancomycin (VANCOCIN) 1,250 mg in 0.9% NaCl 262.5 mL intermittent infusion  1,250 mg Intravenous Q12H Yovani Aldrich MD   1,250 mg at 10/30/24 1208     Current Facility-Administered Medications   Medication Dose Route Frequency Provider Last Rate Last Admin    acetaminophen (TYLENOL) tablet 650 mg  650 mg Oral Q4H PRN Yovani Aldrich MD        Or    acetaminophen (TYLENOL) Suppository 650 mg  650 mg Rectal Q4H PRN Yovani Aldrich MD        alum & mag hydroxide-simethicone (MAALOX) suspension 30 mL  30 mL Oral Q8H PRN Yovani Aldrich MD        benzocaine-menthol (CHLORASEPTIC) 6-10 MG lozenge 1 lozenge  1 lozenge Buccal Q1H PRN Dennis Willingham MD        calcium carbonate (TUMS) chewable tablet 1,000 mg  1,000 mg Oral 4x Daily PRN Yovani Aldrich MD        doxycycline hyclate (VIBRAMYCIN) capsule 100 mg  100 mg Oral Q12H BAILEY (08/20) Yovani Aldrich MD   100 mg at 10/30/24 0859    hydrocortisone (CORTAID) 1 % cream 1 g  1 g Topical TID PRN Yovani Aldrich MD        lidocaine (LMX4) cream   Topical Q1H PRN Yovani Aldrich MD        lidocaine (LMX4) cream   Topical Q1H PRN Yovani Aldrich MD        lidocaine 1 % 0.1-1 mL  0.1-1 mL Other Q1H PRN Yovani Aldrich MD         lidocaine 1 % 1 mL  1 mL Other Q1H PRN Yovani Aldrich MD        May take oral meds with a sip of water, the morning of LINDSEY procedure.   Does not apply Continuous PRN Yovani Aldrich MD        ondansetron (ZOFRAN ODT) ODT tab 4 mg  4 mg Oral Q6H PRN Yovani Aldrich MD        Or    ondansetron (ZOFRAN) injection 4 mg  4 mg Intravenous Q6H PRN Yovani Aldrich MD        prochlorperazine (COMPAZINE) injection 5 mg  5 mg Intravenous Q6H PRN Yovani Aldrich MD        Or    prochlorperazine (COMPAZINE) tablet 5 mg  5 mg Oral Q6H PRN Yovani Aldrich MD        Or    prochlorperazine (COMPAZINE) suppository 12.5 mg  12.5 mg Rectal Q12H PRN Yovani Aldrich MD        senna-docusate (SENOKOT-S/PERICOLACE) 8.6-50 MG per tablet 1 tablet  1 tablet Oral BID PRN Yovani Aldrich MD        Or    senna-docusate (SENOKOT-S/PERICOLACE) 8.6-50 MG per tablet 2 tablet  2 tablet Oral BID PRN Yovani Aldrich MD        sodium chloride (PF) 0.9% PF flush 3 mL  3 mL Intravenous Q1H PRN Yovani Aldrich MD        sodium chloride (PF) 0.9% PF flush 3 mL  3 mL Intravenous Q8H Yovani Aldrich MD   3 mL at 10/30/24 0901    sodium chloride (PF) 0.9% PF flush 3 mL  3 mL Intracatheter Q8H Yovani Aldrich MD   3 mL at 10/30/24 1208    sodium chloride (PF) 0.9% PF flush 3 mL  3 mL Intracatheter q1 min prn Yovani Aldrich MD        sodium chloride tablet 1 g  1 g Oral TID w/meals Minesh Fajardo MD        tamsulosin (FLOMAX) capsule 0.4 mg  0.4 mg Oral Daily Shin Rodríguez MD   0.4 mg at 10/30/24 0900    vancomycin (VANCOCIN) 1,250 mg in 0.9% NaCl 262.5 mL intermittent infusion  1,250 mg Intravenous Q12H Yovani Aldrich MD   1,250 mg at 10/30/24 1208     Allergies   No Known Allergies      Physical Exam   Vital Signs with Ranges  Temp:  [98.2  F (36.8  C)-98.5  F (36.9  C)] 98.4  F (36.9  C)  Pulse:  [74-93] 86  Resp:  [15-16]  16  BP: (107-114)/(57-65) 110/61  SpO2:  [96 %-97 %] 97 %  Wt Readings from Last 4 Encounters:   10/29/24 60.8 kg (134 lb)   10/23/24 59 kg (130 lb)     I/O last 3 completed shifts:  In: 1073 [P.O.:1067; I.V.:6]  Out: 2750 [Urine:2750]      Vitals: /61 (BP Location: Left arm)   Pulse 86   Temp 98.4  F (36.9  C) (Oral)   Resp 16   Wt 60.8 kg (134 lb)   SpO2 97%   BMI 21.63 kg/m      Physical Exam:   Constitutional: Thin male in no acute distress  Respiratory: Normal respiratory effort, CTAB  Cardiovascular: RRR, loud holosystolic murmur at the apex.  There is trace bilateral LE edema.  Normal carotid upstrokes, no carotid bruits.        Clinically Significant Risk Factors         # Hyponatremia: Lowest Na = 118 mmol/L in last 2 days, will monitor as appropriate  # Hypochloremia: Lowest Cl = 85 mmol/L in last 2 days, will monitor as appropriate      # Hypoalbuminemia: Lowest albumin = 2.7 g/dL at 10/30/2024  6:27 AM, will monitor as appropriate                # Severe Malnutrition: based on nutrition assessment    # Financial/Environmental Concerns:          Endocarditis and heart valve disorders in diseases classified elsewhere    hyponatremia and Hypo-osmolality      Chronic Fatigue and Other Debilities: Age-related physical debility

## 2024-10-31 ENCOUNTER — ANESTHESIA (OUTPATIENT)
Dept: SURGERY | Facility: CLINIC | Age: 84
End: 2024-10-31
Payer: MEDICARE

## 2024-10-31 ENCOUNTER — ANESTHESIA EVENT (OUTPATIENT)
Dept: SURGERY | Facility: CLINIC | Age: 84
End: 2024-10-31
Payer: MEDICARE

## 2024-10-31 LAB
ALBUMIN SERPL BCG-MCNC: 2.5 G/DL (ref 3.5–5.2)
ALBUMIN UR-MCNC: NEGATIVE MG/DL
AMORPH CRY #/AREA URNS HPF: ABNORMAL /HPF
ANION GAP SERPL CALCULATED.3IONS-SCNC: 4 MMOL/L (ref 7–15)
APPEARANCE UR: ABNORMAL
BACTERIA BLD CULT: NO GROWTH
BILIRUB UR QL STRIP: NEGATIVE
BUN SERPL-MCNC: 11.7 MG/DL (ref 8–23)
CALCIUM SERPL-MCNC: 8.1 MG/DL (ref 8.8–10.4)
CHLORIDE SERPL-SCNC: 90 MMOL/L (ref 98–107)
COLOR UR AUTO: ABNORMAL
CREAT SERPL-MCNC: 0.55 MG/DL (ref 0.67–1.17)
EGFRCR SERPLBLD CKD-EPI 2021: >90 ML/MIN/1.73M2
ERYTHROCYTE [DISTWIDTH] IN BLOOD BY AUTOMATED COUNT: 14.1 % (ref 10–15)
EST. AVERAGE GLUCOSE BLD GHB EST-MCNC: 105 MG/DL
GLUCOSE SERPL-MCNC: 102 MG/DL (ref 70–99)
GLUCOSE UR STRIP-MCNC: NEGATIVE MG/DL
HBA1C MFR BLD: 5.3 %
HCO3 SERPL-SCNC: 29 MMOL/L (ref 22–29)
HCT VFR BLD AUTO: 28.4 % (ref 40–53)
HGB BLD-MCNC: 10 G/DL (ref 13.3–17.7)
HGB UR QL STRIP: ABNORMAL
KETONES UR STRIP-MCNC: NEGATIVE MG/DL
LEUKOCYTE ESTERASE UR QL STRIP: NEGATIVE
MCH RBC QN AUTO: 29.5 PG (ref 26.5–33)
MCHC RBC AUTO-ENTMCNC: 35.2 G/DL (ref 31.5–36.5)
MCV RBC AUTO: 84 FL (ref 78–100)
MUCOUS THREADS #/AREA URNS LPF: PRESENT /LPF
NITRATE UR QL: NEGATIVE
PH UR STRIP: 7.5 [PH] (ref 5–7)
PHOSPHATE SERPL-MCNC: 2.8 MG/DL (ref 2.5–4.5)
PLATELET # BLD AUTO: 260 10E3/UL (ref 150–450)
POTASSIUM SERPL-SCNC: 4.5 MMOL/L (ref 3.4–5.3)
RBC # BLD AUTO: 3.39 10E6/UL (ref 4.4–5.9)
RBC URINE: 42 /HPF
SODIUM SERPL-SCNC: 123 MMOL/L (ref 135–145)
SODIUM SERPL-SCNC: 123 MMOL/L (ref 135–145)
SODIUM SERPL-SCNC: 125 MMOL/L (ref 135–145)
SP GR UR STRIP: 1.01 (ref 1–1.03)
UROBILINOGEN UR STRIP-MCNC: NORMAL MG/DL
WBC # BLD AUTO: 8.5 10E3/UL (ref 4–11)
WBC URINE: 1 /HPF

## 2024-10-31 PROCEDURE — 370N000017 HC ANESTHESIA TECHNICAL FEE, PER MIN: Performed by: STUDENT IN AN ORGANIZED HEALTH CARE EDUCATION/TRAINING PROGRAM

## 2024-10-31 PROCEDURE — 250N000009 HC RX 250: Performed by: NURSE ANESTHETIST, CERTIFIED REGISTERED

## 2024-10-31 PROCEDURE — 250N000013 HC RX MED GY IP 250 OP 250 PS 637: Performed by: INTERNAL MEDICINE

## 2024-10-31 PROCEDURE — 0CDWXZ1 EXTRACTION OF UPPER TOOTH, MULTIPLE, EXTERNAL APPROACH: ICD-10-PCS | Performed by: STUDENT IN AN ORGANIZED HEALTH CARE EDUCATION/TRAINING PROGRAM

## 2024-10-31 PROCEDURE — 41899 UNLISTED PX DENTALVLR STRUX: CPT | Performed by: NURSE ANESTHETIST, CERTIFIED REGISTERED

## 2024-10-31 PROCEDURE — 99232 SBSQ HOSP IP/OBS MODERATE 35: CPT | Performed by: INTERNAL MEDICINE

## 2024-10-31 PROCEDURE — 250N000025 HC SEVOFLURANE, PER MIN: Performed by: STUDENT IN AN ORGANIZED HEALTH CARE EDUCATION/TRAINING PROGRAM

## 2024-10-31 PROCEDURE — 99152 MOD SED SAME PHYS/QHP 5/>YRS: CPT | Performed by: INTERNAL MEDICINE

## 2024-10-31 PROCEDURE — 250N000011 HC RX IP 250 OP 636: Performed by: NURSE ANESTHETIST, CERTIFIED REGISTERED

## 2024-10-31 PROCEDURE — 258N000003 HC RX IP 258 OP 636: Performed by: HOSPITALIST

## 2024-10-31 PROCEDURE — 258N000003 HC RX IP 258 OP 636: Performed by: NURSE ANESTHETIST, CERTIFIED REGISTERED

## 2024-10-31 PROCEDURE — 99233 SBSQ HOSP IP/OBS HIGH 50: CPT | Performed by: STUDENT IN AN ORGANIZED HEALTH CARE EDUCATION/TRAINING PROGRAM

## 2024-10-31 PROCEDURE — 250N000009 HC RX 250: Performed by: INTERNAL MEDICINE

## 2024-10-31 PROCEDURE — 84295 ASSAY OF SERUM SODIUM: CPT | Performed by: INTERNAL MEDICINE

## 2024-10-31 PROCEDURE — B2111ZZ FLUOROSCOPY OF MULTIPLE CORONARY ARTERIES USING LOW OSMOLAR CONTRAST: ICD-10-PCS | Performed by: INTERNAL MEDICINE

## 2024-10-31 PROCEDURE — P9045 ALBUMIN (HUMAN), 5%, 250 ML: HCPCS | Performed by: NURSE ANESTHETIST, CERTIFIED REGISTERED

## 2024-10-31 PROCEDURE — 258N000003 HC RX IP 258 OP 636: Performed by: INTERNAL MEDICINE

## 2024-10-31 PROCEDURE — 120N000001 HC R&B MED SURG/OB

## 2024-10-31 PROCEDURE — 250N000011 HC RX IP 250 OP 636: Performed by: INTERNAL MEDICINE

## 2024-10-31 PROCEDURE — 83036 HEMOGLOBIN GLYCOSYLATED A1C: CPT | Performed by: STUDENT IN AN ORGANIZED HEALTH CARE EDUCATION/TRAINING PROGRAM

## 2024-10-31 PROCEDURE — C1894 INTRO/SHEATH, NON-LASER: HCPCS | Performed by: INTERNAL MEDICINE

## 2024-10-31 PROCEDURE — 84134 ASSAY OF PREALBUMIN: CPT | Performed by: PHYSICIAN ASSISTANT

## 2024-10-31 PROCEDURE — 93454 CORONARY ARTERY ANGIO S&I: CPT | Performed by: INTERNAL MEDICINE

## 2024-10-31 PROCEDURE — 41899 UNLISTED PX DENTALVLR STRUX: CPT | Performed by: STUDENT IN AN ORGANIZED HEALTH CARE EDUCATION/TRAINING PROGRAM

## 2024-10-31 PROCEDURE — 80069 RENAL FUNCTION PANEL: CPT | Performed by: INTERNAL MEDICINE

## 2024-10-31 PROCEDURE — 99100 ANES PT EXTEME AGE<1 YR&>70: CPT | Performed by: NURSE ANESTHETIST, CERTIFIED REGISTERED

## 2024-10-31 PROCEDURE — 272N000001 HC OR GENERAL SUPPLY STERILE: Performed by: INTERNAL MEDICINE

## 2024-10-31 PROCEDURE — 999N000141 HC STATISTIC PRE-PROCEDURE NURSING ASSESSMENT: Performed by: STUDENT IN AN ORGANIZED HEALTH CARE EDUCATION/TRAINING PROGRAM

## 2024-10-31 PROCEDURE — 0HBCXZX EXCISION OF LEFT UPPER ARM SKIN, EXTERNAL APPROACH, DIAGNOSTIC: ICD-10-PCS | Performed by: STUDENT IN AN ORGANIZED HEALTH CARE EDUCATION/TRAINING PROGRAM

## 2024-10-31 PROCEDURE — 250N000013 HC RX MED GY IP 250 OP 250 PS 637: Performed by: STUDENT IN AN ORGANIZED HEALTH CARE EDUCATION/TRAINING PROGRAM

## 2024-10-31 PROCEDURE — 36415 COLL VENOUS BLD VENIPUNCTURE: CPT | Performed by: INTERNAL MEDICINE

## 2024-10-31 PROCEDURE — 0CDXXZ1 EXTRACTION OF LOWER TOOTH, MULTIPLE, EXTERNAL APPROACH: ICD-10-PCS | Performed by: STUDENT IN AN ORGANIZED HEALTH CARE EDUCATION/TRAINING PROGRAM

## 2024-10-31 PROCEDURE — 87075 CULTR BACTERIA EXCEPT BLOOD: CPT | Performed by: PATHOLOGY

## 2024-10-31 PROCEDURE — 87070 CULTURE OTHR SPECIMN AEROBIC: CPT | Performed by: PATHOLOGY

## 2024-10-31 PROCEDURE — 710N000009 HC RECOVERY PHASE 1, LEVEL 1, PER MIN: Performed by: STUDENT IN AN ORGANIZED HEALTH CARE EDUCATION/TRAINING PROGRAM

## 2024-10-31 PROCEDURE — 999N000128 HC STATISTIC PERIPHERAL IV START W/O US GUIDANCE

## 2024-10-31 PROCEDURE — 99232 SBSQ HOSP IP/OBS MODERATE 35: CPT | Performed by: NURSE PRACTITIONER

## 2024-10-31 PROCEDURE — 250N000011 HC RX IP 250 OP 636: Performed by: HOSPITALIST

## 2024-10-31 PROCEDURE — 85014 HEMATOCRIT: CPT | Performed by: INTERNAL MEDICINE

## 2024-10-31 PROCEDURE — 99232 SBSQ HOSP IP/OBS MODERATE 35: CPT | Performed by: HOSPITALIST

## 2024-10-31 PROCEDURE — 272N000001 HC OR GENERAL SUPPLY STERILE: Performed by: STUDENT IN AN ORGANIZED HEALTH CARE EDUCATION/TRAINING PROGRAM

## 2024-10-31 PROCEDURE — 93454 CORONARY ARTERY ANGIO S&I: CPT | Mod: 26 | Performed by: INTERNAL MEDICINE

## 2024-10-31 PROCEDURE — 250N000009 HC RX 250: Performed by: STUDENT IN AN ORGANIZED HEALTH CARE EDUCATION/TRAINING PROGRAM

## 2024-10-31 PROCEDURE — 88305 TISSUE EXAM BY PATHOLOGIST: CPT | Mod: TC | Performed by: STUDENT IN AN ORGANIZED HEALTH CARE EDUCATION/TRAINING PROGRAM

## 2024-10-31 PROCEDURE — 360N000075 HC SURGERY LEVEL 2, PER MIN: Performed by: STUDENT IN AN ORGANIZED HEALTH CARE EDUCATION/TRAINING PROGRAM

## 2024-10-31 PROCEDURE — 81003 URINALYSIS AUTO W/O SCOPE: CPT | Performed by: STUDENT IN AN ORGANIZED HEALTH CARE EDUCATION/TRAINING PROGRAM

## 2024-10-31 RX ORDER — ATROPINE SULFATE 0.1 MG/ML
0.5 INJECTION INTRAVENOUS
Status: ACTIVE | OUTPATIENT
Start: 2024-10-31 | End: 2024-10-31

## 2024-10-31 RX ORDER — FENTANYL CITRATE 50 UG/ML
25 INJECTION, SOLUTION INTRAMUSCULAR; INTRAVENOUS
Status: DISCONTINUED | OUTPATIENT
Start: 2024-10-31 | End: 2024-11-01

## 2024-10-31 RX ORDER — IOPAMIDOL 755 MG/ML
INJECTION, SOLUTION INTRAVASCULAR
Status: DISCONTINUED | OUTPATIENT
Start: 2024-10-31 | End: 2024-10-31 | Stop reason: HOSPADM

## 2024-10-31 RX ORDER — PROPOFOL 10 MG/ML
INJECTION, EMULSION INTRAVENOUS PRN
Status: DISCONTINUED | OUTPATIENT
Start: 2024-10-31 | End: 2024-10-31

## 2024-10-31 RX ORDER — NALOXONE HYDROCHLORIDE 0.4 MG/ML
0.4 INJECTION, SOLUTION INTRAMUSCULAR; INTRAVENOUS; SUBCUTANEOUS
Status: ACTIVE | OUTPATIENT
Start: 2024-10-31 | End: 2024-10-31

## 2024-10-31 RX ORDER — ONDANSETRON 2 MG/ML
INJECTION INTRAMUSCULAR; INTRAVENOUS PRN
Status: DISCONTINUED | OUTPATIENT
Start: 2024-10-31 | End: 2024-10-31

## 2024-10-31 RX ORDER — CHLORHEXIDINE GLUCONATE ORAL RINSE 1.2 MG/ML
SOLUTION DENTAL PRN
Status: DISCONTINUED | OUTPATIENT
Start: 2024-10-31 | End: 2024-10-31 | Stop reason: HOSPADM

## 2024-10-31 RX ORDER — LIDOCAINE HYDROCHLORIDE 20 MG/ML
INJECTION, SOLUTION INFILTRATION; PERINEURAL PRN
Status: DISCONTINUED | OUTPATIENT
Start: 2024-10-31 | End: 2024-10-31

## 2024-10-31 RX ORDER — OXYCODONE HYDROCHLORIDE 5 MG/1
5 TABLET ORAL EVERY 4 HOURS PRN
Status: DISCONTINUED | OUTPATIENT
Start: 2024-10-31 | End: 2024-11-04

## 2024-10-31 RX ORDER — FENTANYL CITRATE 50 UG/ML
INJECTION, SOLUTION INTRAMUSCULAR; INTRAVENOUS
Status: DISCONTINUED | OUTPATIENT
Start: 2024-10-31 | End: 2024-10-31 | Stop reason: HOSPADM

## 2024-10-31 RX ORDER — SODIUM CHLORIDE, SODIUM LACTATE, POTASSIUM CHLORIDE, CALCIUM CHLORIDE 600; 310; 30; 20 MG/100ML; MG/100ML; MG/100ML; MG/100ML
INJECTION, SOLUTION INTRAVENOUS CONTINUOUS PRN
Status: DISCONTINUED | OUTPATIENT
Start: 2024-10-31 | End: 2024-10-31

## 2024-10-31 RX ORDER — BUPIVACAINE HYDROCHLORIDE AND EPINEPHRINE 2.5; 5 MG/ML; UG/ML
INJECTION, SOLUTION INFILTRATION; PERINEURAL PRN
Status: DISCONTINUED | OUTPATIENT
Start: 2024-10-31 | End: 2024-10-31 | Stop reason: HOSPADM

## 2024-10-31 RX ORDER — FLUMAZENIL 0.1 MG/ML
0.2 INJECTION, SOLUTION INTRAVENOUS
Status: ACTIVE | OUTPATIENT
Start: 2024-10-31 | End: 2024-10-31

## 2024-10-31 RX ORDER — NALOXONE HYDROCHLORIDE 0.4 MG/ML
0.2 INJECTION, SOLUTION INTRAMUSCULAR; INTRAVENOUS; SUBCUTANEOUS
Status: ACTIVE | OUTPATIENT
Start: 2024-10-31 | End: 2024-10-31

## 2024-10-31 RX ORDER — HEPARIN SODIUM 10000 [USP'U]/100ML
100-1000 INJECTION, SOLUTION INTRAVENOUS CONTINUOUS PRN
Status: DISCONTINUED | OUTPATIENT
Start: 2024-10-31 | End: 2024-10-31 | Stop reason: HOSPADM

## 2024-10-31 RX ORDER — FENTANYL CITRATE 50 UG/ML
INJECTION, SOLUTION INTRAMUSCULAR; INTRAVENOUS PRN
Status: DISCONTINUED | OUTPATIENT
Start: 2024-10-31 | End: 2024-10-31

## 2024-10-31 RX ORDER — ACETAMINOPHEN 325 MG/1
650 TABLET ORAL EVERY 4 HOURS PRN
Status: DISCONTINUED | OUTPATIENT
Start: 2024-10-31 | End: 2024-10-31

## 2024-10-31 RX ORDER — VASOPRESSIN IN 0.9 % NACL 2 UNIT/2ML
SYRINGE (ML) INTRAVENOUS PRN
Status: DISCONTINUED | OUTPATIENT
Start: 2024-10-31 | End: 2024-10-31

## 2024-10-31 RX ORDER — MAGNESIUM HYDROXIDE 1200 MG/15ML
LIQUID ORAL PRN
Status: DISCONTINUED | OUTPATIENT
Start: 2024-10-31 | End: 2024-10-31 | Stop reason: HOSPADM

## 2024-10-31 RX ORDER — OXYCODONE HYDROCHLORIDE 5 MG/1
10 TABLET ORAL EVERY 4 HOURS PRN
Status: DISCONTINUED | OUTPATIENT
Start: 2024-10-31 | End: 2024-11-04

## 2024-10-31 RX ADMIN — PHENYLEPHRINE HYDROCHLORIDE 100 MCG: 10 INJECTION INTRAVENOUS at 17:18

## 2024-10-31 RX ADMIN — Medication 0.5 UNITS: at 17:16

## 2024-10-31 RX ADMIN — PHENYLEPHRINE HYDROCHLORIDE 0.5 MCG/KG/MIN: 10 INJECTION INTRAVENOUS at 17:23

## 2024-10-31 RX ADMIN — Medication 1 LOZENGE: at 02:18

## 2024-10-31 RX ADMIN — SODIUM CHLORIDE 500 ML: 900 INJECTION, SOLUTION INTRAVENOUS at 15:55

## 2024-10-31 RX ADMIN — Medication 1 LOZENGE: at 06:47

## 2024-10-31 RX ADMIN — VANCOMYCIN HYDROCHLORIDE 1250 MG: 10 INJECTION, POWDER, LYOPHILIZED, FOR SOLUTION INTRAVENOUS at 12:28

## 2024-10-31 RX ADMIN — LIDOCAINE HYDROCHLORIDE 60 MG: 20 INJECTION, SOLUTION INFILTRATION; PERINEURAL at 17:02

## 2024-10-31 RX ADMIN — Medication 0.5 UNITS: at 17:20

## 2024-10-31 RX ADMIN — PROPOFOL 150 MG: 10 INJECTION, EMULSION INTRAVENOUS at 17:02

## 2024-10-31 RX ADMIN — VANCOMYCIN HYDROCHLORIDE 1250 MG: 10 INJECTION, POWDER, LYOPHILIZED, FOR SOLUTION INTRAVENOUS at 23:42

## 2024-10-31 RX ADMIN — PHENYLEPHRINE HYDROCHLORIDE 200 MCG: 10 INJECTION INTRAVENOUS at 17:11

## 2024-10-31 RX ADMIN — VANCOMYCIN HYDROCHLORIDE 1250 MG: 10 INJECTION, POWDER, LYOPHILIZED, FOR SOLUTION INTRAVENOUS at 00:02

## 2024-10-31 RX ADMIN — ALBUMIN (HUMAN): 12.5 SOLUTION INTRAVENOUS at 17:25

## 2024-10-31 RX ADMIN — PHENYLEPHRINE HYDROCHLORIDE 200 MCG: 10 INJECTION INTRAVENOUS at 17:21

## 2024-10-31 RX ADMIN — ONDANSETRON 4 MG: 2 INJECTION INTRAMUSCULAR; INTRAVENOUS at 17:36

## 2024-10-31 RX ADMIN — Medication 1 UNITS: at 17:18

## 2024-10-31 RX ADMIN — TAMSULOSIN HYDROCHLORIDE 0.4 MG: 0.4 CAPSULE ORAL at 09:43

## 2024-10-31 RX ADMIN — Medication 1 LOZENGE: at 12:26

## 2024-10-31 RX ADMIN — Medication 150 MG: at 18:25

## 2024-10-31 RX ADMIN — Medication 1 LOZENGE: at 04:27

## 2024-10-31 RX ADMIN — SODIUM CHLORIDE TAB 1 GM 1 G: 1 TAB at 09:43

## 2024-10-31 RX ADMIN — PHENYLEPHRINE HYDROCHLORIDE 100 MCG: 10 INJECTION INTRAVENOUS at 17:06

## 2024-10-31 RX ADMIN — FENTANYL CITRATE 50 MCG: 50 INJECTION INTRAMUSCULAR; INTRAVENOUS at 17:02

## 2024-10-31 RX ADMIN — Medication 1 LOZENGE: at 10:31

## 2024-10-31 RX ADMIN — ALBUMIN (HUMAN): 12.5 SOLUTION INTRAVENOUS at 18:13

## 2024-10-31 RX ADMIN — SODIUM CHLORIDE, POTASSIUM CHLORIDE, SODIUM LACTATE AND CALCIUM CHLORIDE: 600; 310; 30; 20 INJECTION, SOLUTION INTRAVENOUS at 16:56

## 2024-10-31 RX ADMIN — PHENYLEPHRINE HYDROCHLORIDE 200 MCG: 10 INJECTION INTRAVENOUS at 17:14

## 2024-10-31 RX ADMIN — ROCURONIUM BROMIDE 50 MG: 50 INJECTION, SOLUTION INTRAVENOUS at 17:02

## 2024-10-31 NOTE — ANESTHESIA PROCEDURE NOTES
Airway       Patient location during procedure: OR       Procedure Start/Stop Times: 10/31/2024 5:06 PM  Staff -        Anesthesiologist:  Halina Mancia MD       CRNA: Jayleen Putnam APRN CRNA       Performed By: CRNA  Consent for Airway        Urgency: elective  Indications and Patient Condition       Indications for airway management: alissa-procedural and airway protection       Induction type:intravenous       Mask difficulty assessment: 1 - vent by mask    Final Airway Details       Final airway type: endotracheal airway       Successful airway: ETT - single and Oral  Endotracheal Airway Details        ETT size (mm): 8.0       Cuffed: yes       Successful intubation technique: video laryngoscopy       VL Blade Size: [place] 4       Grade View of Cords: 1       Adjucts: stylet       Position: Left       Measured from: lips       Secured at (cm): 22       Bite block used: None    Post intubation assessment        Placement verified by: capnometry, equal breath sounds and chest rise        Number of attempts at approach: 1       Number of other approaches attempted: 0       Secured with: tape       Ease of procedure: easy       Dentition: Unchanged    Medication(s) Administered   Medication Administration Time: 10/31/2024 5:06 PM

## 2024-10-31 NOTE — CONSULTS
[facility] General Surgery Consultation    Jayme Donahue MRN# [id number]   YOB: 1940 Age: 84 year old      Date of Admission:  10/25/2024  Date of Consult: 10/31/2024         Assessment and Plan:   Jayme Donahue is a 84 year old male with PMHx of prostate cancer who was transferred from [facility] on 10/25/2024 for concern of endocarditis.  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  Risk and benefits were discussed with the patient and patient elected to proceed with the stated procedure.    PLAN:  - Proceed with bedside punch biopsy  - dressings to be changed daily, vaseline gauze and soft mepilex to cover  - Suture to be removed in 7-10 days  - General surgery will sign off now    Romario Ozuna MD          Requesting Physician:      Kike Belcher MD           Chief Complaint:   Chronic non healing wound of left scapula         History of Present Illness:   Jayme Donahue is a 84 year old male who was seen in consultation at the request of Dr. Lawler who presented with concerns of endocarditis.  We are being consulted for nonhealing chronic wound of the left scapula.  Patient reports that it has been there for several months.  It is not healed.  It is not painful but it has intermittently bled and is itchy.  Patient does not have any wounds like this elsewhere.            Physical Exam:   Blood pressure 97/57, pulse 76, temperature 97.7  F (36.5  C), temperature source Oral, resp. rate 16, weight 56.8 kg (125 lb 4.8 oz), SpO2 97%.  125 lbs 4.8 oz  General: Vital signs reviewed, in no apparent distress  Eyes: Anicteric  HENT: Normocephalic, atraumatic, trachea midline   Respiratory: Breathing nonlabored  Cardiovascular: Regular rate and rhythm  Musculoskeletal: healed left clavicle fracture deformity. Left scapula with 4cm round wound with granular fibrinous pink base and raised edges. Wound base is friable and easily sloughs and bleeds.  Neurologic:  Grossly nonfocal exam  Psychiatric: Normal mood, affect and insight  Integumentary: Warm and dry         Past Medical History:     Past Medical History:   Diagnosis Date    Cataract     Iliac artery aneurysm (H)     Prostate cancer (H)             Past Surgical History:     Past Surgical History:   Procedure Laterality Date    CV CORONARY ANGIOGRAM N/A 10/31/2024    Procedure: Coronary Angiogram;  Surgeon: Shane Ortega MD;  Location:  [facility]ARD[facility] CATH LAB            Current Medications:         Current Facility-Administered Medications   Medication Dose Route Frequency Provider Last Rate Last Admin    sodium chloride (PF) 0.9% PF flush 3 mL  3 mL Intracatheter Q8H Yovani Aldrich MD   3 mL at 10/31/24 1227    sodium chloride 0.9% BOLUS 500 mL  500 mL Intravenous Once Minesh Fajardo MD        sodium chloride tablet 1 g  1 g Oral TID w/meals Minesh Fajardo MD   1 g at 10/31/24 0943    tamsulosin (FLOMAX) capsule 0.4 mg  0.4 mg Oral Daily Shin Rodríguez MD   0.4 mg at 10/31/24 0943    vancomycin (VANCOCIN) 1,250 mg in 0.9% NaCl 262.5 mL intermittent infusion  1,250 mg Intravenous Q12H Yovani Aldrich MD   1,250 mg at 10/31/24 1228       Current Facility-Administered Medications   Medication Dose Route Frequency Provider Last Rate Last Admin    acetaminophen (TYLENOL) tablet 650 mg  650 mg Oral Q4H PRN Yovani Aldrich MD        Or    acetaminophen (TYLENOL) Suppository 650 mg  650 mg Rectal Q4H PRN Yovani Aldrich MD        acetaminophen (TYLENOL) tablet 650 mg  650 mg Oral Q4H PRN Shane Ortega MD        atropine injection 0.5 mg  0.5 mg Intravenous Once PRN Shane Ortega MD        benzocaine-menthol (CHLORASEPTIC) 6-10 MG lozenge 1 lozenge  1 lozenge Buccal Q1H PRN Dennis Willingham MD   1 lozenge at 10/31/24 1226    calcium carbonate (TUMS) chewable tablet 1,000 mg  1,000 mg Oral 4x Daily PRN Yovani Aldrich MD        fentaNYL  (PF) (SUBLIMAZE) injection 25 mcg  25 mcg Intravenous Q15 Min PRN Shane Ortega MD        flumazenil (ROMAZICON) injection 0.2 mg  0.2 mg Intravenous q1 min prn Shane Ortega MD        HOLD:  Metformin and metformin containing medications if patient received IV contrast with acute kidney injury or severe chronic kidney disease (stage IV or stage V; i.e., eGFR less than 30)   Does not apply HOLD Shane Ortega MD        midazolam (VERSED) injection 0.5 mg  0.5 mg Intravenous Q5 Min PRN Shane Ortega MD        naloxone (NARCAN) injection 0.2 mg  0.2 mg Intravenous Q2 Min PRN Shane Ortega MD        Or    naloxone (NARCAN) injection 0.4 mg  0.4 mg Intravenous Q2 Min PRN Shane Ortega MD        Or    naloxone (NARCAN) injection 0.2 mg  0.2 mg Intramuscular Q2 Min PRN Shane Ortega MD        Or    naloxone (NARCAN) injection 0.4 mg  0.4 mg Intramuscular Q2 Min PRN Shane Ortega MD        ondansetron (ZOFRAN ODT) ODT tab 4 mg  4 mg Oral Q6H PRN Yovani Aldrich MD        Or    ondansetron (ZOFRAN) injection 4 mg  4 mg Intravenous Q6H PRN Yovani Aldrich MD        oxyCODONE (ROXICODONE) tablet 5 mg  5 mg Oral Q4H PRN Shane Ortega MD        Or    oxyCODONE (ROXICODONE) tablet 10 mg  10 mg Oral Q4H PRN Shane Ortega MD        prochlorperazine (COMPAZINE) injection 5 mg  5 mg Intravenous Q6H PRN Yovani Aldrich MD        Or    prochlorperazine (COMPAZINE) tablet 5 mg  5 mg Oral Q6H PRN Yovani Aldrich MD        Or    prochlorperazine (COMPAZINE) suppository 12.5 mg  12.5 mg Rectal Q12H PRN Yovani Aldrich MD        senna-docusate (SENOKOT-S/PERICOLACE) 8.6-50 MG per tablet 1 tablet  1 tablet Oral BID PRN Yovani Aldrich MD        Or    cristhian-docusate (SENOKOT-S/PERICOLACE) 8.6-50 MG per tablet 2 tablet  2 tablet Oral BID PRN Yovani Aldrich MD        sodium chloride  (PF) 0.9% PF flush 3 mL  3 mL Intracatheter q1 min prn Yovain Aldrich MD                Home Medications:     Prior to Admission medications    Medication Sig Last Dose Taking? Auth Provider Long Term End Date   Capsicum, Cayenne, (CAYENNE PEPPER PO) Take 1 capsule by mouth daily. Past Month Yes Reported, Patient     Misc Natural Products (SAW PALMETTO) CAPS Take 1 capsule by mouth daily. Past Month Yes Reported, Patient     multivitamin  with lutein (OCUVITE WITH LUTEIN) CAPS per capsule Take 1 capsule by mouth daily. Past Month Yes Reported, Patient              Allergies:   No Known Allergies         Family History:   History reviewed. No pertinent family history.        Social History:   Jayme Donahue  reports that he has never smoked. He has never used smokeless tobacco. He reports that he does not currently use alcohol. He reports that he does not use drugs.          Review of Systems:   Constitutional: No fevers or chills  Eyes: No blurred or double vision  HENT: Denies headaches, No rhinorrhea, No sore throat  Respiratory: No cough or shortness of breath  Cardiovascular: Denies chest pain or palpitations  Gastrointestinal: No abdominal pain or nausea/vomiting  Genitourinary: No hematuria or dysuria  Musculoskeletal: Denies arthralgias or myalgias  Neurologic: No numbness or tingling  Integumentary: No skin rashes         Labs/Imaging   All new lab and imaging data was reviewed.   Recent Labs   Lab 10/31/24  0659 10/30/24  0627 10/26/24  0834   WBC 8.5 8.8 8.2   HGB 10.0* 10.5* 11.3*   HCT 28.4* 28.9* 31.4*   MCV 84 83 83    280 224          Lab Results   Component Value Date     10/31/2024     10/31/2024     10/30/2024    Lab Results   Component Value Date    CHLORIDE 90 10/31/2024    CHLORIDE 88 10/30/2024    CHLORIDE 85 10/29/2024    Lab Results   Component Value Date    BUN 11.7 10/31/2024    BUN 20.4 10/30/2024    BUN 10.6 10/29/2024      Lab Results   Component Value  Date    POTASSIUM 4.5 10/31/2024    POTASSIUM 4.7 10/30/2024    POTASSIUM 4.2 10/29/2024    Lab Results   Component Value Date    CO2 29 10/31/2024    CO2 29 10/30/2024    CO2 33 10/29/2024    Lab Results   Component Value Date    CR 0.55 10/31/2024    CR 0.55 10/30/2024    CR 0.61 10/29/2024          I have personally reviewed the imaging studies-  No pertinent imaging to review.        Romario Ozuna MD

## 2024-10-31 NOTE — PROGRESS NOTES
Care Management Follow Up    Length of Stay (days): 6    Expected Discharge Date: 11/04/2024     Concerns to be Addressed:       Patient plan of care discussed at interdisciplinary rounds: Yes    Anticipated Discharge Disposition: Skilled Nursing Facility              Anticipated Discharge Services:    Anticipated Discharge DME:      Patient/family educated on Medicare website which has current facility and service quality ratings:    Education Provided on the Discharge Plan:    Patient/Family in Agreement with the Plan:      Referrals Placed by CM/SW:    Private pay costs discussed: Not applicable    Discussed  Partnership in Safe Discharge Planning  document with patient/family: No     Handoff Completed: No, handoff not indicated or clinically appropriate    Additional Information:  Writer received voicemail from [facility] regarding patient. Caroline from [facility] requested updated notes be sent. Writer faxed all patients updated information. Writer called Caroline back and [facility] requesting call back.     ADD [id number]:   Writer got in touch with [facility] and let them know that patient will need his procedure before discharge. [facility] will continue to follow in the background. New referral can be sent closer to discharge.     Next Steps: Communication with [facility]    VIRGILIO BEE  [facility]  INPATIENT CARE COORDINATION

## 2024-10-31 NOTE — CONSULTS
SPIRITUAL HEALTH SERVICES  SPIRITUAL ASSESSMENT Consult Note  [facility]. [address] medical specialties     REFERRAL SOURCE: patient requests ongoing spiritual support    Brief visit with Avelino today; shared prayer prior to his angiogram procedure.   Avelino was tearful as he also requested prayers for his wife Pamela who has only been able to come visit once during his hospital stay so far. I shared reading of Psa 121 and prayer.    Avelino stated they may also remove some/all of his teeth as tooth infection may be the source of his heart infection.    Plan: unit  will continue to follow.    Alexus Zhang MD[facility]  Staff   Please place consult order for routine Spiritual Health Services referrals.  SHS available 24/7 for emergent requests either by having the on-call  paged or by entering an ASAP/STAT consult in Epic (this will also page the on-call ).

## 2024-10-31 NOTE — ANESTHESIA CARE TRANSFER NOTE
Patient: Jayme Donahue    Procedure: Procedure(s):  EXTRACTION OF TEETH 3, 5, 6, 7, 8, 9, 10, 11, 12, 13, 14, 20, 21, 23, 24, 25, 26, 27, 28, AND 29       Diagnosis: Bacteremia [R78.81]  Dental caries [K02.9]  Periapical abscess [K04.7]  Diagnosis Additional Information: No value filed.    Anesthesia Type:   General     Note:    Oropharynx: spontaneously breathing and bite block in place (Soft gauze bite blocks bilateral molars placed by DDS)  Level of Consciousness: awake  Oxygen Supplementation: face mask  Level of Supplemental Oxygen (L/min / FiO2): 6  Independent Airway: airway patency satisfactory and stable  Dentition: dentition unchanged  Vital Signs Stable: post-procedure vital signs reviewed and stable  Report to RN Given: handoff report given  Patient transferred to: PACU  Comments:     Neuromuscular blockade reversed with sugammadex, spontaneous respirations, adequate tidal volumes, oropharynx suctioned with soft flexible catheter, extubated atraumatically, extubated with suction, airway patent after extubation.  Oxygen via facemask at 6 liters per minute to PACU. Oxygen tubing connected to wall O2 in PACU, SpO2, NiBP, and EKG monitors and alarms on and functioning, report on patient's clinical status given to PACU RN, RN questions answered.             Handoff Report: Identifed the Patient, Identified the Reponsible Provider, Reviewed the pertinent medical history, Discussed the surgical course, Reviewed Intra-OP anesthesia mangement and issues during anesthesia, Set expectations for post-procedure period and Allowed opportunity for questions and acknowledgement of understanding      Vitals:  Vitals Value Taken Time   /72    Temp     Pulse 82 10/31/24 1836   Resp 18    SpO2 100 % 10/31/24 1836   Vitals shown include unfiled device data.    Electronically Signed By: GLEN Gonsalves CRNA  October 31, 2024  6:37 PM

## 2024-10-31 NOTE — PLAN OF CARE
DATE & TIME: 10/31/24, Days   Cognitive Concerns/ Orientation : Pt A/Ox4, forgetful at times  BEHAVIOR & AGGRESSION TOOL COLOR: Green   ABNL VS/O2: VSS on RA  MOBILITY: Assist x1 with gait belt and walker, fall risk  PAIN MANAGMENT: Denies  DIET: clear liquids as tolerated 1200ml fluid restriction  BOWEL/BLADDER: Continent of bowel. Mclain patent with adequate output, mclain for retention  ABNL LAB/BG: Na 123  DRAIN/DEVICES: PIV SL w/ int abx  TELEMETRY RHYTHM: N/A  SKIN: LUE shoulder wound, mepilex CDI. Coccyx blanchable redness, mepilex CDI, Angio site CDI, no bleeding.  D/C DATE: Cardiac angio completed, Tooth extraction possibly this evening  OTHER IMPORTANT INFO: ID, cardiology, WOC, SW, cardiothoracic surgery, Oral surgery , following , 500 ml bolus ordered

## 2024-10-31 NOTE — PROGRESS NOTES
[facility]    Medicine Progress Note - Hospitalist Service    Date of Admission:  10/25/2024    Assessment & Plan   Jayme Donahue is a 84 year old male with PMH prostate cancer, who was admitted on 10/25/2024 as a transfer from [facility] with Corynebacterium bacteremia and concern for endocarditis.     Corynebacterium striatum mitral valve endocarditis  Corynebacterium bacteremia  Corynebacterium UTI  Severe mitral regurgitation due to above  Septic shock secondary to above, resolved  Low back pain  Generalized weakness  Jayme Donahue initially developed nausea and poor appetite on 10/18/2024.  He presented to an outside hospital and was found to be hyponatremic with a sodium of 118 and diagnosed with sepsis secondary to a UTI.  Subsequently became hypotensive and was transferred to [facility] ICU and started on vasopressors on 10/23/2024.  He was treated with vancomycin and Rocephin.  Levophed weaned off on the night of 10/23/2024.  Blood cultures returned positive for corynebacterium striatum. TTE on 10/23/2024 showed severe mitral regurgitation and a mobile echodensity on the atrial side of the mitral valve that likely represents flail scallop although vegetation is also in the differential.  Complained of low back pain, lumbar spine MRI on 10/24/2024 showed some likely degenerative endplate changes at right L4-L5 and left L5-S1, although early changes of discitis could also be in the differential.  ID was consulted on 10/25/2024 and patient transferred to [facility] for LINDSEY. LINDSEY on 10/29 shows mitral valve endocarditis.  - Admitted to inpatient.  - ID, Cardiology, and Cardiothoracic Surgery consulted, appreciate their assistance.  - LINDSEY on 10/29/2024 showed large vegetation on atrial aspect of mitral valve, prolapse of posterior mitral valve leaflet, severe mitral regurgitation.  - Cardiology and cardiothoracic surgery recommending mitral valve surgery.  Surgery  tentatively being planned early next week.  - Pre-op dental CT showed periapical lucencies associated with multiple maxillary teeth, representing periodontitis or periapical abscess.   - OMFS consulted, planning extraction of multiple teeth later today.  - Bilateral carotid artery ultrasound showed 50-69% stenosis bilaterally.  - Coronary angiogram on 10/31/2024 showed normal coronary arteries.  - Continue vancomycin, pharmacy to dose.  - Added doxycycline for Stenotrophomonas maltophilia growing on culture from left scapular skin lesion, subsequently discontinued as noted below.  - Blood culture on 10/24/2024 grew Corynebacterium striatum.  Repeat blood cultures 10/26-10/30 have been negative to date.  - PT/OT consult, appreciate their assistance--TCU recommended.  - SW following, appreciate their assistance.     Severe hyponatremia, suspect SIADH  Sodium reportedly 118 at outside hospital when he first presented.  Felt to be secondary to dehydration, sodium mildly improved with IV fluids.  Sodium 120 on 10/23/2024 when he arrived at [facility].  Urine studies at [facility] consistent with SIADH.  He was started on salt tablets and placed on a fluid restriction.  Sodium ranged between 119-123 while he was at [facility].  - Sodium improved initially with fluid restriction, but then trended down again.  - Nephrology consulted, appreciate their assistance.  - Started on UreNa on 10/29/2024, then switched to salt tablets on 10/30/2024 per Nephrology.  - Sodium down to 118 on 10/30/2024.  Improved to 123 on 10/31/2024.  - Continue fluid restriction of 1200 ml/day.  - Sodium checks as directed by Nephrology.     Left scapular lesion  Lesion culture positive for Stenotrophomonas maltophilia  Appearance is concerning for malignancy, see photos in epic on 10/23/2024.  Patient states this lesion has been present for several months, he is not sure how it started.  He has been covering it at home to manage  drainage from the lesion.  - WO nurse consulted at [facility].  - Continue wound cares as ordered by WO nurse at previous facility.  - Initially started on doxycycline, but subsequently discontinued as this was felt to represent chronic skin colonization rather than acute infection.  - General surgery consulted to consider biopsy of chronic left scapula lesion/wound, appreciate their assistance.     History of prostate cancer  LUTS  Urinary retention s/p mclain 10/29  Reportedly treated in 2020, but details unclear.  PSA in July 2024 was 54, PSA in September 2024 was 57. Having urinary urgency, LUTS this admission. Noted to be retaining urine and mclain placed 10/29.  - Continue tamsulosin empirically.  - Continue mclain at discharge, consider trial to void in 1-2 weeks.     Moderate protein calorie malnutrition  - Dietitian consulted.          Diet: Fluid restriction 1200 ML FLUID  Fluid restriction 1200 ML FLUID  NPO per Anesthesia Guidelines for Procedure/Surgery Except for: Meds    DVT Prophylaxis: Pneumatic Compression Devices  Mclain Catheter: PRESENT, indication: Acute retention or obstruction  Lines: None     Cardiac Monitoring: None  Code Status: No CPR- Do NOT Intubate      Clinically Significant Risk Factors         # Hyponatremia: Lowest Na = 118 mmol/L in last 2 days, will monitor as appropriate  # Hypochloremia: Lowest Cl = 85 mmol/L in last 2 days, will monitor as appropriate      # Hypoalbuminemia: Lowest albumin = 2.5 g/dL at 10/31/2024  6:59 AM, will monitor as appropriate                # Severe Malnutrition: based on nutrition assessment    # Financial/Environmental Concerns:           Disposition Plan     Medically Ready for Discharge: Anticipated in 5+ Days             Yovani Aldrich MD  Hospitalist Service  [facility]  Securely message with [employer] (more info)  Text page via [email or web address] Paging/Directory    ______________________________________________________________________    Interval History   Jayme Donahue was seen this morning.  He feels okay.  No new complaints/concerns.  Denies fevers, chest pain, shortness of breath at rest, abdominal pain.  Some nausea earlier, resolved now.  Palmer catheter placed yesterday per nephrology.  Plan of care discussed with bedside nurse, cardiothoracic surgery, nephrology, and patient's wife by phone.    Physical Exam   Vital Signs: Temp: 98.2  F (36.8  C) Temp src: Oral BP: 106/61 Pulse: 81   Resp: 16 SpO2: 94 % O2 Device: None (Room air)    Weight: 125 lbs 4.8 oz    Constitutional: awake, alert, cooperative, no apparent distress, laying in the hospital bed with the head of the bed elevated  Respiratory: no increased work of breathing, clear to auscultation bilaterally, no crackles or wheezing  Cardiovascular: regular rate and rhythm, normal S1 and S2, systolic murmur noted  GI: normal bowel sounds, soft, non-distended, non-tender  Skin: warm, dry  Musculoskeletal: no lower extremity pitting edema present  Neurologic: awake, alert, answers questions appropriately, moves all extremities    Medical Decision Making       40 MINUTES SPENT BY ME on the date of service doing chart review, history, exam, documentation & further activities per the note.      Data     I have personally reviewed the following data over the past 24 hrs:    8.5  \   10.0 (L)   / 260     123 (L); 123 (L) 90 (L) 11.7 /  102 (H)   4.5 29 0.55 (L) \     ALT: N/A AST: N/A AP: N/A TBILI: N/A   ALB: 2.5 (L) TOT PROTEIN: N/A LIPASE: N/A     TSH: N/A T4: N/A A1C: 5.3       Imaging results reviewed over the past 24 hrs:   Recent Results (from the past 24 hours)   XR Chest 2 Views    Narrative    EXAM: XR CHEST 2 VIEWS  LOCATION: [facility]  DATE: 10/30/2024    INDICATION: hyponatremia r o mass  COMPARISON: None.      Impression    IMPRESSION: Heart size and pulmonary vessels are normal.  Small bilateral pleural effusions. Mild apical capping on the right likely benign pleural thickening. Lungs otherwise clear.   CT Dental wo Contrast    Narrative    EXAM: CT DENTAL WO CONTRAST  LOCATION: [facility]  DATE: 10/30/2024    INDICATION: pre op for mitral valve replacement  COMPARISON: None.  TECHNIQUE: Routine CT Maxillofacial without IV contrast. Multiplanar reformats. Dose reduction techniques were used.     FINDINGS:  OSSEOUS STRUCTURES/SOFT TISSUES: No localized soft tissue swelling/inflammation. No facial bone fracture or malalignment. Multiple dental caries. Periapical lucencies associated with multiple maxillary teeth, representing periodontitis or periapical   abscess. Bilateral temporomandibular joint degeneration.    ORBITAL CONTENTS: No acute abnormality.    SINUSES: No paranasal sinus mucosal disease.    VISUALIZED INTRACRANIAL CONTENTS: No acute abnormality.       Impression    IMPRESSION:   1.  Periapical lucencies associated with multiple maxillary teeth, representing periodontitis or periapical abscess. Multiple dental caries.     US Carotid Bilateral    Narrative    [place] [facility]  LOCATION: [facility]  DATE: 10/30/2024    INDICATION: pre op for mitral valve replacement  TECHNIQUE: Duplex exam performed utilizing 2D gray-scale imaging, Doppler interrogation with color-flow and spectral waveform analysis.  COMPARISON: None.    FINDINGS:  RIGHT: There is a mild amount of atheromatous plaque.     LEFT: There is a mild amount of atheromatous plaque.     Antegrade flow within the bilateral vertebral arteries    VELOCITY CHART:   The following velocities were obtained in the RIGHT carotid system.  CCA: 128 cm/s  ICA: 133 cm/s  ECA: 215 cm/s  ICA/CCA: PS 1.04    The following velocities were obtained in the LEFT carotid system.  CCA: 156 cm/s  ICA: 139 cm/s  ECA: 203 cm/s  ICA/CCA: PS 0.89                             Impression     CONCLUSION:  1.  On the right there is a mild amount of atheromatous plaque.  Peak systolic velocities in the ICA are 133 cm/s which correspond to the 50-69% stenosis range based on NASCET criteria.  2.  On the left there is a mild amount of atheromatous plaque.  Peak systolic velocities in the ICA are 139 cm/s which correspond to the 50-69% stenosis range based on NASCET criteria.  3.  Antegrade flow within the vertebral arteries bilaterally.    Evaluation based on velocities and NASCET criteria.   Cardiac Catheterization    Narrative    1.  Normal coronary angiography

## 2024-10-31 NOTE — PRE-PROCEDURE
GENERAL PRE-PROCEDURE:   Procedure:  Coronary angiogram  Date/Time:  10/31/2024 10:50 AM    Verbal consent obtained?: Yes    Written consent obtained?: Yes    Risks and benefits: Risks, benefits and alternatives were discussed    Consent given by:  Patient  Patient states understanding of procedure being performed: Yes    Patient's understanding of procedure matches consent: Yes    Procedure consent matches procedure scheduled: Yes    Expected level of sedation:  Moderate  Appropriately NPO:  Yes  ASA Class:  3  Mallampati  :  Grade 3- soft palate visible, posterior pharyngeal wall not visible  Lungs:  Lungs clear with good breath sounds bilaterally  Heart:  Normal heart sounds and rate  History & Physical reviewed:  History and physical reviewed and no updates needed  Statement of review:  I have reviewed the lab findings, diagnostic data, medications, and the plan for sedation

## 2024-10-31 NOTE — PROGRESS NOTES
[facility]     Renal Progress Note       SHORTHAND KEY FOR MY NOTES:  c = with, s = without, p = after, a = before, x = except, asx = asymptomatic, tx = transplant or treatment, sx = symptoms or symptomatic, cx = canceled or culture, rxn = reaction, yday = yesterday, nl = normal, abx = antibiotics, fxn = function, dx = diagnosis, dz = disease, m/h = melena/hematochezia, c/d/l/ha = cramping/dizziness/lightheadedness/headache, d/c = discharge or diarrhea/constipation, f/c/n/v = fevers/chills/nausea/vomiting, cp/sob = chest pain/shortness of breath, tbv = total body volume, rxn = reaction, tdc = tunneled dialysis catheter, pta = prior to admission, hd = hemodialysis, pd = peritoneal dialysis, hhd = home hemodialysis, edw = estimated dry wt         Assessment/Plan:     1.  Severe hyponatremia.  Pt's Na has been stable at 123.  He has SIADH and is tolerating the salt tabs better than the UreNa.  CXR looked ok and didn't show any infiltrates or suspicious lesions.  He also seems pretty dry still.  A.  Continue salt tabs.  B.  Will give a 500 ml bolus of NS.  C.  Check labs daily.     2.  Endocarditis.  Pt is on broad abx per ID.  He may need surgery for curative tx, but wonder if he is a good candidate.  A.  Continue abx.  B.  Plans per CV Surg, ID.     3.  FEN.  Other electrolytes are fine.  A.  Reg diet.    Case d/w Dr. Aldrich.        Interval History:     Pt is doing ok and has no complaints.  No n/v/twitching.          Medications and Allergies:     Current Facility-Administered Medications   Medication Dose Route Frequency Provider Last Rate Last Admin    sodium chloride (PF) 0.9% PF flush 3 mL  3 mL Intracatheter Q8H Yovani Aldrich MD   3 mL at 10/31/24 1227    sodium chloride 0.9% BOLUS 500 mL  500 mL Intravenous Once Minesh Fajardo MD        sodium chloride tablet 1 g  1 g Oral TID w/meals Minesh Fajardo MD   1 g at 10/31/24 0943    tamsulosin (FLOMAX) capsule 0.4 mg  0.4 mg Oral  Daily Shin Rodríguez MD   0.4 mg at 10/31/24 0943    vancomycin (VANCOCIN) 1,250 mg in 0.9% NaCl 262.5 mL intermittent infusion  1,250 mg Intravenous Q12H Yovani Aldrich MD   1,250 mg at 10/31/24 1228     No Known Allergies       Physical Exam:     Vitals were reviewed     , Blood pressure 97/57, pulse 76, temperature 97.7  F (36.5  C), temperature source Oral, resp. rate 16, weight 56.8 kg (125 lb 4.8 oz), SpO2 97%.  Wt Readings from Last 3 Encounters:   10/31/24 56.8 kg (125 lb 4.8 oz)   10/23/24 59 kg (130 lb)     Intake/Output Summary (Last 24 hours) at 10/31/2024 1501  Last data filed at 10/31/2024 1106  Gross per 24 hour   Intake --   Output 3250 ml   Net -3250 ml     GENERAL APPEARANCE: pleasant, NAD, alert  HEENT:  very cachectic  RESP: CTA B c good efforts  CV: RRR, nl S1/S2   ABDOMEN: s/nt/nd  EXTREMITIES/SKIN: no ble edema  OTHER:  + mclain         Data:     CBC RESULTS:     Recent Labs   Lab 10/31/24  0659 10/30/24  0627 10/26/24  0834 10/25/24  0619   WBC 8.5 8.8 8.2 8.7   RBC 3.39* 3.48* 3.77* 4.01*   HGB 10.0* 10.5* 11.3* 12.1*   HCT 28.4* 28.9* 31.4* 33.5*    280 224 209     Basic Metabolic Panel:  Recent Labs   Lab 10/31/24  0659 10/30/24  2227 10/30/24  1400 10/30/24  0627 10/30/24  0222 10/29/24  2017 10/29/24  1445 10/29/24  0600 10/27/24  1600 10/27/24  0811 10/26/24  1513 10/26/24  0834   *  123* 123* 123* 123*  123* 118* 120*   < > 121*  121*   < > 126*  126*   < > 128*  128*   POTASSIUM 4.5  --   --  4.7  --  4.2  --  4.9  --  4.3  --  4.3   CHLORIDE 90*  --   --  88*  --  85*  --  89*  --  93*  --  96*   CO2 29  --   --  29  --  33*  --  29  --  29  --  27   BUN 11.7  --   --  20.4  --  10.6  --  10.0  --  12.2  --  7.8*   CR 0.55*  --   --  0.55*  --  0.61*  --  0.51*  --  0.58*  --  0.53*   *  --   --  96  --  117*  --  90  --  95  --  87   AMALIA 8.1*  --   --  8.2*  --  8.3*  --  8.1*  --  7.9*  --  7.9*    < > = values in this interval not  displayed.     INRNo lab results found in last 7 days.   Attestation:   I have reviewed today's relevant vital signs, notes, medications, labs and imaging.    Minesh Fajardo MD  [facility] - Nephrology  [phone]

## 2024-10-31 NOTE — PROCEDURES
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 A surgical time out was performed. The patient was positioned right lateral decubitus. The patient was prepped and draped in the usual sterile fashion with Chloraprep. Using sterile technique, 1% lidocaine with epinephrine was used to infiltrate the area. After adequate anesthesia was confirmed, a 4mm punch biopsy was used to take 3 samples in the superior border of the wound.  Minor bleeding was controlled using pressure.   The skin was closed with figure of eight 4-0 prolene suture. Hemostasis was excellent. Sterile dressing were placed.  Patient tolerated the procedure well without complications. All sponge, instrument, and needle counts were correct at the conclusion of the case.      Romario Ozuna MD  [facility]  [phone]    Please route or send letter to:  Primary Care Provider (PCP) and Referring Provider

## 2024-10-31 NOTE — PLAN OF CARE
DATE & TIME: 10/30-31/24 23:00-07:30   Cognitive Concerns/ Orientation : Pt A/Ox4, forgetful   BEHAVIOR & AGGRESSION TOOL COLOR: Green   ABNL VS/O2: VSS on RA  MOBILITY: Assist x1 with gait belt and walker, fall risk  PAIN MANAGMENT: Denies  DIET: NPO 1200ml fluid restriction  BOWEL/BLADDER: Continent of bowel. Mclain patent with adequate output, mclain for retention  ABNL LAB/BG: Na 123  DRAIN/DEVICES: PIV SL w/ int abx  TELEMETRY RHYTHM: N/A  SKIN: LUE shoulder wound, mepilex CDI. Coccyx blanchable redness, mepilex CDI.  D/C DATE: Discharge pending progress  OTHER IMPORTANT INFO: ID, cardiology, WOC, SW, cardiothoracic surgery , following   Cough drop given x3 for dry throat

## 2024-10-31 NOTE — BRIEF OP NOTE
[facility]    Brief Operative Note    Pre-operative diagnosis: Bacteremia [R78.81]  Dental caries [K02.9]  Periapical abscess [K04.7]  Post-operative diagnosis Same as pre-operative diagnosis    Procedure: EXTRACTION OF TEETH 3, 5, 6, 7, 8, 9, 10, 11, 12, 13, 14, 20, 21, 23, 24, 25, 26, 27, 28, AND 29, Bilateral - Mouth    Surgeon: Surgeons and Role:     * Gabbie Neville DDS - Primary  Anesthesia: General   Estimated Blood Loss: 30 ml    Drains: None  Specimens: * No specimens in log *  Findings:   None.  Complications: None.  Implants: * No implants in log *        Gabbie Palacio DDS

## 2024-10-31 NOTE — ANESTHESIA PREPROCEDURE EVALUATION
Anesthesia Pre-Procedure Evaluation    Patient: Jayme Donahue   MRN: [id number] : 1940        Procedure : Procedure(s):  EXTRACTION, TOOTH          Past Medical History:   Diagnosis Date    Cataract     Iliac artery aneurysm (H)     Prostate cancer (H)       Past Surgical History:   Procedure Laterality Date    CV CORONARY ANGIOGRAM N/A 10/31/2024    Procedure: Coronary Angiogram;  Surgeon: Shane Ortega MD;  Location:  [facility]      No Known Allergies   Social History     Tobacco Use    Smoking status: Never    Smokeless tobacco: Never   Substance Use Topics    Alcohol use: Not Currently      Wt Readings from Last 1 Encounters:   10/31/24 56.8 kg (125 lb 4.8 oz)        Anesthesia Evaluation            ROS/MED HX  ENT/Pulmonary:    (-) asthma and sleep apnea   Neurologic:       Cardiovascular:     (+)  - -   -  - -                           valvular problems/murmurs  Endocarditis, Mitral valve vegetation.    Previous cardiac testing   Echo: Date: 10/23/24 Results:  Left ventricular systolic function is normal.  The visual ejection fraction is 60-65%.  No regional wall motion abnormalities noted.  There is severe (4+) mitral regurgitation.  Moderate mitral valve prolapse, posterior leaflet  There is mobile echodensity on atrial side of mitral valve that likely  represents flail scallop although vegetation is also in differential. Flail  chord also noted.  Suggest LINDSEY for further evaluation if clinically appropriate. The study was  technically adequate. There is no comparison study available.    Stress Test:  Date: Results:    ECG Reviewed:  Date: Results:    Cath:  Date: Results:      METS/Exercise Tolerance: >4 METS    Hematologic:       Musculoskeletal:       GI/Hepatic:       Renal/Genitourinary:       Endo:       Psychiatric/Substance Use:       Infectious Disease: Comment: Bacteremia, recent hospitalization for sepsis      Malignancy:   (+) Malignancy, History of Prostate.   "  Other:            Physical Exam    Airway        Mallampati: II   TM distance: > 3 FB   Neck ROM: full   Mouth opening: > 3 cm    Respiratory Devices and Support         Dental       (+) Multiple visibly decayed, broken teeth      Cardiovascular   cardiovascular exam normal       Rhythm and rate: regular and normal     Pulmonary   pulmonary exam normal        breath sounds clear to auscultation           OUTSIDE LABS:  CBC:   Lab Results   Component Value Date    WBC 8.5 10/31/2024    WBC 8.8 10/30/2024    HGB 10.0 (L) 10/31/2024    HGB 10.5 (L) 10/30/2024    HCT 28.4 (L) 10/31/2024    HCT 28.9 (L) 10/30/2024     10/31/2024     10/30/2024     BMP:   Lab Results   Component Value Date     (L) 10/31/2024     (L) 10/31/2024    POTASSIUM 4.5 10/31/2024    POTASSIUM 4.7 10/30/2024    CHLORIDE 90 (L) 10/31/2024    CHLORIDE 88 (L) 10/30/2024    CO2 29 10/31/2024    CO2 29 10/30/2024    BUN 11.7 10/31/2024    BUN 20.4 10/30/2024    CR 0.55 (L) 10/31/2024    CR 0.55 (L) 10/30/2024     (H) 10/31/2024    GLC 96 10/30/2024     COAGS:   Lab Results   Component Value Date    INR 1.53 (H) 10/23/2024     POC: No results found for: \"BGM\", \"HCG\", \"HCGS\"  HEPATIC:   Lab Results   Component Value Date    ALBUMIN 2.5 (L) 10/31/2024    PROTTOTAL 4.5 (L) 10/23/2024    ALT 20 10/23/2024    AST 22 10/23/2024    ALKPHOS 88 10/23/2024    BILITOTAL 0.7 10/23/2024     OTHER:   Lab Results   Component Value Date    A1C 5.3 10/31/2024    AMALIA 8.1 (L) 10/31/2024    PHOS 2.8 10/31/2024    TSH 2.65 10/29/2024       Anesthesia Plan    ASA Status:  2    NPO Status:  NPO Appropriate    Anesthesia Type: General.     - Airway: ETT   Induction: Propofol.   Maintenance: Balanced.        Consents           - Patient is DNR/DNI Status: Yes             Suspend during perioperative period? Yes.         Postoperative Care    Pain management: Multi-modal analgesia.   PONV prophylaxis: Ondansetron (or other 5HT-3), Background " Propofol Infusion     Comments:               Halina Mancia MD    I have reviewed the pertinent notes and labs in the chart from the past 30 days and (re)examined the patient.  Any updates or changes from those notes are reflected in this note.     # Hyponatremia: Lowest Na = 118 mmol/L in last 2 days, will monitor as appropriate  # Hypochloremia: Lowest Cl = 85 mmol/L in last 2 days, will monitor as appropriate      # Hypoalbuminemia: Lowest albumin = 2.5 g/dL at 10/31/2024  6:59 AM, will monitor as appropriate                # Severe Malnutrition: based on nutrition assessment    # Financial/Environmental Concerns:

## 2024-10-31 NOTE — PROGRESS NOTES
ORAL & MAXILLOFACIAL SURGERY   BRIEF NOTE  Jayme Donahue,  MRN: [id number],  : 1940           ASSESSMENT:  84 year old male with PMH of prostate cancer and current bacteremia and concern for endocarditis, currently completing cardiac work up for severe mitral regurgitation and large vegetation. Oral surgery being consulted to assess dentition for optimization and possible extractions.    Reviewed radiographs and likely indicated for full mouth tooth extractions, extraction of teeth #3, 5, 6, 7, 8, 9, 10, 11, 12, 13, 14, 20, 21, 23, 24, 25, 26, 27, 28, 29 completed in the OR    Case request has been placed, planning for OR on afternoon on 10/31/24    Please keep patient NPO and hold anticoagulation      Gabbie Palacio DDS  Oral & Maxillofacial Surgery      Please contact the FS surgeon on-call with questions or concerns.  ____________________________________

## 2024-10-31 NOTE — PROGRESS NOTES
CT surgery Progress Note    Dental CT showed multiple dental caries and possible periapical abscess. Oral surgery was consulted.  Getting a coronary angiogram today.  Pending angiogram may look at [facility] MVR vs sternotomy.  Looking at surgical date next week.   Will continue to follow.    Abdi Hodges PA-C  [phone]

## 2024-10-31 NOTE — PLAN OF CARE
Goal Outcome Evaluation:      Plan of Care Reviewed With: patient    Overall Patient Progress: improvingOverall Patient Progress: improving         Summary Corynebacterium bacteremia and concern for endocarditis  Hyponatremia, suspect SIADH,     Hx  prostate cancer, lower back pain     10/30/2024 [id number]    Orientation A & O x 4 forgetful     Vitals/Tele VSS on Rm air     Bjorn pain     IV Access/drains PIV Sl    Diet Reg, fair appetite (NPO at midnight)     Mobility Ast 1 GB/W     GI/ Palmer in place no BM this shift     Wound/Skin L Scapula wound changed, blanchable redness to buttock, mepilex in place,     Consults Card, PT, OT, WOC, SW   Completed Ultrasound, Xray & CT    Discharge Plan Pending     Plan for angiogram tomorrow     See Flow sheets for assessment

## 2024-10-31 NOTE — PROGRESS NOTES
[facility]    Cardiology Progress Note    Primary Cardiologist: New to cardiology, Will be Dr. Bingham    Date of Admission: 10/25/2024  Service Date: 10/31/24    Summary:  Mr. Jayme Donahue is a very pleasant 84 year old male with a past medical history of prostate cancer with radiation in the early 2000s and recent rising PSA's who was admitted on 10/23/2024 for urosepsis with hypotension and hyponatremia at [facility] and later transferred to [facility] on 10/25/24 for further workup with LINDSEY. Cardiology was consulted for endocarditis.    Interval History   No acute events overnight. Patient underwent coronary angiogram today which showed normal coronary arteries. No complications with right femoral arterial site.     Dental CT showed periapical lucencies associated with multiple maxillary teeth representing periodontitis or periapical abscess and oral surgery has been consulted by CT surgery team.     Telemetry: Not on telemetry    Assessment & Plan   Native mitral valve endocarditis (corynebacterium stratum, 1.6 cm vegetation on atrial aspect of the posterior leaflet, associated severe MR)  -10/23/24 Echocardiogram done at that time showed a mobile echodensity on the atrial side of the mitral valve with significant MR   -10/29/24 LINDSEY large echodensity roughly 1.6 cm in length with an associated flail segment and severe anteriorly directed MR     Severe eccentric, anteriorly directed MR related to #1    Remote prostate cancer s/p radiation with climbing PSA recently, though no impending treatment per patient  -PSA in July 2024 was 54, PSA in September 2024 was 57     Hyponatremia  -Nephrology following and managing     Protein calorie malnutrition       Plan:   1. Appreciate continued following by CV surgery team   2. Antibiotics per ID   3. Cardiology will sign off    Thank you for the opportunity to participate in this pleasant patient's care.      GLEN Donaldson, CNP   Nurse Practitioner  [facility]  (8am - 5pm, M-F)    Patient Active Problem List   Diagnosis    Prostate cancer (H)    Septic shock (H)    Bacteremia       Physical Exam   Temp: 98  F (36.7  C) Temp src: Oral BP: 103/63 Pulse: 85   Resp: 16 SpO2: 98 % O2 Device: None (Room air)    Vitals:    10/29/24 0404 10/31/24 0450   Weight: 60.8 kg (134 lb) 56.8 kg (125 lb 4.8 oz)     Vital Signs with Ranges  Temp:  [98  F (36.7  C)-98.4  F (36.9  C)] 98  F (36.7  C)  Pulse:  [69-86] 85  Resp:  [16-17] 16  BP: (102-116)/(55-66) 103/63  SpO2:  [94 %-98 %] 98 %  I/O last 3 completed shifts:  In: 406 [P.O.:400; I.V.:6]  Out: 2650 [Urine:2650]    Constitutional:  Appears his stated age, well nourished, and in no acute distress.  Eyes: Pupils equal, round. Sclerae anicteric.   HEENT: Normocephalic, atraumatic.   Neck: Supple.  No JVD appreciated.  Respiratory: Breathing non-labored. Lungs clear to auscultation bilaterally. No crackles, wheezes, rhonchi, or rales.  Cardiovascular: Regular rate and rhythm, normal S1 and S2. No murmur, rub, or gallop.  GI: Soft, non-distended, non-tender, bowel sounds present in all four quadrants.  Skin: Warm, dry. Right femoral arterial puncture site with no bleeding or bruising, soft and non-tender  Musculoskeletal/Extremities: Moves all extremities well and symmetrically. No edema.  Neurologic: No gross focal deficits. Alert, awake, and oriented to person, place and time.  Psychiatric: Affect appropriate. Mentation normal.    Medications   Current Facility-Administered Medications   Medication Dose Route Frequency Provider Last Rate Last Admin     Current Facility-Administered Medications   Medication Dose Route Frequency Provider Last Rate Last Admin    sodium chloride (PF) 0.9% PF flush 3 mL  3 mL Intracatheter Q8H EkloYovani abebe MD   3 mL at 10/31/24 1227    sodium chloride tablet 1 g  1 g Oral TID w/meals Minesh Fajardo MD   1 g at  10/31/24 0943    tamsulosin (FLOMAX) capsule 0.4 mg  0.4 mg Oral Daily Shin Rodríguez MD   0.4 mg at 10/31/24 0943    vancomycin (VANCOCIN) 1,250 mg in 0.9% NaCl 262.5 mL intermittent infusion  1,250 mg Intravenous Q12H Yovani Aldrich MD   1,250 mg at 10/31/24 1228       Data   Recent Results (from the past 24 hours)   XR Chest 2 Views    Narrative    EXAM: XR CHEST 2 VIEWS  LOCATION: [facility]  DATE: 10/30/2024    INDICATION: hyponatremia r o mass  COMPARISON: None.      Impression    IMPRESSION: Heart size and pulmonary vessels are normal. Small bilateral pleural effusions. Mild apical capping on the right likely benign pleural thickening. Lungs otherwise clear.   CT Dental wo Contrast    Narrative    EXAM: CT DENTAL WO CONTRAST  LOCATION: [facility]  DATE: 10/30/2024    INDICATION: pre op for mitral valve replacement  COMPARISON: None.  TECHNIQUE: Routine CT Maxillofacial without IV contrast. Multiplanar reformats. Dose reduction techniques were used.     FINDINGS:  OSSEOUS STRUCTURES/SOFT TISSUES: No localized soft tissue swelling/inflammation. No facial bone fracture or malalignment. Multiple dental caries. Periapical lucencies associated with multiple maxillary teeth, representing periodontitis or periapical   abscess. Bilateral temporomandibular joint degeneration.    ORBITAL CONTENTS: No acute abnormality.    SINUSES: No paranasal sinus mucosal disease.    VISUALIZED INTRACRANIAL CONTENTS: No acute abnormality.       Impression    IMPRESSION:   1.  Periapical lucencies associated with multiple maxillary teeth, representing periodontitis or periapical abscess. Multiple dental caries.     US Carotid Bilateral    Narrative    [place] [facility]  LOCATION: [facility]  DATE: 10/30/2024    INDICATION: pre op for mitral valve replacement  TECHNIQUE: Duplex exam performed utilizing 2D gray-scale imaging, Doppler  interrogation with color-flow and spectral waveform analysis.  COMPARISON: None.    FINDINGS:  RIGHT: There is a mild amount of atheromatous plaque.     LEFT: There is a mild amount of atheromatous plaque.     Antegrade flow within the bilateral vertebral arteries    VELOCITY CHART:   The following velocities were obtained in the RIGHT carotid system.  CCA: 128 cm/s  ICA: 133 cm/s  ECA: 215 cm/s  ICA/CCA: PS 1.04    The following velocities were obtained in the LEFT carotid system.  CCA: 156 cm/s  ICA: 139 cm/s  ECA: 203 cm/s  ICA/CCA: PS 0.89                             Impression    CONCLUSION:  1.  On the right there is a mild amount of atheromatous plaque.  Peak systolic velocities in the ICA are 133 cm/s which correspond to the 50-69% stenosis range based on NASCET criteria.  2.  On the left there is a mild amount of atheromatous plaque.  Peak systolic velocities in the ICA are 139 cm/s which correspond to the 50-69% stenosis range based on NASCET criteria.  3.  Antegrade flow within the vertebral arteries bilaterally.    Evaluation based on velocities and NASCET criteria.   Cardiac Catheterization    Narrative    1.  Normal coronary angiography         Recent Labs   Lab 10/31/24  0659 10/30/24  0627 10/26/24  0834   WBC 8.5 8.8 8.2   HGB 10.0* 10.5* 11.3*   HCT 28.4* 28.9* 31.4*   MCV 84 83 83    280 224     Recent Labs   Lab 10/31/24  0659 10/30/24  2227 10/30/24  1400 10/30/24  0627 10/30/24  0222 10/29/24  2017   *  123* 123* 123* 123*  123*   < > 120*   POTASSIUM 4.5  --   --  4.7  --  4.2   CHLORIDE 90*  --   --  88*  --  85*   CO2 29  --   --  29  --  33*   ANIONGAP 4*  --   --  6*  --  2*   *  --   --  96  --  117*   BUN 11.7  --   --  20.4  --  10.6   CR 0.55*  --   --  0.55*  --  0.61*   GFRESTIMATED >90  --   --  >90  --  >90   AMALIA 8.1*  --   --  8.2*  --  8.3*    < > = values in this interval not displayed.        This note was completed in part using Dragon voice recognition  software. Although reviewed after completion, some word and grammatical errors may occur.

## 2024-11-01 ENCOUNTER — APPOINTMENT (OUTPATIENT)
Dept: OCCUPATIONAL THERAPY | Facility: CLINIC | Age: 84
End: 2024-11-01
Attending: INTERNAL MEDICINE
Payer: MEDICARE

## 2024-11-01 ENCOUNTER — APPOINTMENT (OUTPATIENT)
Dept: CT IMAGING | Facility: CLINIC | Age: 84
End: 2024-11-01
Attending: PHYSICIAN ASSISTANT
Payer: MEDICARE

## 2024-11-01 LAB
ABO/RH(D): NORMAL
ALBUMIN SERPL BCG-MCNC: 2.8 G/DL (ref 3.5–5.2)
ANION GAP SERPL CALCULATED.3IONS-SCNC: 8 MMOL/L (ref 7–15)
ANTIBODY SCREEN: NEGATIVE
BACTERIA BLD CULT: NO GROWTH
BACTERIA BLD CULT: NO GROWTH
BUN SERPL-MCNC: 11 MG/DL (ref 8–23)
CALCIUM SERPL-MCNC: 8.3 MG/DL (ref 8.8–10.4)
CHLORIDE SERPL-SCNC: 96 MMOL/L (ref 98–107)
CREAT SERPL-MCNC: 0.67 MG/DL (ref 0.67–1.17)
EGFRCR SERPLBLD CKD-EPI 2021: >90 ML/MIN/1.73M2
ERYTHROCYTE [DISTWIDTH] IN BLOOD BY AUTOMATED COUNT: 14.7 % (ref 10–15)
GLUCOSE SERPL-MCNC: 92 MG/DL (ref 70–99)
HCO3 SERPL-SCNC: 26 MMOL/L (ref 22–29)
HCT VFR BLD AUTO: 25.9 % (ref 40–53)
HGB BLD-MCNC: 9 G/DL (ref 13.3–17.7)
MCH RBC QN AUTO: 29.8 PG (ref 26.5–33)
MCHC RBC AUTO-ENTMCNC: 34.7 G/DL (ref 31.5–36.5)
MCV RBC AUTO: 86 FL (ref 78–100)
PHOSPHATE SERPL-MCNC: 3.2 MG/DL (ref 2.5–4.5)
PLATELET # BLD AUTO: 249 10E3/UL (ref 150–450)
POTASSIUM SERPL-SCNC: 4.3 MMOL/L (ref 3.4–5.3)
RBC # BLD AUTO: 3.02 10E6/UL (ref 4.4–5.9)
SODIUM SERPL-SCNC: 130 MMOL/L (ref 135–145)
SPECIMEN EXPIRATION DATE: NORMAL
VANCOMYCIN SERPL-MCNC: 16.7 UG/ML
WBC # BLD AUTO: 6.4 10E3/UL (ref 4–11)

## 2024-11-01 PROCEDURE — 36415 COLL VENOUS BLD VENIPUNCTURE: CPT | Performed by: HOSPITALIST

## 2024-11-01 PROCEDURE — 250N000013 HC RX MED GY IP 250 OP 250 PS 637: Performed by: INTERNAL MEDICINE

## 2024-11-01 PROCEDURE — 97535 SELF CARE MNGMENT TRAINING: CPT | Mod: GO

## 2024-11-01 PROCEDURE — 74177 CT ABD & PELVIS W/CONTRAST: CPT | Mod: MG

## 2024-11-01 PROCEDURE — 250N000009 HC RX 250: Performed by: PHYSICIAN ASSISTANT

## 2024-11-01 PROCEDURE — 36415 COLL VENOUS BLD VENIPUNCTURE: CPT | Performed by: INTERNAL MEDICINE

## 2024-11-01 PROCEDURE — 86923 COMPATIBILITY TEST ELECTRIC: CPT

## 2024-11-01 PROCEDURE — 80202 ASSAY OF VANCOMYCIN: CPT | Performed by: HOSPITALIST

## 2024-11-01 PROCEDURE — G1010 CDSM STANSON: HCPCS

## 2024-11-01 PROCEDURE — 99232 SBSQ HOSP IP/OBS MODERATE 35: CPT | Performed by: HOSPITALIST

## 2024-11-01 PROCEDURE — 250N000011 HC RX IP 250 OP 636: Performed by: HOSPITALIST

## 2024-11-01 PROCEDURE — 82310 ASSAY OF CALCIUM: CPT | Performed by: INTERNAL MEDICINE

## 2024-11-01 PROCEDURE — 71260 CT THORAX DX C+: CPT | Mod: MG

## 2024-11-01 PROCEDURE — 85027 COMPLETE CBC AUTOMATED: CPT | Performed by: INTERNAL MEDICINE

## 2024-11-01 PROCEDURE — 99232 SBSQ HOSP IP/OBS MODERATE 35: CPT | Performed by: INTERNAL MEDICINE

## 2024-11-01 PROCEDURE — 120N000001 HC R&B MED SURG/OB

## 2024-11-01 PROCEDURE — 82947 ASSAY GLUCOSE BLOOD QUANT: CPT | Performed by: INTERNAL MEDICINE

## 2024-11-01 PROCEDURE — 250N000011 HC RX IP 250 OP 636: Performed by: PHYSICIAN ASSISTANT

## 2024-11-01 PROCEDURE — 86900 BLOOD TYPING SEROLOGIC ABO: CPT | Performed by: STUDENT IN AN ORGANIZED HEALTH CARE EDUCATION/TRAINING PROGRAM

## 2024-11-01 PROCEDURE — 82040 ASSAY OF SERUM ALBUMIN: CPT | Performed by: INTERNAL MEDICINE

## 2024-11-01 RX ORDER — VANCOMYCIN HYDROCHLORIDE 1 G/200ML
1000 INJECTION, SOLUTION INTRAVENOUS EVERY 12 HOURS
Status: DISCONTINUED | OUTPATIENT
Start: 2024-11-01 | End: 2024-11-11

## 2024-11-01 RX ORDER — IOPAMIDOL 755 MG/ML
63 INJECTION, SOLUTION INTRAVASCULAR ONCE
Status: COMPLETED | OUTPATIENT
Start: 2024-11-01 | End: 2024-11-01

## 2024-11-01 RX ADMIN — IOPAMIDOL 63 ML: 755 INJECTION, SOLUTION INTRAVENOUS at 06:19

## 2024-11-01 RX ADMIN — SODIUM CHLORIDE TAB 1 GM 1 G: 1 TAB at 21:15

## 2024-11-01 RX ADMIN — VANCOMYCIN HYDROCHLORIDE 1000 MG: 1 INJECTION, SOLUTION INTRAVENOUS at 15:20

## 2024-11-01 RX ADMIN — TAMSULOSIN HYDROCHLORIDE 0.4 MG: 0.4 CAPSULE ORAL at 10:55

## 2024-11-01 RX ADMIN — SODIUM CHLORIDE TAB 1 GM 1 G: 1 TAB at 10:55

## 2024-11-01 RX ADMIN — SODIUM CHLORIDE 60 ML: 9 INJECTION, SOLUTION INTRAVENOUS at 06:20

## 2024-11-01 RX ADMIN — SODIUM CHLORIDE TAB 1 GM 1 G: 1 TAB at 16:42

## 2024-11-01 NOTE — PROGRESS NOTES
[facility]    Infectious Disease Progress Note    Date of Service (when I saw the patient): 11/01/2024     Assessment & Plan   Jayme Donahue is a 84 year old male who was admitted on 10/25/2024.     Impression:  83 yo with PMH prostate cancer, who was admitted on 10/25/2024 as a transfer from [facility] with Corynebacterium bacteremia and concern for endocarditis.   Admitted with weakness and poor oral intake to an outside hospital  Blood cultures from 10/22 grew out Corynebacterium striatum, 2 out of 2  The same day he also had positive urine cultures for Corynebacterium stratum  And a wound culture from the scapular lesion positive for Corynebacterium striatum  With TTE done on 10/23 positive for a mobile echodensity on the atrial side of the mitral valve that likely represents flail scallop although vegetation is also in differential  Patient also complained of back pain but there is no abscesses or osteomyelitis on the back MRI  Of note patient has a left scapular lesion that has been present for several months  He is currently on vancomycin  He was transferred to [facility] for a LINDSEY     Recommendations   Polymicrobial culture from a wound swab from the shoulder, this is a chronic wound a swab culture is bound ot show bacteria but this is not an indicator of infection   LINDSEY:  Large vegetation noted on the atrial aspect of the mitral valve. They measure about 1.5 to 1.7 cm. There is associated flail segment and distorted mitral valve leaflet anatomy. There seems to be prolapse of the posterior mitral valve leaflet.   Noted CV surgery plan   Also noted OMFS plan    Continue on vancomycin  Appreciate  gen surg for biopsy on he chronic left scapula wound  path pending         Kike Belcher MD    Interval History   Tolerating antibiotics ok   No new rashes or issues with antibiotics   Labs reviewed   No changes to past medical, social or family history   Tiered       Physical  "Exam   Temp: 98.5  F (36.9  C) Temp src: Oral BP: 104/62 Pulse: 71   Resp: 18 SpO2: 98 % O2 Device: None (Room air) Oxygen Delivery: 6 LPM  Vitals:    10/29/24 0404 10/31/24 0450   Weight: 60.8 kg (134 lb) 56.8 kg (125 lb 4.8 oz)     Vital Signs with Ranges  Temp:  [97.6  F (36.4  C)-98.5  F (36.9  C)] 98.5  F (36.9  C)  Pulse:  [71-88] 71  Resp:  [15-18] 18  BP: ()/(57-74) 104/62  SpO2:  [95 %-100 %] 98 %    Constitutional: Awake, alert, cooperative, no apparent distress  Lungs: Clear to auscultation bilaterally, no crackles or wheezing  Cardiovascular: Regular rate and rhythm, normal S1 and S2, and no murmur noted  Abdomen: Normal bowel sounds, soft, non-distended, non-tender  Skin: skin wound on the scapula appears to the same   Other:    Medications   Current Facility-Administered Medications   Medication Dose Route Frequency Provider Last Rate Last Admin     Current Facility-Administered Medications   Medication Dose Route Frequency Provider Last Rate Last Admin    sodium chloride (PF) 0.9% PF flush 3 mL  3 mL Intracatheter Q8H Yovani Aldrich MD   3 mL at 11/01/24 1209    sodium chloride tablet 1 g  1 g Oral TID w/meals Minesh Fajardo MD   1 g at 11/01/24 1055    tamsulosin (FLOMAX) capsule 0.4 mg  0.4 mg Oral Daily Shin Rodríguez MD   0.4 mg at 11/01/24 1055    vancomycin (VANCOCIN) 1,000 mg in 200 mL dextrose intermittent infusion  1,000 mg Intravenous Q12H Yovani Aldrich MD           Data   All microbiology laboratory data reviewed.  Recent Labs   Lab Test 11/01/24  0718 10/31/24  0659 10/30/24  0627   WBC 6.4 8.5 8.8   HGB 9.0* 10.0* 10.5*   HCT 25.9* 28.4* 28.9*   MCV 86 84 83    260 280     Recent Labs   Lab Test 11/01/24  0718 10/31/24  0659 10/30/24  0627   CR 0.67 0.55* 0.55*     No lab results found.  No lab results found.    Invalid input(s): \"UC\"    All cultures:  Recent Labs   Lab 10/30/24  0627 10/29/24  0600 10/28/24  0616 10/27/24  0811 10/26/24  2252 " 10/26/24  1811 10/26/24  1513   CULTURE No growth after 2 days No growth after 3 days No growth after 4 days No Growth No Growth No Growth No Growth      Blood culture:  Results for orders placed or performed during the hospital encounter of 10/25/24   Blood Culture Arm, Left    Collection Time: 10/30/24  6:27 AM    Specimen: Arm, Left; Blood   Result Value Ref Range    Culture No growth after 2 days    Blood Culture Arm, Left    Collection Time: 10/29/24  6:00 AM    Specimen: Arm, Left; Blood   Result Value Ref Range    Culture No growth after 3 days    Blood Culture Arm, Right    Collection Time: 10/28/24  6:16 AM    Specimen: Arm, Right; Blood   Result Value Ref Range    Culture No growth after 4 days    Blood Culture Peripheral Blood    Collection Time: 10/27/24  8:11 AM    Specimen: Peripheral Blood   Result Value Ref Range    Culture No Growth    Blood Culture Arm, Right    Collection Time: 10/26/24 10:52 PM    Specimen: Arm, Right; Blood   Result Value Ref Range    Culture No Growth    Blood Culture Hand, Left    Collection Time: 10/26/24  3:13 PM    Specimen: Hand, Left; Blood   Result Value Ref Range    Culture No Growth    Results for orders placed or performed during the hospital encounter of 10/23/24   Blood Culture Arm, Right    Collection Time: 10/24/24  2:44 PM    Specimen: Arm, Right; Blood   Result Value Ref Range    Culture Positive on the 2nd day of incubation (A)     Culture Corynebacterium striatum (AA)       Urine culture:  Results for orders placed or performed during the hospital encounter of 10/25/24   Urine Culture    Collection Time: 10/26/24  6:11 PM    Specimen: Urine, Midstream   Result Value Ref Range    Culture No Growth

## 2024-11-01 NOTE — PLAN OF CARE
Goal Outcome Evaluation:      Plan of Care Reviewed With: patient      DATE & TIME: 10/31/24 [id number]       Summary:Corynebacterium bacteremia and concern for endocarditis.     Cognitive Concerns/ Orientation : A/Ox4, forgetful  BEHAVIOR & AGGRESSION TOOL COLOR: Green   ABNL VS/O2: VSS on RA  MOBILITY: Ax1gbw up to BSC  PAIN MANAGMENT: Denies pain  DIET: clear liquids, fluid restriction 1200ml  BOWEL/BLADDER: Continent of bowel. Palmer in place for  retention, no BM this shift  ABNL LAB/BG: Ca 8.1, hgb 10, Na 125  DRAIN/DEVICES: PIV SL w/ int abx  TELEMETRY RHYTHM:   SKIN: LUE shoulder wound, mepilex CDI. Coccyx blanchable redness, mepilex CDI, Angio site CDI, no bleeding.teeth 3-29 extracted.   PROCEDURES: Teeth 3-29 extracted 10/31,coronary angiogram done 10/31,CT chest abd/pelvis with contrast completed this AM  D/C DATE:Pending /TBD  OTHER IMPORTANT INFO: ID, cardiology, WOC, SW, cardiothoracic surgery, Oral surgery , following  teeth removed 10/31 and closed with snitches.

## 2024-11-01 NOTE — PROGRESS NOTES
[facility]    Medicine Progress Note - Hospitalist Service    Date of Admission:  10/25/2024    Assessment & Plan   Jayme Donahue is a 84 year old male with PMH prostate cancer, who was admitted on 10/25/2024 as a transfer from [facility] with Corynebacterium bacteremia and concern for endocarditis.     Corynebacterium striatum mitral valve endocarditis  Corynebacterium bacteremia  Corynebacterium UTI  Severe mitral regurgitation due to above  Septic shock secondary to above, resolved  Low back pain  Generalized weakness  Jayme Donahue initially developed nausea and poor appetite on 10/18/2024.  He presented to an outside hospital and was found to be hyponatremic with a sodium of 118 and diagnosed with sepsis secondary to a UTI.  Subsequently became hypotensive and was transferred to [facility] ICU and started on vasopressors on 10/23/2024.  He was treated with vancomycin and Rocephin.  Levophed weaned off on the night of 10/23/2024.  Blood cultures returned positive for corynebacterium striatum. TTE on 10/23/2024 showed severe mitral regurgitation and a mobile echodensity on the atrial side of the mitral valve that likely represents flail scallop although vegetation is also in the differential.  Complained of low back pain, lumbar spine MRI on 10/24/2024 showed some likely degenerative endplate changes at right L4-L5 and left L5-S1, although early changes of discitis could also be in the differential.  ID was consulted on 10/25/2024 and patient transferred to [facility] for LINDSEY. LINDSEY on 10/29 shows mitral valve endocarditis.  - Admitted to inpatient.  - ID, Cardiology, and Cardiothoracic Surgery consulted, appreciate their assistance.  - LINDSEY on 10/29/2024 showed large vegetation on atrial aspect of mitral valve, prolapse of posterior mitral valve leaflet, severe mitral regurgitation.  - Cardiology and cardiothoracic surgery recommending mitral valve surgery.     -  Planning for minimally invasive MVR (tissue) on Monday, November 4, 2024.  - Pre-op dental CT showed periapical lucencies associated with multiple maxillary teeth, representing periodontitis or periapical abscess.   - OMFS consulted.  - S/p full mouth tooth extraction on 10/31/2024.  Advanced to mechanical soft diet on 11/1/2024.  - S/p CT chest/abdomen/pelvis on 11/1/2024 as part of CT surgery pre-op evaluation.  - Bilateral carotid artery ultrasound showed 50-69% stenosis bilaterally.  - Coronary angiogram on 10/31/2024 showed normal coronary arteries.  - Continue vancomycin per ID, pharmacy to dose.  - Added doxycycline for Stenotrophomonas maltophilia growing on culture from left scapular skin lesion, subsequently discontinued as noted below.  - Blood culture on 10/24/2024 grew Corynebacterium striatum.  Repeat blood cultures 10/26-10/30 have been negative to date.  - PT/OT consulted, appreciate their assistance - TCU recommended.  - SW following, appreciate their assistance.     Severe hyponatremia, suspect SIADH  Sodium reportedly 118 at outside hospital when he first presented.  Felt to be secondary to dehydration, sodium mildly improved with IV fluids.  Sodium 120 on 10/23/2024 when he arrived at [facility].  Urine studies at [facility] consistent with SIADH.  He was started on salt tablets and placed on a fluid restriction.  Sodium ranged between 119-123 while he was at [facility].  - Sodium improved initially with fluid restriction, but then trended down again.  - Nephrology consulted, appreciate their assistance.  - Started on UreNa on 10/29/2024, then switched to salt tablets on 10/30/2024 per Nephrology.  - Sodium down to 118 on 10/30/2024.  Improved to 123 on 10/31/2024 and then 130 on 11/1/2024.  - Continue fluid restriction of 1200 ml/day.  - Sodium checks as directed by Nephrology.     Left scapular lesion  Lesion culture positive for Stenotrophomonas maltophilia  Appearance is  concerning for malignancy, see photos in epic on 10/23/2024.  Patient states this lesion has been present for several months, he is not sure how it started.  He has been covering it at home to manage drainage from the lesion.  - WO nurse consulted at [facility].  - Continue wound cares as ordered by [facility] nurse at previous facility.  - Initially started on doxycycline, but subsequently discontinued as this was felt to represent chronic skin colonization rather than acute infection.  - S/p biopsy by general surgery on 10/31/2024, pathology pending.   - Sutures should be removed around 11/7/2024 - 11/10/2024.     History of prostate cancer  LUTS  Urinary retention s/p mclain 10/29  Reportedly treated in 2020, but details unclear.  PSA in July 2024 was 54, PSA in September 2024 was 57. Having urinary urgency, LUTS this admission. Noted to be retaining urine and mclain placed 10/29.  - Continue tamsulosin empirically.  - Continue mclain at discharge, consider trial of voiding in 1-2 weeks.     Moderate protein calorie malnutrition  - Dietitian consulted.          Diet: Fluid restriction 1200 ML FLUID  Fluid restriction 1200 ML FLUID  Clear Liquid Diet    DVT Prophylaxis: Pneumatic Compression Devices  Mclain Catheter: PRESENT, indication: Acute retention or obstruction  Lines: None     Cardiac Monitoring: ACTIVE order. Indication: Post- PCI/Angiogram (24 hours)  Code Status: No CPR- Do NOT Intubate      Clinically Significant Risk Factors         # Hyponatremia: Lowest Na = 123 mmol/L in last 2 days, will monitor as appropriate  # Hypochloremia: Lowest Cl = 90 mmol/L in last 2 days, will monitor as appropriate      # Hypoalbuminemia: Lowest albumin = 2.5 g/dL at 10/31/2024  6:59 AM, will monitor as appropriate                # Severe Malnutrition: based on nutrition assessment    # Financial/Environmental Concerns:           Disposition Plan     Medically Ready for Discharge: Anticipated in 5+ Days             Yovani ARMANDO  MD Elinor  Hospitalist Service  [facility]  Securely message with [employer] (more info)  Text page via [email or web address] Paging/Directory   ______________________________________________________________________    Interval History   Jayme Donahue was seen this morning.  He feels pretty good.  Hungry, would like to advance his diet.  Had all of his remaining teeth removed yesterday, pain well-controlled with current medications.  Denies fevers, chest pain, shortness of breath at rest, nausea, abdominal pain.  Plan of care discussed with bedside nurse, nephrology, and patient's wife by phone today.    Physical Exam   Vital Signs: Temp: 98.5  F (36.9  C) Temp src: Oral BP: 104/62 Pulse: 71   Resp: 18 SpO2: 98 % O2 Device: None (Room air) Oxygen Delivery: 6 LPM  Weight: 125 lbs 4.8 oz    Constitutional: awake, alert, cooperative, no apparent distress, laying in the hospital bed with the head of the bed elevated  Respiratory: no increased work of breathing, clear to auscultation bilaterally, no crackles or wheezing  Cardiovascular: regular rate and rhythm, normal S1 and S2, systolic murmur noted  GI: normal bowel sounds, soft, non-distended, non-tender  Skin: warm, dry  Musculoskeletal: no lower extremity pitting edema present  Neurologic: awake, alert, answers questions appropriately, moves all extremities    Medical Decision Making       40 MINUTES SPENT BY ME on the date of service doing chart review, history, exam, documentation & further activities per the note.      Data     I have personally reviewed the following data over the past 24 hrs:    6.4  \   9.0 (L)   / 249     130 (L) 96 (L) 11.0 /  92   4.3 26 0.67 \     ALT: N/A AST: N/A AP: N/A TBILI: N/A   ALB: 2.8 (L) TOT PROTEIN: N/A LIPASE: N/A       Imaging results reviewed over the past 24 hrs:   Recent Results (from the past 24 hours)   CT Chest/Abdomen/Pelvis w Contrast    Narrative    EXAM: CT CHEST/ABDOMEN/PELVIS W CONTRAST  [facility]  DATE: 11/1/2024    INDICATION: pre op MVR, possible minimally invasive eval aorta IVC anf femoral vessels for peripheral bypass  COMPARISON: Abdomen pelvis CT March 31, 2021.  TECHNIQUE: CT scan of the chest, abdomen, and pelvis was performed following injection of IV contrast. Multiplanar reformats were obtained. Dose reduction techniques were used.   CONTRAST: 63 mL Isovue 370.    FINDINGS:   LUNGS AND PLEURA: Small posterior layering pleural effusions with adjacent dependent lower lobe atelectasis. Bilateral apical pleural scarring. Bronchiectasis.    MEDIASTINUM/AXILLAE: Normal caliber, patent thoracic and abdominal aorta. Central pulmonary arteries enhance normally as well. The superior vena cava is patent. There is no mediastinal lymphadenopathy. Normal appearance of the breasts esophagus.    CORONARY ARTERY CALCIFICATION: None.    HEPATOBILIARY: Normal.    PANCREAS: Normal.    SPLEEN: Normal.    ADRENAL GLANDS: Normal.    KIDNEYS/BLADDER: Normal kidneys and ureters. Grossly normal urinary bladder decompressed by Palmer catheter.    BOWEL: Normal.    LYMPH NODES: Normal.    VASCULATURE: The common and external iliac arteries are tortuous and widely patent. Common femoral arteries are also widely patent, measuring 12 mm axial diameter each. The abdominal and pelvic major venous structures are not opacified with contrast but   appear normal in caliber. The suprarenal IVC is opacified with contrast and appears normal. And external compression device overlies the right femoral artery, presumably secondary to the coronary angiogram performed at around 11:15 PM earlier tonight.   There is no adjacent soft tissue hemorrhage, dissection, or fistula.    PELVIC ORGANS: Normal.    MUSCULOSKELETAL: Degenerative change in the spine without compression deformity or significant bone lesion. Scattered small bone islands are similar as on the remote prior CT, of no significance.    Diffuse third  spacing throughout the body wall.      Impression    IMPRESSION:  1.  Small simple bilateral pleural effusions and diffuse third spacing.  2.  Mild atheromatous vascular changes without stenosis or aneurysm of the major arteries in the chest, abdomen, or the pelvis. The IVC and the major pelvic venous structures have a normal noncontrast appearance.  3.  No incidental finding requiring any additional evaluation is identified.

## 2024-11-01 NOTE — PROGRESS NOTES
CT surgery Progress Note    Pt with mitral valve endocarditis, large vegetation and severe mitral regurgitation.  Reviewed studies, body habitus and other health ([facility]) with Dr Ellison and Dr Mulvihill.  Plan is to proceed with surgery on Monday, November 4, 2024 with Dr Ellison for a minimally invasive MVR (tissue) 2nd case.    Reviewed with pt/wife (Pamela) surgery, hospital course and risks/benefits.  Also discussed the likely need for rehab prior to going home to continue antibiotics and to increase his strength.  All questions/concerns were addressed.  Pt continues on vancomycin for corynebacterium bacteremia.  ID following.  Still following surgical pathology from unhealing L scapular wound.  Normal coronaries.  CT chest/abd/pelvis done instead of CT MICS  Pt would take blood if necessary.    Will have colleague stop by over the weekend to ensure no questions/concerns arise. Continue to monitor.    Abdi Hodges PA-C  [phone]

## 2024-11-01 NOTE — PROGRESS NOTES
ORAL & MAXILLOFACIAL SURGERY   BRIEF NOTE  Jayme Donahue,  MRN: [id number],  : 1940         Patient brought to OR today, 10/31/24, for full mouth tooth extraction due to severe caries, periodontitis, periapical abscess in order to optimize patient for mitral valve replacement.    - Gel foam and sutures present in all extraction sites  - Full liquid diet, can advance to mechanical soft diet   - If indicated, OK to start anticoagulation medications on 24  - Patient will need to establish care with a general dentist to determine dental replacement options  - Pain control per primary team  - OMFS signing off, please contact OMFS surgeon on call with any questions or concerns    Gabbie Palacio DDS  Oral and Maxillofacial Surgery

## 2024-11-01 NOTE — CONSULTS
"SPIRITUAL HEALTH SERVICES  SPIRITUAL ASSESSMENT Consult Note  [facility]      REFERRAL SOURCE: Consult for Jainism support    Reviewed documentation. Reflective conversation shared with Avelino which integrated elements of illness and family narratives.     Avelino shared it's been a difficult week with having tooth extractions and an angiogram yesterday.  He reflected on the importance of his Worship irais and the support of his [place] community. 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    I offered spiritual and emotional support through reflective listening that affirmed emotions, experience, and meaning. Offered assurance through prayer which incorporated conversational themes, specifically asking for prayers for his wife, Pamela, and offered a reading from [facility]    PLAN: Spiritual Health remains available for support.     Stefania Lipscomb  Associate      [facility] available 24/7 for emergent requests/referrals, either by paging the on-call  or by entering an ASAP/STAT consult in Epic (this will also page the on-call ).     "

## 2024-11-01 NOTE — ANESTHESIA POSTPROCEDURE EVALUATION
Patient: Jayme Donahue    Procedure: Procedure(s):  EXTRACTION OF TEETH 3, 5, 6, 7, 8, 9, 10, 11, 12, 13, 14, 20, 21, 23, 24, 25, 26, 27, 28, AND 29       Anesthesia Type:  General    Note:  Disposition: Inpatient   Postop Pain Control: Uneventful            Sign Out: Well controlled pain   PONV: No   Neuro/Psych: Uneventful            Sign Out: Acceptable/Baseline neuro status   Airway/Respiratory: Uneventful            Sign Out: Acceptable/Baseline resp. status   CV/Hemodynamics: Uneventful            Sign Out: Acceptable CV status; No obvious hypovolemia; No obvious fluid overload   Other NRE: NONE   DID A NON-ROUTINE EVENT OCCUR? No           Last vitals:  Vitals Value Taken Time   /69 10/31/24 1900   Temp     Pulse 81 10/31/24 1907   Resp 15 10/31/24 1907   SpO2 96 % 10/31/24 1907   Vitals shown include unfiled device data.    Electronically Signed By: Guillermo Kaplan DO  October 31, 2024  7:58 PM

## 2024-11-01 NOTE — CONSULTS
ORAL & MAXILLOFACIAL SURGERY   CONSULT  Jayme Donahue,  MRN: [id number],  : 1940        ASSESSMENT   84 year old male with history of prostate cancer admitted for bacteremia and concern for endocarditis, patient is planned for mitral valve replacement, tentatively planned for week of 24      PLAN  - Full mouth tooth extraction due to severe caries, periapical abscess and severe periodontitis for optimization for mitral valve surgery, planned for week of 2024  - Discussed treatment plan with patient and patient's wife  - Reviewed that dentures can be created in the future, however decreasing sources of possible infection is indicated in preparation for valve surgery  - Reviewed that further surgery, delroy removal, may be needed for denture fabrication but this will be completed after healing from extractions and valve surgery  - Post operatively Peridex mouth rinses can be used to keep oral cavity clean  - Soft diet post operatively     Please contact the OMFS surgeon on-call with questions or concerns.    Gabbie Palacio DDS  Oral & Maxillofacial Surgery    ____________________________________________      HPI  84 year old male presented to OSH with poor appetite, nausea and UTI, patient developed sepsis and was transferred to [facility]. Here during work uo a LINDSEY showed mitral valve regurgitation and vegetation, patient is now being planned for mitral valve replacement. OMFS consulted for optimization prior to valve replacement, patient has severe caries, periodontitis and periapical abscess present throughout dentition.     HISTORY  Past Medical History:   Past Medical History:   Diagnosis Date    Cataract     Iliac artery aneurysm (H)     Prostate cancer (H)      Past Surgical History:   Past Surgical History:   Procedure Laterality Date    CV CORONARY ANGIOGRAM N/A 10/31/2024    Procedure: Coronary Angiogram;  Surgeon: Shane Ortega MD;  Location:  [facility]      Medications:   Current Facility-Administered Medications   Medication Dose Route Frequency Provider Last Rate Last Admin    acetaminophen (TYLENOL) tablet 650 mg  650 mg Oral Q4H PRN Yovani Aldrich MD        Or    acetaminophen (TYLENOL) Suppository 650 mg  650 mg Rectal Q4H PRN Yovani Aldrich MD        atropine injection 0.5 mg  0.5 mg Intravenous Once PRN Shane Ortega MD        benzocaine-menthol (CHLORASEPTIC) 6-10 MG lozenge 1 lozenge  1 lozenge Buccal Q1H PRN Dennis Willingham MD   1 lozenge at 10/31/24 1226    calcium carbonate (TUMS) chewable tablet 1,000 mg  1,000 mg Oral 4x Daily PRN Yovani Aldrich MD        fentaNYL (PF) (SUBLIMAZE) injection 25 mcg  25 mcg Intravenous Q15 Min PRN Shane Ortega MD        flumazenil (ROMAZICON) injection 0.2 mg  0.2 mg Intravenous q1 min prn Shane Ortega MD        HOLD:  Metformin and metformin containing medications if patient received IV contrast with acute kidney injury or severe chronic kidney disease (stage IV or stage V; i.e., eGFR less than 30)   Does not apply HOLD Shane Ortega MD        midazolam (VERSED) injection 0.5 mg  0.5 mg Intravenous Q5 Min PRN Shane Ortega MD        naloxone (NARCAN) injection 0.2 mg  0.2 mg Intravenous Q2 Min PRN Shane Ortega MD        Or    naloxone (NARCAN) injection 0.4 mg  0.4 mg Intravenous Q2 Min PRN Shane Ortega MD        Or    naloxone (NARCAN) injection 0.2 mg  0.2 mg Intramuscular Q2 Min PRN Shane Ortega MD        Or    naloxone (NARCAN) injection 0.4 mg  0.4 mg Intramuscular Q2 Min PRN Shane Ortega MD        ondansetron (ZOFRAN ODT) ODT tab 4 mg  4 mg Oral Q6H PRN Yovani Aldrich MD        Or    ondansetron (ZOFRAN) injection 4 mg  4 mg Intravenous Q6H PRN Yovani Aldrich MD        oxyCODONE (ROXICODONE) tablet 5 mg  5 mg Oral Q4H PRN Shane Ortega MD        Or     oxyCODONE (ROXICODONE) tablet 10 mg  10 mg Oral Q4H PRN Shane Ortega MD        prochlorperazine (COMPAZINE) injection 5 mg  5 mg Intravenous Q6H PRN Yovani Aldrich MD        Or    prochlorperazine (COMPAZINE) tablet 5 mg  5 mg Oral Q6H PRN Yovani Aldrich MD        Or    prochlorperazine (COMPAZINE) suppository 12.5 mg  12.5 mg Rectal Q12H PRN Yovani Aldrich MD        senna-docusate (SENOKOT-S/PERICOLACE) 8.6-50 MG per tablet 1 tablet  1 tablet Oral BID PRN Yovani Aldrich MD        Or    senna-docusate (SENOKOT-S/PERICOLACE) 8.6-50 MG per tablet 2 tablet  2 tablet Oral BID PRN Yovani Aldrich MD        sodium chloride (PF) 0.9% PF flush 3 mL  3 mL Intracatheter Q8H Yovani Aldrich MD   3 mL at 10/31/24 1227    sodium chloride (PF) 0.9% PF flush 3 mL  3 mL Intracatheter q1 min prn Yovani Aldrich MD        sodium chloride 0.9% BOLUS 500 mL  500 mL Intravenous Once Minesh Fajardo  mL/hr at 10/31/24 1555 500 mL at 10/31/24 1555    sodium chloride tablet 1 g  1 g Oral TID w/meals Minesh Fajardo MD   1 g at 10/31/24 0943    tamsulosin (FLOMAX) capsule 0.4 mg  0.4 mg Oral Daily Shin Rodríguez MD   0.4 mg at 10/31/24 0943    vancomycin (VANCOCIN) 1,250 mg in 0.9% NaCl 262.5 mL intermittent infusion  1,250 mg Intravenous Q12H Yovani Aldirch MD   1,250 mg at 10/31/24 1228      Current Facility-Administered Medications   Medication Dose Route Frequency Provider Last Rate Last Admin    sodium chloride (PF) 0.9% PF flush 3 mL  3 mL Intracatheter Q8H Yovani Aldrich MD   3 mL at 10/31/24 1227    sodium chloride 0.9% BOLUS 500 mL  500 mL Intravenous Once Minesh Fajardo  mL/hr at 10/31/24 1555 500 mL at 10/31/24 1555    sodium chloride tablet 1 g  1 g Oral TID w/meals Minesh Fajardo MD   1 g at 10/31/24 0943    tamsulosin (FLOMAX) capsule 0.4 mg  0.4 mg Oral Daily Shin Rodríguez MD   0.4 mg at 10/31/24 0934     vancomycin (VANCOCIN) 1,250 mg in 0.9% NaCl 262.5 mL intermittent infusion  1,250 mg Intravenous Q12H Yovani Aldrich MD   1,250 mg at 10/31/24 1228     Allergies: No Known Allergies  Social History:   Social History     Socioeconomic History    Marital status:      Spouse name: Not on file    Number of children: Not on file    Years of education: Not on file    Highest education level: Not on file   Occupational History    Not on file   Tobacco Use    Smoking status: Never    Smokeless tobacco: Never   Substance and Sexual Activity    Alcohol use: Not Currently    Drug use: Never    Sexual activity: Not on file   Other Topics Concern    Not on file   Social History Narrative    Not on file     Social Drivers of Health     Financial Resource Strain: Low Risk  (10/28/2024)    Financial Resource Strain     Within the past 12 months, have you or your family members you live with been unable to get utilities (heat, electricity) when it was really needed?: No   Food Insecurity: Low Risk  (10/28/2024)    Food Insecurity     Within the past 12 months, did you worry that your food would run out before you got money to buy more?: No     Within the past 12 months, did the food you bought just not last and you didn t have money to get more?: No   Transportation Needs: Low Risk  (10/28/2024)    Transportation Needs     Within the past 12 months, has lack of transportation kept you from medical appointments, getting your medicines, non-medical meetings or appointments, work, or from getting things that you need?: No   Physical Activity: Not on file   Stress: Not on file   Social Connections: Not on file   Interpersonal Safety: Low Risk  (10/31/2024)    Interpersonal Safety     Do you feel physically and emotionally safe where you currently live?: Yes     Within the past 12 months, have you been hit, slapped, kicked or otherwise physically hurt by someone?: No     Within the past 12 months, have you been  humiliated or emotionally abused in other ways by your partner or ex-partner?: No   Recent Concern: Interpersonal Safety - High Risk (10/23/2024)    Interpersonal Safety     Do you feel physically and emotionally safe where you currently live?: No     Within the past 12 months, have you been hit, slapped, kicked or otherwise physically hurt by someone?: No     Within the past 12 months, have you been humiliated or emotionally abused in other ways by your partner or ex-partner?: No   Housing Stability: Low Risk  (10/28/2024)    Housing Stability     Do you have housing? : Yes     Are you worried about losing your housing?: No       REVIEW OF SYSTEMS  10 point ROS reviewed and negative aside from listed in HPI    PHYSICAL EXAM  Vital Signs:   Vitals:    10/31/24 1618 10/31/24 1833 10/31/24 1845 10/31/24 1900   BP: 110/68 125/72 123/74 110/69   BP Location:       Patient Position:       Cuff Size:       Pulse:  85 86 79   Resp: 16 18 17 15   Temp: 98.2  F (36.8  C) 97.6  F (36.4  C)     TempSrc: Temporal Temporal     SpO2: 96% 100% 100% 96%   Weight:           GEN: WD/WN male, NAD  HEENT: NC/AT, EOMI, PERRL  INTRAORAL: severe caries and periodontitis throughout dentition, fractured root tips teeth #5, 6, 7, 8, 9, 10, 11,12, 23, 24, 25, 26, severe caries and periodontitis teeth #3, 13, 14, 20, 21, 27, 28, 29, mandibular delroy present, teeth #13, 14, 20, 21, mobile, no vestibular edema present, mild erythema with gingiva, dry mouth, poor dentition  CV: RRR  PULM: breathing comfortably on room air  GI: Soft, ND/NT  MSK: HOLLIDAY, no peripheral extremity edema  NEURO: AAOx4, CN II-XII intact bilaterally  PSYCH: Appropriate mood and affect            REVIEW OF LABORATORY DATA  Most Recent 3 CBC's:  Recent Labs   Lab Test 10/31/24  0659 10/30/24  0627 10/26/24  0834   WBC 8.5 8.8 8.2   HGB 10.0* 10.5* 11.3*   MCV 84 83 83    280 224      Most Recent 3 BMP's:  Recent Labs   Lab Test 10/31/24  0659 10/30/24  0344  10/30/24  1400 10/30/24  0627 10/30/24  0222 10/29/24  2017   *  123* 123* 123* 123*  123*   < > 120*   POTASSIUM 4.5  --   --  4.7  --  4.2   CHLORIDE 90*  --   --  88*  --  85*   CO2 29  --   --  29  --  33*   BUN 11.7  --   --  20.4  --  10.6   CR 0.55*  --   --  0.55*  --  0.61*   ANIONGAP 4*  --   --  6*  --  2*   AMALIA 8.1*  --   --  8.2*  --  8.3*   *  --   --  96  --  117*    < > = values in this interval not displayed.     Most Recent 2 LFT's:  Recent Labs   Lab Test 10/23/24  0519   AST 22   ALT 20   ALKPHOS 88   BILITOTAL 0.7     Most Recent INR's and Anticoagulation Dosing History:  Anticoagulation Dose History          Latest Ref Rng & Units 10/23/2024   Recent Dosing and Labs   INR 0.85 - 1.15 1.53       Details                 Most Recent 3 Troponin's:No lab results found.  Most Recent Cholesterol Panel:No lab results found.  Most Recent 6 Bacteria Isolates From Any Culture (See EPIC Reports for Culture Details):No lab results found.  Most Recent TSH, T4 and A1c Labs:  Recent Labs   Lab Test 10/31/24  0659 10/29/24  0600   TSH  --  2.65   A1C 5.3  --        IMAGING RESULTS (Include outside hospital results)     Independently reviewed, severe caries throughout dentition, heavily restored dentition, and periapical abscess teeth 5, 6, 7, 8, 9, 10, 11, 12, severe periodontitis throughout dentition

## 2024-11-01 NOTE — OP NOTE
Oral & Maxillofacial Surgery Operative Note:     Procedure:   Simple extraction of teeth #3, 5, 6, 7, 8, 9, 10, 11, 12, 13, 14, 20, 21, 23, 24, 25, 26, 27, 28, 29     Pre-Operative Diagnosis:   Caries, periapical abscess, fractured teeth, severe periodontitis    Post-Operative Diagnosis: Same     ESTIMATED BLOOD LOSS:  30 ml    LOCAL ANESTHESIA: 5 mL total of 0.25% Marcaine with 1:200,000 epinephrine.     SPECIMENS: None.     COMPLICATIONS: None.     DRAINS: none    INDICATIONS FOR PROCEDURE: severe caries, severe periodontitis, fractured teeth, periapical abscess, optimization for mitral valve surgery     DESCRIPTION OF PROCEDURE: The patient was met preoperatively and the treatment plan was confirmed with the patient, patient's wife was called and updated.  He was brought back to the operating room by the Anesthesia Service. He was transferred to the table and was induced to general anesthesia. Following induction of anesthesia, an oral endotracheal tube was placed and taped to the left by the Anesthesia Service. The patient's arms were tucked and padded, and the oral cavity was prepped. A throat pack was placed and chlorhexidine rinse was used in the oral cavity. The oral cavity was suctioned. Surgeons stepped out to scrub and returned to don sterile gown and gloves. At that time, the surgical site was squared off and a split sheet drape was placed.     First attention to left side.  15 blade was used to make a sulcular incision.  FTMP flap was elevated.  #7, 8, 9, 10, 11, 12, 13, 14, 20, 21, 23, 24, 25, 26 were elevated and delivered with forceps.  No OA communication.  Rongeur and bone file used to smooth irregular areas of alveolar bone. Sites were irrigated, curetted.  Gel foam packed into all site, 3-0 cg suture was used to reapproximate the gingiva.  Next periosteal elevator was used to reflect the marginal gingiva at site #3, 5, 6, 29, 28, 27. elevated and delivered with forceps.  No OA communication.   Rongeur and bone file used to smooth irregular areas of alveolar bone. Sites were irrigated, curetted.  Gel foam packed into all site, 3-0 cg suture was used to reapproximate the gingiva.       All sites were palpated and found to be smooth with no buccal undercuts. Extraction sites hemostatic    The patient's oral cavity was suctioned. The throat pack was removed.  The patient was turned over to the Anesthesia Service and was awakened in the OR and transferred stable to the PACU.    Complications: None.     Gabbie Palacio DDS

## 2024-11-01 NOTE — PLAN OF CARE
DATE & TIME: 10/31/24 3185-3644  Cognitive Concerns/ Orientation : A/Ox4, forgetful at times, calm and cooperative, pleasant  BEHAVIOR & AGGRESSION TOOL COLOR: Green   ABNL VS/O2: VSS on RA  MOBILITY: Assist x1 with gait belt and walker, fall risk  PAIN MANAGMENT: Denies  DIET: clear liquids, 1200ml fluid restriction  BOWEL/BLADDER: Continent of bowel. Mclain patent with adequate output, mclain for retention, no BM this shift  ABNL LAB/BG: Ca 8.1, hgb 10, Na 125  DRAIN/DEVICES: PIV SL w/ int abx  TELEMETRY RHYTHM: N/A  SKIN: LUE shoulder wound, mepilex CDI. Coccyx blanchable redness, mepilex CDI, Angio site CDI, no bleeding. All teeth extracted - minimal blood oozing, using gauze for comfort  PROCEDURES: Coronary angio completed in AM, all teeth extracted this evening-tolerated  D/C DATE  OTHER IMPORTANT INFO: ID, cardiology, WOC, SW, cardiothoracic surgery, Oral surgery , following  teeth removed this evening and closed with snitches, minimal blood oozing.

## 2024-11-01 NOTE — PHARMACY-VANCOMYCIN DOSING SERVICE
Pharmacy Vancomycin Note  Date of Service 2024  Patient's  1940   84 year old, male    Indication: Bacteremia and possible endocarditis   Day of Therapy: 11  Current vancomycin regimen:  1250 mg IV q12h  Current vancomycin monitoring method: AUC  Current vancomycin therapeutic monitoring goal: 400-600 mg*h/L    InsightRX Prediction of Current Vancomycin Regimen  Regimen: 1250 mg IV every 12 hours.  Start time: 23:59 on 10/29/2024  Exposure target: AUC24 (range)400-600 mg/L.hr   AUC24,ss: 575 mg/L.hr  Probability of AUC24 > 400: 100 %  Ctrough,ss: 15.7 mg/L  Probability of Ctrough,ss > 20: 1 %  Probability of nephrotoxicity (Lodise J CARLOS ): 11 %       Current estimated CrCl = Estimated Creatinine Clearance: 65.9 mL/min (based on SCr of 0.67 mg/dL).    Creatinine for last 3 days  10/29/2024:  8:17 PM Creatinine 0.61 mg/dL  10/30/2024:  6:27 AM Creatinine 0.55 mg/dL  10/31/2024:  6:59 AM Creatinine 0.55 mg/dL  2024:  7:18 AM Creatinine 0.67 mg/dL    Recent Vancomycin Levels (past 3 days)  10/29/2024:  8:17 PM Vancomycin 16.5 ug/mL  2024: 10:32 AM Vancomycin 16.7 ug/mL    Vancomycin IV Administrations (past 72 hours)                     vancomycin (VANCOCIN) 1,250 mg in 0.9% NaCl 262.5 mL intermittent infusion (mg) 1,250 mg New Bag 10/31/24 2342     1,250 mg New Bag  1228     1,250 mg New Bag  0002     1,250 mg New Bag 10/30/24 1208     1,250 mg New Bag  0012                    Nephrotoxins and other renal medications (From now, onward)      Start     Dose/Rate Route Frequency Ordered Stop    24 1400  vancomycin (VANCOCIN) 1,000 mg in 200 mL dextrose intermittent infusion         1,000 mg  200 mL/hr over 1 Hours Intravenous EVERY 12 HOURS 24 1234                 Contrast Orders - past 72 hours (72h ago, onward)      Start     Dose/Rate Route Frequency Stop    24 0600  iopamidol (ISOVUE-370) solution 63 mL         63 mL Intravenous ONCE 24 0619    10/31/24 1126   iopamidol (ISOVUE-370) solution  Status:  Discontinued           ONCE PRN 10/31/24 1143            Interpretation of levels and current regimen:  Vancomycin level is reflective of AUC greater than 600    Has serum creatinine changed greater than 50% in last 72 hours: No    Urine output:  good urine output    Renal Function: Stable    InsightRX Prediction of Planned New Vancomycin Regimen  Regimen: 1000 mg IV every 12 hours.  Start time: 12:33 on 11/01/2024  Exposure target: AUC24 (range)400-600 mg/L.hr   AUC24,ss: 511 mg/L.hr  Probability of AUC24 > 400: 98 %  Ctrough,ss: 13.9 mg/L  Probability of Ctrough,ss > 20: 0 %  Probability of nephrotoxicity (Lodise J CARLOS 2009): 9 %      Plan:  Decrease Dose to 1gram IV q 12 hours   Vancomycin monitoring method: AUC  Vancomycin therapeutic monitoring goal: 400-600 mg*h/L  Pharmacy will check vancomycin levels as appropriate in 1-3 Days.  Serum creatinine levels will be ordered a minimum of twice weekly.    Tavia Rivas RP

## 2024-11-01 NOTE — PROGRESS NOTES
[facility]     Renal Progress Note       SHORTHAND KEY FOR MY NOTES:  c = with, s = without, p = after, a = before, x = except, asx = asymptomatic, tx = transplant or treatment, sx = symptoms or symptomatic, cx = canceled or culture, rxn = reaction, yday = yesterday, nl = normal, abx = antibiotics, fxn = function, dx = diagnosis, dz = disease, m/h = melena/hematochezia, c/d/l/ha = cramping/dizziness/lightheadedness/headache, d/c = discharge or diarrhea/constipation, f/c/n/v = fevers/chills/nausea/vomiting, cp/sob = chest pain/shortness of breath, tbv = total body volume, rxn = reaction, tdc = tunneled dialysis catheter, pta = prior to admission, hd = hemodialysis, pd = peritoneal dialysis, hhd = home hemodialysis, edw = estimated dry wt         Assessment/Plan:     1.  Severe hyponatremia.  Pt's Na has increased to 130 p getting the fluid bolus yday.  He is still prob on the dry side.  Tolerating salt tabs.  He seems like he is eating better today p having the rotten teeth removed.  A.  Continue salt tabs.  B.  If the Na drops again, then consider another NS bolus since he's on the dry side still.  C.  Check labs daily.  D.  Continue same fluid restriction (1200 ml every day) for now.    2.  MV endocarditis.  Pt remains on broad abx per ID.  In prep for a MV repair, he had two dye loads in the past 2d, one for the angio and the other c the CT.  In addition, his BP dropped during the oral surgery yday.  He is very frail and strong consideration must be given to his long-term rehab potential before doing open heart surgery.   A.  Continue broad abx per ID.  B.  Watch renal fxn closely in the next few days.  C.  Follow clinically.    3.  FEN.  Other electrolytes are fine.  A.  Reg diet.    4.  Code Status.  Pt is DNR/DNI.    Case d/w Dr. Aldrich, bAdi Hodges, [facility].        Interval History:     Pt had all his remaining teeth extracted yday and he feels better today.  He is able to drink and eat  soft foods, which is good since he's been malnourished bc of the bad teeth.    He is unsure what the plans are for fixing his valve - open vs minimally invasive.  He had a coronary angio 2d ago and then a CT c dye yday.          Medications and Allergies:     Current Facility-Administered Medications   Medication Dose Route Frequency Provider Last Rate Last Admin    sodium chloride (PF) 0.9% PF flush 3 mL  3 mL Intracatheter Q8H Yovani Aldrich MD   3 mL at 11/01/24 1209    sodium chloride tablet 1 g  1 g Oral TID w/meals Minesh Fajardo MD   1 g at 11/01/24 1055    tamsulosin (FLOMAX) capsule 0.4 mg  0.4 mg Oral Daily Shin Rodríguez MD   0.4 mg at 11/01/24 1055    vancomycin (VANCOCIN) 1,000 mg in 200 mL dextrose intermittent infusion  1,000 mg Intravenous Q12H Yovani Aldrich MD         No Known Allergies       Physical Exam:     Vitals were reviewed     , Blood pressure 104/62, pulse 71, temperature 98.5  F (36.9  C), temperature source Oral, resp. rate 18, weight 56.8 kg (125 lb 4.8 oz), SpO2 98%.  Wt Readings from Last 3 Encounters:   10/31/24 56.8 kg (125 lb 4.8 oz)   10/23/24 59 kg (130 lb)     Intake/Output Summary (Last 24 hours) at 11/1/2024 1248  Last data filed at 11/1/2024 0350  Gross per 24 hour   Intake 1050 ml   Output 2850 ml   Net -1800 ml     GENERAL APPEARANCE: pleasant, NAD, alert  HEENT:  very cachectic  RESP: CTA B c good efforts  CV: RRR, nl S1/S2   ABDOMEN: s/nt/nd  EXTREMITIES/SKIN: no ble edema  OTHER:  + mclain         Data:     CBC RESULTS:     Recent Labs   Lab 11/01/24  0718 10/31/24  0659 10/30/24  0627 10/26/24  0834   WBC 6.4 8.5 8.8 8.2   RBC 3.02* 3.39* 3.48* 3.77*   HGB 9.0* 10.0* 10.5* 11.3*   HCT 25.9* 28.4* 28.9* 31.4*    260 280 224     Basic Metabolic Panel:  Recent Labs   Lab 11/01/24  0718 10/31/24  2105 10/31/24  0659 10/30/24  2227 10/30/24  1400 10/30/24  0627 10/30/24  0222 10/29/24  2017 10/29/24  1445 10/29/24  0600 10/27/24  1600  10/27/24  0811   * 125* 123*  123* 123* 123* 123*  123*   < > 120*   < > 121*  121*   < > 126*  126*   POTASSIUM 4.3  --  4.5  --   --  4.7  --  4.2  --  4.9  --  4.3   CHLORIDE 96*  --  90*  --   --  88*  --  85*  --  89*  --  93*   CO2 26  --  29  --   --  29  --  33*  --  29  --  29   BUN 11.0  --  11.7  --   --  20.4  --  10.6  --  10.0  --  12.2   CR 0.67  --  0.55*  --   --  0.55*  --  0.61*  --  0.51*  --  0.58*   GLC 92  --  102*  --   --  96  --  117*  --  90  --  95   AMALIA 8.3*  --  8.1*  --   --  8.2*  --  8.3*  --  8.1*  --  7.9*    < > = values in this interval not displayed.     INRNo lab results found in last 7 days.   Attestation:   I have reviewed today's relevant vital signs, notes, medications, labs and imaging.    Minesh Fajardo MD  [facility] - Nephrology  [phone]

## 2024-11-01 NOTE — PROGRESS NOTES
Patient seen and examined.  Punch biopsy wound looks to be healing well.  Suture will need to be removed in a week or so.  Pathology can be conveyed to the patient by the primary team when available.  No further recommendations, please call if there are further issues with the wound or additional questions.  Surgery will sign off.  Abdiel Quiñonez MD  General Surgery, Office [phone]

## 2024-11-02 LAB
ALBUMIN SERPL BCG-MCNC: 2.6 G/DL (ref 3.5–5.2)
ANION GAP SERPL CALCULATED.3IONS-SCNC: 11 MMOL/L (ref 7–15)
BACTERIA BLD CULT: NO GROWTH
BUN SERPL-MCNC: 12.8 MG/DL (ref 8–23)
CALCIUM SERPL-MCNC: 8.3 MG/DL (ref 8.8–10.4)
CHLORIDE SERPL-SCNC: 93 MMOL/L (ref 98–107)
CREAT SERPL-MCNC: 0.56 MG/DL (ref 0.67–1.17)
EGFRCR SERPLBLD CKD-EPI 2021: >90 ML/MIN/1.73M2
ERYTHROCYTE [DISTWIDTH] IN BLOOD BY AUTOMATED COUNT: 14.6 % (ref 10–15)
GLUCOSE SERPL-MCNC: 97 MG/DL (ref 70–99)
HCO3 SERPL-SCNC: 23 MMOL/L (ref 22–29)
HCT VFR BLD AUTO: 26.7 % (ref 40–53)
HGB BLD-MCNC: 9.1 G/DL (ref 13.3–17.7)
MCH RBC QN AUTO: 29.6 PG (ref 26.5–33)
MCHC RBC AUTO-ENTMCNC: 34.1 G/DL (ref 31.5–36.5)
MCV RBC AUTO: 87 FL (ref 78–100)
PHOSPHATE SERPL-MCNC: 2.6 MG/DL (ref 2.5–4.5)
PLATELET # BLD AUTO: 257 10E3/UL (ref 150–450)
POTASSIUM SERPL-SCNC: 4 MMOL/L (ref 3.4–5.3)
PREALB SERPL-MCNC: 8.9 MG/DL (ref 20–40)
RBC # BLD AUTO: 3.07 10E6/UL (ref 4.4–5.9)
SODIUM SERPL-SCNC: 127 MMOL/L (ref 135–145)
WBC # BLD AUTO: 8.1 10E3/UL (ref 4–11)

## 2024-11-02 PROCEDURE — 85018 HEMOGLOBIN: CPT | Performed by: INTERNAL MEDICINE

## 2024-11-02 PROCEDURE — 120N000001 HC R&B MED SURG/OB

## 2024-11-02 PROCEDURE — 36415 COLL VENOUS BLD VENIPUNCTURE: CPT | Performed by: INTERNAL MEDICINE

## 2024-11-02 PROCEDURE — 82310 ASSAY OF CALCIUM: CPT | Performed by: INTERNAL MEDICINE

## 2024-11-02 PROCEDURE — 250N000013 HC RX MED GY IP 250 OP 250 PS 637: Performed by: INTERNAL MEDICINE

## 2024-11-02 PROCEDURE — 99232 SBSQ HOSP IP/OBS MODERATE 35: CPT | Performed by: STUDENT IN AN ORGANIZED HEALTH CARE EDUCATION/TRAINING PROGRAM

## 2024-11-02 PROCEDURE — 250N000011 HC RX IP 250 OP 636: Performed by: HOSPITALIST

## 2024-11-02 RX ORDER — NALOXONE HYDROCHLORIDE 0.4 MG/ML
0.4 INJECTION, SOLUTION INTRAMUSCULAR; INTRAVENOUS; SUBCUTANEOUS
Status: DISCONTINUED | OUTPATIENT
Start: 2024-11-02 | End: 2024-11-13 | Stop reason: HOSPADM

## 2024-11-02 RX ORDER — NALOXONE HYDROCHLORIDE 0.4 MG/ML
0.2 INJECTION, SOLUTION INTRAMUSCULAR; INTRAVENOUS; SUBCUTANEOUS
Status: DISCONTINUED | OUTPATIENT
Start: 2024-11-02 | End: 2024-11-13 | Stop reason: HOSPADM

## 2024-11-02 RX ADMIN — SODIUM CHLORIDE TAB 1 GM 1 G: 1 TAB at 13:44

## 2024-11-02 RX ADMIN — VANCOMYCIN HYDROCHLORIDE 1000 MG: 1 INJECTION, SOLUTION INTRAVENOUS at 15:00

## 2024-11-02 RX ADMIN — SODIUM CHLORIDE TAB 1 GM 1 G: 1 TAB at 17:49

## 2024-11-02 RX ADMIN — SODIUM CHLORIDE TAB 1 GM 1 G: 1 TAB at 08:25

## 2024-11-02 RX ADMIN — TAMSULOSIN HYDROCHLORIDE 0.4 MG: 0.4 CAPSULE ORAL at 08:24

## 2024-11-02 RX ADMIN — VANCOMYCIN HYDROCHLORIDE 1000 MG: 1 INJECTION, SOLUTION INTRAVENOUS at 02:23

## 2024-11-02 NOTE — PROGRESS NOTES
[facility]    Medicine Progress Note - Hospitalist Service    Date of Admission:  10/25/2024    Assessment & Plan   Jayme Donahue is a 84 year old male with PMH prostate cancer, who was admitted on 10/25/2024 as a transfer from [facility] with Corynebacterium bacteremia and concern for endocarditis.     Corynebacterium striatum mitral valve endocarditis  Corynebacterium bacteremia  Corynebacterium UTI  Severe mitral regurgitation due to above  Septic shock secondary to above, resolved  Low back pain  Generalized weakness  Jayme Donahue initially developed nausea and poor appetite on 10/18/2024.  He presented to an outside hospital and was found to be hyponatremic with a sodium of 118 and diagnosed with sepsis secondary to a UTI.  Subsequently became hypotensive and was transferred to [facility] ICU and started on vasopressors on 10/23/2024.  He was treated with vancomycin and Rocephin.  Levophed weaned off on the night of 10/23/2024.  Blood cultures returned positive for corynebacterium striatum. TTE on 10/23/2024 showed severe mitral regurgitation and a mobile echodensity on the atrial side of the mitral valve that likely represents flail scallop although vegetation is also in the differential.  Complained of low back pain, lumbar spine MRI on 10/24/2024 showed some likely degenerative endplate changes at right L4-L5 and left L5-S1, although early changes of discitis could also be in the differential.  ID was consulted on 10/25/2024 and patient transferred to [facility] for LINDSEY. LINDSEY on 10/29 shows mitral valve endocarditis.  - Admitted to inpatient.  - ID, Cardiology, and Cardiothoracic Surgery consulted, appreciate their assistance.  - LINDSEY on 10/29/2024 showed large vegetation on atrial aspect of mitral valve, prolapse of posterior mitral valve leaflet, severe mitral regurgitation.  - Cardiology and cardiothoracic surgery recommending mitral valve surgery.     -  Planning for minimally invasive MVR (tissue) on Monday, November 4, 2024.  - Pre-op dental CT showed periapical lucencies associated with multiple maxillary teeth, representing periodontitis or periapical abscess.   - OMFS consulted.  - S/p full mouth tooth extraction on 10/31/2024.  Advanced to mechanical soft diet on 11/1/2024.  - S/p CT chest/abdomen/pelvis on 11/1/2024 as part of CT surgery pre-op evaluation.  - Bilateral carotid artery ultrasound showed 50-69% stenosis bilaterally.  - Coronary angiogram on 10/31/2024 showed normal coronary arteries.  - Continue vancomycin per ID, pharmacy to dose.  - Added doxycycline for Stenotrophomonas maltophilia growing on culture from left scapular skin lesion, subsequently discontinued as noted below.  - Blood culture on 10/24/2024 grew Corynebacterium striatum.  Repeat blood cultures 10/26-10/30 have been negative to date.  - PT/OT consulted, appreciate their assistance - TCU recommended.  - SW following, appreciate their assistance.     Severe hyponatremia, suspect SIADH  Sodium reportedly 118 at outside hospital when he first presented.  Felt to be secondary to dehydration, sodium mildly improved with IV fluids.  Sodium 120 on 10/23/2024 when he arrived at [facility].  Urine studies at [facility] consistent with SIADH.  He was started on salt tablets and placed on a fluid restriction.  Sodium ranged between 119-123 while he was at [facility].  - Sodium improved initially with fluid restriction, but then trended down again.  - Nephrology consulted, appreciate their assistance.  - Started on UreNa on 10/29/2024, then switched to salt tablets on 10/30/2024 per Nephrology.  - Sodium down to 118 on 10/30/2024.  Improved to 123 on 10/31/2024 and then 130 on 11/1/2024. Back down to 127 on 11/2.   - Continue fluid restriction of 1200 ml/day.  - Sodium checks as directed by Nephrology.     Left scapular lesion  Lesion culture positive for Stenotrophomonas  maltophilia  Appearance is concerning for malignancy, see photos in epic on 10/23/2024.  Patient states this lesion has been present for several months, he is not sure how it started.  He has been covering it at home to manage drainage from the lesion.  - WOC nurse consulted at [facility].  - Continue wound cares as ordered by WOC nurse at previous facility.  - Initially started on doxycycline, but subsequently discontinued as this was felt to represent chronic skin colonization rather than acute infection.  - S/p biopsy by general surgery on 10/31/2024, pathology pending.   - Sutures should be removed around 11/7/2024 - 11/10/2024.     History of prostate cancer  LUTS  Urinary retention s/p mclain 10/29  Reportedly treated in 2020, but details unclear.  PSA in July 2024 was 54, PSA in September 2024 was 57. Having urinary urgency, LUTS this admission. Noted to be retaining urine and mclain placed 10/29.  - Continue tamsulosin empirically.  - Continue mclain at discharge, consider trial of voiding in 1-2 weeks.     Moderate protein calorie malnutrition  - Dietitian consulted.          Diet: Fluid restriction 1200 ML FLUID  Fluid restriction 1200 ML FLUID  Mechanical/Dental Soft Diet  Snacks/Supplements Adult: Ensure Enlive; With Meals    DVT Prophylaxis: Pneumatic Compression Devices  Mclain Catheter: PRESENT, indication: Acute retention or obstruction  Lines: None     Cardiac Monitoring: ACTIVE order. Indication: Post- PCI/Angiogram (24 hours)  Code Status: No CPR- Do NOT Intubate      Clinically Significant Risk Factors         # Hyponatremia: Lowest Na = 125 mmol/L in last 2 days, will monitor as appropriate  # Hypochloremia: Lowest Cl = 93 mmol/L in last 2 days, will monitor as appropriate      # Hypoalbuminemia: Lowest albumin = 2.5 g/dL at 10/31/2024  6:59 AM, will monitor as appropriate                # Severe Malnutrition: based on nutrition assessment    # Financial/Environmental Concerns:           Disposition  Plan     Medically Ready for Discharge: Anticipated in 5+ Days             Elinor Wilson MD  Hospitalist Service  [facility]  Securely message with [employer] (more info)  Text page via [facility] Paging/Directory   ______________________________________________________________________    Interval History   Jayme Donahue was seen this morning.  He is feeling tired today. Not sleeping that well. Also finding it hard to eat much given the dietary restrictions (soft diet with fluid restriction). Denies pain or shortness of breath. No questions. No other concerns.     Physical Exam   Vital Signs: Temp: 97.5  F (36.4  C) Temp src: Oral BP: 114/61 Pulse: 81   Resp: 18 SpO2: 98 % O2 Device: None (Room air)    Weight: 125 lbs 4.8 oz    Constitutional: awake, alert, cooperative, no apparent distress, laying in the hospital bed with the head of the bed elevated  Respiratory: no increased work of breathing, clear to auscultation bilaterally, no crackles or wheezing  Cardiovascular: regular rate and rhythm, normal S1 and S2, systolic murmur noted  GI: normal bowel sounds, soft, non-distended, non-tender  Skin: warm, dry  Musculoskeletal: no lower extremity pitting edema present  Neurologic: awake, alert, answers questions appropriately, moves all extremities    Medical Decision Making       40 MINUTES SPENT BY ME on the date of service doing chart review, history, exam, documentation & further activities per the note.      Data     I have personally reviewed the following data over the past 24 hrs:    8.1  \   9.1 (L)   / 257     127 (L) 93 (L) 12.8 /  97   4.0 23 0.56 (L) \     ALT: N/A AST: N/A AP: N/A TBILI: N/A   ALB: 2.6 (L) TOT PROTEIN: N/A LIPASE: N/A       Imaging results reviewed over the past 24 hrs:   No results found for this or any previous visit (from the past 24 hours).

## 2024-11-02 NOTE — PROGRESS NOTES
CV Surgery Brief Progress Note    Jayme Donahue is a 84 year old male with a PMH of remote prostate treated with radiation therapy with known rising PSA who was transferred to [facility] 10/25 after presenting to [facility] 10/18 with nausea and poor appetite and found to be severely hyponatremic (Na 118) and septic with corynebacterium striatum bacteremia. TTE 10/24 with preserved biventricular function, severe 4+ MR with possible MV vegetation noted. LINDSEY 10/29 confirmed large vegetation (~1.5 cm) on the atrial surface of the mitral valve. CV surgery consulted for consideration of surgical valve replacement.       - Patient and wife agreeable to proceed with MVR. Patient agreeable to rescind DNR. Plan for minimally invasive mitral valve replacement on 11/4 with Dr. Ellison (2nd case).   - Coronary angiogram 10/31 without significant CAD.  - Carotid US 10/30 with 50-69% bilateral carotid stenosis.  - Unable to perform CT MICS due to heart center availability. CT C/A/P completed 11/1, surgeon to review.   - No neurologic deficits noted. Defer pre-op neurologic screening for cerebral septic emboli per surgeon.   - Blood culture negative since 10/26  - Poor dentition. Underwent extraction of all remaining teeth with OMFS 10/31  - Severe malnutrition in the setting of acute on chronic illness. Prealbumin 8.9. Encourage PO and protein intake. Dietician following. May need to consider TF postop to aid in healing.   - SIADH. Nephrology following. Continues on salt tabs and fluid restriction.  - Left scapular non-healing wound. Punch biopsy 10/31 by general surgery, pathology pending.     Malinda Ferreira PA-C  Cardiothoracic Surgery  Available for paging [phone] (personal pager or CV Surgery Rounding Pager)  Personal Pager: [phone]  CV Surgery Rounding Pager: [phone]  After hours please page surgeon on-call

## 2024-11-02 NOTE — PLAN OF CARE
DATE & TIME: 11/1/24: 3976-9584  Cognitive Concerns/ Orientation : A/Ox4, forgetful at times, calm and cooperative, pleasant  BEHAVIOR & AGGRESSION TOOL COLOR: Green   ABNL VS/O2: VSS except for soft BP. On RA  MOBILITY: Assist x1 with gait belt and walker. Ambulated in the barnes x2. Fall risk  PAIN MANAGMENT: Denies  DIET: Mec/dental soft diet, 1200ml fluid restriction  BOWEL/BLADDER: Continent of bowel. Mclain patent with adequate output, mclain for retention, no BM this shift  ABNL LAB/BG: Na 130- improving. (see other results in chart)   DRAIN/DEVICES: PIV SL w/ int abx  TELEMETRY RHYTHM: SR with BBB.   SKIN: LUE shoulder wound with serosanguinous drainage/ wound care/ dressing change done/ mepilex CDI. Coccyx with blanchable redness/mepilex CDI. R groin angio site CDI, no bleeding. Multiple teeth extraction 10/31.   PROCEDURES: Coronary angio completed in AM, teeth extracted 10/31.   D/C DATE: pending   OTHER IMPORTANT INFO: Plan for mitral valve replacement surgery next week on Monday. ID, cardiology, cardiothoracic surgery, oral surgery, WOC, SW following.

## 2024-11-02 NOTE — PLAN OF CARE
Goal Outcome Evaluation:      Plan of Care Reviewed With: patient    Summary: Transfer from [facility] with Corynebacterium bacteremia and concern for endocarditis. Plan for MVR procedure on 11/4   DATE & TIME: 11/2/24: 6332-2176  Cognitive Concerns/ Orientation : A/Ox4, forgetful at times, calm and cooperative, pleasant  BEHAVIOR & AGGRESSION TOOL COLOR: Green   ABNL VS/O2: VSS except for soft BP. On RA  MOBILITY: Assist x1 with gait belt and walker. Up to in the halls and to the BR.   PAIN MANAGMENT: Denies pain at rest.   DIET: Mec/dental soft diet, 1200ml fluid restriction- under restriction.   BOWEL/BLADDER: Continent of bowel. Mclian patent with adequate output, mclain for retention, BM this shift  ABNL LAB/BG: Na 127, creatinine 0.56, calcium 8.3, hgb 9.1  DRAIN/DEVICES: PIV SL w/ int abx  TELEMETRY RHYTHM: SR with BBB- asymptomatic   SKIN: LUE shoulder wound- dressing change done/ mepilex CDI- dressing due tomorrow 11/3. Coccyx with blanchable redness/mepilex CDI. R groin angio site CDI, no bleeding. Multiple teeth extraction 10/31.   PROCEDURES: Coronary angio completed today. Teeth extracted 10/31. Plan for mitral valve replacement surgery  on 11/4  D/C DATE: pending procedure and clinical improvement.   OTHER IMPORTANT INFO: Monday. ID, cardiology, cardiothoracic surgery, oral surgery, WOC, SW following.

## 2024-11-02 NOTE — PLAN OF CARE
Goal Outcome Evaluation:    DATE & TIME: 11/1-11/2/24: 8554-4329   Cognitive Concerns/ Orientation : A&Ox4, forgetful at times, pleasant, appreciative   BEHAVIOR & AGGRESSION TOOL COLOR: Green             ABNL VS/O2: VSS/On RA  MOBILITY: Assist x1 with GB/walker.   PAIN MANAGMENT: Denies  DIET: Mec/dental soft diet, 1200ml fluid restriction  BOWEL/BLADDER: Continent of bowel. Mclain patent with adequate output, mclain for retention, Diarrhea x2 incontinence.    ABNL LAB/BG: Na 130- improving. Hgb 9.0  DRAIN/DEVICES: PIV SL with intermittent abx  TELEMETRY RHYTHM: SR with BBB  SKIN: LUE shoulder wound with serosanguinous drainage/ wound care/ dressing change done/ mepilex CDI. Coccyx with blanchable redness/mepilex changed. R groin angio site C/D/I. Multiple teeth extraction 10/31.   PROCEDURES: Coronary angio completed today. Teeth extracted 10/31.   D/C DATE: pending   OTHER IMPORTANT INFO: Plan for mitral valve replacement surgery  on Monday 11/4 . ID, cardiology, cardiothoracic surgery, oral surgery, WOC, SW following, plan of care ongoing.

## 2024-11-02 NOTE — PLAN OF CARE
DATE & TIME: 11/1/24: 7920-7522  Cognitive Concerns/ Orientation : A/Ox4, forgetful at times, calm and cooperative, pleasant  BEHAVIOR & AGGRESSION TOOL COLOR: Green   ABNL VS/O2: VSS except for soft BP. On RA  MOBILITY: Assist x1 with gait belt and walker.   PAIN MANAGMENT: Denies  DIET: Mec/dental soft diet, 1200ml fluid restriction  BOWEL/BLADDER: Continent of bowel. Mclain patent with adequate output, mclain for retention, x2 smear bm this shift.   ABNL LAB/BG: Na 130- improving. Hgb 9.0  DRAIN/DEVICES: PIV SL w/ int abx  TELEMETRY RHYTHM: SR with BBB  SKIN: LUE shoulder wound with serosanguinous drainage/ wound care/ dressing change done/ mepilex CDI. Coccyx with blanchable redness/mepilex CDI. R groin angio site CDI, no bleeding. Multiple teeth extraction 10/31.   PROCEDURES: Coronary angio completed today. Teeth extracted 10/31.   D/C DATE: pending   OTHER IMPORTANT INFO: Plan for mitral valve replacement surgery  on 11/4 Monday. ID, cardiology, cardiothoracic surgery, oral surgery, WOC, SW following.

## 2024-11-03 ENCOUNTER — ANESTHESIA EVENT (OUTPATIENT)
Dept: SURGERY | Facility: CLINIC | Age: 84
End: 2024-11-03
Payer: MEDICARE

## 2024-11-03 LAB
ALBUMIN SERPL BCG-MCNC: 2.6 G/DL (ref 3.5–5.2)
ANION GAP SERPL CALCULATED.3IONS-SCNC: 5 MMOL/L (ref 7–15)
BACTERIA BLD CULT: NO GROWTH
BUN SERPL-MCNC: 14.4 MG/DL (ref 8–23)
CALCIUM SERPL-MCNC: 8 MG/DL (ref 8.8–10.4)
CHLORIDE SERPL-SCNC: 94 MMOL/L (ref 98–107)
CREAT SERPL-MCNC: 0.58 MG/DL (ref 0.67–1.17)
EGFRCR SERPLBLD CKD-EPI 2021: >90 ML/MIN/1.73M2
ERYTHROCYTE [DISTWIDTH] IN BLOOD BY AUTOMATED COUNT: 14.8 % (ref 10–15)
GLUCOSE SERPL-MCNC: 110 MG/DL (ref 70–99)
HCO3 SERPL-SCNC: 28 MMOL/L (ref 22–29)
HCT VFR BLD AUTO: 25.7 % (ref 40–53)
HGB BLD-MCNC: 8.7 G/DL (ref 13.3–17.7)
MCH RBC QN AUTO: 30 PG (ref 26.5–33)
MCHC RBC AUTO-ENTMCNC: 33.9 G/DL (ref 31.5–36.5)
MCV RBC AUTO: 89 FL (ref 78–100)
PHOSPHATE SERPL-MCNC: 2.7 MG/DL (ref 2.5–4.5)
PLATELET # BLD AUTO: 248 10E3/UL (ref 150–450)
POTASSIUM SERPL-SCNC: 4.2 MMOL/L (ref 3.4–5.3)
RBC # BLD AUTO: 2.9 10E6/UL (ref 4.4–5.9)
SODIUM SERPL-SCNC: 127 MMOL/L (ref 135–145)
WBC # BLD AUTO: 7.6 10E3/UL (ref 4–11)

## 2024-11-03 PROCEDURE — 120N000001 HC R&B MED SURG/OB

## 2024-11-03 PROCEDURE — 82374 ASSAY BLOOD CARBON DIOXIDE: CPT | Performed by: INTERNAL MEDICINE

## 2024-11-03 PROCEDURE — 250N000013 HC RX MED GY IP 250 OP 250 PS 637: Performed by: INTERNAL MEDICINE

## 2024-11-03 PROCEDURE — 36415 COLL VENOUS BLD VENIPUNCTURE: CPT | Performed by: INTERNAL MEDICINE

## 2024-11-03 PROCEDURE — 250N000011 HC RX IP 250 OP 636: Performed by: HOSPITALIST

## 2024-11-03 PROCEDURE — 82040 ASSAY OF SERUM ALBUMIN: CPT | Performed by: INTERNAL MEDICINE

## 2024-11-03 PROCEDURE — 82435 ASSAY OF BLOOD CHLORIDE: CPT | Performed by: INTERNAL MEDICINE

## 2024-11-03 PROCEDURE — 85018 HEMOGLOBIN: CPT | Performed by: INTERNAL MEDICINE

## 2024-11-03 PROCEDURE — 99232 SBSQ HOSP IP/OBS MODERATE 35: CPT | Performed by: STUDENT IN AN ORGANIZED HEALTH CARE EDUCATION/TRAINING PROGRAM

## 2024-11-03 RX ADMIN — VANCOMYCIN HYDROCHLORIDE 1000 MG: 1 INJECTION, SOLUTION INTRAVENOUS at 01:53

## 2024-11-03 RX ADMIN — VANCOMYCIN HYDROCHLORIDE 1000 MG: 1 INJECTION, SOLUTION INTRAVENOUS at 14:15

## 2024-11-03 RX ADMIN — SODIUM CHLORIDE TAB 1 GM 1 G: 1 TAB at 10:00

## 2024-11-03 RX ADMIN — TAMSULOSIN HYDROCHLORIDE 0.4 MG: 0.4 CAPSULE ORAL at 10:00

## 2024-11-03 RX ADMIN — SODIUM CHLORIDE TAB 1 GM 1 G: 1 TAB at 14:21

## 2024-11-03 RX ADMIN — SODIUM CHLORIDE TAB 1 GM 1 G: 1 TAB at 17:58

## 2024-11-03 NOTE — PLAN OF CARE
Goal Outcome Evaluation:         Summary: Transfer from [facility] with Corynebacterium bacteremia and concern for endocarditis. Plan for MVR procedure on 11/4     DATE & TIME: 11/2/24 to 11/3/24 8140-3192  Cognitive Concerns/ Orientation : A/Ox4, forgetful at times, calm and cooperative, pleasant  BEHAVIOR & AGGRESSION TOOL COLOR: Green   ABNL VS/O2: VSS on RA  MOBILITY: Assist x1 with gait belt and walker.  PAIN MANAGMENT: Denies pain at rest.   DIET: Mec/dental soft diet, 1200ml fluid restriction- (had 960/1200)  BOWEL/BLADDER: incontinent of bowel. Mclain patent with adequate output, mclain for retention ABNL LAB/BG: Na 127, creatinine 0.56, calcium 8.3, hgb 9.1  DRAIN/DEVICES: PIV SL w/ int abx  TELEMETRY RHYTHM: SR with BBB- asymptomatic   SKIN: LUE shoulder wound- dressing change done/ mepilex CDI- dressing due tomorrow 11/3. Coccyx with blanchable redness/mepilex CDI. R groin angio site CDI, no bleeding. Multiple teeth extraction 10/31.   PROCEDURES: Coronary angio on 10/31. Teeth extracted 10/31. Plan for mitral valve replacement surgery  on 11/4  D/C DATE: pending procedure and clinical improvement.   OTHER IMPORTANT INFO: Monday. ID, cardiology, cardiothoracic surgery, oral surgery, WOC, SW following.

## 2024-11-03 NOTE — PLAN OF CARE
Addended by: Juan Bull on: 1/13/2023 02:06 PM     Modules accepted: Orders Summary: Transfer from [facility] with Corynebacterium bacteremia and concern for endocarditis. Plan for MVR procedure on 11/4   DATE & TIME: 11/2/24: 4905-1195  Cognitive Concerns/ Orientation : A/Ox4, forgetful at times, calm and cooperative, pleasant  BEHAVIOR & AGGRESSION TOOL COLOR: Green   ABNL VS/O2: VSS on RA  MOBILITY: Assist x1 with gait belt and walker. Up to in the halls x1 and to the BR.   PAIN MANAGMENT: Denies pain at rest.   DIET: Mec/dental soft diet, 1200ml fluid restriction- (had 960/1200)  BOWEL/BLADDER: incontinent of bowel. Mclain patent with adequate output, mclain for retention, BM x2   ABNL LAB/BG: Na 127, creatinine 0.56, calcium 8.3, hgb 9.1  DRAIN/DEVICES: PIV SL w/ int abx  TELEMETRY RHYTHM: SR with BBB- asymptomatic   SKIN: LUE shoulder wound- dressing change done/ mepilex CDI- dressing due tomorrow 11/3. Coccyx with blanchable redness/mepilex CDI. R groin angio site CDI, no bleeding. Multiple teeth extraction 10/31.   PROCEDURES: Coronary angio on 10/31. Teeth extracted 10/31. Plan for mitral valve replacement surgery  on 11/4  D/C DATE: pending procedure and clinical improvement.   OTHER IMPORTANT INFO: Monday. ID, cardiology, cardiothoracic surgery, oral surgery, WOC, SW following.

## 2024-11-03 NOTE — PLAN OF CARE
Goal Outcome Evaluation:      Plan of Care Reviewed With: patient    Summary: Transfer from [facility] with Corynebacterium bacteremia and concern for endocarditis. Plan for MVR procedure on 11/4   DATE & TIME: 11/3/24, [id number]  Cognitive Concerns/ Orientation : A/Ox4, forgetful at times, calm and cooperative, pleasant  BEHAVIOR & AGGRESSION TOOL COLOR: Green   ABNL VS/O2: VSS on RA  MOBILITY: Assist x1 with gait belt and walker. Walked in the halls and to the BR.   PAIN MANAGMENT: Denies pain at rest.   DIET: Mec/dental soft diet, 1200ml fluid restriction, NPO at MN   BOWEL/BLADDER: incontinent of bowel. Mclain patent with adequate output, mclain for retention   ABNL LAB/BG: pending labs  DRAIN/DEVICES: PIV SL w/ int abx  TELEMETRY RHYTHM: SR with BBB- asymptomatic   SKIN: LUE shoulder wound- dressing completed on 11/3. Coccyx with blanchable redness/mepilex CDI. R groin angio site CDI, no bleeding. Multiple teeth extraction 10/31- no new signs of bleeding or discomfort.   PROCEDURES: Coronary angio on 10/31. Teeth extracted 10/31. Plan for mitral valve replacement surgery  on 11/4  D/C DATE: pending procedure and clinical improvement.   OTHER IMPORTANT INFO: Monday. ID, cardiology, cardiothoracic surgery, oral surgery, WOC, SW following.              No

## 2024-11-03 NOTE — PROGRESS NOTES
CV Surgery Brief Progress Note    Jayme Donahue is a 84 year old male with a PMH of remote prostate treated with radiation therapy with known rising PSA who was transferred to [facility] 10/25 after presenting to [facility] 10/18 with nausea and poor appetite and found to be severely hyponatremic (Na 118) and septic with corynebacterium striatum bacteremia. TTE 10/24 with preserved biventricular function, severe 4+ MR with possible MV vegetation noted. LINDSEY 10/29 confirmed large vegetation (~1.5 cm) on the atrial surface of the mitral valve. CV surgery consulted for consideration of surgical valve replacement.       - Patient and wife agreeable to proceed with MVR. Patient agreeable to rescind DNR. Plan for minimally invasive mitral valve replacement tomorrow 11/4 with Dr. Ellison (2nd case).   - NPO a midnight. Pre-op orders in place, RN to release. Pre-op teaching complete with patient. Wife updated by telephone.   - Coronary angiogram 10/31 without significant CAD.  - Carotid US 10/30 with 50-69% bilateral carotid stenosis.  - Unable to perform CT MICS due to heart center availability. CT C/A/P completed 11/1, surgeon to review.   - No neurologic deficits noted. Defer pre-op neurologic screening for cerebral septic emboli per surgeon.   - Blood culture negative since 10/26  - Poor dentition. Underwent extraction of all remaining teeth with OMFS 10/31  - Severe malnutrition in the setting of acute on chronic illness. Prealbumin 8.9. Encourage PO and protein intake. Dietician following. May need to consider TF postop to aid in healing.   - SIADH. Nephrology following. Continues on salt tabs and fluid restriction.  - Left scapular non-healing wound. Punch biopsy 10/31 by general surgery, pathology pending.     Malinda Ferreira PA-C  Cardiothoracic Surgery  Available for paging [phone] (personal pager or CV Surgery Rounding Pager)  Personal Pager: [phone]  CV Surgery Rounding Pager: [phone]  After hours please page surgeon  on-call

## 2024-11-03 NOTE — ANESTHESIA PREPROCEDURE EVALUATION
Anesthesia Pre-Procedure Evaluation    Patient: Jayme Donahue   MRN: [id number] : 1940        Procedure : Procedure(s):  REPLACEMENT, MITRAL VALVE, MINIMALLY INVASIVE          Past Medical History:   Diagnosis Date     Cataract      Iliac artery aneurysm (H)      Prostate cancer (H)       Past Surgical History:   Procedure Laterality Date     CV CORONARY ANGIOGRAM N/A 10/31/2024    Procedure: Coronary Angiogram;  Surgeon: Shane Ortega MD;  Location:  [facility]     EXTRACTION(S) DENTAL Bilateral 10/31/2024    Procedure: EXTRACTION OF TEETH 3, 5, 6, 7, 8, 9, 10, 11, 12, 13, 14, 20, 21, 23, 24, 25, 26, 27, 28, AND 29;  Surgeon: Gabbie Neville DDS;  Location:  OR      No Known Allergies   Social History     Tobacco Use     Smoking status: Never     Smokeless tobacco: Never   Substance Use Topics     Alcohol use: Not Currently      Wt Readings from Last 1 Encounters:   10/31/24 56.8 kg (125 lb 4.8 oz)        Anesthesia Evaluation   Pt has had prior anesthetic. Type: General.    No history of anesthetic complications       ROS/MED HX  ENT/Pulmonary:    (-) asthma and sleep apnea   Neurologic:  - neg neurologic ROS     Cardiovascular:     (+)  - Peripheral Vascular Disease-- Carotid Stenosis and Non Symptomatic.   -  - -                           valvular problems/murmurs  Endocarditis, Mitral valve vegetation.    Previous cardiac testing   Echo: Date: 10/23/24 Results:  Left ventricular systolic function is normal.  The visual ejection fraction is 60-65%.  No regional wall motion abnormalities noted.  There is severe (4+) mitral regurgitation.  Moderate mitral valve prolapse, posterior leaflet  There is mobile echodensity on atrial side of mitral valve that likely  represents flail scallop although vegetation is also in differential. Flail  chord also noted.  Suggest LINDSEY for further evaluation if clinically appropriate. The study was  technically adequate. There is no comparison  "study available.    Stress Test:  Date: Results:    ECG Reviewed:  Date: Results:    Cath:  Date: Results:   (-) CAD   METS/Exercise Tolerance: >4 METS    Hematologic:     (+)      anemia,          Musculoskeletal: Comment: malnutrition      GI/Hepatic:    (-) GERD and liver disease   Renal/Genitourinary: Comment: hyponatremia   (-) renal disease   Endo:    (-) Type I DM and Type II DM   Psychiatric/Substance Use:       Infectious Disease: Comment: Bacteremia, recent hospitalization for sepsis      Malignancy:   (+) Malignancy, History of Prostate.Prostate CA Remission status post Radiation.      Other:            Physical Exam    Airway        Mallampati: II   TM distance: > 3 FB   Neck ROM: full   Mouth opening: > 3 cm    Respiratory Devices and Support         Dental       (+) Edentulous      Cardiovascular          Rhythm and rate: regular and normal     Pulmonary   pulmonary exam normal            OUTSIDE LABS:  CBC:   Lab Results   Component Value Date    WBC 8.1 11/02/2024    WBC 6.4 11/01/2024    HGB 9.1 (L) 11/02/2024    HGB 9.0 (L) 11/01/2024    HCT 26.7 (L) 11/02/2024    HCT 25.9 (L) 11/01/2024     11/02/2024     11/01/2024     BMP:   Lab Results   Component Value Date     (L) 11/02/2024     (L) 11/01/2024    POTASSIUM 4.0 11/02/2024    POTASSIUM 4.3 11/01/2024    CHLORIDE 93 (L) 11/02/2024    CHLORIDE 96 (L) 11/01/2024    CO2 23 11/02/2024    CO2 26 11/01/2024    BUN 12.8 11/02/2024    BUN 11.0 11/01/2024    CR 0.56 (L) 11/02/2024    CR 0.67 11/01/2024    GLC 97 11/02/2024    GLC 92 11/01/2024     COAGS:   Lab Results   Component Value Date    INR 1.53 (H) 10/23/2024     POC: No results found for: \"BGM\", \"HCG\", \"HCGS\"  HEPATIC:   Lab Results   Component Value Date    ALBUMIN 2.6 (L) 11/02/2024    PROTTOTAL 4.5 (L) 10/23/2024    ALT 20 10/23/2024    AST 22 10/23/2024    ALKPHOS 88 10/23/2024    BILITOTAL 0.7 10/23/2024     OTHER:   Lab Results   Component Value Date    A1C 5.3 " 10/31/2024    AMALIA 8.3 (L) 11/02/2024    PHOS 2.6 11/02/2024    TSH 2.65 10/29/2024       Anesthesia Plan    ASA Status:  4    NPO Status:  NPO Appropriate    Anesthesia Type: General.     - Airway: ETT   Induction: Intravenous.   Maintenance: Inhalation.   Techniques and Equipment:     - Lines/Monitors: Arterial Line, Central Line, LINDSEY            LINDSEY Absolute Contra-indication: NONE            LINDSEY Relative Contra-indication: NONE     - Blood: Blood in Room     - Drips/Meds: Norepi, Epinephrine     Consents            Postoperative Care    Pain management: IV analgesics.   PONV prophylaxis: Ondansetron (or other 5HT-3)     Comments:             Nelson Ferreira MD    I have reviewed the pertinent notes and labs in the chart from the past 30 days and (re)examined the patient.  Any updates or changes from those notes are reflected in this note.     # Hyponatremia: Lowest Na = 127 mmol/L in last 2 days, will monitor as appropriate  # Hypochloremia: Lowest Cl = 93 mmol/L in last 2 days, will monitor as appropriate      # Hypoalbuminemia: Lowest albumin = 2.5 g/dL at 10/31/2024  6:59 AM, will monitor as appropriate                # Severe Malnutrition: based on nutrition assessment    # Financial/Environmental Concerns:

## 2024-11-04 ENCOUNTER — APPOINTMENT (OUTPATIENT)
Dept: GENERAL RADIOLOGY | Facility: CLINIC | Age: 84
End: 2024-11-04
Attending: SURGERY
Payer: MEDICARE

## 2024-11-04 ENCOUNTER — ANESTHESIA (OUTPATIENT)
Dept: SURGERY | Facility: CLINIC | Age: 84
End: 2024-11-04
Payer: MEDICARE

## 2024-11-04 LAB
ABO/RH(D): NORMAL
ALBUMIN SERPL BCG-MCNC: 2.5 G/DL (ref 3.5–5.2)
ALBUMIN SERPL BCG-MCNC: 2.6 G/DL (ref 3.5–5.2)
ALLEN'S TEST: ABNORMAL
ALP SERPL-CCNC: 83 U/L (ref 40–150)
ALT SERPL W P-5'-P-CCNC: 19 U/L (ref 0–70)
ANION GAP SERPL CALCULATED.3IONS-SCNC: 10 MMOL/L (ref 7–15)
ANION GAP SERPL CALCULATED.3IONS-SCNC: 6 MMOL/L (ref 7–15)
ANION GAP SERPL CALCULATED.3IONS-SCNC: 9 MMOL/L (ref 7–15)
ANTIBODY SCREEN: NEGATIVE
APTT PPP: 35 SECONDS (ref 22–38)
APTT PPP: 47 SECONDS (ref 22–38)
AST SERPL W P-5'-P-CCNC: 34 U/L (ref 0–45)
BACTERIA BLD CULT: NO GROWTH
BACTERIA SPEC CULT: ABNORMAL
BASE EXCESS BLDA CALC-SCNC: 0.4 MMOL/L (ref -3–3)
BASE EXCESS BLDA CALC-SCNC: 1.1 MMOL/L (ref -3–3)
BASE EXCESS BLDA CALC-SCNC: 2.3 MMOL/L (ref -3–3)
BASE EXCESS BLDA CALC-SCNC: 3 MMOL/L (ref -3–3)
BASE EXCESS BLDA CALC-SCNC: 3.4 MMOL/L (ref -3–3)
BASE EXCESS BLDA CALC-SCNC: 3.7 MMOL/L (ref -3–3)
BASE EXCESS BLDA CALC-SCNC: 4.6 MMOL/L (ref -3–3)
BASE EXCESS BLDA CALC-SCNC: 5.2 MMOL/L (ref -3–3)
BASE EXCESS BLDV CALC-SCNC: 5.6 MMOL/L (ref -3–3)
BILIRUB SERPL-MCNC: 0.6 MG/DL
BLD PROD TYP BPU: NORMAL
BLOOD COMPONENT TYPE: NORMAL
BUN SERPL-MCNC: 10.7 MG/DL (ref 8–23)
BUN SERPL-MCNC: 9.4 MG/DL (ref 8–23)
BUN SERPL-MCNC: 9.5 MG/DL (ref 8–23)
CA-I BLD-MCNC: 3.9 MG/DL (ref 4.4–5.2)
CA-I BLD-MCNC: 4 MG/DL (ref 4.4–5.2)
CA-I BLD-MCNC: 4 MG/DL (ref 4.4–5.2)
CA-I BLD-MCNC: 4.1 MG/DL (ref 4.4–5.2)
CA-I BLD-MCNC: 4.2 MG/DL (ref 4.4–5.2)
CA-I BLD-MCNC: 4.5 MG/DL (ref 4.4–5.2)
CA-I BLD-MCNC: 4.6 MG/DL (ref 4.4–5.2)
CA-I BLD-MCNC: 4.9 MG/DL (ref 4.4–5.2)
CA-I BLD-MCNC: 5.1 MG/DL (ref 4.4–5.2)
CALCIUM SERPL-MCNC: 8.1 MG/DL (ref 8.8–10.4)
CALCIUM SERPL-MCNC: 8.3 MG/DL (ref 8.8–10.4)
CALCIUM SERPL-MCNC: 8.9 MG/DL (ref 8.8–10.4)
CHLORIDE SERPL-SCNC: 91 MMOL/L (ref 98–107)
CHLORIDE SERPL-SCNC: 97 MMOL/L (ref 98–107)
CHLORIDE SERPL-SCNC: 99 MMOL/L (ref 98–107)
CLOT INIT KAOL IND TO POST HEP NEUT TRTO: 1 {RATIO}
CLOT INIT KAOL IND TO POST HEP NEUT TRTO: 1 {RATIO}
CLOT INIT KAOLIN IND BLD US: 126 SEC (ref 113–166)
CLOT INIT KAOLIN IND BLD US: 127 SEC (ref 113–166)
CLOT INIT KAOLIN IND P HEP NEUT BLD US: 124 SEC (ref 103–153)
CLOT INIT KAOLIN IND P HEP NEUT BLD US: 131 SEC (ref 103–153)
CLOT STIFF PLT CONT BLD CALC: 15.1 HPA (ref 11.9–29.8)
CLOT STIFF PLT CONT BLD CALC: 22.9 HPA (ref 11.9–29.8)
CLOT STIFF TF IND P HEP NEUT BLD US: 17 HPA (ref 13–33.2)
CLOT STIFF TF IND P HEP NEUT BLD US: 25.7 HPA (ref 13–33.2)
CLOT STIFF TF IND+IIB-IIIA INH P HEP NEU: 1.9 HPA (ref 1–3.7)
CLOT STIFF TF IND+IIB-IIIA INH P HEP NEU: 2.8 HPA (ref 1–3.7)
CODING SYSTEM: NORMAL
COHGB MFR BLD: 99.3 % (ref 95–96)
CREAT SERPL-MCNC: 0.52 MG/DL (ref 0.67–1.17)
CREAT SERPL-MCNC: 0.53 MG/DL (ref 0.67–1.17)
CREAT SERPL-MCNC: 0.59 MG/DL (ref 0.67–1.17)
CROSSMATCH: NORMAL
EGFRCR SERPLBLD CKD-EPI 2021: >90 ML/MIN/1.73M2
ERYTHROCYTE [DISTWIDTH] IN BLOOD BY AUTOMATED COUNT: 14.9 % (ref 10–15)
ERYTHROCYTE [DISTWIDTH] IN BLOOD BY AUTOMATED COUNT: 15.1 % (ref 10–15)
ERYTHROCYTE [DISTWIDTH] IN BLOOD BY AUTOMATED COUNT: 15.2 % (ref 10–15)
ERYTHROCYTE [DISTWIDTH] IN BLOOD BY AUTOMATED COUNT: 15.3 % (ref 10–15)
FIBRINOGEN PPP-MCNC: 242 MG/DL (ref 170–510)
FIBRINOGEN PPP-MCNC: 249 MG/DL (ref 170–510)
GLUCOSE BLD-MCNC: 105 MG/DL (ref 70–99)
GLUCOSE BLD-MCNC: 106 MG/DL (ref 70–99)
GLUCOSE BLD-MCNC: 106 MG/DL (ref 70–99)
GLUCOSE BLD-MCNC: 151 MG/DL (ref 70–99)
GLUCOSE BLD-MCNC: 176 MG/DL (ref 70–99)
GLUCOSE BLD-MCNC: 183 MG/DL (ref 70–99)
GLUCOSE BLD-MCNC: 186 MG/DL (ref 70–99)
GLUCOSE BLD-MCNC: 91 MG/DL (ref 70–99)
GLUCOSE BLDC GLUCOMTR-MCNC: 134 MG/DL (ref 70–99)
GLUCOSE SERPL-MCNC: 159 MG/DL (ref 70–99)
GLUCOSE SERPL-MCNC: 189 MG/DL (ref 70–99)
GLUCOSE SERPL-MCNC: 92 MG/DL (ref 70–99)
GRAM STAIN RESULT: ABNORMAL
HCO3 BLD-SCNC: 27 MMOL/L (ref 21–28)
HCO3 BLDA-SCNC: 26 MMOL/L (ref 21–28)
HCO3 BLDA-SCNC: 26 MMOL/L (ref 21–28)
HCO3 BLDA-SCNC: 28 MMOL/L (ref 21–28)
HCO3 BLDA-SCNC: 28 MMOL/L (ref 21–28)
HCO3 BLDA-SCNC: 29 MMOL/L (ref 21–28)
HCO3 BLDV-SCNC: 31 MMOL/L (ref 21–28)
HCO3 SERPL-SCNC: 25 MMOL/L (ref 22–29)
HCO3 SERPL-SCNC: 25 MMOL/L (ref 22–29)
HCO3 SERPL-SCNC: 29 MMOL/L (ref 22–29)
HCT VFR BLD AUTO: 23.5 % (ref 40–53)
HCT VFR BLD AUTO: 24.2 % (ref 40–53)
HCT VFR BLD AUTO: 26.5 % (ref 40–53)
HCT VFR BLD AUTO: 26.5 % (ref 40–53)
HGB BLD-MCNC: 6.7 G/DL (ref 13.3–17.7)
HGB BLD-MCNC: 6.7 G/DL (ref 13.3–17.7)
HGB BLD-MCNC: 7.6 G/DL (ref 13.3–17.7)
HGB BLD-MCNC: 8 G/DL (ref 13.3–17.7)
HGB BLD-MCNC: 8 G/DL (ref 13.3–17.7)
HGB BLD-MCNC: 8.1 G/DL (ref 13.3–17.7)
HGB BLD-MCNC: 8.1 G/DL (ref 13.3–17.7)
HGB BLD-MCNC: 8.3 G/DL (ref 13.3–17.7)
HGB BLD-MCNC: 8.7 G/DL (ref 13.3–17.7)
HGB BLD-MCNC: 8.7 G/DL (ref 13.3–17.7)
HGB BLD-MCNC: 8.9 G/DL (ref 13.3–17.7)
HGB BLD-MCNC: 9.3 G/DL (ref 13.3–17.7)
INR PPP: 1.8 (ref 0.85–1.15)
INR PPP: 2.04 (ref 0.85–1.15)
ISSUE DATE AND TIME: NORMAL
LACTATE BLD-SCNC: 0.5 MMOL/L (ref 0.7–2)
LACTATE BLD-SCNC: 0.7 MMOL/L (ref 0.7–2)
LACTATE BLD-SCNC: 0.8 MMOL/L (ref 0.7–2)
LACTATE BLD-SCNC: 0.8 MMOL/L (ref 0.7–2)
LACTATE BLD-SCNC: 1.3 MMOL/L (ref 0.7–2)
LACTATE BLD-SCNC: 1.3 MMOL/L (ref 0.7–2)
LACTATE BLD-SCNC: 2.1 MMOL/L (ref 0.7–2)
LACTATE BLD-SCNC: 2.1 MMOL/L (ref 0.7–2)
LACTATE SERPL-SCNC: 1.6 MMOL/L (ref 0.7–2)
LACTATE SERPL-SCNC: 2.1 MMOL/L (ref 0.7–2)
MAGNESIUM SERPL-MCNC: 2.5 MG/DL (ref 1.7–2.3)
MCH RBC QN AUTO: 29.4 PG (ref 26.5–33)
MCH RBC QN AUTO: 30 PG (ref 26.5–33)
MCH RBC QN AUTO: 30.2 PG (ref 26.5–33)
MCH RBC QN AUTO: 30.4 PG (ref 26.5–33)
MCHC RBC AUTO-ENTMCNC: 33.6 G/DL (ref 31.5–36.5)
MCHC RBC AUTO-ENTMCNC: 34 G/DL (ref 31.5–36.5)
MCHC RBC AUTO-ENTMCNC: 34.3 G/DL (ref 31.5–36.5)
MCHC RBC AUTO-ENTMCNC: 35.1 G/DL (ref 31.5–36.5)
MCV RBC AUTO: 87 FL (ref 78–100)
MCV RBC AUTO: 88 FL (ref 78–100)
O2/TOTAL GAS SETTING VFR VENT: 100 %
O2/TOTAL GAS SETTING VFR VENT: 100 %
O2/TOTAL GAS SETTING VFR VENT: 30 %
O2/TOTAL GAS SETTING VFR VENT: 60 %
O2/TOTAL GAS SETTING VFR VENT: 75 %
O2/TOTAL GAS SETTING VFR VENT: 80 %
O2/TOTAL GAS SETTING VFR VENT: 80 %
OXYHGB MFR BLDA: 99 % (ref 92–100)
OXYHGB MFR BLDV: 78 % (ref 70–75)
PATH REPORT.COMMENTS IMP SPEC: ABNORMAL
PATH REPORT.COMMENTS IMP SPEC: ABNORMAL
PATH REPORT.COMMENTS IMP SPEC: YES
PATH REPORT.FINAL DX SPEC: ABNORMAL
PATH REPORT.GROSS SPEC: ABNORMAL
PATH REPORT.MICROSCOPIC SPEC OTHER STN: ABNORMAL
PATH REPORT.RELEVANT HX SPEC: ABNORMAL
PCO2 BLD: 46 MM HG (ref 35–45)
PCO2 BLDA: 36 MM HG (ref 35–45)
PCO2 BLDA: 39 MM HG (ref 35–45)
PCO2 BLDA: 40 MM HG (ref 35–45)
PCO2 BLDA: 43 MM HG (ref 35–45)
PCO2 BLDA: 43 MM HG (ref 35–45)
PCO2 BLDA: 46 MM HG (ref 35–45)
PCO2 BLDA: 48 MM HG (ref 35–45)
PCO2 BLDV: 47 MM HG (ref 40–50)
PH BLD: 7.37 [PH] (ref 7.35–7.45)
PH BLDA: 7.36 [PH] (ref 7.35–7.45)
PH BLDA: 7.38 [PH] (ref 7.35–7.45)
PH BLDA: 7.42 [PH] (ref 7.35–7.45)
PH BLDA: 7.43 [PH] (ref 7.35–7.45)
PH BLDA: 7.47 [PH] (ref 7.35–7.45)
PH BLDA: 7.48 [PH] (ref 7.35–7.45)
PH BLDA: 7.48 [PH] (ref 7.35–7.45)
PH BLDV: 7.43 [PH] (ref 7.32–7.43)
PHOSPHATE SERPL-MCNC: 2.9 MG/DL (ref 2.5–4.5)
PHOSPHATE SERPL-MCNC: 3 MG/DL (ref 2.5–4.5)
PHOTO IMAGE: ABNORMAL
PLATELET # BLD AUTO: 160 10E3/UL (ref 150–450)
PLATELET # BLD AUTO: 184 10E3/UL (ref 150–450)
PLATELET # BLD AUTO: 205 10E3/UL (ref 150–450)
PLATELET # BLD AUTO: 270 10E3/UL (ref 150–450)
PO2 BLD: 123 MM HG (ref 80–105)
PO2 BLDA: 251 MM HG (ref 80–105)
PO2 BLDA: 388 MM HG (ref 80–105)
PO2 BLDA: 389 MM HG (ref 80–105)
PO2 BLDA: 396 MM HG (ref 80–105)
PO2 BLDA: 432 MM HG (ref 80–105)
PO2 BLDA: 451 MM HG (ref 80–105)
PO2 BLDA: 512 MM HG (ref 80–105)
PO2 BLDV: 44 MM HG (ref 25–47)
POTASSIUM BLD-SCNC: 3.4 MMOL/L (ref 3.4–5.3)
POTASSIUM BLD-SCNC: 3.5 MMOL/L (ref 3.4–5.3)
POTASSIUM BLD-SCNC: 3.7 MMOL/L (ref 3.4–5.3)
POTASSIUM BLD-SCNC: 3.7 MMOL/L (ref 3.4–5.3)
POTASSIUM BLD-SCNC: 4.1 MMOL/L (ref 3.4–5.3)
POTASSIUM BLD-SCNC: 4.4 MMOL/L (ref 3.4–5.3)
POTASSIUM BLD-SCNC: 4.7 MMOL/L (ref 3.4–5.3)
POTASSIUM BLD-SCNC: 4.7 MMOL/L (ref 3.4–5.3)
POTASSIUM SERPL-SCNC: 4 MMOL/L (ref 3.4–5.3)
POTASSIUM SERPL-SCNC: 4.2 MMOL/L (ref 3.4–5.3)
POTASSIUM SERPL-SCNC: 4.3 MMOL/L (ref 3.4–5.3)
PROT SERPL-MCNC: 4.3 G/DL (ref 6.4–8.3)
RBC # BLD AUTO: 2.67 10E6/UL (ref 4.4–5.9)
RBC # BLD AUTO: 2.75 10E6/UL (ref 4.4–5.9)
RBC # BLD AUTO: 3.03 10E6/UL (ref 4.4–5.9)
RBC # BLD AUTO: 3.06 10E6/UL (ref 4.4–5.9)
SAO2 % BLDA: 97 % (ref 92–100)
SAO2 % BLDA: >100 % (ref 95–96)
SAO2 % BLDV: 80 % (ref 70–75)
SODIUM BLD-SCNC: 128 MMOL/L (ref 135–145)
SODIUM BLD-SCNC: 129 MMOL/L (ref 135–145)
SODIUM BLD-SCNC: 129 MMOL/L (ref 135–145)
SODIUM BLD-SCNC: 130 MMOL/L (ref 135–145)
SODIUM SERPL-SCNC: 126 MMOL/L (ref 135–145)
SODIUM SERPL-SCNC: 132 MMOL/L (ref 135–145)
SODIUM SERPL-SCNC: 133 MMOL/L (ref 135–145)
SPECIMEN EXPIRATION DATE: NORMAL
UNIT ABO/RH: NORMAL
UNIT NUMBER: NORMAL
UNIT STATUS: NORMAL
UNIT TYPE ISBT: 5100
WBC # BLD AUTO: 13.2 10E3/UL (ref 4–11)
WBC # BLD AUTO: 16.3 10E3/UL (ref 4–11)
WBC # BLD AUTO: 17.3 10E3/UL (ref 4–11)
WBC # BLD AUTO: 7.6 10E3/UL (ref 4–11)

## 2024-11-04 PROCEDURE — 87102 FUNGUS ISOLATION CULTURE: CPT | Performed by: SURGERY

## 2024-11-04 PROCEDURE — 86900 BLOOD TYPING SEROLOGIC ABO: CPT | Performed by: PHYSICIAN ASSISTANT

## 2024-11-04 PROCEDURE — 84100 ASSAY OF PHOSPHORUS: CPT | Performed by: SURGERY

## 2024-11-04 PROCEDURE — 86923 COMPATIBILITY TEST ELECTRIC: CPT | Performed by: PHYSICIAN ASSISTANT

## 2024-11-04 PROCEDURE — 83735 ASSAY OF MAGNESIUM: CPT | Performed by: SURGERY

## 2024-11-04 PROCEDURE — 82947 ASSAY GLUCOSE BLOOD QUANT: CPT | Performed by: SURGERY

## 2024-11-04 PROCEDURE — 87070 CULTURE OTHR SPECIMN AEROBIC: CPT | Performed by: SURGERY

## 2024-11-04 PROCEDURE — 999N000157 HC STATISTIC RCP TIME EA 10 MIN

## 2024-11-04 PROCEDURE — 85396 CLOTTING ASSAY WHOLE BLOOD: CPT

## 2024-11-04 PROCEDURE — 250N000013 HC RX MED GY IP 250 OP 250 PS 637: Performed by: INTERNAL MEDICINE

## 2024-11-04 PROCEDURE — 84132 ASSAY OF SERUM POTASSIUM: CPT

## 2024-11-04 PROCEDURE — 410N000006: Performed by: SURGERY

## 2024-11-04 PROCEDURE — 999N000065 XR CHEST PORT 1 VIEW

## 2024-11-04 PROCEDURE — 0483T TMVI PERCUTANEOUS APPROACH: CPT | Performed by: ANESTHESIOLOGY

## 2024-11-04 PROCEDURE — 258N000003 HC RX IP 258 OP 636: Performed by: SURGERY

## 2024-11-04 PROCEDURE — P9016 RBC LEUKOCYTES REDUCED: HCPCS | Performed by: PHYSICIAN ASSISTANT

## 2024-11-04 PROCEDURE — 85048 AUTOMATED LEUKOCYTE COUNT: CPT | Performed by: PHYSICIAN ASSISTANT

## 2024-11-04 PROCEDURE — 250N000011 HC RX IP 250 OP 636: Performed by: SURGERY

## 2024-11-04 PROCEDURE — 94002 VENT MGMT INPAT INIT DAY: CPT

## 2024-11-04 PROCEDURE — 120N000004 HC R&B MS OVERFLOW

## 2024-11-04 PROCEDURE — 83605 ASSAY OF LACTIC ACID: CPT | Performed by: PHYSICIAN ASSISTANT

## 2024-11-04 PROCEDURE — 250N000012 HC RX MED GY IP 250 OP 636 PS 637: Performed by: NURSE ANESTHETIST, CERTIFIED REGISTERED

## 2024-11-04 PROCEDURE — 99100 ANES PT EXTEME AGE<1 YR&>70: CPT | Performed by: NURSE ANESTHETIST, CERTIFIED REGISTERED

## 2024-11-04 PROCEDURE — 250N000011 HC RX IP 250 OP 636: Performed by: NURSE ANESTHETIST, CERTIFIED REGISTERED

## 2024-11-04 PROCEDURE — 99291 CRITICAL CARE FIRST HOUR: CPT | Performed by: PHYSICIAN ASSISTANT

## 2024-11-04 PROCEDURE — 258N000003 HC RX IP 258 OP 636

## 2024-11-04 PROCEDURE — 250N000009 HC RX 250: Performed by: SURGERY

## 2024-11-04 PROCEDURE — P9045 ALBUMIN (HUMAN), 5%, 250 ML: HCPCS

## 2024-11-04 PROCEDURE — 258N000003 HC RX IP 258 OP 636: Performed by: NURSE ANESTHETIST, CERTIFIED REGISTERED

## 2024-11-04 PROCEDURE — 36415 COLL VENOUS BLD VENIPUNCTURE: CPT | Performed by: INTERNAL MEDICINE

## 2024-11-04 PROCEDURE — 999N000141 HC STATISTIC PRE-PROCEDURE NURSING ASSESSMENT: Performed by: SURGERY

## 2024-11-04 PROCEDURE — 250N000013 HC RX MED GY IP 250 OP 250 PS 637: Performed by: SURGERY

## 2024-11-04 PROCEDURE — 93005 ELECTROCARDIOGRAM TRACING: CPT

## 2024-11-04 PROCEDURE — C1889 IMPLANT/INSERT DEVICE, NOC: HCPCS | Performed by: SURGERY

## 2024-11-04 PROCEDURE — 80069 RENAL FUNCTION PANEL: CPT | Performed by: INTERNAL MEDICINE

## 2024-11-04 PROCEDURE — 85018 HEMOGLOBIN: CPT | Performed by: SURGERY

## 2024-11-04 PROCEDURE — 250N000013 HC RX MED GY IP 250 OP 250 PS 637: Performed by: PHYSICIAN ASSISTANT

## 2024-11-04 PROCEDURE — 82805 BLOOD GASES W/O2 SATURATION: CPT | Performed by: SURGERY

## 2024-11-04 PROCEDURE — 250N000024 HC ISOFLURANE, PER MIN: Performed by: SURGERY

## 2024-11-04 PROCEDURE — 250N000011 HC RX IP 250 OP 636: Performed by: ANESTHESIOLOGY

## 2024-11-04 PROCEDURE — 410N000005 HC PER-PERFUSION, SH ONLY, 1ST 30 MIN: Performed by: SURGERY

## 2024-11-04 PROCEDURE — 83605 ASSAY OF LACTIC ACID: CPT | Performed by: SURGERY

## 2024-11-04 PROCEDURE — 85610 PROTHROMBIN TIME: CPT | Performed by: SURGERY

## 2024-11-04 PROCEDURE — 360N000078 HC SURGERY LEVEL 5, PER MIN: Performed by: SURGERY

## 2024-11-04 PROCEDURE — 84132 ASSAY OF SERUM POTASSIUM: CPT | Performed by: SURGERY

## 2024-11-04 PROCEDURE — 82330 ASSAY OF CALCIUM: CPT

## 2024-11-04 PROCEDURE — 85384 FIBRINOGEN ACTIVITY: CPT | Performed by: SURGERY

## 2024-11-04 PROCEDURE — 85014 HEMATOCRIT: CPT | Performed by: INTERNAL MEDICINE

## 2024-11-04 PROCEDURE — 87176 TISSUE HOMOGENIZATION CULTR: CPT | Performed by: SURGERY

## 2024-11-04 PROCEDURE — 82330 ASSAY OF CALCIUM: CPT | Performed by: SURGERY

## 2024-11-04 PROCEDURE — 82040 ASSAY OF SERUM ALBUMIN: CPT | Performed by: SURGERY

## 2024-11-04 PROCEDURE — P9016 RBC LEUKOCYTES REDUCED: HCPCS

## 2024-11-04 PROCEDURE — 370N000017 HC ANESTHESIA TECHNICAL FEE, PER MIN: Performed by: SURGERY

## 2024-11-04 PROCEDURE — 87075 CULTR BACTERIA EXCEPT BLOOD: CPT | Performed by: SURGERY

## 2024-11-04 PROCEDURE — 272N000001 HC OR GENERAL SUPPLY STERILE: Performed by: SURGERY

## 2024-11-04 PROCEDURE — 99232 SBSQ HOSP IP/OBS MODERATE 35: CPT | Performed by: INTERNAL MEDICINE

## 2024-11-04 PROCEDURE — 85014 HEMATOCRIT: CPT | Performed by: PHYSICIAN ASSISTANT

## 2024-11-04 PROCEDURE — 250N000011 HC RX IP 250 OP 636

## 2024-11-04 PROCEDURE — 99232 SBSQ HOSP IP/OBS MODERATE 35: CPT | Performed by: STUDENT IN AN ORGANIZED HEALTH CARE EDUCATION/TRAINING PROGRAM

## 2024-11-04 PROCEDURE — 85730 THROMBOPLASTIN TIME PARTIAL: CPT | Performed by: SURGERY

## 2024-11-04 PROCEDURE — 85014 HEMATOCRIT: CPT | Performed by: SURGERY

## 2024-11-04 PROCEDURE — 0483T TMVI PERCUTANEOUS APPROACH: CPT | Performed by: NURSE ANESTHETIST, CERTIFIED REGISTERED

## 2024-11-04 PROCEDURE — 5A1223Z PERFORMANCE OF CARDIAC PACING, CONTINUOUS: ICD-10-PCS | Performed by: SURGERY

## 2024-11-04 PROCEDURE — 250N000011 HC RX IP 250 OP 636: Performed by: HOSPITALIST

## 2024-11-04 PROCEDURE — 99232 SBSQ HOSP IP/OBS MODERATE 35: CPT | Performed by: PHYSICIAN ASSISTANT

## 2024-11-04 PROCEDURE — 5A1221Z PERFORMANCE OF CARDIAC OUTPUT, CONTINUOUS: ICD-10-PCS | Performed by: SURGERY

## 2024-11-04 PROCEDURE — C1898 LEAD, PMKR, OTHER THAN TRANS: HCPCS | Performed by: SURGERY

## 2024-11-04 PROCEDURE — 02RG08Z REPLACEMENT OF MITRAL VALVE WITH ZOOPLASTIC TISSUE, OPEN APPROACH: ICD-10-PCS | Performed by: SURGERY

## 2024-11-04 PROCEDURE — 258N000003 HC RX IP 258 OP 636: Performed by: ANESTHESIOLOGY

## 2024-11-04 PROCEDURE — 250N000025 HC SEVOFLURANE, PER MIN: Performed by: SURGERY

## 2024-11-04 PROCEDURE — 88305 TISSUE EXAM BY PATHOLOGIST: CPT | Mod: 26 | Performed by: PATHOLOGY

## 2024-11-04 PROCEDURE — 82310 ASSAY OF CALCIUM: CPT | Performed by: SURGERY

## 2024-11-04 PROCEDURE — 87205 SMEAR GRAM STAIN: CPT | Performed by: SURGERY

## 2024-11-04 PROCEDURE — 250N000009 HC RX 250

## 2024-11-04 PROCEDURE — 250N000009 HC RX 250: Performed by: NURSE ANESTHETIST, CERTIFIED REGISTERED

## 2024-11-04 PROCEDURE — P9045 ALBUMIN (HUMAN), 5%, 250 ML: HCPCS | Performed by: NURSE ANESTHETIST, CERTIFIED REGISTERED

## 2024-11-04 PROCEDURE — 84295 ASSAY OF SERUM SODIUM: CPT | Performed by: SURGERY

## 2024-11-04 PROCEDURE — 85384 FIBRINOGEN ACTIVITY: CPT | Performed by: PHYSICIAN ASSISTANT

## 2024-11-04 PROCEDURE — 33430 REPLACEMENT OF MITRAL VALVE: CPT | Performed by: SURGERY

## 2024-11-04 DEVICE — RESILIA MITRAL VALVE, SIZE 31 MM
Type: IMPLANTABLE DEVICE | Site: CHEST | Status: FUNCTIONAL
Brand: MITRIS RESILIA MITRAL VALVE

## 2024-11-04 RX ORDER — VECURONIUM BROMIDE 1 MG/ML
INJECTION, POWDER, LYOPHILIZED, FOR SOLUTION INTRAVENOUS PRN
Status: DISCONTINUED | OUTPATIENT
Start: 2024-11-04 | End: 2024-11-04

## 2024-11-04 RX ORDER — OXYCODONE HYDROCHLORIDE 5 MG/1
5 TABLET ORAL EVERY 4 HOURS PRN
Status: DISCONTINUED | OUTPATIENT
Start: 2024-11-04 | End: 2024-11-13 | Stop reason: HOSPADM

## 2024-11-04 RX ORDER — NICOTINE POLACRILEX 4 MG
15-30 LOZENGE BUCCAL
Status: DISCONTINUED | OUTPATIENT
Start: 2024-11-04 | End: 2024-11-05

## 2024-11-04 RX ORDER — ACETAMINOPHEN 325 MG/1
650 TABLET ORAL EVERY 4 HOURS PRN
Status: DISCONTINUED | OUTPATIENT
Start: 2024-11-07 | End: 2024-11-13 | Stop reason: HOSPADM

## 2024-11-04 RX ORDER — PROPOFOL 10 MG/ML
5-75 INJECTION, EMULSION INTRAVENOUS CONTINUOUS
Status: DISCONTINUED | OUTPATIENT
Start: 2024-11-04 | End: 2024-11-05

## 2024-11-04 RX ORDER — BUPIVACAINE HYDROCHLORIDE 5 MG/ML
INJECTION, SOLUTION PERINEURAL PRN
Status: DISCONTINUED | OUTPATIENT
Start: 2024-11-04 | End: 2024-11-04 | Stop reason: HOSPADM

## 2024-11-04 RX ORDER — PIPERACILLIN SODIUM, TAZOBACTAM SODIUM 4; .5 G/20ML; G/20ML
4.5 INJECTION, POWDER, LYOPHILIZED, FOR SOLUTION INTRAVENOUS EVERY 8 HOURS
Status: DISCONTINUED | OUTPATIENT
Start: 2024-11-04 | End: 2024-11-04

## 2024-11-04 RX ORDER — CALCIUM CHLORIDE 100 MG/ML
INJECTION INTRAVENOUS; INTRAVENTRICULAR PRN
Status: DISCONTINUED | OUTPATIENT
Start: 2024-11-04 | End: 2024-11-04

## 2024-11-04 RX ORDER — ALBUTEROL SULFATE 0.83 MG/ML
2.5 SOLUTION RESPIRATORY (INHALATION)
Status: DISCONTINUED | OUTPATIENT
Start: 2024-11-04 | End: 2024-11-09

## 2024-11-04 RX ORDER — FENTANYL CITRATE 0.05 MG/ML
50 INJECTION, SOLUTION INTRAMUSCULAR; INTRAVENOUS
Status: DISCONTINUED | OUTPATIENT
Start: 2024-11-04 | End: 2024-11-04 | Stop reason: HOSPADM

## 2024-11-04 RX ORDER — CALCIUM GLUCONATE 20 MG/ML
2 INJECTION, SOLUTION INTRAVENOUS
Status: ACTIVE | OUTPATIENT
Start: 2024-11-04 | End: 2024-11-10

## 2024-11-04 RX ORDER — HYDROMORPHONE HCL IN WATER/PF 6 MG/30 ML
0.2 PATIENT CONTROLLED ANALGESIA SYRINGE INTRAVENOUS
Status: DISCONTINUED | OUTPATIENT
Start: 2024-11-04 | End: 2024-11-13 | Stop reason: HOSPADM

## 2024-11-04 RX ORDER — FAMOTIDINE 20 MG/1
20 TABLET, FILM COATED ORAL
Status: DISCONTINUED | OUTPATIENT
Start: 2024-11-04 | End: 2024-11-04 | Stop reason: HOSPADM

## 2024-11-04 RX ORDER — EPINEPHRINE IN 0.9 % SOD CHLOR 5 MG/250ML
.01-.3 PLASTIC BAG, INJECTION (ML) INTRAVENOUS CONTINUOUS
Status: DISCONTINUED | OUTPATIENT
Start: 2024-11-04 | End: 2024-11-05

## 2024-11-04 RX ORDER — LIDOCAINE 40 MG/G
CREAM TOPICAL
Status: DISCONTINUED | OUTPATIENT
Start: 2024-11-04 | End: 2024-11-04 | Stop reason: HOSPADM

## 2024-11-04 RX ORDER — LIDOCAINE 4 G/G
2 PATCH TOPICAL EVERY 24 HOURS
Status: DISCONTINUED | OUTPATIENT
Start: 2024-11-05 | End: 2024-11-13 | Stop reason: HOSPADM

## 2024-11-04 RX ORDER — PROPOFOL 10 MG/ML
INJECTION, EMULSION INTRAVENOUS CONTINUOUS PRN
Status: DISCONTINUED | OUTPATIENT
Start: 2024-11-04 | End: 2024-11-04

## 2024-11-04 RX ORDER — HEPARIN SODIUM 1000 [USP'U]/ML
INJECTION, SOLUTION INTRAVENOUS; SUBCUTANEOUS PRN
Status: DISCONTINUED | OUTPATIENT
Start: 2024-11-04 | End: 2024-11-04

## 2024-11-04 RX ORDER — VANCOMYCIN HYDROCHLORIDE 1 G/200ML
1000 INJECTION, SOLUTION INTRAVENOUS
Status: COMPLETED | OUTPATIENT
Start: 2024-11-04 | End: 2024-11-04

## 2024-11-04 RX ORDER — NITROGLYCERIN 10 MG/100ML
INJECTION INTRAVENOUS PRN
Status: DISCONTINUED | OUTPATIENT
Start: 2024-11-04 | End: 2024-11-04

## 2024-11-04 RX ORDER — SODIUM CHLORIDE, SODIUM LACTATE, POTASSIUM CHLORIDE, CALCIUM CHLORIDE 600; 310; 30; 20 MG/100ML; MG/100ML; MG/100ML; MG/100ML
INJECTION, SOLUTION INTRAVENOUS CONTINUOUS
Status: DISCONTINUED | OUTPATIENT
Start: 2024-11-04 | End: 2024-11-04 | Stop reason: HOSPADM

## 2024-11-04 RX ORDER — CALCIUM CARBONATE 500 MG/1
1000 TABLET, CHEWABLE ORAL DAILY PRN
Status: DISCONTINUED | OUTPATIENT
Start: 2024-11-04 | End: 2024-11-13 | Stop reason: HOSPADM

## 2024-11-04 RX ORDER — POLYETHYLENE GLYCOL 3350 17 G/17G
17 POWDER, FOR SOLUTION ORAL DAILY
Status: DISCONTINUED | OUTPATIENT
Start: 2024-11-05 | End: 2024-11-11

## 2024-11-04 RX ORDER — LIDOCAINE 40 MG/G
CREAM TOPICAL
Status: DISCONTINUED | OUTPATIENT
Start: 2024-11-04 | End: 2024-11-13 | Stop reason: HOSPADM

## 2024-11-04 RX ORDER — DEXTROSE MONOHYDRATE 25 G/50ML
25-50 INJECTION, SOLUTION INTRAVENOUS
Status: DISCONTINUED | OUTPATIENT
Start: 2024-11-04 | End: 2024-11-05

## 2024-11-04 RX ORDER — ACETAMINOPHEN 325 MG/1
975 TABLET ORAL ONCE
Status: COMPLETED | OUTPATIENT
Start: 2024-11-04 | End: 2024-11-04

## 2024-11-04 RX ORDER — PROTAMINE SULFATE 10 MG/ML
INJECTION, SOLUTION INTRAVENOUS PRN
Status: DISCONTINUED | OUTPATIENT
Start: 2024-11-04 | End: 2024-11-04

## 2024-11-04 RX ORDER — HYDROMORPHONE HCL IN WATER/PF 6 MG/30 ML
0.4 PATIENT CONTROLLED ANALGESIA SYRINGE INTRAVENOUS
Status: DISCONTINUED | OUTPATIENT
Start: 2024-11-04 | End: 2024-11-13 | Stop reason: HOSPADM

## 2024-11-04 RX ORDER — ONDANSETRON 2 MG/ML
4 INJECTION INTRAMUSCULAR; INTRAVENOUS EVERY 6 HOURS PRN
Status: DISCONTINUED | OUTPATIENT
Start: 2024-11-04 | End: 2024-11-13 | Stop reason: HOSPADM

## 2024-11-04 RX ORDER — CHLORHEXIDINE GLUCONATE ORAL RINSE 1.2 MG/ML
10 SOLUTION DENTAL ONCE
Status: COMPLETED | OUTPATIENT
Start: 2024-11-04 | End: 2024-11-04

## 2024-11-04 RX ORDER — PROCHLORPERAZINE MALEATE 5 MG/1
5 TABLET ORAL EVERY 6 HOURS PRN
Status: ACTIVE | OUTPATIENT
Start: 2024-11-04 | End: 2024-11-07

## 2024-11-04 RX ORDER — PROPOFOL 10 MG/ML
INJECTION, EMULSION INTRAVENOUS PRN
Status: DISCONTINUED | OUTPATIENT
Start: 2024-11-04 | End: 2024-11-04

## 2024-11-04 RX ORDER — FENTANYL CITRATE 50 UG/ML
INJECTION, SOLUTION INTRAMUSCULAR; INTRAVENOUS PRN
Status: DISCONTINUED | OUTPATIENT
Start: 2024-11-04 | End: 2024-11-04

## 2024-11-04 RX ORDER — ONDANSETRON 2 MG/ML
INJECTION INTRAMUSCULAR; INTRAVENOUS PRN
Status: DISCONTINUED | OUTPATIENT
Start: 2024-11-04 | End: 2024-11-04

## 2024-11-04 RX ORDER — CEFAZOLIN SODIUM 1 G/3ML
1 INJECTION, POWDER, FOR SOLUTION INTRAMUSCULAR; INTRAVENOUS EVERY 8 HOURS
Status: DISCONTINUED | OUTPATIENT
Start: 2024-11-05 | End: 2024-11-04

## 2024-11-04 RX ORDER — CEFAZOLIN SODIUM/WATER 2 G/20 ML
2 SYRINGE (ML) INTRAVENOUS
Status: DISCONTINUED | OUTPATIENT
Start: 2024-11-04 | End: 2024-11-04 | Stop reason: HOSPADM

## 2024-11-04 RX ORDER — METOPROLOL TARTRATE 1 MG/ML
INJECTION, SOLUTION INTRAVENOUS PRN
Status: DISCONTINUED | OUTPATIENT
Start: 2024-11-04 | End: 2024-11-04

## 2024-11-04 RX ORDER — SODIUM CHLORIDE 9 MG/ML
INJECTION, SOLUTION INTRAVENOUS CONTINUOUS PRN
Status: DISCONTINUED | OUTPATIENT
Start: 2024-11-04 | End: 2024-11-04

## 2024-11-04 RX ORDER — NOREPINEPHRINE BITARTRATE 0.02 MG/ML
.01-.6 INJECTION, SOLUTION INTRAVENOUS CONTINUOUS
Status: DISCONTINUED | OUTPATIENT
Start: 2024-11-04 | End: 2024-11-05

## 2024-11-04 RX ORDER — HYDRALAZINE HYDROCHLORIDE 20 MG/ML
10 INJECTION INTRAMUSCULAR; INTRAVENOUS EVERY 30 MIN PRN
Status: DISCONTINUED | OUTPATIENT
Start: 2024-11-04 | End: 2024-11-13 | Stop reason: HOSPADM

## 2024-11-04 RX ORDER — ASPIRIN 81 MG/1
81 TABLET, CHEWABLE ORAL DAILY
Status: DISCONTINUED | OUTPATIENT
Start: 2024-11-05 | End: 2024-11-11

## 2024-11-04 RX ORDER — MAGNESIUM HYDROXIDE 1200 MG/15ML
LIQUID ORAL PRN
Status: DISCONTINUED | OUTPATIENT
Start: 2024-11-04 | End: 2024-11-04 | Stop reason: HOSPADM

## 2024-11-04 RX ORDER — CALCIUM GLUCONATE 20 MG/ML
1 INJECTION, SOLUTION INTRAVENOUS
Status: ACTIVE | OUTPATIENT
Start: 2024-11-04 | End: 2024-11-10

## 2024-11-04 RX ORDER — DEXMEDETOMIDINE HYDROCHLORIDE 4 UG/ML
.2-.7 INJECTION, SOLUTION INTRAVENOUS CONTINUOUS
Status: DISCONTINUED | OUTPATIENT
Start: 2024-11-04 | End: 2024-11-05

## 2024-11-04 RX ORDER — LIDOCAINE HYDROCHLORIDE 20 MG/ML
INJECTION, SOLUTION INFILTRATION; PERINEURAL PRN
Status: DISCONTINUED | OUTPATIENT
Start: 2024-11-04 | End: 2024-11-04

## 2024-11-04 RX ORDER — BISACODYL 10 MG
10 SUPPOSITORY, RECTAL RECTAL DAILY PRN
Status: DISCONTINUED | OUTPATIENT
Start: 2024-11-04 | End: 2024-11-13 | Stop reason: HOSPADM

## 2024-11-04 RX ORDER — SODIUM CHLORIDE, SODIUM LACTATE, POTASSIUM CHLORIDE, CALCIUM CHLORIDE 600; 310; 30; 20 MG/100ML; MG/100ML; MG/100ML; MG/100ML
INJECTION, SOLUTION INTRAVENOUS CONTINUOUS
Status: DISCONTINUED | OUTPATIENT
Start: 2024-11-04 | End: 2024-11-04

## 2024-11-04 RX ORDER — CEFAZOLIN SODIUM 1 G/3ML
1 INJECTION, POWDER, FOR SOLUTION INTRAMUSCULAR; INTRAVENOUS EVERY 8 HOURS
Status: COMPLETED | OUTPATIENT
Start: 2024-11-05 | End: 2024-11-05

## 2024-11-04 RX ORDER — CHLORHEXIDINE GLUCONATE ORAL RINSE 1.2 MG/ML
15 SOLUTION DENTAL EVERY 12 HOURS
Status: DISCONTINUED | OUTPATIENT
Start: 2024-11-04 | End: 2024-11-05

## 2024-11-04 RX ORDER — HEPARIN SODIUM 5000 [USP'U]/.5ML
5000 INJECTION, SOLUTION INTRAVENOUS; SUBCUTANEOUS EVERY 8 HOURS
Status: DISCONTINUED | OUTPATIENT
Start: 2024-11-05 | End: 2024-11-13 | Stop reason: HOSPADM

## 2024-11-04 RX ORDER — AMOXICILLIN 250 MG
1 CAPSULE ORAL 2 TIMES DAILY
Status: DISCONTINUED | OUTPATIENT
Start: 2024-11-04 | End: 2024-11-11

## 2024-11-04 RX ORDER — ASPIRIN 81 MG/1
81 TABLET, CHEWABLE ORAL
Status: DISCONTINUED | OUTPATIENT
Start: 2024-11-04 | End: 2024-11-04 | Stop reason: HOSPADM

## 2024-11-04 RX ORDER — ACETAMINOPHEN 325 MG/1
975 TABLET ORAL EVERY 8 HOURS
Status: COMPLETED | OUTPATIENT
Start: 2024-11-04 | End: 2024-11-07

## 2024-11-04 RX ORDER — PANTOPRAZOLE SODIUM 40 MG/1
40 TABLET, DELAYED RELEASE ORAL DAILY
Status: DISCONTINUED | OUTPATIENT
Start: 2024-11-04 | End: 2024-11-13 | Stop reason: HOSPADM

## 2024-11-04 RX ORDER — METHOCARBAMOL 500 MG/1
500 TABLET, FILM COATED ORAL EVERY 6 HOURS PRN
Status: DISCONTINUED | OUTPATIENT
Start: 2024-11-04 | End: 2024-11-13 | Stop reason: HOSPADM

## 2024-11-04 RX ORDER — ONDANSETRON 4 MG/1
4 TABLET, ORALLY DISINTEGRATING ORAL EVERY 6 HOURS PRN
Status: DISCONTINUED | OUTPATIENT
Start: 2024-11-04 | End: 2024-11-13 | Stop reason: HOSPADM

## 2024-11-04 RX ORDER — SODIUM CHLORIDE, SODIUM LACTATE, POTASSIUM CHLORIDE, CALCIUM CHLORIDE 600; 310; 30; 20 MG/100ML; MG/100ML; MG/100ML; MG/100ML
INJECTION, SOLUTION INTRAVENOUS CONTINUOUS PRN
Status: DISCONTINUED | OUTPATIENT
Start: 2024-11-04 | End: 2024-11-04

## 2024-11-04 RX ORDER — ASPIRIN 81 MG/1
162 TABLET, CHEWABLE ORAL
Status: DISCONTINUED | OUTPATIENT
Start: 2024-11-04 | End: 2024-11-04 | Stop reason: HOSPADM

## 2024-11-04 RX ORDER — DIPHENHYDRAMINE HYDROCHLORIDE 50 MG/ML
INJECTION INTRAMUSCULAR; INTRAVENOUS PRN
Status: DISCONTINUED | OUTPATIENT
Start: 2024-11-04 | End: 2024-11-04

## 2024-11-04 RX ORDER — CEFAZOLIN SODIUM/WATER 2 G/20 ML
2 SYRINGE (ML) INTRAVENOUS SEE ADMIN INSTRUCTIONS
Status: DISCONTINUED | OUTPATIENT
Start: 2024-11-04 | End: 2024-11-04 | Stop reason: HOSPADM

## 2024-11-04 RX ORDER — VANCOMYCIN HYDROCHLORIDE 1 G/200ML
1000 INJECTION, SOLUTION INTRAVENOUS EVERY 12 HOURS
Status: DISCONTINUED | OUTPATIENT
Start: 2024-11-04 | End: 2024-11-04

## 2024-11-04 RX ADMIN — LIDOCAINE HYDROCHLORIDE 100 MG: 20 INJECTION, SOLUTION INFILTRATION; PERINEURAL at 15:28

## 2024-11-04 RX ADMIN — TAMSULOSIN HYDROCHLORIDE 0.4 MG: 0.4 CAPSULE ORAL at 09:35

## 2024-11-04 RX ADMIN — NOREPINEPHRINE BITARTRATE 6.4 MCG: 1 INJECTION, SOLUTION, CONCENTRATE INTRAVENOUS at 16:17

## 2024-11-04 RX ADMIN — DEXMEDETOMIDINE HYDROCHLORIDE 0.2 MCG/KG/HR: 400 INJECTION INTRAVENOUS at 20:56

## 2024-11-04 RX ADMIN — Medication 200 MG: at 19:26

## 2024-11-04 RX ADMIN — MIDAZOLAM HYDROCHLORIDE 2 MG: 1 INJECTION, SOLUTION INTRAMUSCULAR; INTRAVENOUS at 18:00

## 2024-11-04 RX ADMIN — NOREPINEPHRINE BITARTRATE 6.4 MCG: 1 INJECTION, SOLUTION, CONCENTRATE INTRAVENOUS at 16:14

## 2024-11-04 RX ADMIN — SODIUM CHLORIDE, POTASSIUM CHLORIDE, SODIUM LACTATE AND CALCIUM CHLORIDE 250 ML: 600; 310; 30; 20 INJECTION, SOLUTION INTRAVENOUS at 22:19

## 2024-11-04 RX ADMIN — HEPARIN SODIUM 21000 UNITS: 1000 INJECTION INTRAVENOUS; SUBCUTANEOUS at 16:28

## 2024-11-04 RX ADMIN — NOREPINEPHRINE BITARTRATE 6.4 MCG: 1 INJECTION, SOLUTION, CONCENTRATE INTRAVENOUS at 15:35

## 2024-11-04 RX ADMIN — SODIUM CHLORIDE: 9 INJECTION, SOLUTION INTRAVENOUS at 15:28

## 2024-11-04 RX ADMIN — HYDROMORPHONE HYDROCHLORIDE 0.2 MG: 0.2 INJECTION, SOLUTION INTRAMUSCULAR; INTRAVENOUS; SUBCUTANEOUS at 22:38

## 2024-11-04 RX ADMIN — ACETAMINOPHEN 975 MG: 325 TABLET, FILM COATED ORAL at 05:40

## 2024-11-04 RX ADMIN — DIPHENHYDRAMINE HYDROCHLORIDE 25 MG: 50 INJECTION, SOLUTION INTRAMUSCULAR; INTRAVENOUS at 18:23

## 2024-11-04 RX ADMIN — VECURONIUM BROMIDE 5 MG: 1 INJECTION, POWDER, LYOPHILIZED, FOR SOLUTION INTRAVENOUS at 16:39

## 2024-11-04 RX ADMIN — METOPROLOL TARTRATE 2.5 MG: 5 INJECTION INTRAVENOUS at 15:42

## 2024-11-04 RX ADMIN — FENTANYL CITRATE 50 MCG: 50 INJECTION INTRAMUSCULAR; INTRAVENOUS at 16:37

## 2024-11-04 RX ADMIN — SODIUM CHLORIDE TAB 1 GM 1 G: 1 TAB at 09:35

## 2024-11-04 RX ADMIN — VANCOMYCIN HYDROCHLORIDE 1000 MG: 1 INJECTION, SOLUTION INTRAVENOUS at 01:35

## 2024-11-04 RX ADMIN — Medication 1 LOZENGE: at 09:35

## 2024-11-04 RX ADMIN — ROCURONIUM BROMIDE 50 MG: 50 INJECTION, SOLUTION INTRAVENOUS at 15:29

## 2024-11-04 RX ADMIN — MIDAZOLAM HYDROCHLORIDE 1 MG: 1 INJECTION, SOLUTION INTRAMUSCULAR; INTRAVENOUS at 18:29

## 2024-11-04 RX ADMIN — SODIUM CHLORIDE, SODIUM LACTATE, POTASSIUM CHLORIDE, CALCIUM CHLORIDE: 600; 310; 30; 20 INJECTION, SOLUTION INTRAVENOUS at 15:28

## 2024-11-04 RX ADMIN — SODIUM CHLORIDE, POTASSIUM CHLORIDE, SODIUM LACTATE AND CALCIUM CHLORIDE: 600; 310; 30; 20 INJECTION, SOLUTION INTRAVENOUS at 15:15

## 2024-11-04 RX ADMIN — NOREPINEPHRINE BITARTRATE 0.03 MCG/KG/MIN: 1 INJECTION, SOLUTION, CONCENTRATE INTRAVENOUS at 15:32

## 2024-11-04 RX ADMIN — AMINOCAPROIC ACID 5 G/HR: 250 INJECTION, SOLUTION INTRAVENOUS at 15:32

## 2024-11-04 RX ADMIN — PROPOFOL 75 MG: 10 INJECTION, EMULSION INTRAVENOUS at 15:29

## 2024-11-04 RX ADMIN — CALCIUM CHLORIDE INJECTION 250 MG: 100 INJECTION, SOLUTION INTRAVENOUS at 18:34

## 2024-11-04 RX ADMIN — SENNOSIDES AND DOCUSATE SODIUM 1 TABLET: 8.6; 5 TABLET ORAL at 22:38

## 2024-11-04 RX ADMIN — PHENYLEPHRINE HYDROCHLORIDE 50 MCG: 10 INJECTION INTRAVENOUS at 16:21

## 2024-11-04 RX ADMIN — PROPOFOL 35 MCG/KG/MIN: 10 INJECTION, EMULSION INTRAVENOUS at 18:58

## 2024-11-04 RX ADMIN — INSULIN HUMAN 3 UNITS: 100 INJECTION, SOLUTION PARENTERAL at 18:56

## 2024-11-04 RX ADMIN — FENTANYL CITRATE 50 MCG: 50 INJECTION INTRAMUSCULAR; INTRAVENOUS at 16:38

## 2024-11-04 RX ADMIN — Medication 40 MG: at 21:01

## 2024-11-04 RX ADMIN — FENTANYL CITRATE 100 MCG: 50 INJECTION INTRAMUSCULAR; INTRAVENOUS at 18:37

## 2024-11-04 RX ADMIN — FAMOTIDINE 20 MG: 10 INJECTION, SOLUTION INTRAVENOUS at 19:22

## 2024-11-04 RX ADMIN — ROCURONIUM BROMIDE 50 MG: 50 INJECTION, SOLUTION INTRAVENOUS at 15:58

## 2024-11-04 RX ADMIN — PROTAMINE SULFATE 175 MG: 10 INJECTION, SOLUTION INTRAVENOUS at 18:35

## 2024-11-04 RX ADMIN — SODIUM CHLORIDE: 9 INJECTION, SOLUTION INTRAVENOUS at 18:26

## 2024-11-04 RX ADMIN — DIPHENHYDRAMINE HYDROCHLORIDE 25 MG: 50 INJECTION, SOLUTION INTRAMUSCULAR; INTRAVENOUS at 18:44

## 2024-11-04 RX ADMIN — VECURONIUM BROMIDE 5 MG: 1 INJECTION, POWDER, LYOPHILIZED, FOR SOLUTION INTRAVENOUS at 17:58

## 2024-11-04 RX ADMIN — NITROGLYCERIN 5 MCG: 10 INJECTION INTRAVENOUS at 16:39

## 2024-11-04 RX ADMIN — FENTANYL CITRATE 100 MCG: 50 INJECTION INTRAMUSCULAR; INTRAVENOUS at 16:22

## 2024-11-04 RX ADMIN — METHOCARBAMOL 500 MG: 500 TABLET ORAL at 21:01

## 2024-11-04 RX ADMIN — CHLORHEXIDINE GLUCONATE 0.12% ORAL RINSE 15 ML: 1.2 LIQUID ORAL at 22:41

## 2024-11-04 RX ADMIN — EPINEPHRINE 5 MCG: 1 INJECTION INTRAMUSCULAR; INTRAVENOUS; SUBCUTANEOUS at 18:21

## 2024-11-04 RX ADMIN — VANCOMYCIN HYDROCHLORIDE 1000 MG: 1 INJECTION, SOLUTION INTRAVENOUS at 13:04

## 2024-11-04 RX ADMIN — ACETAMINOPHEN 975 MG: 325 TABLET, FILM COATED ORAL at 20:59

## 2024-11-04 RX ADMIN — Medication 2 G: at 15:42

## 2024-11-04 RX ADMIN — CHLORHEXIDINE GLUCONATE 0.12% ORAL RINSE 10 ML: 1.2 LIQUID ORAL at 05:40

## 2024-11-04 RX ADMIN — INSULIN HUMAN 3 UNITS: 100 INJECTION, SOLUTION PARENTERAL at 19:32

## 2024-11-04 RX ADMIN — CALCIUM CHLORIDE INJECTION 250 MG: 100 INJECTION, SOLUTION INTRAVENOUS at 18:47

## 2024-11-04 RX ADMIN — FENTANYL CITRATE 100 MCG: 50 INJECTION INTRAMUSCULAR; INTRAVENOUS at 15:28

## 2024-11-04 RX ADMIN — EPINEPHRINE 0.01 MCG/KG/MIN: 1 INJECTION INTRAMUSCULAR; INTRAVENOUS; SUBCUTANEOUS at 18:16

## 2024-11-04 RX ADMIN — EPINEPHRINE 5 MCG: 1 INJECTION INTRAMUSCULAR; INTRAVENOUS; SUBCUTANEOUS at 18:24

## 2024-11-04 RX ADMIN — OXYCODONE HYDROCHLORIDE 5 MG: 5 TABLET ORAL at 20:59

## 2024-11-04 RX ADMIN — MIDAZOLAM HYDROCHLORIDE 2 MG: 1 INJECTION, SOLUTION INTRAMUSCULAR; INTRAVENOUS at 15:28

## 2024-11-04 RX ADMIN — ALBUMIN (HUMAN): 12.5 SOLUTION INTRAVENOUS at 18:50

## 2024-11-04 RX ADMIN — SODIUM CHLORIDE, POTASSIUM CHLORIDE, SODIUM LACTATE AND CALCIUM CHLORIDE: 600; 310; 30; 20 INJECTION, SOLUTION INTRAVENOUS at 13:18

## 2024-11-04 RX ADMIN — FENTANYL CITRATE 100 MCG: 50 INJECTION INTRAMUSCULAR; INTRAVENOUS at 19:04

## 2024-11-04 RX ADMIN — MIDAZOLAM 1 MG: 1 INJECTION INTRAMUSCULAR; INTRAVENOUS at 14:13

## 2024-11-04 RX ADMIN — ONDANSETRON 4 MG: 2 INJECTION INTRAMUSCULAR; INTRAVENOUS at 19:12

## 2024-11-04 NOTE — PROGRESS NOTES
Care Management Follow Up    Length of Stay (days): 10    Expected Discharge Date: 11/09/2024     Concerns to be Addressed:   await postoperative status    Patient plan of care discussed at interdisciplinary rounds: Yes    Anticipated Discharge Disposition: Skilled Nursing Facility        Anticipated Discharge Services:  OPCR vs TCU  Anticipated Discharge DME:  [facility]    Patient/family educated on Medicare website which has current facility and service quality ratings:  NA  Education Provided on the Discharge Plan:  ongoing  Patient/Family in Agreement with the Plan:  pending post operative status and recommendations, will need further discussion with patient and family    Referrals Placed by CM/SW:  [facility]  Private pay costs discussed: Not applicable    Discussed  Partnership in Safe Discharge Planning  document with patient/family: No     Handoff Completed: No, handoff not indicated or clinically appropriate    Additional Information:  Planning minimally invasive MVR surgery today with Dr Ellison.  Will await post operative status and recommendations as far as rehab.    The [facility] had been working on possibly [facility] bed for TCU.  Patient is a a  as PCP noted to be [facility] and there is only Medicare listed.     Next Steps:   Await post operative status rehab recommendations.  If patient is needing placement, the [facility] will continue to work on this.    Vanna Patterson, RN  Inpatient Care Management  [phone]

## 2024-11-04 NOTE — ANESTHESIA PROCEDURE NOTES
Central Line/PA Catheter Placement    Pre-Procedure   Staff -        Anesthesiologist:  Nelson Ferreira MD       Performed By: anesthesiologist       Location: pre-op       Pre-Anesthestic Checklist: patient identified, IV checked, site marked, risks and benefits discussed, informed consent, monitors and equipment checked and pre-op evaluation  Timeout:       Correct Patient: Yes        Correct Procedure: Yes        Correct Site: Yes        Correct Position: Yes        Correct Laterality: N/A   Line Placement:   This line was placed Pre Induction starting at 11/4/2024 2:20 PM and ending at 11/4/2024 2:20 PM    Procedure   Procedure: central line       Laterality: right       Insertion Site: internal jugular.       Patient Position: Trendelenburg  Sterile Prep        All elements of maximal sterile barrier technique followed       Patient Prep/Sterile Barriers: draped, patient draped, hand hygiene, gloves , hat , mask , draped, gown, sterile gel and probe cover       Skin prep: Chloraprep  Insertion/Injection        Local skin infiltrated with mL of 1% lidocaine.        Technique: ultrasound guided and Seldinger Technique        1. Ultrasound was used to evaluate the access site.       2. Vein evaluated via ultrasound for patency/adequacy.       3. Using real-time ultrasound the needle/catheter was observed entering the artery/vein.       4. Permanent image was captured and entered into the patient's record.       5. The visualized structures were anatomically normal.       6. There were no apparent abnormal pathologic findings.       Introducer Type: 9 Fr, 10 cm        Type: Introducer  Narrative         Secured by: suture       Tegaderm and Biopatch dressing used.       Complications: None apparent,        blood aspirated from all lumens,        All lumens flushed: Yes       Verification method: Placement to be verified post-op, X-ray and Ultrasound   Comments:  No complications noted. Images were saved.

## 2024-11-04 NOTE — PROGRESS NOTES
Renal Medicine Progress Note            Assessment/Plan:     Assessment: Jayme Donahue is a 83 yo male with PMH prostate cancer admitted 10/25/24 with MV endocarditis. Planning for minimally invasive MVR 11/4.     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 Intermittently worsens related to poor PO intake. Overall improved. Slight decrease related to NPO status.   - continue salt tabs   - will likely get IVF in OR which is ok   - continue 1.2L FR   - daily BMP     Corynebacterium MV endocarditis   Abx per ID. Eval by CV surgery with plan for minimally invasive MVR 11/4.   - daily BMP in days following MVR     Prostate cancer     Plan/Recs:  1) continue salt tabs and FR 1.2L   2) follow BMP closely few days after surgery to ensure stability in Na and Cr     Reviewed notes from Dr. Wilson (hospitalist), Malinda Ferreira PA-C (CV surgery), Dr. Fajardo ( nephro).     Kt Cordoba MD   [facility]  Office: [phone]          Interval History:      No acute events overnight   Reports feeling well   Very grateful for the care he is receiving   Denies n/v, lightheadedness or dizziness   Denies SOB   Great UOP, 1.45 L yesterday, 1 L since midnight          Medications and Allergies:     Current Facility-Administered Medications   Medication Dose Route Frequency Provider Last Rate Last Admin    aspirin (ASA) chewable tablet 162 mg  162 mg Oral Pre-Op/Pre-procedure x 1 dose Alexa Ellison MD        Or    aspirin (ASA) chewable tablet 81 mg  81 mg Oral Pre-Op/Pre-procedure x 1 dose Alexa Ellison MD        famotidine (PEPCID) tablet 20 mg  20 mg Oral Pre-Op/Pre-procedure x 1 dose Alexa Ellison MD        metoprolol tartrate (LOPRESSOR) half-tab 12.5 mg  12.5 mg Oral Pre-Op/Pre-procedure x 1 dose Alexa Ellison MD        sodium chloride (PF) 0.9% PF flush 3 mL  3 mL Intracatheter Q8H [place], Yovani Acuña MD   3 mL at  11/04/24 0936    sodium chloride tablet 1 g  1 g Oral TID w/meals Minesh Fajardo MD   1 g at 11/04/24 0935    tamsulosin (FLOMAX) capsule 0.4 mg  0.4 mg Oral Daily Shin Rodríguez MD   0.4 mg at 11/04/24 0935    vancomycin (VANCOCIN) 1,000 mg in 200 mL dextrose intermittent infusion  1,000 mg Intravenous Q12H Yovani Aldrich  mL/hr at 11/04/24 0135 1,000 mg at 11/04/24 0135      No Known Allergies         Physical Exam:   Vitals were reviewed  /66 (BP Location: Left arm)   Pulse 68   Temp 97.7  F (36.5  C) (Axillary)   Resp 18   Wt 56.8 kg (125 lb 4.8 oz)   SpO2 99%   BMI 20.22 kg/m      Wt Readings from Last 3 Encounters:   10/31/24 56.8 kg (125 lb 4.8 oz)   10/23/24 59 kg (130 lb)       Intake/Output Summary (Last 24 hours) at 11/4/2024 0957  Last data filed at 11/4/2024 0713  Gross per 24 hour   Intake 543 ml   Output 2005 ml   Net -1462 ml       GENERAL APPEARANCE: NAD, thin and frail appearing  HEENT: normocephalic, dry MM   RESP: clear anteriorly   CV: RRR, loud systolic murmur  EXTREMITIES/SKIN: no edema  NEURO:  alert, oriented, normal speech   LINES:  + mclain,            Data:     BMP  Recent Labs   Lab 11/04/24  0612 11/03/24  1644 11/02/24  0546 11/01/24  0718   * 127* 127* 130*   POTASSIUM 4.2 4.2 4.0 4.3   CHLORIDE 91* 94* 93* 96*   AMALIA 8.1* 8.0* 8.3* 8.3*   CO2 29 28 23 26   BUN 10.7 14.4 12.8 11.0   CR 0.59* 0.58* 0.56* 0.67   GLC 92 110* 97 92     CBC  Recent Labs   Lab 11/04/24  0612 11/03/24  1644 11/02/24  0546 11/01/24  0718   WBC 7.6 7.6 8.1 6.4   HGB 9.3* 8.7* 9.1* 9.0*   HCT 26.5* 25.7* 26.7* 25.9*   MCV 87 89 87 86    248 257 249     Lab Results   Component Value Date    AST 22 10/23/2024    ALT 20 10/23/2024    ALKPHOS 88 10/23/2024    BILITOTAL 0.7 10/23/2024     Lab Results   Component Value Date    INR 1.53 (H) 10/23/2024     Color Urine (no units)   Date Value   10/31/2024 Light Yellow   03/17/2021 Straw     Appearance Urine (no units)   Date  Value   10/31/2024 Slightly Cloudy (A)   03/17/2021 Clear     Glucose Urine (mg/dL)   Date Value   10/31/2024 Negative   03/17/2021 Negative     Bilirubin Urine (no units)   Date Value   10/31/2024 Negative   03/17/2021 Negative     Ketones Urine (mg/dL)   Date Value   10/31/2024 Negative   03/17/2021 Negative     Specific Gravity Urine (no units)   Date Value   10/31/2024 1.011   03/17/2021 1.003     pH Urine   Date Value   10/31/2024 7.5 (H)   03/17/2021 7.0 pH     Protein Albumin Urine (mg/dL)   Date Value   10/31/2024 Negative   03/17/2021 Negative     Nitrite Urine (no units)   Date Value   10/31/2024 Negative   03/17/2021 Negative     Leukocyte Esterase Urine (no units)   Date Value   10/31/2024 Negative   03/17/2021 Negative         Attestation:  I have reviewed today's vital signs, notes, medications, labs and imaging.    Kt Cordoba MD  [facility] - Nephrology  Office: [phone]

## 2024-11-04 NOTE — PLAN OF CARE
DATE & TIME: 11/3/24 3pm-11pm    Cognitive Concerns/ Orientation : A&Ox4   BEHAVIOR & AGGRESSION TOOL COLOR: green  ABNL VS/O2: VSS/Room air  MOBILITY: Ax1/GB/Walker  PAIN MANAGMENT: denies  DIET: mech soft/FR 1200mL  BOWEL/BLADDER: incontinent of bowel this evening but also walked to bathroom to have a BM/Mclain for retention he will discharge with mclain  ABNL LAB/BG: Na 127, Hgb 8.7  DRAIN/DEVICES: L PIV  TELEMETRY RHYTHM: SR with BBB  SKIN: left shoulder wound dressing changed today  TESTS/PROCEDURES: going to have mitral valve replacement tomorrow  D/C DATE: pending to TCU after heart surgery

## 2024-11-04 NOTE — CONSULTS
"SPIRITUAL HEALTH SERVICES Consult Note  [facility]. [address] medical specialties    Referral Source/Reason for Visit: patient requests ongoing support    Saw pt Jayme Donahue at bedside for 20 minutes    Patient / Family Understanding of Illness and Goals of Care - Avelino sated his gratitude that he is able to have heart surgery, stating he would not have been able to do \"open heart\" but they will do a minimally invasive procedure. Avelino said he will likely move to ICU afterward and is aware chaplains are able to follow.    Distress and Loss -     Strengths, Coping, and Resources - Avelino's wife Pamela was able to visit yesterday and plans to come tomorrow, as he is recovering from his procedure.    Meaning, Beliefs, and Spirituality - Avelino welcomed prayers before surgery, we reflected on the wisdom of gratitude, prayer and the peace of God's presence.    Plan: unit  will continue to follow for emotional/spiritual support.    Alexus Zhang MDiv [facility]  Staff   Please place consult order for routine Spiritual Health Services referrals.  [facility]S available 24/7 for emergent requests either by having the on-call  paged or by entering an ASAP/STAT consult in Epic (this will also page the on-call ).    "

## 2024-11-04 NOTE — CONSULTS
[facility] ICU Consult note   Date: 11/04/24     CO-MORBIDITIES:   Patient Active Problem List   Diagnosis    Prostate cancer (H)    Septic shock (H)    Bacteremia       ASSESSMENT: Jayme Donahue is a 84 year old male with PMH of prostate  who was admitted on 10/25/2024 as a transfer from [facility] to [facility] with Corynebacterium bacteremia and endocarditis concerns after presenting with generalized weakness on 10/22/24. 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  Patient now s/p Minimal invasive mitral valve replacement right thoracotomy by Dr. Ellison on 11/4/24.     OR findings notable for gum bleeding from recent tooth extraction requiring oropharyngeal packing during case and right rib fracture. 2 chest tubes placed, V wires placed, backup at 70BPM, no shocks required.     Post procedure TTEL Normal EF, Trace TR, no AI.     EBL: 500mL. Products: 1 pRBC tranfused, 230mL cell saver. Voided 400mL.  LR 1500mL, NS 1500mL and albumin 5% 250mL       PLAN:  Neuro/ pain/ sedation:  # Acute postoperative pain  - Scheduled: tylenol, topical lidoderm patches   - PRN: oxycodone, dilaudid IVP as able , robaxin PRN     #Sedation/anagelsia   - Gtt: IVP dilaudid,  if not able to progress and extubate, will consider fentanyl gtt for pain     HEENT  # S/p full mouth tooth extraction on 10/31/2024.   - Full mouth tooth extraction due to severe caries, periapical abscess and severe periodontitis   - will need continued Peridex mouth rinses     # gingival bleeding, now improved   - required packing intraoperatively, now removed     Pulmonary care:   # Post operative ventilator support   FiO2 (%): 30 %, Resp: 16, Vent Mode: CMV/AC, Resp Rate (Set): 16 breaths/min, Tidal Volume (Set, mL): 450 mL, PEEP (cm H2O): 5 cmH2O, Resp Rate (Set): 16 breaths/min, Tidal Volume (Set, mL): 450 mL, PEEP (cm H2O):  5 cmH2O    - Goal SpO2 > 92%  - Ventilator bundle. HOB elevation.   - PST, wean to extubate when meets criteria.     Cardiovascular:    # Corynebacterium striatum mitral valve endocarditis   TTE 10/23 severe mitral regurgitation and a mobile echodensity on the atrial side of the mitral valve   LINDSEY on 10/29/2024 showed large vegetation on atrial aspect of mitral valve, prolapse of posterior mitral valve leaflet, severe mitral regurgitation.   # Shock-- multifactorial--vasoplegia, volume depletion and distributive shock   - Epi, NE,  gtts for inotropic and pressor support, wean as able  - Monitor hemodynamic status. -Goal MAP >65, SBP <140   - Hold Statin, BB and diuretics for now   - no PTA medications in EMR   - ASA to start POD # 1.     GI care / Nutrition:   # Moderate protein calorie malnutrition  - Dietitian consulted.   - patient  previously tolerated mechanical soft diet with supplements    - GI prophylaxis while requiring mechanical support   - Bowel regimen: MiraLAX, senna    Renal / Fluids / Electrolytes:   #  hx of Prostate cancer, reported to been treated 20/20   # Urinary retention   -  started Flomax empirically this admission for retention, cmlain replaced 10/29/24   BL creat appears to be ~ 0.4-0.6 range   - keep mclain for now     # lactic acidosis   # Hypocalcemia   # Severe hyponatremia, felt to be SIADH   - Sodium ranged between 118-123 while at OSH,   - Seen by renal this admission.   - Fluid restriction at 1200mL/day  and salt tab administration   - Electrolyte replacement  per RN protocol   - trend and  monitor Lactic levels     Endocrine:    # Hyperglycemia due to critical illness   Preop A1c 5.3 on 10/31/24   - Insulin gtt Goal BG <180 for optimal healing    ID / Antibiotics:  # Corynebacterium striatum mitral valve endocarditis  # Corynebacterium bacteremia  # Corynebacterium UTI  # Leukocytosis  - Blood culture on 10/24/2024 grew Corynebacterium striatum.  Repeat blood cultures 10/26-10/30  have been negative to date per EMR review   - Continue vancomycin per ID, pharmacy to dose.    # Non healing chornic wound of left shoulder,  Lesion culture positive for Stenotrophomonas maltophilia/S Aures and Acinetobacter   - [facility] nurse consulted at [facility]. See [facility] for care orders   - S/p punch biopsy x3 by general surgery on 10/31/2024,                - Sutures should be removed around 11/7/2024 - 11/10/2024 per chart    -  Pathology resulted 11/4: Basal cell carcinoma with keratotic features, incompletely excised **will need Dermatology/surgical followup **    Heme:     # Acute blood loss anemia due to surgical blood loss   # Acute blood loss thrombocytopenia    -  EBL 500mL, s/p One unit pRBC and 230ml cell saver, no other products give   - Transfuse for signs and symptoms of hypoperfusion.   - replace products as needed     MSK / Skin:  # Sternotomy and Surgical Incision  # Right fracture ( during retraction, repaired with bone wax)    # Weakness and deconditioning    - Sternal precautions  - Postoperative incision management per protocol  - PT/OT/CR    General cares and Prophylaxis:     - Mechanical ppx for DVT  - Chemical DVT ppx: Subcutaneous heparin per Surgical team   - PPI  - Bowel regimen: PPI, MiraLAX, senna    Lines / Tubes / Drains:  - ETT  - RIJ MAC introducer  - Left radial Arterial Line  - CTs x 2  - Palmer  - NG     Disposition:  - ICU     Patient seen, findings and plan will be discussed with ICU team     Code status: previously DNR, discussed with CT surgery team, DNR suspended during perioperative cardiac period.     Zbigniew Johansen PA-C    Total Critical Care time spent by me, excluding procedures, was 60minutes     Clinically Significant Risk Factors         # Hyponatremia: Lowest Na = 126 mmol/L in last 2 days, will monitor as appropriate  # Hypochloremia: Lowest Cl = 91 mmol/L in last 2 days, will monitor as appropriate  # Hypocalcemia: Lowest iCa = 3.9 mg/dL in last 2 days, will monitor  and replace as appropriate     # Hypoalbuminemia: Lowest albumin = 2.5 g/dL at 10/31/2024  6:59 AM, will monitor as appropriate                 # Severe Malnutrition: based on nutrition assessment    # Financial/Environmental Concerns:              ====================================    HPI:  Jayme Donahue is a 84 year old male with past medical history notable of prostate  who was admitted on 10/25/2024 as a transfer from [facility] to [facility] with Corynebacterium bacteremia and endocarditis concerns after presenting with generalized weakness on 10/22/24.  He subsequently found to have endocarditis with 4+ mitral regurgitation, SIADH and chronic  left scapular wound that was subsequently found to be basal cell carcinoma.     Patient now s/p Minimal invasive mitral valve replacement right thoracotomy by Dr. Ellison on 11/4/24. Not able to perform ROS due to intubation and sedation.       PAST MEDICAL HISTORY:   Past Medical History:   Diagnosis Date    Cataract     Iliac artery aneurysm (H)     Prostate cancer (H)        PAST SURGICAL HISTORY:   Past Surgical History:   Procedure Laterality Date    CV CORONARY ANGIOGRAM N/A 10/31/2024    Procedure: Coronary Angiogram;  Surgeon: Shane Ortega MD;  Location:  HEART CARDIAC CATH LAB    EXTRACTION(S) DENTAL Bilateral 10/31/2024    Procedure: EXTRACTION OF TEETH 3, 5, 6, 7, 8, 9, 10, 11, 12, 13, 14, 20, 21, 23, 24, 25, 26, 27, 28, AND 29;  Surgeon: Gabbie Neville DDS;  Location:  OR       FAMILY HISTORY: History reviewed. No pertinent family history.    SOCIAL HISTORY:   Social History     Tobacco Use    Smoking status: Never    Smokeless tobacco: Never   Substance Use Topics    Alcohol use: Not Currently         OBJECTIVE:  1. VITAL SIGNS:   Temp:  [96.5  F (35.8  C)-99  F (37.2  C)] 96.5  F (35.8  C)  Pulse:  [68-80] 76  Resp:  [16-18] 16  BP: (102-126)/(63-72) 107/65  SpO2:  [97 %-99 %] 99 %    Resp: 16      2. INTAKE/ OUTPUT:   I/O  last 3 completed shifts:  In: 300 [P.O.:300]  Out: 2055 [Urine:2055]      3. PHYSICAL EXAMINATION:   General: chemically sedated.   HEENT: PERRLA, pupils 2mm and reactive, edentulous, some oozing from posterior left upper gums.  ETT present and secured.   Neuro: chemically sedated.    Resp/Chest:  BBS, lungs clear and coarse,  2 Chest tubes y'ed together--no airleak, sanguinous drainage in tubing, occasional clot in tubing. Incisions sites dressed   CV: S1, S2   Abdomen: Soft, non-distended, non-tender  Extremities: extremities warm and well perfused, calves soft and compressible. Pulses 2+.       4. INVESTIGATIONS:   Arterial Blood Gases   Recent Labs   Lab 11/04/24  1728 11/04/24  1655 11/04/24  1633 11/04/24  1540   PH 7.42 7.48* 7.43 7.48*   PCO2 43 40 43 39   PO2 388* 432* 251* 512*   HCO3 28 29* 28 29*     Complete Blood Count   Recent Labs   Lab 11/04/24  1728 11/04/24  1655 11/04/24  1653 11/04/24  1633 11/04/24  1540 11/04/24  0612 11/03/24  1644 11/02/24  0546 11/01/24  0718   WBC  --   --   --   --   --  7.6 7.6 8.1 6.4   HGB 8.1* 6.7* 6.7* 8.7*   < > 9.3* 8.7* 9.1* 9.0*   PLT  --   --   --   --   --  270 248 257 249    < > = values in this interval not displayed.     Basic Metabolic Panel  Recent Labs   Lab 11/04/24  1728 11/04/24  1655 11/04/24  1653 11/04/24  1633 11/04/24  1540 11/04/24  0612 11/03/24  1644 11/02/24  0546 11/01/24  0718   * 129* 130* 128*   < > 126* 127* 127* 130*   POTASSIUM 4.7 3.4 3.5 3.7   < > 4.2 4.2 4.0 4.3   CHLORIDE  --   --   --   --   --  91* 94* 93* 96*   CO2  --   --   --   --   --  29 28 23 26   BUN  --   --   --   --   --  10.7 14.4 12.8 11.0   CR  --   --   --   --   --  0.59* 0.58* 0.56* 0.67   * 106* 105* 106*   < > 92 110* 97 92    < > = values in this interval not displayed.     Liver Function Tests  Recent Labs   Lab 11/04/24  0612 11/03/24  1644 11/02/24  0546 11/01/24  0718   ALBUMIN 2.6* 2.6* 2.6* 2.8*     5. RADIOLOGY:   No results found for this  or any previous visit (from the past 24 hours).    =========================================

## 2024-11-04 NOTE — PROGRESS NOTES
[facility]    Medicine Progress Note - Hospitalist Service    Date of Admission:  10/25/2024    Assessment & Plan   Jayme Donahue is a 84 year old male with PMH prostate cancer, who was admitted on 10/25/2024 as a transfer from [facility] with Corynebacterium bacteremia and concern for endocarditis.     Corynebacterium striatum mitral valve endocarditis  Corynebacterium bacteremia  Corynebacterium UTI  Severe mitral regurgitation due to above  Septic shock secondary to above, resolved  Low back pain, r/o early discitis   Generalized weakness  Jayme Donahue initially developed nausea and poor appetite on 10/18/2024.  He presented to an outside hospital and was found to be hyponatremic with a sodium of 118 and diagnosed with sepsis secondary to a UTI.  Subsequently became hypotensive and was transferred to [facility] ICU and started on vasopressors on 10/23/2024.  He was treated with vancomycin and Rocephin.  Levophed weaned off on the night of 10/23/2024.  Blood cultures returned positive for corynebacterium striatum. TTE on 10/23/2024 showed severe mitral regurgitation and a mobile echodensity on the atrial side of the mitral valve that likely represents flail scallop although vegetation is also in the differential.  Complained of low back pain, lumbar spine MRI on 10/24/2024 showed some likely degenerative endplate changes at right L4-L5 and left L5-S1, although early changes of discitis could also be in the differential.  ID was consulted on 10/25/2024 and patient transferred to [facility] for LINDSEY. LINDSEY on 10/29 shows mitral valve endocarditis.  - Admitted to inpatient.  - ID, Cardiology, and Cardiothoracic Surgery consulted, appreciate their assistance.  - LINDSEY on 10/29/2024 showed large vegetation on atrial aspect of mitral valve, prolapse of posterior mitral valve leaflet, severe mitral regurgitation.  - Cardiology and cardiothoracic surgery recommending mitral  valve surgery.                 - Planning for minimally invasive MVR (tissue) on Monday, November 4, 2024. - Pre-op dental CT showed periapical lucencies associated with multiple maxillary teeth, representing periodontitis or periapical abscess.               - OMFS consulted.  - S/p full mouth tooth extraction on 10/31/2024.  Advanced to mechanical soft diet on 11/1/2024.  - S/p CT chest/abdomen/pelvis on 11/1/2024 as part of CT surgery pre-op evaluation.  - Bilateral carotid artery ultrasound showed 50-69% stenosis bilaterally.  - Coronary angiogram on 10/31/2024 showed normal coronary arteries.  - Continue vancomycin per ID, pharmacy to dose.  - Blood culture on 10/24/2024 grew Corynebacterium striatum.  Repeat blood cultures 10/26-10/30 have been negative to date.  - PT/OT consulted, appreciate their assistance - TCU recommended.  - SW following, appreciate their assistance.     Severe hyponatremia, suspect SIADH  Sodium reportedly 118 at outside hospital when he first presented.  Felt to be secondary to dehydration, sodium mildly improved with IV fluids.  Sodium 120 on 10/23/2024 when he arrived at [facility].  Urine studies at [facility] consistent with SIADH.  He was started on salt tablets and placed on a fluid restriction.  Sodium ranged between 119-123 while he was at [facility].  - Sodium improved initially with fluid restriction, but then trended down again.  - Nephrology consulted, appreciate their assistance.  - Started on UreNa on 10/29/2024, then switched to salt tablets on 10/30/2024 per Nephrology.  - Continue fluid restriction of 1200 ml/day.  - Sodium checks as directed by Nephrology.     Left scapular lesion  Lesion culture positive for Stenotrophomonas maltophilia  Appearance is concerning for malignancy, see photos in epic on 10/23/2024.  Patient states this lesion has been present for several months, he is not sure how it started.  He has been covering it at home to manage  drainage from the lesion.  - [facility] nurse consulted at [facility].  - Continue wound cares as ordered by [facility] nurse at previous facility.  - Initially started on doxycycline, but subsequently discontinued as this was felt to represent chronic skin colonization rather than acute infection.  - S/p biopsy by general surgery on 10/31/2024, pathology pending.               - Sutures should be removed around 11/7/2024 - 11/10/2024.     History of prostate cancer  LUTS  Urinary retention s/p mclain 10/29  Reportedly treated in 2020, but details unclear.  PSA in July 2024 was 54, PSA in September 2024 was 57. Having urinary urgency, LUTS this admission. Noted to be retaining urine and mclain placed 10/29.  - Continue tamsulosin empirically.  - Continue mclain at discharge, consider trial of voiding in 1-2 weeks.     Moderate protein calorie malnutrition  - Dietitian consulted.             Diet: Fluid restriction 1200 ML FLUID  Fluid restriction 1200 ML FLUID  Snacks/Supplements Adult: Ensure Enlive; With Meals  NPO per Anesthesia Guidelines for Procedure/Surgery Except for: Meds    DVT Prophylaxis: Pneumatic Compression Devices  Mclain Catheter: PRESENT, indication: ?  (Error. Value could not be saved.), Acute retention or obstruction  Lines: None     Cardiac Monitoring: ACTIVE order. Indication: Infective endocarditis (48 hours) - additional guidance recommending until clinically stable  Code Status: No CPR- Do NOT Intubate      Clinically Significant Risk Factors         # Hyponatremia: Lowest Na = 126 mmol/L in last 2 days, will monitor as appropriate  # Hypochloremia: Lowest Cl = 91 mmol/L in last 2 days, will monitor as appropriate      # Hypoalbuminemia: Lowest albumin = 2.5 g/dL at 10/31/2024  6:59 AM, will monitor as appropriate                 # Severe Malnutrition: based on nutrition assessment    # Financial/Environmental Concerns:           Disposition Plan     Medically Ready for Discharge: Anticipated in 5+ Days              Ramana Nieto MD  Hospitalist Service  [facility]  Securely message with [employer] (more info)  Text page via [employer] Paging/Directory   _The above note was dictated using voice recognition software. Although reviewed after completion, some word and grammatical error may remain . Please contact the author for any clarifications.  _____________________________________________________________________    Interval History   I first interaction with the patient.  History reviewed  Denies any chest pain/palpitations.  Waiting for surgery later this afternoon    Physical Exam   /66 (BP Location: Left arm)   Pulse 68   Temp 97.7  F (36.5  C) (Axillary)   Resp 18   Wt 56.8 kg (125 lb 4.8 oz)   SpO2 99%   BMI 20.22 kg/m    Gen- pleasant   Neck- supple  CVS- I+II+ ESM  RS- CTAB  Abdo- soft, no tenderness . No g/r/r  Ext- no edema     Medical Decision Making       40 MINUTES SPENT BY ME on the date of service doing chart review, history, exam, documentation & further activities per the note.      Data   ------------------------- PAST 24 HR DATA REVIEWED -----------------------------------------------    I have personally reviewed the following data over the past 24 hrs:    7.6  \   9.3 (L)   / 270     126 (L) 91 (L) 10.7 /  92   4.2 29 0.59 (L) \     ALT: N/A AST: N/A AP: N/A TBILI: N/A   ALB: 2.6 (L) TOT PROTEIN: N/A LIPASE: N/A       Imaging results reviewed over the past 24 hrs:   No results found for this or any previous visit (from the past 24 hours).

## 2024-11-04 NOTE — PROGRESS NOTES
[facility]    Infectious Disease Progress Note    Date of Service (when I saw the patient): 11/04/2024     Assessment & Plan   Jayme Donahue is a 84 year old male who was admitted on 10/25/2024.     Impression:  85 yo with PMH prostate cancer, who was admitted on 10/25/2024 as a transfer from [facility] with Corynebacterium bacteremia and concern for endocarditis.   Admitted with weakness and poor oral intake to an outside hospital  Blood cultures from 10/22 grew out Corynebacterium striatum, 2 out of 2  The same day he also had positive urine cultures for Corynebacterium stratum  And a wound culture from the scapular lesion also positive for Corynebacterium striatum  With TTE done on 10/23 positive for a mobile echodensity on the atrial side of the mitral valve that likely represents flail scallop although vegetation is also in differential. LINDSEY: Large vegetation noted on the atrial aspect of the mitral valve. They measure about 1.5 to 1.7 cm. There is associated flail segment and distorted mitral valve leaflet anatomy. There seems to be prolapse of the posterior mitral valve leaflet.   Patient also complained of back pain but there is no abscesses or osteomyelitis on the back MRI  Of note patient has a left scapular lesion that has been present for several months  He is currently on vancomycin  Poor dentition. S/p extraction of all remaining teeth 10/31/24.      Recommendations:     Patient to go to OR today for MVR.   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 Belcher.     Migdalia Butler PA-C    Interval History   Tolerating antibiotics ok   No new rashes or issues with antibiotics   Feeling well. Awaiting surgery today.   Labs reviewed   No changes to past medical, social or family history         Physical Exam   Temp: 97.7  F (36.5  C) Temp src: Axillary BP: 102/66 Pulse: 68    Resp: 18 SpO2: 99 % O2 Device: None (Room air)    Vitals:    10/29/24 0404 10/31/24 0450   Weight: 60.8 kg (134 lb) 56.8 kg (125 lb 4.8 oz)     Vital Signs with Ranges  Temp:  [97.7  F (36.5  C)-99.1  F (37.3  C)] 97.7  F (36.5  C)  Pulse:  [68-91] 68  Resp:  [18] 18  BP: ()/(58-66) 102/66  SpO2:  [97 %-99 %] 99 %    Constitutional: Awake, alert, cooperative, no apparent distress  Lungs: Clear to auscultation bilaterally, no crackles or wheezing  Cardiovascular: Regular rate and rhythm, normal S1 and S2, and no murmur noted  Abdomen: Normal bowel sounds, soft, non-distended, non-tender  Skin: skin wound on the scapula appears to the same   Other:    Medications   Current Facility-Administered Medications   Medication Dose Route Frequency Provider Last Rate Last Admin     Current Facility-Administered Medications   Medication Dose Route Frequency Provider Last Rate Last Admin    aspirin (ASA) chewable tablet 162 mg  162 mg Oral Pre-Op/Pre-procedure x 1 dose Alexa Ellison MD        Or    aspirin (ASA) chewable tablet 81 mg  81 mg Oral Pre-Op/Pre-procedure x 1 dose Alexa Ellison MD        famotidine (PEPCID) tablet 20 mg  20 mg Oral Pre-Op/Pre-procedure x 1 dose Alexa Ellison MD        metoprolol tartrate (LOPRESSOR) half-tab 12.5 mg  12.5 mg Oral Pre-Op/Pre-procedure x 1 dose Alexa Ellison MD        sodium chloride (PF) 0.9% PF flush 3 mL  3 mL Intracatheter Q8H EkYovani galindo MD   3 mL at 11/04/24 0936    sodium chloride tablet 1 g  1 g Oral TID w/meals Minesh Fajardo MD   1 g at 11/04/24 0935    tamsulosin (FLOMAX) capsule 0.4 mg  0.4 mg Oral Daily Shin Rodríguez MD   0.4 mg at 11/04/24 0935    vancomycin (VANCOCIN) 1,000 mg in 200 mL dextrose intermittent infusion  1,000 mg Intravenous Q12H [place], Yovani Domenico,  mL/hr at 11/04/24 0135 1,000 mg at 11/04/24 0135       Data   All microbiology laboratory data reviewed.  Recent Labs   Lab Test  11/04/24  0612 11/03/24  1644 11/02/24  0546   WBC 7.6 7.6 8.1   HGB 9.3* 8.7* 9.1*   HCT 26.5* 25.7* 26.7*   MCV 87 89 87    248 257     Recent Labs   Lab Test 11/04/24  0612 11/03/24  1644 11/02/24  0546   CR 0.59* 0.58* 0.56*          10/31/2024 1552 11/04/2024 0732 Tissue Aerobic Bacterial Culture Routine With Gram Stain [[id number]]   Tissue from Shoulder, Left    Preliminary result Component Value   Culture No growth after 2 days P   Gram Stain Result No organisms seen P    No white blood cells seen P             10/31/2024 1552 11/03/2024 1805 Anaerobic Bacterial Culture Routine [[id number]]   Tissue from Shoulder, Left    Preliminary result Component Value   Culture No anaerobic organisms isolated after 2 days P             10/30/2024 0627 11/04/2024 1006 Blood Culture Arm, Left [[id number]]   Blood from Arm, Left    Final result Component Value   Culture No Growth             10/29/2024 0600 11/03/2024 0805 Blood Culture Arm, Left [[id number]]    Blood from Arm, Left    Final result Component Value   Culture No Growth             10/28/2024 0616 11/02/2024 1116 Blood Culture Arm, Right [[id number]]   Blood from Arm, Right    Final result Component Value   Culture No Growth             10/27/2024 0811 11/01/2024 1046 Blood Culture Peripheral Blood [[id number]]    Peripheral Blood    Final result Component Value   Culture No Growth             10/26/2024 2252 11/01/2024 0031 Blood Culture Arm, Right [[id number]]   Blood from Arm, Right    Final result Component Value   Culture No Growth             10/26/2024 1811 10/27/2024 2213 Urine Culture [[id number]]   Urine, Midstream    Final result Component Value   Culture No Growth             10/26/2024 1513 10/31/2024 1804 Blood Culture Hand, Left [[id number]]   Blood from Hand, Left    Final result Component Value   Culture No Growth             10/24/2024 1444 10/28/2024 1329 Blood Culture Arm, Right [[id number]]   (Abnormal)   Blood  from Arm, Right    Final result Component Value   Culture Positive on the 2nd day of incubation Abnormal            10/22/2024 1645 10/26/2024 0814 Microbiology Isolate Referral [[id number]]    (Abnormal)   Blood from Other    Final result Component Value   Culture Corynebacterium striatum Abnormal     Organism identified by client.       Susceptibility     Corynebacterium striatum     HARRIS     Ceftriaxone 3 ug/mL Resistant     Clindamycin 1.0 ug/mL Intermediate     Doxycycline 0.125 ug/mL Susceptible     Meropenem 0.125 ug/mL Susceptible     Penicillin 0.25 ug/mL Intermediate     Trimethoprim/Sulfamethoxazole 0.250 ug/mL Susceptible     Vancomycin 0.5 ug/mL Susceptible              Complete LINDSEY Adult. 10/25/24  ______________________________________________________________________________  Interpretation Summary     This was a transesophageal echocardiogram done for evaluation of endocarditis  & MR. Study was technically difficult. Probe insertion was technically quite  challenging. Patient was short of breath and coughing a lot despite sedation.  I had to request one of my partners Dr. Galicia who was kind to come and assist  with LINDSEY probe insertion.     Normal left ventricular size and systolic function.  Normal RV size and systolic function.  No evidence of left atrial appendage thrombus. No color shunt across the  interatrial septum.  Large vegetation noted on the atrial aspect of the mitral valve. They measure  about 1.5 to 1.7 cm. There is associated flail segment and distorted mitral  valve leaflet anatomy. There seems to be prolapse of the posterior mitral  valve leaflet. 3D imaging was quite challenging due to patient tolerance of  the procedure. We had to terminate prematurely.  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 I was not able  to get 2D and 3D quantification of the mitral regurgitation due to  patient  tolerance of the procedure but visually MR is clearly severe.  No pericardial effusion.

## 2024-11-04 NOTE — ANESTHESIA PROCEDURE NOTES
Arterial Line Procedure Note    Pre-Procedure   Staff -        Anesthesiologist:  Nelson Ferreira MD       Performed By: anesthesiologist       Location: pre-op       Pre-Anesthestic Checklist: patient identified, IV checked, risks and benefits discussed, informed consent, monitors and equipment checked and pre-op evaluation  Timeout:       Correct Patient: Yes        Correct Procedure: Yes        Correct Site: Yes        Correct Position: Yes   Line Placement:   This line was placed Pre Induction starting at 11/4/2024 2:10 PM and ending at 11/4/2024 2:10 PM  Procedure   Procedure: arterial line       Laterality: left       Insertion Site: radial.  Sterile Prep        Standard elements of sterile barrier followed       Skin prep: Chloraprep  Insertion/Injection        Technique: ultrasound guided        1. Ultrasound was used to evaluate the access site.       2. Artery evaluated via ultrasound for patency/adequacy.       3. Using real-time ultrasound the needle/catheter was observed entering the artery/vein.       5. The visualized structures were anatomically normal.       6. There were no apparent abnormal pathologic findings.       Catheter Type/Size: 20 G, 12 cm  Narrative         Secured by: anchor securement device       Tegaderm dressing used.       Complications: None apparent,        Arterial waveform: Yes    Comments:  Arterial line secured with a Tegaderm and tape. Sterile gloves, mask, hat, and sterile drape utilized. No IMAGES SAVED

## 2024-11-04 NOTE — ANESTHESIA PROCEDURE NOTES
Airway       Patient location during procedure: OR       Procedure Start/Stop Times: 11/4/2024 3:31 PM  Staff -        Anesthesiologist:  Nelson Ferreira MD       CRNA: Piero Waters APRN CRNA       Performed By: CRNA  Consent for Airway        Urgency: elective  Indications and Patient Condition       Indications for airway management: alissa-procedural       Induction type:intravenous       Mask difficulty assessment: 1 - vent by mask    Final Airway Details       Final airway type: endotracheal airway       Successful airway: ETT - single  Endotracheal Airway Details        ETT size (mm): 8.0       Cuffed: yes       Cuff volume (mL): 7       Successful intubation technique: video laryngoscopy       VL Blade Size: [place] 4       Grade View of Cords: 1       Adjucts: stylet       Position: Center       Measured from: lips       Secured at (cm): 22    Post intubation assessment        Placement verified by: capnometry, equal breath sounds and chest rise        Number of attempts at approach: 1       Secured with: tape       Ease of procedure: easy       Dentition: Intact and Unchanged    Medication(s) Administered   Medication Administration Time: 11/4/2024 3:31 PM

## 2024-11-04 NOTE — PLAN OF CARE
Goal Outcome Evaluation:      Plan of Care Reviewed With: patient           Shift :[id number]    Summary: Corynebacterium bacteremia and concern for endocarditis. Plan for MVR procedure on 11/4     Cognitive Concerns/ Orientation : A/Ox4, forgetful at times, calm and cooperative, pleasant  BEHAVIOR & AGGRESSION TOOL COLOR: Green   ABNL VS/O2: VSS on RA  MOBILITY: Assist x1 with gait belt and walker. Walked in the halls and around the nursing station  PAIN MANAGMENT: Denies pain    DIET: 1200ml fluid restriction, NPO at midnight   BOWEL/BLADDER: incontinent of bowel. Mclain patent with adequate output, mclain for retention   ABNL LAB/BG: Na 127,Cr 0.58  DRAIN/DEVICES: PIV SL w/ int abx  TELEMETRY RHYTHM: SR with BBB  SKIN: LUE shoulder wound- dressing completed on 11/3. Coccyx with blanchable redness/mepilex CDI. R groin angio site CDI, no bleeding. Multiple teeth extraction 10/31- no new signs of bleeding or discomfort.   PROCEDURES: Coronary angio on 10/31. Teeth extracted 10/31. Plan for mitral valve replacement surgery  on 11/4@3:30 PM  D/C DATE: pending procedure and clinical improvement.   OTHER IMPORTANT INFO: ID, cardiology, cardiothoracic surgery, oral surgery, WOC, SW following.

## 2024-11-05 ENCOUNTER — APPOINTMENT (OUTPATIENT)
Dept: GENERAL RADIOLOGY | Facility: CLINIC | Age: 84
End: 2024-11-05
Attending: SURGERY
Payer: MEDICARE

## 2024-11-05 ENCOUNTER — APPOINTMENT (OUTPATIENT)
Dept: PHYSICAL THERAPY | Facility: CLINIC | Age: 84
End: 2024-11-05
Attending: SURGERY
Payer: MEDICARE

## 2024-11-05 DIAGNOSIS — Z95.2 S/P MITRAL VALVE REPLACEMENT: Primary | ICD-10-CM

## 2024-11-05 LAB
ALLEN'S TEST: ABNORMAL
ANION GAP SERPL CALCULATED.3IONS-SCNC: 8 MMOL/L (ref 7–15)
ATRIAL RATE - MUSE: 76 BPM
ATRIAL RATE - MUSE: 82 BPM
BASE EXCESS BLDA CALC-SCNC: 1 MMOL/L (ref -3–3)
BUN SERPL-MCNC: 10.7 MG/DL (ref 8–23)
CA-I BLD-MCNC: 4.6 MG/DL (ref 4.4–5.2)
CALCIUM SERPL-MCNC: 8.1 MG/DL (ref 8.8–10.4)
CHLORIDE SERPL-SCNC: 96 MMOL/L (ref 98–107)
COHGB MFR BLD: 98.1 % (ref 95–96)
CREAT SERPL-MCNC: 0.56 MG/DL (ref 0.67–1.17)
DIASTOLIC BLOOD PRESSURE - MUSE: NORMAL MMHG
DIASTOLIC BLOOD PRESSURE - MUSE: NORMAL MMHG
EGFRCR SERPLBLD CKD-EPI 2021: >90 ML/MIN/1.73M2
ERYTHROCYTE [DISTWIDTH] IN BLOOD BY AUTOMATED COUNT: 14.9 % (ref 10–15)
ERYTHROCYTE [DISTWIDTH] IN BLOOD BY AUTOMATED COUNT: 15.1 % (ref 10–15)
GLUCOSE BLDC GLUCOMTR-MCNC: 115 MG/DL (ref 70–99)
GLUCOSE BLDC GLUCOMTR-MCNC: 117 MG/DL (ref 70–99)
GLUCOSE BLDC GLUCOMTR-MCNC: 123 MG/DL (ref 70–99)
GLUCOSE BLDC GLUCOMTR-MCNC: 129 MG/DL (ref 70–99)
GLUCOSE BLDC GLUCOMTR-MCNC: 144 MG/DL (ref 70–99)
GLUCOSE BLDC GLUCOMTR-MCNC: 177 MG/DL (ref 70–99)
GLUCOSE BLDC GLUCOMTR-MCNC: 98 MG/DL (ref 70–99)
GLUCOSE SERPL-MCNC: 195 MG/DL (ref 70–99)
HCO3 BLD-SCNC: 26 MMOL/L (ref 21–28)
HCO3 SERPL-SCNC: 24 MMOL/L (ref 22–29)
HCT VFR BLD AUTO: 27.7 % (ref 40–53)
HCT VFR BLD AUTO: 29.1 % (ref 40–53)
HGB BLD-MCNC: 10 G/DL (ref 13.3–17.7)
HGB BLD-MCNC: 9.7 G/DL (ref 13.3–17.7)
INTERPRETATION ECG - MUSE: NORMAL
INTERPRETATION ECG - MUSE: NORMAL
LACTATE SERPL-SCNC: 1.7 MMOL/L (ref 0.7–2)
LACTATE SERPL-SCNC: 2.5 MMOL/L (ref 0.7–2)
MAGNESIUM SERPL-MCNC: 2.2 MG/DL (ref 1.7–2.3)
MCH RBC QN AUTO: 30 PG (ref 26.5–33)
MCH RBC QN AUTO: 30.1 PG (ref 26.5–33)
MCHC RBC AUTO-ENTMCNC: 34.4 G/DL (ref 31.5–36.5)
MCHC RBC AUTO-ENTMCNC: 35 G/DL (ref 31.5–36.5)
MCV RBC AUTO: 86 FL (ref 78–100)
MCV RBC AUTO: 87 FL (ref 78–100)
O2/TOTAL GAS SETTING VFR VENT: 21 %
P AXIS - MUSE: 73 DEGREES
P AXIS - MUSE: 85 DEGREES
PCO2 BLD: 40 MM HG (ref 35–45)
PH BLD: 7.42 [PH] (ref 7.35–7.45)
PHOSPHATE SERPL-MCNC: 3.5 MG/DL (ref 2.5–4.5)
PLATELET # BLD AUTO: 192 10E3/UL (ref 150–450)
PLATELET # BLD AUTO: 201 10E3/UL (ref 150–450)
PO2 BLD: 91 MM HG (ref 80–105)
POTASSIUM SERPL-SCNC: 4.5 MMOL/L (ref 3.4–5.3)
PR INTERVAL - MUSE: 266 MS
PR INTERVAL - MUSE: 284 MS
QRS DURATION - MUSE: 122 MS
QRS DURATION - MUSE: 126 MS
QT - MUSE: 372 MS
QT - MUSE: 408 MS
QTC - MUSE: 434 MS
QTC - MUSE: 459 MS
R AXIS - MUSE: 51 DEGREES
R AXIS - MUSE: 81 DEGREES
RBC # BLD AUTO: 3.22 10E6/UL (ref 4.4–5.9)
RBC # BLD AUTO: 3.33 10E6/UL (ref 4.4–5.9)
SAO2 % BLDA: 96 % (ref 92–100)
SODIUM SERPL-SCNC: 128 MMOL/L (ref 135–145)
SODIUM SERPL-SCNC: 130 MMOL/L (ref 135–145)
SODIUM UR-SCNC: 66 MMOL/L
SYSTOLIC BLOOD PRESSURE - MUSE: NORMAL MMHG
SYSTOLIC BLOOD PRESSURE - MUSE: NORMAL MMHG
T AXIS - MUSE: 66 DEGREES
T AXIS - MUSE: 71 DEGREES
VENTRICULAR RATE- MUSE: 76 BPM
VENTRICULAR RATE- MUSE: 82 BPM
WBC # BLD AUTO: 12.2 10E3/UL (ref 4–11)
WBC # BLD AUTO: 9.1 10E3/UL (ref 4–11)

## 2024-11-05 PROCEDURE — 83605 ASSAY OF LACTIC ACID: CPT | Performed by: PHYSICIAN ASSISTANT

## 2024-11-05 PROCEDURE — 250N000011 HC RX IP 250 OP 636: Performed by: SURGERY

## 2024-11-05 PROCEDURE — 80048 BASIC METABOLIC PNL TOTAL CA: CPT | Performed by: PHYSICIAN ASSISTANT

## 2024-11-05 PROCEDURE — 71045 X-RAY EXAM CHEST 1 VIEW: CPT

## 2024-11-05 PROCEDURE — 82330 ASSAY OF CALCIUM: CPT | Performed by: SURGERY

## 2024-11-05 PROCEDURE — 84295 ASSAY OF SERUM SODIUM: CPT | Performed by: INTERNAL MEDICINE

## 2024-11-05 PROCEDURE — 99232 SBSQ HOSP IP/OBS MODERATE 35: CPT | Performed by: INTERNAL MEDICINE

## 2024-11-05 PROCEDURE — 83735 ASSAY OF MAGNESIUM: CPT | Performed by: SURGERY

## 2024-11-05 PROCEDURE — 250N000012 HC RX MED GY IP 250 OP 636 PS 637: Performed by: NURSE PRACTITIONER

## 2024-11-05 PROCEDURE — 99232 SBSQ HOSP IP/OBS MODERATE 35: CPT | Performed by: PHYSICIAN ASSISTANT

## 2024-11-05 PROCEDURE — 97530 THERAPEUTIC ACTIVITIES: CPT | Mod: GP | Performed by: PHYSICAL THERAPIST

## 2024-11-05 PROCEDURE — 250N000013 HC RX MED GY IP 250 OP 250 PS 637: Performed by: INTERNAL MEDICINE

## 2024-11-05 PROCEDURE — 250N000009 HC RX 250: Performed by: SURGERY

## 2024-11-05 PROCEDURE — 250N000009 HC RX 250: Performed by: PHYSICIAN ASSISTANT

## 2024-11-05 PROCEDURE — 250N000013 HC RX MED GY IP 250 OP 250 PS 637: Performed by: SURGERY

## 2024-11-05 PROCEDURE — 82805 BLOOD GASES W/O2 SATURATION: CPT | Performed by: SURGERY

## 2024-11-05 PROCEDURE — 97110 THERAPEUTIC EXERCISES: CPT | Mod: GP | Performed by: PHYSICAL THERAPIST

## 2024-11-05 PROCEDURE — 85018 HEMOGLOBIN: CPT | Performed by: SURGERY

## 2024-11-05 PROCEDURE — 85048 AUTOMATED LEUKOCYTE COUNT: CPT | Performed by: SURGERY

## 2024-11-05 PROCEDURE — 120N000013 HC R&B IMCU

## 2024-11-05 PROCEDURE — 94799 UNLISTED PULMONARY SVC/PX: CPT

## 2024-11-05 PROCEDURE — 84300 ASSAY OF URINE SODIUM: CPT | Performed by: INTERNAL MEDICINE

## 2024-11-05 PROCEDURE — 999N000157 HC STATISTIC RCP TIME EA 10 MIN

## 2024-11-05 PROCEDURE — 85027 COMPLETE CBC AUTOMATED: CPT | Performed by: PHYSICIAN ASSISTANT

## 2024-11-05 PROCEDURE — 272N000202 HC AEROBIKA WITH MANOMETER

## 2024-11-05 PROCEDURE — 82374 ASSAY BLOOD CARBON DIOXIDE: CPT | Performed by: PHYSICIAN ASSISTANT

## 2024-11-05 PROCEDURE — 250N000013 HC RX MED GY IP 250 OP 250 PS 637: Performed by: PHYSICIAN ASSISTANT

## 2024-11-05 PROCEDURE — 97164 PT RE-EVAL EST PLAN CARE: CPT | Mod: GP | Performed by: PHYSICAL THERAPIST

## 2024-11-05 PROCEDURE — 250N000013 HC RX MED GY IP 250 OP 250 PS 637: Performed by: NURSE PRACTITIONER

## 2024-11-05 PROCEDURE — 99233 SBSQ HOSP IP/OBS HIGH 50: CPT | Performed by: INTERNAL MEDICINE

## 2024-11-05 PROCEDURE — 84100 ASSAY OF PHOSPHORUS: CPT | Performed by: SURGERY

## 2024-11-05 PROCEDURE — 258N000003 HC RX IP 258 OP 636: Performed by: INTERNAL MEDICINE

## 2024-11-05 PROCEDURE — 93005 ELECTROCARDIOGRAM TRACING: CPT

## 2024-11-05 PROCEDURE — 250N000011 HC RX IP 250 OP 636: Performed by: PHYSICIAN ASSISTANT

## 2024-11-05 RX ORDER — NICOTINE POLACRILEX 4 MG
15-30 LOZENGE BUCCAL
Status: DISCONTINUED | OUTPATIENT
Start: 2024-11-05 | End: 2024-11-13 | Stop reason: HOSPADM

## 2024-11-05 RX ORDER — DEXTROSE MONOHYDRATE 25 G/50ML
25-50 INJECTION, SOLUTION INTRAVENOUS
Status: DISCONTINUED | OUTPATIENT
Start: 2024-11-05 | End: 2024-11-13 | Stop reason: HOSPADM

## 2024-11-05 RX ORDER — SODIUM CHLORIDE 1 G/1
1 TABLET ORAL
Status: DISCONTINUED | OUTPATIENT
Start: 2024-11-05 | End: 2024-11-09

## 2024-11-05 RX ORDER — SIMETHICONE 80 MG
80 TABLET,CHEWABLE ORAL 4 TIMES DAILY
Status: DISCONTINUED | OUTPATIENT
Start: 2024-11-05 | End: 2024-11-11

## 2024-11-05 RX ORDER — SODIUM CHLORIDE, SODIUM LACTATE, POTASSIUM CHLORIDE, CALCIUM CHLORIDE 600; 310; 30; 20 MG/100ML; MG/100ML; MG/100ML; MG/100ML
INJECTION, SOLUTION INTRAVENOUS CONTINUOUS
Status: DISCONTINUED | OUTPATIENT
Start: 2024-11-05 | End: 2024-11-05

## 2024-11-05 RX ADMIN — Medication 40 MG: at 09:53

## 2024-11-05 RX ADMIN — CEFAZOLIN 1 G: 1 INJECTION, POWDER, FOR SOLUTION INTRAMUSCULAR; INTRAVENOUS at 00:16

## 2024-11-05 RX ADMIN — HEPARIN SODIUM 5000 UNITS: 5000 INJECTION, SOLUTION INTRAVENOUS; SUBCUTANEOUS at 14:18

## 2024-11-05 RX ADMIN — ACETAMINOPHEN 975 MG: 325 TABLET, FILM COATED ORAL at 12:26

## 2024-11-05 RX ADMIN — ACETAMINOPHEN 975 MG: 325 TABLET, FILM COATED ORAL at 20:56

## 2024-11-05 RX ADMIN — OXYCODONE HYDROCHLORIDE 7.5 MG: 5 TABLET ORAL at 01:05

## 2024-11-05 RX ADMIN — INSULIN HUMAN 1.5 UNITS/HR: 1 INJECTION, SOLUTION INTRAVENOUS at 04:47

## 2024-11-05 RX ADMIN — VANCOMYCIN HYDROCHLORIDE 1000 MG: 1 INJECTION, SOLUTION INTRAVENOUS at 01:57

## 2024-11-05 RX ADMIN — INSULIN ASPART 1 UNITS: 100 INJECTION, SOLUTION INTRAVENOUS; SUBCUTANEOUS at 12:45

## 2024-11-05 RX ADMIN — SODIUM CHLORIDE TAB 1 GM 1 G: 1 TAB at 18:19

## 2024-11-05 RX ADMIN — SODIUM CHLORIDE TAB 1 GM 1 G: 1 TAB at 10:03

## 2024-11-05 RX ADMIN — SENNOSIDES AND DOCUSATE SODIUM 1 TABLET: 8.6; 5 TABLET ORAL at 20:59

## 2024-11-05 RX ADMIN — CEFAZOLIN 1 G: 1 INJECTION, POWDER, FOR SOLUTION INTRAMUSCULAR; INTRAVENOUS at 15:35

## 2024-11-05 RX ADMIN — CEFAZOLIN 1 G: 1 INJECTION, POWDER, FOR SOLUTION INTRAMUSCULAR; INTRAVENOUS at 07:44

## 2024-11-05 RX ADMIN — SIMETHICONE 80 MG: 80 TABLET, CHEWABLE ORAL at 18:19

## 2024-11-05 RX ADMIN — HEPARIN SODIUM 5000 UNITS: 5000 INJECTION, SOLUTION INTRAVENOUS; SUBCUTANEOUS at 21:00

## 2024-11-05 RX ADMIN — SIMETHICONE 80 MG: 80 TABLET, CHEWABLE ORAL at 10:03

## 2024-11-05 RX ADMIN — LIDOCAINE 2 PATCH: 4 PATCH TOPICAL at 00:16

## 2024-11-05 RX ADMIN — SODIUM CHLORIDE TAB 1 GM 1 G: 1 TAB at 12:27

## 2024-11-05 RX ADMIN — HYDROMORPHONE HYDROCHLORIDE 0.4 MG: 0.2 INJECTION, SOLUTION INTRAMUSCULAR; INTRAVENOUS; SUBCUTANEOUS at 00:31

## 2024-11-05 RX ADMIN — NOREPINEPHRINE BITARTRATE 0.03 MCG/KG/MIN: 0.02 INJECTION, SOLUTION INTRAVENOUS at 03:20

## 2024-11-05 RX ADMIN — CHLORHEXIDINE GLUCONATE 0.12% ORAL RINSE 15 ML: 1.2 LIQUID ORAL at 07:56

## 2024-11-05 RX ADMIN — HYDROMORPHONE HYDROCHLORIDE 0.2 MG: 0.2 INJECTION, SOLUTION INTRAMUSCULAR; INTRAVENOUS; SUBCUTANEOUS at 04:50

## 2024-11-05 RX ADMIN — VANCOMYCIN HYDROCHLORIDE 1000 MG: 1 INJECTION, SOLUTION INTRAVENOUS at 14:15

## 2024-11-05 RX ADMIN — POLYETHYLENE GLYCOL 3350 17 G: 17 POWDER, FOR SOLUTION ORAL at 09:52

## 2024-11-05 RX ADMIN — SENNOSIDES AND DOCUSATE SODIUM 1 TABLET: 8.6; 5 TABLET ORAL at 10:02

## 2024-11-05 RX ADMIN — SODIUM CHLORIDE, POTASSIUM CHLORIDE, SODIUM LACTATE AND CALCIUM CHLORIDE: 600; 310; 30; 20 INJECTION, SOLUTION INTRAVENOUS at 04:46

## 2024-11-05 RX ADMIN — TAMSULOSIN HYDROCHLORIDE 0.4 MG: 0.4 CAPSULE ORAL at 09:55

## 2024-11-05 RX ADMIN — SIMETHICONE 80 MG: 80 TABLET, CHEWABLE ORAL at 12:50

## 2024-11-05 RX ADMIN — SIMETHICONE 80 MG: 80 TABLET, CHEWABLE ORAL at 21:00

## 2024-11-05 RX ADMIN — ASPIRIN 81 MG CHEWABLE TABLET 81 MG: 81 TABLET CHEWABLE at 10:02

## 2024-11-05 NOTE — PLAN OF CARE
"  Problem: Adult Inpatient Plan of Care  Goal: Plan of Care Review  Description: The Plan of Care Review/Shift note should be completed every shift.  The Outcome Evaluation is a brief statement about your assessment that the patient is improving, declining, or no change.  This information will be displayed automatically on your shift  note.  Outcome: Progressing  Flowsheets (Taken 11/5/2024 1745)  Plan of Care Reviewed With:   patient   spouse  Overall Patient Progress: improving  Goal: Patient-Specific Goal (Individualized)  Description: You can add care plan individualizations to a care plan. Examples of Individualization might be:  \"Parent requests to be called daily at 9am for status\", \"I have a hard time hearing out of my right ear\", or \"Do not touch me to wake me up as it startles  me\".  Outcome: Progressing  Goal: Absence of Hospital-Acquired Illness or Injury  Outcome: Progressing  Intervention: Identify and Manage Fall Risk  Recent Flowsheet Documentation  Taken 11/5/2024 1600 by Yana Mueller, RN  Safety Promotion/Fall Prevention:   activity supervised   assistive device/personal items within reach   clutter free environment maintained   increased rounding and observation   increase visualization of patient   lighting adjusted   mobility aid in reach   nonskid shoes/slippers when out of bed   room door open   room near nurse's station   safety round/check completed   room organization consistent  Taken 11/5/2024 1400 by Yana Mueller, RN  Safety Promotion/Fall Prevention:   activity supervised   assistive device/personal items within reach   clutter free environment maintained   increased rounding and observation   increase visualization of patient   lighting adjusted   mobility aid in reach   nonskid shoes/slippers when out of bed   room door open   room near nurse's station   safety round/check completed   room organization consistent  Taken 11/5/2024 1200 by Yana Mueller, RN  Safety " Promotion/Fall Prevention:   activity supervised   assistive device/personal items within reach   clutter free environment maintained   increased rounding and observation   increase visualization of patient   lighting adjusted   mobility aid in reach   nonskid shoes/slippers when out of bed   room door open   room near nurse's station   safety round/check completed   room organization consistent  Taken 11/5/2024 1000 by Yana Mueller RN  Safety Promotion/Fall Prevention:   activity supervised   assistive device/personal items within reach   clutter free environment maintained   increased rounding and observation   increase visualization of patient   lighting adjusted   mobility aid in reach   nonskid shoes/slippers when out of bed   room door open   room near nurse's station   safety round/check completed   room organization consistent  Taken 11/5/2024 0800 by Yana Mueller RN  Safety Promotion/Fall Prevention:   activity supervised   assistive device/personal items within reach   clutter free environment maintained   increased rounding and observation   increase visualization of patient   lighting adjusted   mobility aid in reach   nonskid shoes/slippers when out of bed   room door open   room near nurse's station   safety round/check completed   room organization consistent  Intervention: Prevent Skin Injury  Recent Flowsheet Documentation  Taken 11/5/2024 1600 by Yana Mueller RN  Body Position:   turned   neutral head position   side-lying  Skin Protection: adhesive use limited  Taken 11/5/2024 1400 by Yana Mueller RN  Body Position:   turned   neutral head position   side-lying  Taken 11/5/2024 1200 by Yana Mueller RN  Skin Protection: adhesive use limited  Taken 11/5/2024 0800 by Yana Mueller RN  Body Position:   turned   neutral head position   side-lying  Skin Protection: adhesive use limited  Intervention: Prevent and Manage VTE (Venous Thromboembolism) Risk  Recent  Flowsheet Documentation  Taken 11/5/2024 1600 by Yana Mueller RN  VTE Prevention/Management: SCDs on (sequential compression devices)  Taken 11/5/2024 1200 by Yana Mueller RN  VTE Prevention/Management: SCDs on (sequential compression devices)  Taken 11/5/2024 0800 by Yana Mueller RN  VTE Prevention/Management: SCDs on (sequential compression devices)  Intervention: Prevent Infection  Recent Flowsheet Documentation  Taken 11/5/2024 1600 by Yana Mueller RN  Infection Prevention:   cohorting utilized   hand hygiene promoted   rest/sleep promoted   single patient room provided  Taken 11/5/2024 1400 by Yana Mueller RN  Infection Prevention:   cohorting utilized   hand hygiene promoted   rest/sleep promoted   single patient room provided  Taken 11/5/2024 1200 by Yana Mueller RN  Infection Prevention:   cohorting utilized   hand hygiene promoted   rest/sleep promoted   single patient room provided  Taken 11/5/2024 1000 by Yana Mueller RN  Infection Prevention:   cohorting utilized   hand hygiene promoted   rest/sleep promoted   single patient room provided  Taken 11/5/2024 0800 by Yana Mueller RN  Infection Prevention:   cohorting utilized   hand hygiene promoted   rest/sleep promoted   single patient room provided  Goal: Optimal Comfort and Wellbeing  Outcome: Progressing  Intervention: Provide Person-Centered Care  Recent Flowsheet Documentation  Taken 11/5/2024 1600 by Yana Mueller RN  Trust Relationship/Rapport:   care explained   reassurance provided   thoughts/feelings acknowledged   choices provided   emotional support provided   empathic listening provided   questions encouraged   questions answered  Taken 11/5/2024 1200 by Yana Mueller RN  Trust Relationship/Rapport:   care explained   reassurance provided   thoughts/feelings acknowledged   choices provided   emotional support provided   empathic listening provided   questions encouraged    questions answered  Taken 11/5/2024 0800 by Yana Mueller RN  Trust Relationship/Rapport:   care explained   reassurance provided   thoughts/feelings acknowledged   choices provided   emotional support provided   empathic listening provided   questions encouraged   questions answered  Goal: Readiness for Transition of Care  Outcome: Progressing     Problem: Infection  Goal: Absence of Infection Signs and Symptoms  Outcome: Progressing  Intervention: Prevent or Manage Infection  Recent Flowsheet Documentation  Taken 11/5/2024 1600 by Yana Mueller RN  Infection Management: aseptic technique maintained  Taken 11/5/2024 1200 by Yana Mueller RN  Infection Management: aseptic technique maintained  Taken 11/5/2024 0800 by Yana Mueller RN  Infection Management: aseptic technique maintained     Problem: Cardiovascular Surgery  Goal: Improved Activity Tolerance  Outcome: Progressing  Intervention: Optimize Tolerance for Activity  Recent Flowsheet Documentation  Taken 11/5/2024 1600 by Yana Mueller RN  Environmental Support: calm environment promoted  Taken 11/5/2024 1200 by Yana Mueller RN  Environmental Support: calm environment promoted  Taken 11/5/2024 0800 by Yana Mueller RN  Environmental Support: calm environment promoted  Goal: Optimal Coping with Heart Surgery  Outcome: Progressing  Intervention: Support Psychosocial Response to Surgery  Recent Flowsheet Documentation  Taken 11/5/2024 1600 by Yana Mueller RN  Supportive Measures:   active listening utilized   relaxation techniques promoted   verbalization of feelings encouraged  Taken 11/5/2024 1200 by Yana Mueller RN  Supportive Measures:   active listening utilized   relaxation techniques promoted   verbalization of feelings encouraged  Taken 11/5/2024 0800 by Yana Mueller RN  Supportive Measures:   active listening utilized   relaxation techniques promoted   verbalization of feelings  encouraged  Goal: Absence of Bleeding  Outcome: Progressing  Intervention: Monitor and Manage Bleeding  Recent Flowsheet Documentation  Taken 11/5/2024 1600 by Yana Mueller RN  Bleeding Management: dressing monitored  Taken 11/5/2024 1200 by Yana Mueller RN  Bleeding Management: dressing monitored  Taken 11/5/2024 0800 by Yana Mueller RN  Bleeding Management: dressing monitored  Goal: Effective Bowel Elimination  Outcome: Progressing  Goal: Effective Cardiac Function  Outcome: Progressing  Goal: Optimal Cerebral Tissue Perfusion  Outcome: Progressing  Intervention: Protect and Optimize Cerebral Perfusion  Recent Flowsheet Documentation  Taken 11/5/2024 1600 by Yana Mueller RN  Sensory Stimulation Regulation:   care clustered   lighting decreased  Head of Bed (HOB) Positioning: HOB at 20-30 degrees  Taken 11/5/2024 1400 by Yana Mueller RN  Head of Bed (HOB) Positioning: HOB at 20-30 degrees  Taken 11/5/2024 1200 by Yana Mueller RN  Sensory Stimulation Regulation:   care clustered   lighting decreased  Taken 11/5/2024 0800 by Yana Mueller RN  Sensory Stimulation Regulation:   care clustered   lighting decreased  Head of Bed (HOB) Positioning: HOB at 30 degrees  Goal: Fluid and Electrolyte Balance  Outcome: Progressing  Goal: Blood Glucose Level Within Targeted Range  Outcome: Progressing  Goal: Absence of Infection Signs and Symptoms  Outcome: Progressing  Intervention: Prevent or Manage Infection  Recent Flowsheet Documentation  Taken 11/5/2024 1600 by Yana Mueller RN  Infection Prevention:   cohorting utilized   hand hygiene promoted   rest/sleep promoted   single patient room provided  Taken 11/5/2024 1400 by Yana Mueller RN  Infection Prevention:   cohorting utilized   hand hygiene promoted   rest/sleep promoted   single patient room provided  Taken 11/5/2024 1200 by Yana Mueller RN  Infection Prevention:   cohorting utilized   hand hygiene  promoted   rest/sleep promoted   single patient room provided  Taken 11/5/2024 1000 by Yana Mueller RN  Infection Prevention:   cohorting utilized   hand hygiene promoted   rest/sleep promoted   single patient room provided  Taken 11/5/2024 0800 by Yana Mueller RN  Infection Prevention:   cohorting utilized   hand hygiene promoted   rest/sleep promoted   single patient room provided  Goal: Anesthesia/Sedation Recovery  Outcome: Progressing  Intervention: Optimize Anesthesia Recovery  Recent Flowsheet Documentation  Taken 11/5/2024 1600 by Yana Mueller RN  Safety Promotion/Fall Prevention:   activity supervised   assistive device/personal items within reach   clutter free environment maintained   increased rounding and observation   increase visualization of patient   lighting adjusted   mobility aid in reach   nonskid shoes/slippers when out of bed   room door open   room near nurse's station   safety round/check completed   room organization consistent  Administration (IS): self-administered  Reorientation Measures: clock in view  Level Incentive Spirometer (mL): 500  Number of Repetitions (IS): 10  Patient Tolerance (IS): good  Taken 11/5/2024 1400 by Yana Mueller RN  Safety Promotion/Fall Prevention:   activity supervised   assistive device/personal items within reach   clutter free environment maintained   increased rounding and observation   increase visualization of patient   lighting adjusted   mobility aid in reach   nonskid shoes/slippers when out of bed   room door open   room near nurse's station   safety round/check completed   room organization consistent  Taken 11/5/2024 1200 by Yana Mueller RN  Safety Promotion/Fall Prevention:   activity supervised   assistive device/personal items within reach   clutter free environment maintained   increased rounding and observation   increase visualization of patient   lighting adjusted   mobility aid in reach   nonskid  shoes/slippers when out of bed   room door open   room near nurse's station   safety round/check completed   room organization consistent  Administration (IS): self-administered  Reorientation Measures: clock in view  Level Incentive Spirometer (mL): 500  Number of Repetitions (IS): 10  Patient Tolerance (IS): good  Taken 11/5/2024 1000 by Yana Mueller RN  Safety Promotion/Fall Prevention:   activity supervised   assistive device/personal items within reach   clutter free environment maintained   increased rounding and observation   increase visualization of patient   lighting adjusted   mobility aid in reach   nonskid shoes/slippers when out of bed   room door open   room near nurse's station   safety round/check completed   room organization consistent  Taken 11/5/2024 0800 by Yana Mueller RN  Safety Promotion/Fall Prevention:   activity supervised   assistive device/personal items within reach   clutter free environment maintained   increased rounding and observation   increase visualization of patient   lighting adjusted   mobility aid in reach   nonskid shoes/slippers when out of bed   room door open   room near nurse's station   safety round/check completed   room organization consistent  Administration (IS): instruction provided, follow-up  Reorientation Measures: clock in view  Level Incentive Spirometer (mL): 500  Number of Repetitions (IS): 10  Patient Tolerance (IS): good  Goal: Acceptable Pain Control  Outcome: Progressing  Goal: Nausea and Vomiting Relief  Outcome: Progressing  Goal: Effective Urinary Elimination  Outcome: Progressing  Intervention: Monitor and Manage Urinary Retention  Recent Flowsheet Documentation  Taken 11/5/2024 1600 by Yana Mueller, RN  Urinary Elimination Promotion: catheter patency maintained  Taken 11/5/2024 1200 by Yana Mueller, RN  Urinary Elimination Promotion: catheter patency maintained  Taken 11/5/2024 0800 by Yana Mueller, DEREK  Urinary  Elimination Promotion: catheter patency maintained  Goal: Effective Oxygenation and Ventilation  Outcome: Progressing  Intervention: Promote Airway Secretion Clearance  Recent Flowsheet Documentation  Taken 11/5/2024 1600 by Yana Mueller, RN  Administration (IS): self-administered  Level Incentive Spirometer (mL): 500  Cough And Deep Breathing: done with encouragement  Number of Repetitions (IS): 10  Patient Tolerance (IS): good  Taken 11/5/2024 1200 by Yana Mueller, RN  Administration (IS): self-administered  Level Incentive Spirometer (mL): 500  Cough And Deep Breathing: done with encouragement  Number of Repetitions (IS): 10  Patient Tolerance (IS): good  Taken 11/5/2024 0800 by Yana Mueller, RN  Administration (IS): instruction provided, follow-up  Level Incentive Spirometer (mL): 500  Cough And Deep Breathing: done with encouragement  Number of Repetitions (IS): 10  Patient Tolerance (IS): good   Goal Outcome Evaluation:      Plan of Care Reviewed With: patient, spouse    Overall Patient Progress: improvingOverall Patient Progress: improving     CV  Pressors (which pressors and any increase/decrease in pressor needs): Weaned off   HR range:80-95  Chest tube output:240 mL    Neuro  Orientation: A&O x4  Delirium present?(y/n): no  Sleep: Resting between cares  Pain: Minimal complaints, controlled with tylenol    GI/  BM? (y/n): No  Urine output: 260 mL borderline      Lines:  Out    Ready for transfer to [facility]

## 2024-11-05 NOTE — ANESTHESIA POSTPROCEDURE EVALUATION
Patient: Jayme Donahue    Procedure: Procedure(s):  MINIMALLY INVASIVE MITRAL VALVE REPLACEMENT WITH TISSUE HEART VALVE MITRIS  RESILIA  MITRAL VALVE SIZE: 31MM, AND ON CARDIOPULMONARY PUMP OXYGENATOR  (INTRAOPERATIVE TRANSESOPHAGEAL ECHOCARDIOGRAM BY ANESTHESIOLOGIST)       Anesthesia Type:  General    Note:  Disposition: Admission; ICU            ICU Sign Out: Anesthesiologist/ICU physician sign out WAS performed   Postop Pain Control: Uneventful            Sign Out: Well controlled pain   PONV: No   Neuro/Psych: Uneventful            Sign Out: Acceptable/Baseline neuro status   Airway/Respiratory: Uneventful            Sign Out: AIRWAY IN SITU/Resp. Support               Airway in situ/Resp. Support: ETT                 Reason: Planned Pre-op   CV/Hemodynamics: Uneventful            Sign Out: Detailed CV status               Blood Pressure: Pressors               Rate/Rhythm: Normal HR               Perfusion:  Adequate perfusion indices   Other NRE: NONE   DID A NON-ROUTINE EVENT OCCUR? No           Last vitals:  Vitals Value Taken Time   BP 84/60 11/04/24 2003   Temp 35.9  C (96.62  F) 11/04/24 2132   Pulse 76 11/04/24 2132   Resp 37 11/04/24 2132   SpO2 99 % 11/04/24 2132   Vitals shown include unfiled device data.    Electronically Signed By: Amanda Tabares MD  November 4, 2024  9:33 PM

## 2024-11-05 NOTE — BRIEF OP NOTE
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]  Post-operative diagnosis Same as pre-operative diagnosis    Procedure: MINIMALLY INVASIVE MITRAL VALVE REPLACEMENT WITH TISSUE HEART VALVE MITRIS  RESILIA  MITRAL VALVE SIZE: 31MM, AND ON CARDIOPULMONARY PUMP OXYGENATOR  (INTRAOPERATIVE TRANSESOPHAGEAL ECHOCARDIOGRAM BY ANESTHESIOLOGIST), Right - Chest    Surgeon: Surgeons and Role:     * Alexa Ellison MD - Primary     * To Tyler, NP - Assisting  Anesthesia: General   Estimated Blood Loss: 500 ml    Drains: Chest tubes x2  Specimens:   ID Type Source Tests Collected by Time Destination   A : STAT LABS S/P CARDIAC BYPASS Blood Line, arterial CBC WITH PLATELETS, BASIC METABOLIC PANEL, FIBRINOGEN ACTIVITY, PARTIAL THROMBOPLASTIN TIME, INR Alexa Ellison MD 11/4/2024  6:46 PM    B : STAT GRAM STAIN Biopsy Heart Valve, Mitral (Bicuspid) ANAEROBIC BACTERIAL CULTURE ROUTINE, GRAM STAIN, FUNGAL OR YEAST CULTURE ROUTINE, AEROBIC BACTERIAL CULTURE ROUTINE Alexa Ellison MD 11/4/2024  5:23 PM      Findings:   See formal op note for full details \  Complications: None  Implants:   Implant Name Type Inv. Item Serial No.  Lot No. LRB No. Used Action   VALVE MITRAL MITRIS RESILIA OD31 [id number] - [id number] Valve VALVE MITRAL MITRIS RESILIA OD31 [id number] [employer]  Right 1 Implanted     To Tyler CNP, [facility]-  Cardiothoracic Surgery  [employer] Pager [phone]

## 2024-11-05 NOTE — PLAN OF CARE
CV  Pressors (which pressors and any increase/decrease in pressor needs): Epinephrine at 0.03. Levophed turned off. Decrease in pressor needs after returning to bed from chair and receiving 1 unit(s) PRBCs and 250 mL LR bolus per intensivist.  HR range:  70s-80s, briefly paced while up in chair, rhythm otherwise SR w/ 1 degree AV block w/ RBBB  Chest tube output: 330 mL    Neuro  Orientation: Aox4, forgetful  Delirium present?(y/n): No  Sleep: Resting between cares  Pain: incisional pain controlled with IV dilaudid    GI/  BM? (y/n): No  Urine output: 30-55 mL/hr    Lines: R IV CVC w/ introducer, L radial art line, R forearm PIV  Arrival time: 11/4/24 1957  Extubation time: 11/5/24 0150  Dangle time: 11/5/24 0320  Chair Time: 11/5/24 0330

## 2024-11-05 NOTE — PROGRESS NOTES
11/05/24 0854   Appointment Info   Signing Clinician's Name / Credentials (PT) Cynthia Melton Poster, SPT   Student Supervision Direct Patient Contact Provided;Line of sight supervision provided;Direct supervision provided;On-site supervision provided   Living Environment   People in Home spouse   Current Living Arrangements house   Home Accessibility no concerns   Number of Stairs, Main Entrance 5   Stair Railings, Main Entrance railings safe and in good condition   Transportation Anticipated family or friend will provide;agency   Living Environment Comments ramp to enter home, all needs met on main level   Self-Care   Usual Activity Tolerance good   Regular Exercise No   Equipment Currently Used at Home cane, straight   Fall history within last six months no   Activity/Exercise/Self-Care Comment IND with mobility without device, has 2WW and 4WW at home if needed   General Information   Onset of Illness/Injury or Date of Surgery 11/23/24   Referring Physician To Tyler, NP 11/04/24 194   Patient/Family Therapy Goals Statement (PT) return home   Pertinent History of Current Problem (include personal factors and/or comorbidities that impact the POC) Pt is an 84 year old male admitted for urosepsis. Is currently one day post-op MVR 1/4/24.   Existing Precautions/Restrictions fall;cardiac   Cognition   Orientation Status (Cognition) oriented to;person;place;situation;time   Safety Deficit (Cognition) minimal deficit;impulsivity;insight into deficits/self-awareness;judgment;problem-solving;other (see comments)   Cognitive Status Comments pt follows but needs frequent redirection, cued to stay sitting several times, egar to move.   Pain Assessment   Patient Currently in Pain Yes, see Vital Sign flowsheet  (no back pain currently)   Integumentary/Edema   Integumentary/Edema   (pain felt on R incision sites)   Integumentary/Edema Comments surgical edema   Posture    Posture Forward head position   Range of Motion  (ROM)   Range of Motion ROM is WFL   ROM Comment R arm a little limited secondary to lines and pain/discomfort secondary to CT site/incisions.   Strength (Manual Muscle Testing)   Strength (Manual Muscle Testing) Deficits observed during functional mobility   Strength Comments gerneral LE weakness, reduced mobility due to pain, impaired funcitonal activity tolerance from baseline.   Bed Mobility   Bed Mobility supine-sit   Comment, (Bed Mobility) modA at trunk, line managment   Transfers   Transfers sit-stand transfer;bed-chair transfer   Comment, (Transfers) CGA   Bed-Chair Transfer   Assistive Device (Bed-Chair Transfers) walker, front-wheeled   Bed-Chair [place] (Transfers) contact guard   Sit-Stand Transfer   Sit-Stand [place] (Transfers) contact guard   Gait/Stairs (Locomotion)   Comment, (Gait/Stairs) bed side gaite with FWW   Balance   Balance Comments static balance EOB, standing not assessed   Clinical Impression   Criteria for Skilled Therapeutic Intervention Yes, treatment indicated   PT Diagnosis (PT) Impaired functional activity tolerance   Influenced by the following impairments Post op weakness, pain and fatigue. Decreased balance.   Functional limitations due to impairments Decreased functional independence with mobility   Clinical Presentation (PT Evaluation Complexity) stable   Clinical Presentation Rationale See MR   Clinical Decision Making (Complexity) low complexity   Planned Therapy Interventions (PT) bed mobility training;gait training;home exercise program;patient/family education;stair training;strengthening;stretching;risk factor education;home program guidelines;progressive activity/exercise;neuromuscular re-education;postural re-education;ROM (range of motion);transfer training   Risk & Benefits of therapy have been explained evaluation/treatment results reviewed;care plan/treatment goals reviewed;risks/benefits reviewed;current/potential barriers reviewed;participants  included;participants voiced agreement with care plan;patient   PT Total Evaluation Time   PT Eval, Re-eval Minutes (31605) 5   Physical Therapy Goals   PT Frequency 2x/day   PT Predicted Duration/Target Date for Goal Attainment 12/08/24   PT Goals Cardiac Phase 1   PT: Bed Mobility Independent;Supine to/from sit   PT: Transfers Supervision/stand-by assist;Bed to/from chair;Assistive device   PT: Understanding of cardiac education to maximize quality of life, condition management, and health outcomes Patient;Caregiver;Verbalize;Demonstrate   PT: Perform aerobic activity with stable cardiovascular response continuous;5 minutes;intermittent;10 minutes;ambulation   PT: Functional/aerobic ambulation tolerance with stable cardiovascular response in order to return to home and community environment Supervision/SBA;Assistive device;Greater than 300 feet   PT: Navigation of stairs simulating home set up with stable cardiovascular response in order to return to home and community environment Supervision/SBA;6 stairs;Rail on both sides   Interventions   Interventions Quick Adds Therapeutic Procedure;Gait Training;Neuromuscular Re-ed;Cardiac Rehab   Therapeutic Procedure/Exercise   Ther. Procedure: strength, endurance, ROM, flexibillity Minutes (49145) 10   Treatment Detail/Skilled Intervention Pt completed ankle PF/DF in bed in prep for transfers. EOB dangle with weight shifting cues. pt avoiding sitting/leaning on R painful side, responds well to tactile cues to shift back to the L. Initiated pre-gait activities such as standing marches and side steps. Limited by short lines. Pt did tolerate, but admits to fatigue. Reviewed signs and symptoms of activity intolerance. Decrease in BP with time up.   Therapeutic Activity   Therapeutic Activities: dynamic activities to improve functional performance Minutes (54060) 25   Symptoms Noted During/After Treatment Increased pain   Treatment Detail/Skilled Intervention Pt supine when  "entering the room. RN present thoughout treatment. increased time for room set up, line management, and VS monitoring. Pt required Max/ModA sup>sit due to surgical incisions/chest tubes. once sitting pt verbally cued to walk legs to EOB. Pt required Toribio to shift trunk to feet flatat EOB. pt asked to hold on to us to shift trunk but was edu on proper mechanics for standing. Once at EOB pt verbally cued to shift hips forward so that his feet could reach the ground. Pt asked to use FWW to transfer to chair. Once FWW was in room pt edu on pushing off for STS. pt CGA for STS with verbal cues to keep \"nose over toes.\" once OOB pt reached for FWW and stood for a few min to monitor VS. pt denied dizziness. pt completed standing marches to initiate transfer to recliner. pt sompleted stand pivot transfer using FWW. once at the chair pt cued to reach back and slowly sit. pt positioned with pillows under arms/legs/back/head and legs up in recliner. chair alarm on, call light near by, RN aware of PT status.   Cardiac Rehab Phase II Plan   Phase II Order Received Yes   Phase II Appointment Status Not scheduled   Not Scheduled Reason Patient discharging to TCU/ARC   PT Discharge Planning   PT Plan initiate ambulation, strengthening, balance   PT Discharge Recommendation (DC Rec) Transitional Care Facility;home with assist   PT Rationale for DC Rec Lives in a house with his wife, reprots independence at baseline. Pt required Ax1 with mobility today, limited by fatigue, balance and impaired activity tolerance, performing under baseline with functional mobility. At this time recommend TCU at disch for ongoing skilled rehab benefit. If pt is able to progress independence and safety wtih mobility, could possibly disch to home with OP CR phase II.   PT Brief overview of current status CGA sit>stand, ModA sup>sit\"Goals of therapy will be to address safe mobility and make recs for d/c to next level of care. Pt and RN will continue to " "follow all falls risk precautions as documented by RN staff while hospitalized.\"   Physical Therapy Time and Intention   Timed Code Treatment Minutes 35   Total Session Time (sum of timed and untimed services) 40     "

## 2024-11-05 NOTE — ANESTHESIA CARE TRANSFER NOTE
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    Anesthesia Type:   General     Note:    Oropharynx: endotracheal tube in place, nasal gatric tube in place and ventilatory support  Level of Consciousness: iatrogenic sedation      Independent Airway: airway patency not satisfactory and stable    Vital Signs Stable: post-procedure vital signs reviewed and stable  Report to RN Given: handoff report given  Patient transferred to: ICU    ICU Handoff: Call for PAUSE to initiate/utilize ICU HANDOFF, Identified Patient, Identified Responsible Provider, Reviewed the Pertinent Medical History, Discussed Surgical Course, Reviewed Intra-OP Anesthesia Management and Issues during Anesthesia, Set Expectations for Post Procedure Period and Allowed Opportunity for Questions and Acknowledgement of Understanding  Vitals:  Vitals Value Taken Time   BP 84/60 11/04/24 2003   Temp     Pulse 86 11/04/24 2007   Resp 22 11/04/24 2007   SpO2 98 % 11/04/24 2007   Vitals shown include unfiled device data.    Electronically Signed By: GLEN Dumont CRNA  November 4, 2024  8:08 PM

## 2024-11-05 NOTE — OP NOTE
Date of Service: October 4th, 2024    Referring Cardiologist: Tarik Bingham MD    Preoperative Diagnosis: severe mitral valve regurgitation, bacterial endocarditis    Postoperative Diagnosis: severe mitral valve regurgitation, bacterial endocarditis    Surgeon: Alexa Ellison MD    Assistant: Pradeep Tyler NP    Name of Operation: Right mini-thoracotomy mitral valve replacement with a 31 mm Quinonez MITRIS bovine pericardial valve, right common femoral arterial and venous cannulation for cardiopulmonary bypass, intraoperative LINDSEY.    Anesthesia: general orotracheal    Indications for Procedure: Mr. Donahue is a very pleasant 84-year-old gentleman who was recently admitted to our hospital and was diagnosed with severe mitral regurgitation with bacterial mitral valve endocarditis.  He was taken to the operating today for a minimally invasive mitral valve replacement.    Operative Findings: The patient had an overall normal LV systolic size and function.  He had severe mitral valve regurgitation and he had fresh and friable vegetations on both leaflets of the mitral valve.  The vegetation also appeared to involve the subvalvular apparatus of the posterior leaflet and so we completely removed the chordal structures of the posterior leaflet down to the papillary muscles.    Description of the Procedure: After informed consent was obtained, the patient was brought down to the operating room and was placed on the OR table in the supine position.  Intravenous and intraarterial lines were begun.  While monitoring his blood pressure and EKG tracing, he was anesthetized and intubated using a single-lumen endotracheal tube.  His entire chest, abdomen, both groins and legs were prepped down to the knees using multiple layers of DuraPrep.  He was draped in a sterile field.  A right minithoracotomy incision was made, and the fourth intercostal space was entered.  An Parmjit soft tissue wound retractor and the [employer]  minithoracotomy rib retractor were used for exposure.  Carbon dioxide was flooded into the chest prevent air embolism.  A small right groin incision was then made, and the right common femoral artery and vein were dissected out.  The patient was fully heparinized.  5-0 Prolene pursestring sutures were used to cannulate the right common femoral artery and vein using a 17 British femoral arterial cannula and a 25 British multistage venous cannula, respectively.  Seldinger technique was used with LINDSEY guidance.  After appropriate ACT level was achieved, cardiopulmonary bypass was established and the patient was kept normothermic during the entire operation.    The right phrenic nerve was identified and protected.  The pericardium was opened 2 cm anteriorly in a parallel fashion to the phrenic nerve.  The venous drainage of the heart was excellent.  The oblique sinus was entered and the interatrial groove was dissected out using electrocautery.  An antegrade needle/aortic root vent was placed in the ascending aorta.  The aorta was then crossclamped and 1200 cc of [place] antegrade cardioplegia was given to fully arrest the heart.  The patient went into good diastolic arrest without any LV distention.    A standard left atriotomy was then made, and the mitral valve was exposed using the [employer] HV retractor.  Our exposure was good.  Findings were noted above.  Both leaflets were excised and the annulus was meticulously debrided and irrigated out.  The mitral valve and the vegetations were sent to microbiology for STAT Gram stain which came back positive for 2+ gram-negative bacilli.  The annulus was sized to a 31 mm MITRIS bovine pericardial valve.  Annular sutures were placed using horizontal mattress sutures of 2-0 Ethibond with supra-annular pledgets.  The valve was brought to the field and all sutures were brought through the sewing ring and the valve was seated down without difficulty.  All sutures were tied down  securely using the Cor-Knot device.  The left atriotomy was then closed in 2 layers of running 4-0 Prolene.  The patient was placed in Trendelenburg position.  Antegrade hotshot was given and the aortic cross-clamp was removed.  Aortic cross-clamp time was 66 minutes.  The patient was then weaned of cardiopulmonary bypass with low-dose inotropic and vasopressor support.  Total cardiopulmonary bypass time was 102 minutes.  The ascending aorta was continuously vented to de-air the heart while we came off bypass.  LV function was normal.  The prosthetic mitral valve was seated down well with no paravalvular leak with a mean gradient of 3 mmHg.  Once the patient remained stable off bypass, the femoral venous cannula was removed and protamine was given.  The femoral arterial cannula was subsequently removed as well.  The groin incision was irrigated out and was closed in layers of running Vicryl suture.  The skin was closed using 3-0 Vicryl and was sealed using Dermabond.    The right lung reinflated nicely.  Temporary ventricular pacing wire was placed in the RV muscle.  A 24 Portuguese Zackary drain was placed in the right pleural space and a 28 Portuguese straight chest tube in the mediastinum.  These were all brought out percutaneously below the sternotomy incision and secured to the skin using 2-0 Ethibond.  The rib space was reapproximated using 2 interrupted #5 Ethibond sutures. 30 mL of 0.5% bupivicaine was injected in the 3rd, 4th, and 5th intercostal space. The minithoracotomy incision was closed in layers of running Vicryl suture.  The skin was closed using 3-0 Vicryl and was sealed using Dermabond.    There were no intraoperative complications and the patient tolerated the operation well.  No blood products were given intraoperatively.  All sponge counts, needle counts, and instrument counts were correct x 2 at the end the operation.  EBL: 300 cc.  Specimen removed: mitral valve leaflets.  The patient was brought to  the ICU in hemodynamically stable condition and remained intubated.    Alexa Ellison MD

## 2024-11-05 NOTE — PROGRESS NOTES
Extubation Note    Successful completion of SBT (Yes or No):yes  Extubation time:0150    Patient assessment:  Lung sounds:Clear  Stridor Present (Yes or No): No  Patient tolerance: Good    SpO2:  98% RA    Plan:We will continue to follow    Sakina Rodriguez RT   11/5/2024

## 2024-11-05 NOTE — PROGRESS NOTES
[facility]    Medicine Progress Note - Hospitalist Service    Date of Admission:  10/25/2024    Assessment & Plan   Jayme Donahue is a 84 year old male with PMH prostate cancer, who was admitted on 10/25/2024 as a transfer from [facility] with Corynebacterium bacteremia and concern for endocarditis.     Corynebacterium striatum mitral valve endocarditis  Corynebacterium bacteremia  Corynebacterium UTI  Severe mitral regurgitation due to above  Septic shock secondary to above, resolved  Low back pain, r/o early discitis   Generalized weakness  Jayme Donahue initially developed nausea and poor appetite on 10/18/2024.  He presented to an outside hospital and was found to be hyponatremic with a sodium of 118 and diagnosed with sepsis secondary to a UTI.  Subsequently became hypotensive and was transferred to [facility] ICU and started on vasopressors on 10/23/2024.  He was treated with vancomycin and Rocephin.  Levophed weaned off on the night of 10/23/2024.  Blood cultures returned positive for corynebacterium striatum. TTE on 10/23/2024 showed severe mitral regurgitation and a mobile echodensity on the atrial side of the mitral valve that likely represents flail scallop although vegetation is also in the differential.  Complained of low back pain, lumbar spine MRI on 10/24/2024 showed some likely degenerative endplate changes at right L4-L5 and left L5-S1, although early changes of discitis could also be in the differential.  ID was consulted on 10/25/2024 and patient transferred to [facility] for LINDSEY. LINDSEY on 10/29 shows mitral valve endocarditis.  - Admitted to inpatient.  - ID, Cardiology, and Cardiothoracic Surgery consulted, appreciate their assistance.  - LINDSEY on 10/29/2024 showed large vegetation on atrial aspect of mitral valve, prolapse of posterior mitral valve leaflet, severe mitral regurgitation.           -   - Coronary angiogram on 10/31/2024 showed normal  coronary arteries.  - Continue vancomycin per ID, pharmacy to dose.  - Blood culture on 10/24/2024 grew Corynebacterium striatum.  Repeat blood cultures 10/26-10/30 have been negative to date.  Now -   s/p Right mini-thoracotomy mitral valve replacement with a 31 mm Quinonez MITRIS bovine pericardial valve, right common femoral arterial and venous cannulation for cardiopulmonary bypass, intraoperative LINDSEY on 11/4/24 with Dr. Alexa Ellison.  - further postoperative and chest tube cares as per CT VS surgery  -Continue aspirin 81 mg daily, no statin indicated    - PT/OT consulted, appreciate their assistance - TCU recommended.     Severe hyponatremia, suspect SIADH  Sodium reportedly 118 at outside hospital when he first presented.  Felt to be secondary to dehydration, sodium mildly improved with IV fluids.  Sodium 120 on 10/23/2024 when he arrived at [facility].  Urine studies at [facility] consistent with SIADH.  He was started on salt tablets and placed on a fluid restriction.  Sodium ranged between 119-123 while he was at [facility].  - Sodium improved initially with fluid restriction, but then trended down again.  - Nephrology consulted, appreciate their assistance.  - Started on UreNa on 10/29/2024, then switched to salt tablets on 10/30/2024 per Nephrology.  Continue 3 times daily dosing  - Continue fluid restriction of 1200 ml/day.    Left scapular lesion  Lesion culture positive for Stenotrophomonas maltophilia  Appearance is concerning for malignancy, see photos in epic on 10/23/2024.  Patient states this lesion has been present for several months, he is not sure how it started.  He has been covering it at home to manage drainage from the lesion.  - WOC nurse consulted at [facility].  - Continue wound cares as ordered by WOC nurse at previous facility.  - Initially started on doxycycline, but subsequently discontinued as this was felt to represent chronic skin colonization rather than acute  infection.  - S/p biopsy by general surgery on 10/31/2024, pathology pending.               - Sutures should be removed around 11/7/2024 - 11/10/2024.     History of prostate cancer  LUTS  Urinary retention s/p mclain 10/29  Reportedly treated in 2020, but details unclear.  PSA in July 2024 was 54, PSA in September 2024 was 57. Having urinary urgency, LUTS this admission. Noted to be retaining urine and mcalin placed 10/29.  - Continue tamsulosin empirically.  - Continue mclain at discharge, consider trial of voiding in 1-2 weeks.     Moderate protein calorie malnutrition  - Dietitian consulted.         Diet: Fluid restriction 1200 ML FLUID  Snacks/Supplements Adult: Ensure Enlive; With Meals  Advance Diet as Tolerated: Regular Diet Adult; Mechanical/Dental Soft Diet    DVT Prophylaxis: Pneumatic Compression Devices  Mclain Catheter: PRESENT, indication: ?  (Error. Value could not be saved.), Surgical procedure;ICU only: hourly urine output needed for patient care, Acute retention or obstruction  Lines: PRESENT      CVC Right Internal jugular-Site Assessment: WDL  [REMOVED] Arterial Line 11/04/24 Radial-Site Assessment: WDL    Cardiac Monitoring: ACTIVE order. Indication: Open heart surgery (72 hours)  Code Status: Full Code      Clinically Significant Risk Factors         # Hyponatremia: Lowest Na = 126 mmol/L in last 2 days, will monitor as appropriate  # Hypochloremia: Lowest Cl = 91 mmol/L in last 2 days, will monitor as appropriate  # Hypocalcemia: Lowest iCa = 3.9 mg/dL in last 2 days, will monitor and replace as appropriate     # Hypoalbuminemia: Lowest albumin = 2.5 g/dL at 11/4/2024  8:12 PM, will monitor as appropriate  # Coagulation Defect: INR = 1.80 (Ref range: 0.85 - 1.15) and/or PTT = 47 Seconds (Ref range: 22 - 38 Seconds), will monitor for bleeding                # Severe Malnutrition: based on nutrition assessment    # Financial/Environmental Concerns:           Disposition Plan     Medically Ready for  Discharge: Anticipated in 5+ Days             Ramana Nieto MD  Hospitalist Service  [facility]  Securely message with [employer] (more info)  Text page via [employer] Paging/Directory   _The above note was dictated using voice recognition software. Although reviewed after completion, some word and grammatical error may remain . Please contact the author for any clarifications.  _____________________________________________________________________    Interval History   Last 24-hour events noted.  Tolerated MVR  Briefly required pressors last night but was weaned off and extubated successfully.  This morning feels okay.  Pain is controlled  Denies any shortness of breath    Physical Exam   BP 96/56   Pulse 84   Temp 99.7  F (37.6  C)   Resp 27   Wt 57.7 kg (127 lb 3.3 oz)   SpO2 95%   BMI 20.53 kg/m    Gen- pleasant   Neck- supple  CVS- I+II+ no m/r/g  RS-chest tubes in place  Abdo- soft, no tenderness . No g/r/r  Ext- no edema     Medical Decision Making       51 MINUTES SPENT BY ME on the date of service doing chart review, history, exam, documentation & further activities per the note.      Data   ------------------------- PAST 24 HR DATA REVIEWED -----------------------------------------------    I have personally reviewed the following data over the past 24 hrs:    9.1  \   9.7 (L)   / 192     130 (L) 96 (L) 10.7 /  144 (H)   4.5 24 0.56 (L) \     ALT: 19 AST: 34 AP: 83 TBILI: 0.6   ALB: 2.5 (L) TOT PROTEIN: 4.3 (L) LIPASE: N/A     Procal: N/A CRP: N/A Lactic Acid: 2.5 (H)       INR:  1.80 (H) PTT:  47 (H)   D-dimer:  N/A Fibrinogen:  242       Imaging results reviewed over the past 24 hrs:   Recent Results (from the past 24 hours)   XR Chest Port 1 View    Narrative    EXAM: XR CHEST PORT 1 VIEW  LOCATION: [facility]  DATE: 11/4/2024    INDICATION: Post Op CVTS Surgery  COMPARISON: CT chest 11/1/2024, radiograph 10/30/2024      Impression    IMPRESSION: Interval  mitral valve replacement. Endotracheal tube terminates approximately 3 cm above the chandu. Right IJ line in the mid SVC. Nasogastric tube tip in the stomach. There are 2 right-sided chest tubes, one terminating medially the level   the chandu, the other near the apex. No pneumothorax. Mild subcutaneous emphysema along the right lateral chest wall. Small right pleural effusion.   XR Chest Port 1 View    Narrative    EXAM: XR CHEST PORT 1 VIEW  LOCATION: [facility]  DATE: 11/5/2024    INDICATION: Post Op CVTS Surgery  COMPARISON: November 5, 2024.      Impression    IMPRESSION: Right IJ central line and drains in the right hemithorax as well as cardiac valve hardware remain. No pneumothorax. Vascular volume is normal and the lungs are clear.

## 2024-11-05 NOTE — CONSULTS
"SPIRITUAL HEALTH SERVICES Consult Note  [facility]. Unit ICU    Referral Source/Reason for Visit: patient requests ongoing spiritual support, following from previous unit    Saw pt Jayme MANUEL Godfrey at bedside for 20 minutes.    Patient / Family Understanding of Illness and Goals of Care - Avelino said he's received an initial update and is very grateful to hear the surgery was a success, that he has not been in pain, and pleased to already be sitting up in the chair.    Distress and Loss -     Strengths, Coping, and Resources - Avelino expressed his gratitude for the surgeons and medical team, for his nurse today, and to God for this \"miracle.\" Avelino is expecting his two friends to arrive today with his wife Pamela for a visit, and spoke of his  and his wife as strong supports as well.    Meaning, Beliefs, and Spirituality - Avelino stated he will contact his Rastafari to provide an update regarding the surgery; Avelino initiated a prayer of gratitude at the end of the visit.    Plan: I will continue to follow for emotional and spiritual support at patient request.    Alexus Zhang MDiv [facility]  Staff   Please place consult order for routine Spiritual Health Services referrals.  SHS available 24/7 for emergent requests either by having the on-call  paged or by entering an ASAP/STAT consult in Epic (this will also page the on-call ).    "

## 2024-11-05 NOTE — CONSULTS
CARDIAC SURGERY NUTRITION CONSULT    Received standing order to assess and educate patient.  Patient was being followed by the RD pre-op, received supplements with meals.     Will continue to follow per protocol once patient is extubated and/or is transferred to medical unit.    Patient will receive nutrition education during the Outpatient Cardiac Rehab Program (nutrition classes/dietitian counseling).    Radha Reddy RD, LD, [facility]   Clinical Dietitian - [facility]

## 2024-11-05 NOTE — PROGRESS NOTES
Brief ICU note,  I evaluated the patient at the bedside.  He is on low dose Dex gtt 0.2. he is interactive and following commands.  I switched him from full vent support to PS 5/5, he did well on PST, his pressors need is stable on NE 0.13 and Epi 0.04, and his MAP is stable and actually improving after the PST and blood transfusion.  He is not tachycardic and has stable native rhythm.  I reviewed his labs, he had slight increase PaCO2 at 46 and normal balanced pH 7.37 at 814 PM.  I asked RT to extubate the patient     Nelly Iraheta MD   Pulmonary and Critical Care

## 2024-11-05 NOTE — PROGRESS NOTES
[facility]    Infectious Disease Progress Note    Date of Service (when I saw the patient): 11/05/2024     Assessment & Plan   Jayme Donahue is a 84 year old male who was admitted on 10/25/2024.     Impression:  83 yo with PMH prostate cancer, who was admitted on 10/25/2024 as a transfer from [facility] with Corynebacterium bacteremia and concern for endocarditis.   Admitted with weakness and poor oral intake to an outside hospital  Blood cultures from 10/22 grew out Corynebacterium striatum, 2 out of 2  The same day he also had positive urine cultures for Corynebacterium stratum  And a wound culture from the scapular lesion also positive for Corynebacterium striatum  With TTE done on 10/23 positive for a mobile echodensity on the atrial side of the mitral valve that likely represents flail scallop although vegetation is also in differential. LINDSEY: Large vegetation noted on the atrial aspect of the mitral valve. They measure about 1.5 to 1.7 cm. There is associated flail segment and distorted mitral valve leaflet anatomy. There seems to be prolapse of the posterior mitral valve leaflet.   Patient also complained of back pain but there is no abscesses or osteomyelitis on the back MRI  Of note patient has a left scapular lesion that has been present for several months  He is currently on vancomycin  Poor dentition. S/p extraction of all remaining teeth 10/31/24.   S/p MVR 11/4/24. Valve culture pending.      Recommendations:   Continue on vancomycin  Following OR culture of valve.   Will need prolonged IV antibiotics upon discharge.      Patient and plan discussed with Dr. Carrasco.     Migdalia Butler PA-C    Interval History   Tolerating antibiotics ok   No new rashes or issues with antibiotics   Feeling well post-op. No complaints presently.  Labs reviewed   No changes to past medical, social or family history         Physical Exam   Temp: 99.9  F (37.7  C) Temp src: Bladder BP: 96/63  Pulse: 77   Resp: 18 SpO2: 96 % O2 Device: None (Room air) Oxygen Delivery: 4 LPM  Vitals:    10/31/24 0450 11/04/24 2005 11/05/24 0500   Weight: 56.8 kg (125 lb 4.8 oz) 56.2 kg (123 lb 14.4 oz) 57.7 kg (127 lb 3.3 oz)     Vital Signs with Ranges  Temp:  [96.1  F (35.6  C)-99.9  F (37.7  C)] 99.9  F (37.7  C)  Pulse:  [69-86] 77  Resp:  [11-31] 18  BP: ()/(45-64) 96/63  MAP:  [57 mmHg-228 mmHg] 228 mmHg  Arterial Line BP: ()/() 230/229  FiO2 (%):  [30 %] 30 %  SpO2:  [80 %-100 %] 96 %    Constitutional: Awake, alert, cooperative, no apparent distress  Lungs: Clear to auscultation bilaterally, no crackles or wheezing. Chest tubes.   Cardiovascular: Regular rate and rhythm, normal S1 and S2, and no murmur noted  Abdomen: Normal bowel sounds, soft, non-distended, non-tender  Skin: skin wound on the scapula appears to the same   Other:    Medications   Current Facility-Administered Medications   Medication Dose Route Frequency Provider Last Rate Last Admin    Reason beta blocker order not selected   Does not apply DOES NOT GO TO MAR To [facility] NP         Current Facility-Administered Medications   Medication Dose Route Frequency Provider Last Rate Last Admin    acetaminophen (TYLENOL) tablet 975 mg  975 mg Oral or NG Tube Q8H To [facility] NP   975 mg at 11/05/24 1226    aspirin (ASA) chewable tablet 81 mg  81 mg Oral or NG Tube Daily To [facility] NP   81 mg at 11/05/24 1002    ceFAZolin (ANCEF) 1 g vial to attach to  ml bag for ADULT or 50 ml bag for PEDS  1 g Intravenous Q8H Zbigniew Johansen PA-C   1 g at 11/05/24 0744    heparin ANTICOAGULANT injection 5,000 Units  5,000 Units Subcutaneous Q8H To Tyelr NP   5,000 Units at 11/05/24 1418    insulin aspart (NovoLOG) injection (RAPID ACTING)  1-7 Units Subcutaneous TID AC Kerri Steen, NP   1 Units at 11/05/24 1245    insulin aspart (NovoLOG) injection (RAPID ACTING)  1-5 Units Subcutaneous At Bedtime Kerri Steen, NP         Lidocaine (LIDOCARE) 4 % Patch 2 patch  2 patch Transdermal Q24H To [facility] NP   2 patch at 11/05/24 0016    pantoprazole (PROTONIX) 2 mg/mL suspension 40 mg  40 mg Oral or NG Tube Daily To [facility] NP   40 mg at 11/05/24 0953    Or    pantoprazole (PROTONIX) EC tablet 40 mg  40 mg Oral Daily To [facility] NP        polyethylene glycol (MIRALAX) Packet 17 g  17 g Oral Daily To [facility] NP   17 g at 11/05/24 0952    senna-docusate (SENOKOT-S/PERICOLACE) 8.6-50 MG per tablet 1 tablet  1 tablet Oral or NG Tube BID To [facility] NP   1 tablet at 11/05/24 1002    simethicone (MYLICON) chewable tablet 80 mg  80 mg Oral 4x Daily Kerri Steen NP   80 mg at 11/05/24 1250    sodium chloride (PF) 0.9% PF flush 3 mL  3 mL Intracatheter Q8H To [facility] NP   3 mL at 11/05/24 1221    sodium chloride tablet 1 g  1 g Oral TID w/meals Nelly Iraheta MD   1 g at 11/05/24 1227    tamsulosin (FLOMAX) capsule 0.4 mg  0.4 mg Oral Daily To [facility] NP   0.4 mg at 11/05/24 0955    vancomycin (VANCOCIN) 1,000 mg in 200 mL dextrose intermittent infusion  1,000 mg Intravenous Q12H To Tyler  mL/hr at 11/05/24 1415 1,000 mg at 11/05/24 1415       Data   All microbiology laboratory data reviewed.  Recent Labs   Lab Test 11/05/24  0436 11/05/24 0204 11/04/24  2232   WBC 9.1 12.2* 16.3*   HGB 9.7* 10.0* 8.9*   HCT 27.7* 29.1* 26.5*   MCV 86 87 88    201 205     Recent Labs   Lab Test 11/05/24  0436 11/04/24 2012 11/04/24  1846   CR 0.56* 0.52* 0.53*         11/04/2024 1723 11/04/2024 1733 Anaerobic Bacterial Culture Routine [[id number]]   Biopsy from Heart Valve, Mitral (Bicuspid)    In process Component Value   No component results             11/04/2024 1723 11/04/2024 2227 Gram Stain [[id number]]   (Abnormal)   Biopsy from Heart Valve, Mitral (Bicuspid)    Final result Component Value   GS Culture See corresponding culture for results   Gram Stain Result 3+ Gram positive  bacilli Abnormal    Corrected on November 4, 2024/10:25 PM by Jeannine Rodriguez  Previously reported as: 2+ Gram negative bacilli.    Corrected result: Previously reported as No organisms seen on 11/4/2024 at  6:04 PM CST.   Gram Stain Result 1+ Gram positive cocci Abnormal    Gram Stain Result 3+ WBC seen Abnormal    Predominantly PMNs          11/04/2024 1723 11/04/2024 1733 Fungal or Yeast Culture Routine [[id number]]   Biopsy from Heart Valve, Mitral (Bicuspid)    In process Component Value   No component results             11/04/2024 1723 11/05/2024 1447 Biopsy Aerobic Bacterial Culture Routine [[id number]]   Biopsy from Heart Valve, Mitral (Bicuspid)    Preliminary result Component Value   Culture No growth, less than 1 day P           10/31/2024 1552 11/04/2024 0732 Tissue Aerobic Bacterial Culture Routine With Gram Stain [[id number]]   Tissue from Shoulder, Left    Preliminary result Component Value   Culture No growth after 2 days P   Gram Stain Result No organisms seen P    No white blood cells seen P             10/31/2024 1552 11/03/2024 1805 Anaerobic Bacterial Culture Routine [[id number]]   Tissue from Shoulder, Left    Preliminary result Component Value   Culture No anaerobic organisms isolated after 2 days P             10/30/2024 0627 11/04/2024 1006 Blood Culture Arm, Left [[id number]]   Blood from Arm, Left    Final result Component Value   Culture No Growth             10/29/2024 0600 11/03/2024 0805 Blood Culture Arm, Left [[id number]]    Blood from Arm, Left    Final result Component Value   Culture No Growth             10/28/2024 0616 11/02/2024 1116 Blood Culture Arm, Right [[id number]]   Blood from Arm, Right    Final result Component Value   Culture No Growth             10/27/2024 0811 11/01/2024 1046 Blood Culture Peripheral Blood [[id number]]    Peripheral Blood    Final result Component Value   Culture No Growth             10/26/2024 2252 11/01/2024 0031 Blood Culture  Arm, Right [[id number]]   Blood from Arm, Right    Final result Component Value   Culture No Growth             10/26/2024 1811 10/27/2024 2213 Urine Culture [[id number]]   Urine, Midstream    Final result Component Value   Culture No Growth             10/26/2024 1513 10/31/2024 1804 Blood Culture Hand, Left [[id number]]   Blood from Hand, Left    Final result Component Value   Culture No Growth             10/24/2024 1444 10/28/2024 1329 Blood Culture Arm, Right [[id number]]   (Abnormal)   Blood from Arm, Right    Final result Component Value   Culture Positive on the 2nd day of incubation Abnormal            10/22/2024 1645 10/26/2024 0814 Microbiology Isolate Referral [[id number]]    (Abnormal)   Blood from Other    Final result Component Value   Culture Corynebacterium striatum Abnormal     Organism identified by client.       Susceptibility     Corynebacterium striatum     HARRIS     Ceftriaxone 3 ug/mL Resistant     Clindamycin 1.0 ug/mL Intermediate     Doxycycline 0.125 ug/mL Susceptible     Meropenem 0.125 ug/mL Susceptible     Penicillin 0.25 ug/mL Intermediate     Trimethoprim/Sulfamethoxazole 0.250 ug/mL Susceptible     Vancomycin 0.5 ug/mL Susceptible              Complete LINDSEY Adult. 10/25/24  ______________________________________________________________________________  Interpretation Summary     This was a transesophageal echocardiogram done for evaluation of endocarditis  & MR. Study was technically difficult. Probe insertion was technically quite  challenging. Patient was short of breath and coughing a lot despite sedation.  I had to request one of my partners Dr. Galicia who was kind to come and assist  with LINDSEY probe insertion.     Normal left ventricular size and systolic function.  Normal RV size and systolic function.  No evidence of left atrial appendage thrombus. No color shunt across the  interatrial septum.  Large vegetation noted on the atrial aspect of the mitral valve. They  measure  about 1.5 to 1.7 cm. There is associated flail segment and distorted mitral  valve leaflet anatomy. There seems to be prolapse of the posterior mitral  valve leaflet. 3D imaging was quite challenging due to patient tolerance of  the procedure. We had to terminate prematurely.  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 I was not able  to get 2D and 3D quantification of the mitral regurgitation due to patient  tolerance of the procedure but visually MR is clearly severe.  No pericardial effusion.

## 2024-11-05 NOTE — PROGRESS NOTES
[facility]  Cardiovascular and Thoracic Surgery Daily Note      Assessment and Plan  Mr. Donahue is a very pleasant 84-year-old gentleman with a PMH of prostate cancer who was recently admitted to the hospital and was diagnosed with Corynebacterium bacteremia and urinary tract infection. 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     POD # 1 s/p Right mini-thoracotomy mitral valve replacement with a 31 mm Quinonez MITRIS bovine pericardial valve, right common femoral arterial and venous cannulation for cardiopulmonary bypass, intraoperative LINDSEY on 11/4/24 with Dr. Alexa Ellison.    - CVS: Pre-op TTE with preserved biventricular funtion. Postop vasoplegic shock, resolved. On low dose norepi overnight, now off this morning. HD stable in NSR with 1* AVB. Defer on BB this AM given recent need for vasopressor. Continue ASA 81 mg daily, no statin indicated. Defer on volume or diuresis today. No PTA meds on hold. Chest tubes: in place with ssang output, leave in to suction. TPW: cap     - Resp: Postoperative mechanical ventilation, resolved. Extubated POD 0, now saturating well on 4LPM cannula. Encourage aggressive IS, pulmonary toilet.    - Neuro: Neuros intact, pain controlled on current regimen    - Renal: No history of significant renal disease. Cr stable. Trend BMP.  Volume management as above. 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 Salt tabs resumed today by nephrology.  Recent Labs   Lab 11/05/24  0436 11/04/24 2012 11/04/24  1846   CR 0.56* 0.52* 0.53*       - GI: no BM, no flatus, continue bowel regimen. Large gastric bubble on CXR, scheduled simethicone QID    - : Palmer in place today for strict I/O. Hx prostate cancer s/p radiation. PTA flomax resumed    - Endo: Postop stress hyperglycemia. Insulin infusion transitioned to sliding scale insulin.   Hemoglobin A1C   Date Value Ref Range Status   10/31/2024 5.3 <5.7 % Final      Comment:     Normal <5.7%   Prediabetes 5.7-6.4%    Diabetes 6.5% or higher     Note: Adopted from ADA consensus guidelines.        - FEN: Replace electrolytes as needed. ADAT    - ID: Postop leukocytosis. Afebrile. Infectious endocarditis: had Corynebacterium bacteremia and urinary tract infection. OR gram stain positive thus far for 3+ GPB. ID following. Currently on Vancomycin. Will eventually need a PICC when ID clears. Completing perioperative Cefazolin. Trend CBC and fever curve.   Recent Labs   Lab 11/05/24  0436 11/05/24  0204 11/04/24  2232   WBC 9.1 12.2* 16.3*       - Heme: Acute blood loss anemia and thrombocytopenia due to surgery. Hgb and PLT stable. Trend CBC, transfuse PRN. Skin biopsy on back positive for basal cell carcinoma.  Recent Labs   Lab 11/05/24  0436 11/05/24  0204 11/04/24  2232   HGB 9.7* 10.0* 8.9*    201 205       - Proph: SCD, subcutaneous heparin, PPI    - Other:  Clinically Significant Risk Factors         # Hyponatremia: Lowest Na = 126 mmol/L in last 2 days, will monitor as appropriate  # Hypochloremia: Lowest Cl = 91 mmol/L in last 2 days, will monitor as appropriate  # Hypocalcemia: Lowest iCa = 3.9 mg/dL in last 2 days, will monitor and replace as appropriate     # Hypoalbuminemia: Lowest albumin = 2.5 g/dL at 11/4/2024  8:12 PM, will monitor as appropriate  # Coagulation Defect: INR = 1.80 (Ref range: 0.85 - 1.15) and/or PTT = 47 Seconds (Ref range: 22 - 38 Seconds), will monitor for bleeding                # Severe Malnutrition: based on nutrition assessment    # Financial/Environmental Concerns:           - Dispo: Transfer to station 33 today. Encourage mobility, therapies and pulm hygiene.      Interval History  No acute events overnight. States pain is well managed on current regimen, slept well. Breathing is stable on NC, working with IS. Tolerating diet, is not yet passing flatus, no BM, no n/v. Ambulating in the halls with assistance. Denies chest pain,  palpitations, dizziness, syncopal symptoms, chills, myalgias.      Medications  Current Facility-Administered Medications   Medication Dose Route Frequency Provider Last Rate Last Admin    acetaminophen (TYLENOL) tablet 975 mg  975 mg Oral or NG Tube Q8H To Tyler NP   975 mg at 11/04/24 2059    aspirin (ASA) chewable tablet 81 mg  81 mg Oral or NG Tube Daily To Tyler NP        ceFAZolin (ANCEF) 1 g vial to attach to  ml bag for ADULT or 50 ml bag for PEDS  1 g Intravenous Q8H Zbigniew Johansen PA-C   1 g at 11/05/24 0744    chlorhexidine (PERIDEX) 0.12 % solution 15 mL  15 mL Mouth/Throat Q12H Zbigniew Johansen PA-C   15 mL at 11/05/24 0756    heparin ANTICOAGULANT injection 5,000 Units  5,000 Units Subcutaneous Q8H To Tyler NP        Lidocaine (LIDOCARE) 4 % Patch 2 patch  2 patch Transdermal Q24H To Tyler NP   2 patch at 11/05/24 0016    pantoprazole (PROTONIX) 2 mg/mL suspension 40 mg  40 mg Oral or NG Tube Daily To Tyler NP   40 mg at 11/04/24 2101    Or    pantoprazole (PROTONIX) EC tablet 40 mg  40 mg Oral Daily To Tyler NP        polyethylene glycol (MIRALAX) Packet 17 g  17 g Oral Daily To Tyler NP        senna-docusate (SENOKOT-S/PERICOLACE) 8.6-50 MG per tablet 1 tablet  1 tablet Oral or NG Tube BID To Tyler NP   1 tablet at 11/04/24 2238    simethicone (MYLICON) chewable tablet 80 mg  80 mg Oral 4x Daily Kerri Steen NP        sodium chloride (PF) 0.9% PF flush 3 mL  3 mL Intracatheter Q8H To Tyler NP   3 mL at 11/05/24 0459    sodium chloride tablet 1 g  1 g Oral TID w/meals Nelly Iraheta MD        tamsulosin (FLOMAX) capsule 0.4 mg  0.4 mg Oral Daily To Tyler, NP   0.4 mg at 11/04/24 0935    vancomycin (VANCOCIN) 1,000 mg in 200 mL dextrose intermittent infusion  1,000 mg Intravenous Q12H To Tyler, JOANNE 200 mL/hr at 11/05/24 0157 1,000 mg at 11/05/24 0157     Current Facility-Administered Medications    Medication Dose Route Frequency Provider Last Rate Last Admin    [START ON 11/7/2024] acetaminophen (TYLENOL) tablet 650 mg  650 mg Oral Q4H PRN To Tyler NP        albuterol (PROVENTIL) neb solution 2.5 mg  2.5 mg Nebulization Q2H PRN Zbigniew Johansen PA-C        benzocaine-menthol (CHLORASEPTIC) 6-10 MG lozenge 1 lozenge  1 lozenge Buccal Q1H PRN To Tyler NP   1 lozenge at 11/04/24 0935    bisacodyl (DULCOLAX) suppository 10 mg  10 mg Rectal Daily PRN To Tyler NP        calcium carbonate (TUMS) chewable tablet 1,000 mg  1,000 mg Oral Daily PRTo Smith NP        calcium gluconate 1 g in 50 mL in sodium chloride intermittent infusion  1 g Intravenous Once PRTo Smith NP        calcium gluconate 2 g in  mL intermittent infusion  2 g Intravenous Once PRTo Smith NP        calcium gluconate 3 g in sodium chloride 0.9 % 100 mL intermittent infusion  3 g Intravenous Once PRTo Smith NP        glucose gel 15-30 g  15-30 g Oral Q15 Min PRTo Smith NP        Or    dextrose 50 % injection 25-50 mL  25-50 mL Intravenous Q15 Min To Madden NP        Or    glucagon injection 1 mg  1 mg Subcutaneous Q15 Min To Madden NP        hydrALAZINE (APRESOLINE) injection 10 mg  10 mg Intravenous Q30 Min PRN To Tyler NP        HYDROmorphone (DILAUDID) injection 0.2 mg  0.2 mg Intravenous Q2H PRTo Smith NP   0.2 mg at 11/05/24 0450    Or    HYDROmorphone (DILAUDID) injection 0.4 mg  0.4 mg Intravenous Q2H PRTo Smith NP   0.4 mg at 11/05/24 0031    lidocaine (LMX4) cream   Topical Q1H PRN Nayeli, Jennilyn M, NP        lidocaine 1 % 0.1-1 mL  0.1-1 mL Other Q1H PRN To Tyler, NP        magnesium hydroxide (MILK OF MAGNESIA) suspension 30 mL  30 mL Oral Daily PRN To Tyler, NP        propofol (DIPRIVAN) bolus from bag or syringe pump  10 mg Intravenous Q15 Min PRN Zbigniew Johansen PA-C        And     Medication Instruction   Does not apply Continuous PRN Zbigniew Johansen PA-C        methocarbamol (ROBAXIN) tablet 500 mg  500 mg Oral Q6H PRN To Tyler NP   500 mg at 11/04/24 2101    naloxone (NARCAN) injection 0.2 mg  0.2 mg Intravenous Q2 Min PRN To Tyler NP        Or    naloxone (NARCAN) injection 0.4 mg  0.4 mg Intravenous Q2 Min PRN To Tyler NP        Or    naloxone (NARCAN) injection 0.2 mg  0.2 mg Intramuscular Q2 Min PRN To Tyler NP        Or    naloxone (NARCAN) injection 0.4 mg  0.4 mg Intramuscular Q2 Min PRN To Tyler NP        ondansetron (ZOFRAN ODT) ODT tab 4 mg  4 mg Oral Q6H PRN To Tyler NP        Or    ondansetron (ZOFRAN) injection 4 mg  4 mg Intravenous Q6H PRN To Tyler NP        oxyCODONE (ROXICODONE) tablet 5 mg  5 mg Oral Q4H PRN To Tyler NP   5 mg at 11/04/24 2059    Or    oxyCODONE IR (ROXICODONE) half-tab 7.5 mg  7.5 mg Oral Q4H PRN To Tyler NP   7.5 mg at 11/05/24 0105    prochlorperazine (COMPAZINE) injection 5 mg  5 mg Intravenous Q6H PRN To Tyler NP        Or    prochlorperazine (COMPAZINE) tablet 5 mg  5 mg Oral Q6H PRN To Tyler NP        Reason beta blocker order not selected   Does not apply DOES NOT GO TO To Vicente NP        sodium chloride (PF) 0.9% PF flush 3 mL  3 mL Intracatheter q1 min prTo Adam NP             Physical Exam  Vitals were reviewed  Blood pressure (!) 88/57, pulse 80, temperature 99.5  F (37.5  C), resp. rate 19, weight 57.7 kg (127 lb 3.3 oz), SpO2 96%.  Rhythm: NSR 1* AVB    Lungs: diminished throughout on NC    Cardiovascular: S1, S2, RRR, no m/r/g    Abdomen: soft, NT, ND, rare BS    Extremeties: warm, no LE edema    Incisions: Right thoracotomy and right femoral incisions CDI    CT: serosang output, no air leak    Weight:   Vitals:    10/29/24 0404 10/31/24 0450 11/04/24 2005 11/05/24 0500   Weight: 60.8 kg (134 lb) 56.8 kg (125 lb 4.8 oz)  56.2 kg (123 lb 14.4 oz) 57.7 kg (127 lb 3.3 oz)         Data  Recent Labs   Lab 11/05/24  0810 11/05/24  0706 11/05/24  0614 11/05/24  0436 11/05/24  0432 11/05/24  0204 11/04/24 2232 11/04/24 2012 11/04/24 2006 11/04/24  1847 11/04/24  1846 11/04/24  1540 11/04/24  0612   WBC  --   --   --  9.1  --  12.2* 16.3* 17.3*  --   --  13.2*  --  7.6   HGB  --   --   --  9.7*  --  10.0* 8.9* 8.3*  --  8.1* 8.0*   < > 9.3*   MCV  --   --   --  86  --  87 88 88  --   --  88  --  87   PLT  --   --   --  192  --  201 205 184  --   --  160  --  270   INR  --   --   --   --   --   --   --  1.80*  --   --  2.04*  --   --    NA  --   --   --  128*  --   --   --  133*  --  129* 132*   < > 126*   POTASSIUM  --   --   --  4.5  --   --   --  4.0  --  4.1 4.3   < > 4.2   CHLORIDE  --   --   --  96*  --   --   --  99  --   --  97*  --  91*   CO2  --   --   --  24  --   --   --  25  --   --  25  --  29   BUN  --   --   --  10.7  --   --   --  9.5  --   --  9.4  --  10.7   CR  --   --   --  0.56*  --   --   --  0.52*  --   --  0.53*  --  0.59*   ANIONGAP  --   --   --  8  --   --   --  9  --   --  10  --  6*   AMALIA  --   --   --  8.1*  --   --   --  8.3*  --   --  8.9  --  8.1*   GLC 98 117* 123* 195*   < >  --   --  159*   < > 183* 189*   < > 92   ALBUMIN  --   --   --   --   --   --   --  2.5*  --   --   --   --  2.6*   PROTTOTAL  --   --   --   --   --   --   --  4.3*  --   --   --   --   --    BILITOTAL  --   --   --   --   --   --   --  0.6  --   --   --   --   --    ALKPHOS  --   --   --   --   --   --   --  83  --   --   --   --   --    ALT  --   --   --   --   --   --   --  19  --   --   --   --   --    AST  --   --   --   --   --   --   --  34  --   --   --   --   --     < > = values in this interval not displayed.       Imaging:  Recent Results (from the past 24 hours)   XR Chest Port 1 View    Narrative    EXAM: XR CHEST PORT 1 VIEW  LOCATION: [facility]  DATE: 11/4/2024    INDICATION: Post Op CVTS  Surgery  COMPARISON: CT chest 11/1/2024, radiograph 10/30/2024      Impression    IMPRESSION: Interval mitral valve replacement. Endotracheal tube terminates approximately 3 cm above the chandu. Right IJ line in the mid SVC. Nasogastric tube tip in the stomach. There are 2 right-sided chest tubes, one terminating medially the level   the chandu, the other near the apex. No pneumothorax. Mild subcutaneous emphysema along the right lateral chest wall. Small right pleural effusion.   XR Chest Port 1 View    Narrative    EXAM: XR CHEST PORT 1 VIEW  LOCATION: [facility]  DATE: 11/5/2024    INDICATION: Post Op CVTS Surgery  COMPARISON: November 5, 2024.      Impression    IMPRESSION: Right IJ central line and drains in the right hemithorax as well as cardiac valve hardware remain. No pneumothorax. Vascular volume is normal and the lungs are clear.         Patient seen and discussed with Dr. Antony Steen, APRN, ACNPC-AG, CCRN  Nurse Practitioner  Cardiothoracic Surgery  Pager: [phone]    CV Surgery Rounding Pager: [phone]  After hours please page surgeon on-call

## 2024-11-05 NOTE — OR NURSING
DR. OROSCO NOTIFIED REGARDING CRITICAL RESULT:   2+ GRAM NEGATIVE BACILLI ON MITRAL HEART VALVE    Gabbie Shah RN on 11/4/2024 at 6:35 PM

## 2024-11-05 NOTE — PROGRESS NOTES
[facility]     Renal Progress Note       SHORTHAND KEY FOR MY NOTES:  c = with, s = without, p = after, a = before, x = except, asx = asymptomatic, tx = transplant or treatment, sx = symptoms or symptomatic, cx = canceled or culture, rxn = reaction, yday = yesterday, nl = normal, abx = antibiotics, fxn = function, dx = diagnosis, dz = disease, m/h = melena/hematochezia, c/d/l/ha = cramping/dizziness/lightheadedness/headache, d/c = discharge or diarrhea/constipation, f/c/n/v = fevers/chills/nausea/vomiting, cp/sob = chest pain/shortness of breath, tbv = total body volume, rxn = reaction, tdc = tunneled dialysis catheter, pta = prior to admission, hd = hemodialysis, pd = peritoneal dialysis, hhd = home hemodialysis, edw = estimated dry wt         Assessment/Plan:     1.  Severe hyponatremia.  Pt's Na is stable in the high 120s/low 130s.  He is taking salt tabs and tolerating them s probs.    A.  Follow Na daily.  B.  Continue salt tabs 1 tid.  C.  Continue same fluid restriction - 1200 ml every day.    2.  MV endocarditis s/p MVR.  Pt did ok c surgery and remains on broad abx.  His kidneys have been resilient despite 2 dye loads + intra-op hypotension.  A.  Per CV Surg.  B.  Monitor renal fxn closely.    3.  Anemia.  Hb is stable.  A.  Follow hb, clinically.    4.  FEN.  Other than Na, electrolytes are fine.  A.  Reg diet.    4.  Code Status.  Pt is Full Code post-surgery.  He was previously DNR/DNI but it was reversed for surgery.    Case d/w Malinda Ferreira PA-C and Yana REED.        Interval History:     Pt had surgery yday and tolerated it well.  Good uo.  He has some R chest discomfort but nothing bad.  He is taking his meds c applesauce.          Medications and Allergies:     Current Facility-Administered Medications   Medication Dose Route Frequency Provider Last Rate Last Admin    acetaminophen (TYLENOL) tablet 975 mg  975 mg Oral or NG Tube Q8H To Tyler NP   975 mg at 11/04/24  2059    aspirin (ASA) chewable tablet 81 mg  81 mg Oral or NG Tube Daily To Tyler NP   81 mg at 11/05/24 1002    ceFAZolin (ANCEF) 1 g vial to attach to  ml bag for ADULT or 50 ml bag for PEDS  1 g Intravenous Q8H Zbigniew Johansen PA-C   1 g at 11/05/24 0744    heparin ANTICOAGULANT injection 5,000 Units  5,000 Units Subcutaneous Q8H To Tyler NP        insulin aspart (NovoLOG) injection (RAPID ACTING)  1-7 Units Subcutaneous TID AC Kerri Steen NP        insulin aspart (NovoLOG) injection (RAPID ACTING)  1-5 Units Subcutaneous At Bedtime Kerri Steen NP        Lidocaine (LIDOCARE) 4 % Patch 2 patch  2 patch Transdermal Q24H To Tyler NP   2 patch at 11/05/24 0016    pantoprazole (PROTONIX) 2 mg/mL suspension 40 mg  40 mg Oral or NG Tube Daily To Tyler NP   40 mg at 11/05/24 0953    Or    pantoprazole (PROTONIX) EC tablet 40 mg  40 mg Oral Daily To Tyler NP        polyethylene glycol (MIRALAX) Packet 17 g  17 g Oral Daily To Tyler NP   17 g at 11/05/24 0952    senna-docusate (SENOKOT-S/PERICOLACE) 8.6-50 MG per tablet 1 tablet  1 tablet Oral or NG Tube BID To Tyler NP   1 tablet at 11/05/24 1002    simethicone (MYLICON) chewable tablet 80 mg  80 mg Oral 4x Daily Kerri Steen NP   80 mg at 11/05/24 1003    sodium chloride (PF) 0.9% PF flush 3 mL  3 mL Intracatheter Q8H To Tyler NP   3 mL at 11/05/24 0459    sodium chloride tablet 1 g  1 g Oral TID w/meals Nelly Iraheta MD   1 g at 11/05/24 1003    tamsulosin (FLOMAX) capsule 0.4 mg  0.4 mg Oral Daily To Tyler NP   0.4 mg at 11/05/24 0955    vancomycin (VANCOCIN) 1,000 mg in 200 mL dextrose intermittent infusion  1,000 mg Intravenous Q12H To Tyler,  mL/hr at 11/05/24 0157 1,000 mg at 11/05/24 0157     No Known Allergies       Physical Exam:     Vitals were reviewed     , Blood pressure 98/62, pulse 82, temperature 99  F (37.2  C), resp. rate 18, weight 57.7 kg  (127 lb 3.3 oz), SpO2 95%.  Wt Readings from Last 3 Encounters:   11/05/24 57.7 kg (127 lb 3.3 oz)   10/23/24 59 kg (130 lb)     Intake/Output Summary (Last 24 hours) at 11/5/2024 1214  Last data filed at 11/5/2024 1100  Gross per 24 hour   Intake 5693.98 ml   Output 2287 ml   Net 3406.98 ml     GENERAL APPEARANCE: pleasant, NAD, alert  HEENT:  very cachectic  RESP: diminished, R chest tube  CV: RRR, nl S1/S2   ABDOMEN: s/nt/nd  EXTREMITIES/SKIN: no ble edema  OTHER:  + mclain         Data:     CBC RESULTS:     Recent Labs   Lab 11/05/24  0436 11/05/24  0204 11/04/24  2232 11/04/24 2012 11/04/24  1847 11/04/24  1846 11/04/24  1540 11/04/24  0612   WBC 9.1 12.2* 16.3* 17.3*  --  13.2*  --  7.6   RBC 3.22* 3.33* 3.03* 2.75*  --  2.67*  --  3.06*   HGB 9.7* 10.0* 8.9* 8.3* 8.1* 8.0*   < > 9.3*   HCT 27.7* 29.1* 26.5* 24.2*  --  23.5*  --  26.5*    201 205 184  --  160  --  270    < > = values in this interval not displayed.     Basic Metabolic Panel:  Recent Labs   Lab 11/05/24  0810 11/05/24  0706 11/05/24  0614 11/05/24  0436 11/05/24  0432 11/04/24 2012 11/04/24 2006 11/04/24  1847 11/04/24  1846 11/04/24  1822 11/04/24  1759 11/04/24  1540 11/04/24  0612 11/03/24  1644 11/02/24  0546   NA  --   --   --  128*  --  133*  --  129* 132* 128* 128*   < > 126* 127* 127*   POTASSIUM  --   --   --  4.5  --  4.0  --  4.1 4.3 4.7 4.4   < > 4.2 4.2 4.0   CHLORIDE  --   --   --  96*  --  99  --   --  97*  --   --   --  91* 94* 93*   CO2  --   --   --  24  --  25  --   --  25  --   --   --  29 28 23   BUN  --   --   --  10.7  --  9.5  --   --  9.4  --   --   --  10.7 14.4 12.8   CR  --   --   --  0.56*  --  0.52*  --   --  0.53*  --   --   --  0.59* 0.58* 0.56*   GLC 98 117* 123* 195* 177* 159*   < > 183* 189* 186* 176*   < > 92 110* 97   AMALIA  --   --   --  8.1*  --  8.3*  --   --  8.9  --   --   --  8.1* 8.0* 8.3*    < > = values in this interval not displayed.     INR  Recent Labs   Lab 11/04/24 2012 11/04/24  4521    INR 1.80* 2.04*      Attestation:   I have reviewed today's relevant vital signs, notes, medications, labs and imaging.    Minesh Fajardo MD  [facility] - Nephrology  [phone]

## 2024-11-05 NOTE — PROGRESS NOTES
[facility] RCAT Note    Date:11/5/2024  Admission Diagnosis: UTI, bacteremia  Pulmonary History: None  Home Nebulizer/ MDI Use: None  Home Oxygen Use: None  Acuity Level (from RT Assessment flow sheet): 5      Pulmonary Hygiene Initiated: Flutter Valve ordered QID      Volume Expansion Therapy Initiated: IS BID per RCAT protocol      Current Oxygen Requirement: RA  Current SpO2: 95%    Re-evaluation date: 11/8/24     See 'RT Assessments' flow sheet for patient assessment scoring and Acuity Level Details.

## 2024-11-06 ENCOUNTER — APPOINTMENT (OUTPATIENT)
Dept: PHYSICAL THERAPY | Facility: CLINIC | Age: 84
End: 2024-11-06
Attending: INTERNAL MEDICINE
Payer: MEDICARE

## 2024-11-06 ENCOUNTER — APPOINTMENT (OUTPATIENT)
Dept: PHYSICAL THERAPY | Facility: CLINIC | Age: 84
DRG: 853 | End: 2024-11-06
Attending: INTERNAL MEDICINE
Payer: MEDICARE

## 2024-11-06 LAB
ANION GAP SERPL CALCULATED.3IONS-SCNC: 6 MMOL/L (ref 7–15)
BACTERIA TISS BX CULT: NO GROWTH
BUN SERPL-MCNC: 15.9 MG/DL (ref 8–23)
CALCIUM SERPL-MCNC: 8.3 MG/DL (ref 8.8–10.4)
CHLORIDE SERPL-SCNC: 97 MMOL/L (ref 98–107)
CREAT SERPL-MCNC: 0.67 MG/DL (ref 0.67–1.17)
EGFRCR SERPLBLD CKD-EPI 2021: >90 ML/MIN/1.73M2
ERYTHROCYTE [DISTWIDTH] IN BLOOD BY AUTOMATED COUNT: 15.6 % (ref 10–15)
GLUCOSE BLDC GLUCOMTR-MCNC: 109 MG/DL (ref 70–99)
GLUCOSE BLDC GLUCOMTR-MCNC: 129 MG/DL (ref 70–99)
GLUCOSE BLDC GLUCOMTR-MCNC: 85 MG/DL (ref 70–99)
GLUCOSE BLDC GLUCOMTR-MCNC: 97 MG/DL (ref 70–99)
GLUCOSE SERPL-MCNC: 99 MG/DL (ref 70–99)
GRAM STAIN RESULT: NORMAL
GRAM STAIN RESULT: NORMAL
HCO3 SERPL-SCNC: 27 MMOL/L (ref 22–29)
HCT VFR BLD AUTO: 28.2 % (ref 40–53)
HGB BLD-MCNC: 9.4 G/DL (ref 13.3–17.7)
MAGNESIUM SERPL-MCNC: 2 MG/DL (ref 1.7–2.3)
MCH RBC QN AUTO: 29.7 PG (ref 26.5–33)
MCHC RBC AUTO-ENTMCNC: 33.3 G/DL (ref 31.5–36.5)
MCV RBC AUTO: 89 FL (ref 78–100)
PHOSPHATE SERPL-MCNC: 2.5 MG/DL (ref 2.5–4.5)
PLATELET # BLD AUTO: 198 10E3/UL (ref 150–450)
POTASSIUM SERPL-SCNC: 4.2 MMOL/L (ref 3.4–5.3)
RBC # BLD AUTO: 3.17 10E6/UL (ref 4.4–5.9)
SODIUM SERPL-SCNC: 130 MMOL/L (ref 135–145)
SODIUM SERPL-SCNC: 130 MMOL/L (ref 135–145)
SODIUM SERPL-SCNC: 131 MMOL/L (ref 135–145)
SODIUM SERPL-SCNC: 132 MMOL/L (ref 135–145)
VANCOMYCIN SERPL-MCNC: 11.9 UG/ML
WBC # BLD AUTO: 9.1 10E3/UL (ref 4–11)

## 2024-11-06 PROCEDURE — 258N000003 HC RX IP 258 OP 636: Performed by: STUDENT IN AN ORGANIZED HEALTH CARE EDUCATION/TRAINING PROGRAM

## 2024-11-06 PROCEDURE — 250N000013 HC RX MED GY IP 250 OP 250 PS 637: Performed by: PHYSICIAN ASSISTANT

## 2024-11-06 PROCEDURE — 84295 ASSAY OF SERUM SODIUM: CPT | Performed by: INTERNAL MEDICINE

## 2024-11-06 PROCEDURE — 93005 ELECTROCARDIOGRAM TRACING: CPT

## 2024-11-06 PROCEDURE — 250N000009 HC RX 250: Performed by: SURGERY

## 2024-11-06 PROCEDURE — 250N000013 HC RX MED GY IP 250 OP 250 PS 637: Performed by: INTERNAL MEDICINE

## 2024-11-06 PROCEDURE — 83735 ASSAY OF MAGNESIUM: CPT | Performed by: NURSE PRACTITIONER

## 2024-11-06 PROCEDURE — 97530 THERAPEUTIC ACTIVITIES: CPT | Mod: GP | Performed by: PHYSICAL THERAPIST

## 2024-11-06 PROCEDURE — 84100 ASSAY OF PHOSPHORUS: CPT | Performed by: SURGERY

## 2024-11-06 PROCEDURE — G0463 HOSPITAL OUTPT CLINIC VISIT: HCPCS

## 2024-11-06 PROCEDURE — 36415 COLL VENOUS BLD VENIPUNCTURE: CPT | Performed by: NURSE PRACTITIONER

## 2024-11-06 PROCEDURE — 250N000011 HC RX IP 250 OP 636: Performed by: SURGERY

## 2024-11-06 PROCEDURE — 36415 COLL VENOUS BLD VENIPUNCTURE: CPT | Performed by: INTERNAL MEDICINE

## 2024-11-06 PROCEDURE — 258N000003 HC RX IP 258 OP 636: Performed by: SURGERY

## 2024-11-06 PROCEDURE — 99231 SBSQ HOSP IP/OBS SF/LOW 25: CPT | Performed by: INTERNAL MEDICINE

## 2024-11-06 PROCEDURE — 250N000013 HC RX MED GY IP 250 OP 250 PS 637: Performed by: SURGERY

## 2024-11-06 PROCEDURE — 120N000013 HC R&B IMCU

## 2024-11-06 PROCEDURE — 84295 ASSAY OF SERUM SODIUM: CPT | Performed by: NURSE PRACTITIONER

## 2024-11-06 PROCEDURE — 250N000013 HC RX MED GY IP 250 OP 250 PS 637: Performed by: NURSE PRACTITIONER

## 2024-11-06 PROCEDURE — 36415 COLL VENOUS BLD VENIPUNCTURE: CPT | Performed by: SURGERY

## 2024-11-06 PROCEDURE — 85014 HEMATOCRIT: CPT | Performed by: NURSE PRACTITIONER

## 2024-11-06 PROCEDURE — 93010 ELECTROCARDIOGRAM REPORT: CPT | Performed by: INTERNAL MEDICINE

## 2024-11-06 PROCEDURE — 80202 ASSAY OF VANCOMYCIN: CPT | Performed by: SURGERY

## 2024-11-06 PROCEDURE — 80048 BASIC METABOLIC PNL TOTAL CA: CPT | Performed by: INTERNAL MEDICINE

## 2024-11-06 PROCEDURE — 99233 SBSQ HOSP IP/OBS HIGH 50: CPT | Performed by: INTERNAL MEDICINE

## 2024-11-06 PROCEDURE — 97110 THERAPEUTIC EXERCISES: CPT | Mod: GP | Performed by: PHYSICAL THERAPIST

## 2024-11-06 RX ORDER — MAGNESIUM SULFATE HEPTAHYDRATE 40 MG/ML
2 INJECTION, SOLUTION INTRAVENOUS ONCE
Status: COMPLETED | OUTPATIENT
Start: 2024-11-06 | End: 2024-11-06

## 2024-11-06 RX ADMIN — METOPROLOL TARTRATE 12.5 MG: 25 TABLET, FILM COATED ORAL at 20:35

## 2024-11-06 RX ADMIN — SIMETHICONE 80 MG: 80 TABLET, CHEWABLE ORAL at 08:27

## 2024-11-06 RX ADMIN — TAMSULOSIN HYDROCHLORIDE 0.4 MG: 0.4 CAPSULE ORAL at 08:27

## 2024-11-06 RX ADMIN — ACETAMINOPHEN 975 MG: 325 TABLET, FILM COATED ORAL at 12:32

## 2024-11-06 RX ADMIN — METOPROLOL TARTRATE 12.5 MG: 25 TABLET, FILM COATED ORAL at 12:33

## 2024-11-06 RX ADMIN — HEPARIN SODIUM 5000 UNITS: 5000 INJECTION, SOLUTION INTRAVENOUS; SUBCUTANEOUS at 22:13

## 2024-11-06 RX ADMIN — SODIUM CHLORIDE TAB 1 GM 1 G: 1 TAB at 08:27

## 2024-11-06 RX ADMIN — HEPARIN SODIUM 5000 UNITS: 5000 INJECTION, SOLUTION INTRAVENOUS; SUBCUTANEOUS at 13:00

## 2024-11-06 RX ADMIN — PANTOPRAZOLE SODIUM 40 MG: 40 TABLET, DELAYED RELEASE ORAL at 08:27

## 2024-11-06 RX ADMIN — ASPIRIN 81 MG CHEWABLE TABLET 81 MG: 81 TABLET CHEWABLE at 08:27

## 2024-11-06 RX ADMIN — SODIUM PHOSPHATE, MONOBASIC, MONOHYDRATE AND SODIUM PHOSPHATE, DIBASIC, ANHYDROUS 9 MMOL: 142; 276 INJECTION, SOLUTION INTRAVENOUS at 11:01

## 2024-11-06 RX ADMIN — ACETAMINOPHEN 975 MG: 325 TABLET, FILM COATED ORAL at 20:35

## 2024-11-06 RX ADMIN — SODIUM CHLORIDE TAB 1 GM 1 G: 1 TAB at 17:52

## 2024-11-06 RX ADMIN — VANCOMYCIN HYDROCHLORIDE 1000 MG: 1 INJECTION, SOLUTION INTRAVENOUS at 15:20

## 2024-11-06 RX ADMIN — VANCOMYCIN HYDROCHLORIDE 1000 MG: 1 INJECTION, SOLUTION INTRAVENOUS at 01:39

## 2024-11-06 RX ADMIN — SIMETHICONE 80 MG: 80 TABLET, CHEWABLE ORAL at 12:32

## 2024-11-06 RX ADMIN — SODIUM CHLORIDE TAB 1 GM 1 G: 1 TAB at 12:32

## 2024-11-06 RX ADMIN — SODIUM CHLORIDE 1000 ML: 9 INJECTION, SOLUTION INTRAVENOUS at 20:07

## 2024-11-06 RX ADMIN — SIMETHICONE 80 MG: 80 TABLET, CHEWABLE ORAL at 17:52

## 2024-11-06 RX ADMIN — MAGNESIUM SULFATE HEPTAHYDRATE 2 G: 40 INJECTION, SOLUTION INTRAVENOUS at 08:28

## 2024-11-06 RX ADMIN — LIDOCAINE 2 PATCH: 4 PATCH TOPICAL at 01:34

## 2024-11-06 RX ADMIN — ACETAMINOPHEN 975 MG: 325 TABLET, FILM COATED ORAL at 04:59

## 2024-11-06 RX ADMIN — HEPARIN SODIUM 5000 UNITS: 5000 INJECTION, SOLUTION INTRAVENOUS; SUBCUTANEOUS at 05:00

## 2024-11-06 NOTE — PLAN OF CARE
Goal Outcome Evaluation:      Plan of Care Reviewed With: patient    Overall Patient Progress: improvingOverall Patient Progress: improving  Problem: Adult Inpatient Plan of Care  Goal: Plan of Care Review  Description: The Plan of Care Review/Shift note should be completed every shift.  The Outcome Evaluation is a brief statement about your assessment that the patient is improving, declining, or no change.  This information will be displayed automatically on your shift  note.  Outcome: Progressing  Flowsheets (Taken 11/6/2024 0531)  Outcome Evaluation: Pain managed with Tylenol, Lidocaine. CMS intact. Neuros intact. CT output WDL, SS. UOP borderline. SR+1st deg AVB, RBBB. Awaiting transfer to [facility].  Plan of Care Reviewed With: patient  Overall Patient Progress: improving  Goal: Absence of Hospital-Acquired Illness or Injury  Intervention: Identify and Manage Fall Risk  Recent Flowsheet Documentation  Taken 11/5/2024 2000 by Cyril Kasper RN  Safety Promotion/Fall Prevention:   activity supervised   assistive device/personal items within reach   clutter free environment maintained   increased rounding and observation   increase visualization of patient   lighting adjusted   mobility aid in reach   nonskid shoes/slippers when out of bed   room door open   room near nurse's station   safety round/check completed   room organization consistent  Intervention: Prevent Skin Injury  Recent Flowsheet Documentation  Taken 11/5/2024 2000 by Cyril Kasper RN  Body Position:   turned   neutral head position   side-lying  Skin Protection: adhesive use limited  Intervention: Prevent and Manage VTE (Venous Thromboembolism) Risk  Recent Flowsheet Documentation  Taken 11/6/2024 0000 by Cyril Kasper RN  VTE Prevention/Management: SCDs on (sequential compression devices)  Taken 11/5/2024 2000 by Cyril Kasper RN  VTE Prevention/Management: SCDs on (sequential compression devices)  Intervention: Prevent Infection  Recent  Flowsheet Documentation  Taken 11/5/2024 2000 by Cyril Kasper RN  Infection Prevention:   cohorting utilized   hand hygiene promoted   rest/sleep promoted   single patient room provided  Goal: Optimal Comfort and Wellbeing  Intervention: Monitor Pain and Promote Comfort  Recent Flowsheet Documentation  Taken 11/5/2024 2056 by Cyril Kasper RN  Pain Management Interventions:   repositioned   rest   medication (see MAR)  Intervention: Provide Person-Centered Care  Recent Flowsheet Documentation  Taken 11/5/2024 2000 by Cyril Kasper RN  Trust Relationship/Rapport:   care explained   reassurance provided   thoughts/feelings acknowledged   choices provided   emotional support provided   empathic listening provided   questions encouraged   questions answered     Problem: Infection  Goal: Absence of Infection Signs and Symptoms  Intervention: Prevent or Manage Infection  Recent Flowsheet Documentation  Taken 11/5/2024 2000 by Cyril Kasper RN  Infection Management: aseptic technique maintained     Problem: Cardiovascular Surgery  Goal: Improved Activity Tolerance  Intervention: Optimize Tolerance for Activity  Recent Flowsheet Documentation  Taken 11/5/2024 2000 by Cyril Kasper RN  Environmental Support: calm environment promoted  Goal: Optimal Coping with Heart Surgery  Intervention: Support Psychosocial Response to Surgery  Recent Flowsheet Documentation  Taken 11/5/2024 2000 by Cyril Kasper RN  Supportive Measures:   active listening utilized   relaxation techniques promoted   verbalization of feelings encouraged  Goal: Absence of Bleeding  Intervention: Monitor and Manage Bleeding  Recent Flowsheet Documentation  Taken 11/5/2024 2000 by Cyril Kasper RN  Bleeding Management: dressing monitored  Goal: Optimal Cerebral Tissue Perfusion  Intervention: Protect and Optimize Cerebral Perfusion  Recent Flowsheet Documentation  Taken 11/5/2024 2000 by Cyril Kasper RN  Sensory Stimulation  Regulation:   care clustered   lighting decreased  Glycemic Management: blood glucose monitored  Head of Bed (HOB) Positioning: HOB at 20-30 degrees  Goal: Fluid and Electrolyte Balance  Intervention: Monitor and Manage Fluid and Electrolyte Balance  Recent Flowsheet Documentation  Taken 11/5/2024 2000 by Cyril Kasper RN  Fluid/Electrolyte Management: fluids restricted  Goal: Blood Glucose Level Within Targeted Range  Intervention: Optimize Glycemic Control  Recent Flowsheet Documentation  Taken 11/5/2024 2000 by Cyril Kasper RN  Glycemic Management: blood glucose monitored  Goal: Absence of Infection Signs and Symptoms  Intervention: Prevent or Manage Infection  Recent Flowsheet Documentation  Taken 11/5/2024 2000 by Cyril Kasper RN  Infection Prevention:   cohorting utilized   hand hygiene promoted   rest/sleep promoted   single patient room provided  Goal: Anesthesia/Sedation Recovery  Intervention: Optimize Anesthesia Recovery  Recent Flowsheet Documentation  Taken 11/6/2024 0000 by Cyril Kasper RN  Administration (IS): self-administered  Taken 11/5/2024 2000 by Cyril Kasper RN  Safety Promotion/Fall Prevention:   activity supervised   assistive device/personal items within reach   clutter free environment maintained   increased rounding and observation   increase visualization of patient   lighting adjusted   mobility aid in reach   nonskid shoes/slippers when out of bed   room door open   room near nurse's station   safety round/check completed   room organization consistent  Administration (IS): self-administered  Reorientation Measures: clock in view  Level Incentive Spirometer (mL): 500  Number of Repetitions (IS): 10  Goal: Acceptable Pain Control  Intervention: Prevent or Manage Pain  Recent Flowsheet Documentation  Taken 11/5/2024 2056 by Cyril Kasper RN  Pain Management Interventions:   repositioned   rest   medication (see MAR)  Goal: Effective Oxygenation and  Ventilation  Intervention: Promote Airway Secretion Clearance  Recent Flowsheet Documentation  Taken 11/6/2024 0000 by Cyril Kasper, RN  Administration (IS): self-administered  Cough And Deep Breathing: done with encouragement  Taken 11/5/2024 2000 by Cyril Kasepr, RN  Administration (IS): self-administered  Level Incentive Spirometer (mL): 500  Cough And Deep Breathing: done with encouragement  Number of Repetitions (IS): 10  Airway/Ventilation Management: airway patency maintained  Intervention: Optimize Oxygenation and Ventilation  Recent Flowsheet Documentation  Taken 11/5/2024 2000 by Cyril Kasper, RN  Chest Tube Safety:   all connections secured   suction checked       Outcome Evaluation: Pain managed with Tylenol, Lidocaine. CMS intact. Neuros intact. CT output WDL, SS. UOP borderline. SR+1st deg AVB, RBBB. Awaiting transfer to [facility].

## 2024-11-06 NOTE — PROGRESS NOTES
[facility]     Renal Progress Note       SHORTHAND KEY FOR MY NOTES:  c = with, s = without, p = after, a = before, x = except, asx = asymptomatic, tx = transplant or treatment, sx = symptoms or symptomatic, cx = canceled or culture, rxn = reaction, yday = yesterday, nl = normal, abx = antibiotics, fxn = function, dx = diagnosis, dz = disease, m/h = melena/hematochezia, c/d/l/ha = cramping/dizziness/lightheadedness/headache, d/c = discharge or diarrhea/constipation, f/c/n/v = fevers/chills/nausea/vomiting, cp/sob = chest pain/shortness of breath, tbv = total body volume, rxn = reaction, tdc = tunneled dialysis catheter, pta = prior to admission, hd = hemodialysis, pd = peritoneal dialysis, hhd = home hemodialysis, edw = estimated dry wt         Assessment/Plan:     1.  Severe hyponatremia.  Pt's Na is 130 today.  Tolerating salt tabs.    A.  Follow Na daily.  B.  Continue salt tabs 1 tid.  C.  Continue same fluid restriction - 1200 ml every day.  D.  When Na gets to ~135 then liberalize the fluid restriction and continue salt tabs.    2.  FEN.  Other than Na, electrolytes are fine.  A.  Reg diet.    3.  Code Status.  Pt is Full Code post-surgery.  He was previously DNR/DNI but it was reversed for surgery.    Case d/w Dr. Nieto.  Thank you for this consultation.  I will sign off.  Please call if any questions.        Interval History:     Pt is feeling ok and has no complaints.          Medications and Allergies:     Current Facility-Administered Medications   Medication Dose Route Frequency Provider Last Rate Last Admin    acetaminophen (TYLENOL) tablet 975 mg  975 mg Oral or NG Tube Q8H To Tyler NP   975 mg at 11/06/24 1232    aspirin (ASA) chewable tablet 81 mg  81 mg Oral or NG Tube Daily To Tyler NP   81 mg at 11/06/24 0827    heparin ANTICOAGULANT injection 5,000 Units  5,000 Units Subcutaneous Q8H To Tyler NP   5,000 Units at 11/06/24 1300    insulin aspart  (NovoLOG) injection (RAPID ACTING)  1-7 Units Subcutaneous TID AC Kerri Steen NP   1 Units at 11/05/24 1245    insulin aspart (NovoLOG) injection (RAPID ACTING)  1-5 Units Subcutaneous At Bedtime Kerri Steen NP        Lidocaine (LIDOCARE) 4 % Patch 2 patch  2 patch Transdermal Q24H To Tyler NP   2 patch at 11/06/24 0134    metoprolol tartrate (LOPRESSOR) half-tab 12.5 mg  12.5 mg Oral BID Yana Rapp PA-C   12.5 mg at 11/06/24 1233    pantoprazole (PROTONIX) 2 mg/mL suspension 40 mg  40 mg Oral or NG Tube Daily To Tyler NP   40 mg at 11/05/24 0953    Or    pantoprazole (PROTONIX) EC tablet 40 mg  40 mg Oral Daily To Tyler NP   40 mg at 11/06/24 0827    polyethylene glycol (MIRALAX) Packet 17 g  17 g Oral Daily To Tyler NP   17 g at 11/05/24 0952    senna-docusate (SENOKOT-S/PERICOLACE) 8.6-50 MG per tablet 1 tablet  1 tablet Oral or NG Tube BID To Tyler NP   1 tablet at 11/05/24 2059    simethicone (MYLICON) chewable tablet 80 mg  80 mg Oral 4x Daily Kerri Steen NP   80 mg at 11/06/24 1232    sodium chloride (PF) 0.9% PF flush 3 mL  3 mL Intracatheter Q8H To Tyler NP   3 mL at 11/06/24 0829    sodium chloride tablet 1 g  1 g Oral TID w/meals Nelly Iraheta MD   1 g at 11/06/24 1232    sodium phosphate 9 mmol in 250 mL NS intermittent infusion  9 mmol Intravenous Once Alexa Ellison MD   9 mmol at 11/06/24 1101    tamsulosin (FLOMAX) capsule 0.4 mg  0.4 mg Oral Daily To Tyler NP   0.4 mg at 11/06/24 0827    vancomycin (VANCOCIN) 1,000 mg in 200 mL dextrose intermittent infusion  1,000 mg Intravenous Q12H To Tyler,  mL/hr at 11/06/24 0139 1,000 mg at 11/06/24 0139     No Known Allergies       Physical Exam:     Vitals were reviewed     , Blood pressure 100/66, pulse 91, temperature 98.6  F (37  C), temperature source Bladder, resp. rate 30, weight 59 kg (130 lb 1.1 oz), SpO2 97%.  Wt Readings from Last 3  Encounters:   11/06/24 59 kg (130 lb 1.1 oz)   10/23/24 59 kg (130 lb)     Intake/Output Summary (Last 24 hours) at 11/6/2024 1404  Last data filed at 11/6/2024 1200  Gross per 24 hour   Intake 580 ml   Output 862 ml   Net -282 ml     GENERAL APPEARANCE: pleasant, NAD, alert  HEENT:  very cachectic  RESP: diminished, R chest tube  CV: RRR, nl S1/S2   ABDOMEN: s/nt/nd  EXTREMITIES/SKIN: no ble edema  OTHER:  + mclain         Data:     CBC RESULTS:     Recent Labs   Lab 11/06/24  0537 11/05/24  0436 11/05/24  0204 11/04/24  2232 11/04/24 2012 11/04/24 1847 11/04/24  1846   WBC 9.1 9.1 12.2* 16.3* 17.3*  --  13.2*   RBC 3.17* 3.22* 3.33* 3.03* 2.75*  --  2.67*   HGB 9.4* 9.7* 10.0* 8.9* 8.3* 8.1* 8.0*   HCT 28.2* 27.7* 29.1* 26.5* 24.2*  --  23.5*    192 201 205 184  --  160     Basic Metabolic Panel:  Recent Labs   Lab 11/06/24  1228 11/06/24  0826 11/06/24  0537 11/05/24  2054 11/05/24  1704 11/05/24  1219 11/05/24  1218 11/05/24  0614 11/05/24  0436 11/05/24  0432 11/04/24 2012 11/04/24 2006 11/04/24  1847 11/04/24  1846 11/04/24  1822 11/04/24  1540 11/04/24  0612 11/03/24  1644   *  --  130*  130*  --   --  130*  --   --  128*  --  133*  --  129* 132* 128*   < > 126* 127*   POTASSIUM  --   --  4.2  --   --   --   --   --  4.5  --  4.0  --  4.1 4.3 4.7   < > 4.2 4.2   CHLORIDE  --   --  97*  --   --   --   --   --  96*  --  99  --   --  97*  --   --  91* 94*   CO2  --   --  27  --   --   --   --   --  24  --  25  --   --  25  --   --  29 28   BUN  --   --  15.9  --   --   --   --   --  10.7  --  9.5  --   --  9.4  --   --  10.7 14.4   CR  --   --  0.67  --   --   --   --   --  0.56*  --  0.52*  --   --  0.53*  --   --  0.59* 0.58*   * 97 99 115* 129*  --  144*   < > 195*   < > 159*   < > 183* 189* 186*   < > 92 110*   AMALIA  --   --  8.3*  --   --   --   --   --  8.1*  --  8.3*  --   --  8.9  --   --  8.1* 8.0*    < > = values in this interval not displayed.     INR  Recent Labs   Lab  11/04/24 2012 11/04/24  1846   INR 1.80* 2.04*      Attestation:   I have reviewed today's relevant vital signs, notes, medications, labs and imaging.    Minesh Fajardo MD  [facility] - Nephrology  [phone]

## 2024-11-06 NOTE — PROGRESS NOTES
"CLINICAL NUTRITION SERVICES - REASSESSMENT NOTE      Recommendations Ordered by Registered Dietitian (RD): Add Magic Cup TID with meals    Malnutrition: % Weight Loss:  None noted  % Intake:  No decreased intake noted  Subcutaneous Fat Loss:  Globally severe   Muscle Loss:  Globally severe   Fluid Retention:  None noted    Malnutrition Diagnosis: Severe malnutrition  In Context of:  Acute illness or injury       EVALUATION OF PROGRESS TOWARD GOALS   Diet:  Mechanical soft + 1200 mL fluid restriction  Ensure Enlive with meals     Intake/Tolerance:  Visited with patient this afternoon. He notes that his appetite is pretty good but is limited on food selections d/t recent dental extractions. Has been eating broth, yogurt, pudding, and Ensure. Willing to try Magic Cup as well.   \"The food is SO GOOD\".      ASSESSED NUTRITION NEEDS:  Dosing Weight: 59 kg (actual)   Estimated Energy Needs: 1026-2725 kcals (25-30 Kcal/Kg)  Justification: maintenance  Estimated Protein Needs: 70-90+ grams protein (1.2-1.5 g pro/Kg)  Justification: hypercatabolism with acute illness  Estimated Fluid Needs: 1200  mL   Justification: restriction per MD, hyponatremic      NEW FINDINGS:   10/31: Punch Biopsy of chronic wound   10/31: Angio = Normal coronary angiography   10/31: EXTRACTION OF TEETH 3, 5, 6, 7, 8, 9, 10, 11, 12, 13, 14, 20, 21, 23, 24, 25, 26, 27, 28, AND 29, Bilateral - Mouth   11/4: Minimally invasive MV replacement   11/5: Extubated     Weight = 59 kg (stable)    Na 130 (L)     Previous Goals (10/30):   PO intakes >/= 75% nutritionally adequate meals per day (or equivalent via snack/supplement)   Evaluation: Met    Previous Nutrition Diagnosis (10/30):   No nutrition diagnosis identified at this time   Evaluation: No change      MALNUTRITION  % Weight Loss:  None noted  % Intake:  No decreased intake noted  Subcutaneous Fat Loss:  Globally severe   Muscle Loss:  Globally severe   Fluid Retention:  None noted    Malnutrition " Diagnosis: Severe malnutrition  In Context of:  Acute illness or injury    CURRENT NUTRITION DIAGNOSIS  Increased nutrient needs (protein) related to recent heart surgery as evidenced by need for additional oral nutritional supplements     INTERVENTIONS  Recommendations / Nutrition Prescription  Continue mechanical soft diet as tolerated  Add Magic Cup TID with meals     Implementation  Medical Food Supplement: Ordered as above   Collaboration and Referral of Nutrition care: Patient discussed today during interdisciplinary bedside rounds    Goals  Patient will consume at least 75% meals and supplements     MONITORING AND EVALUATION:  Progress towards goals will be monitored and evaluated per protocol and Practice Guidelines    Radha Reddy RD, LD, CNSC   Clinical Dietitian - [facility]

## 2024-11-06 NOTE — PROGRESS NOTES
[facility]    Medicine Progress Note - Hospitalist Service    Date of Admission:  10/25/2024    Assessment & Plan   Jayem Donahue is a 84 year old male with PMH prostate cancer, who was admitted on 10/25/2024 as a transfer from [facility] with Corynebacterium bacteremia and concern for endocarditis.     Corynebacterium striatum mitral valve endocarditis  Corynebacterium bacteremia  Corynebacterium UTI  Severe mitral regurgitation due to above  Septic shock secondary to above, resolved  Low back pain, r/o early discitis   Generalized weakness  Jayme Donahue initially developed nausea and poor appetite on 10/18/2024.  He presented to an outside hospital and was found to be hyponatremic with a sodium of 118 and diagnosed with sepsis secondary to a UTI.  Subsequently became hypotensive and was transferred to [facility] ICU and started on vasopressors on 10/23/2024.  He was treated with vancomycin and Rocephin.  Levophed weaned off on the night of 10/23/2024.  Blood cultures returned positive for corynebacterium striatum. TTE on 10/23/2024 showed severe mitral regurgitation and a mobile echodensity on the atrial side of the mitral valve that likely represents flail scallop although vegetation is also in the differential.  Complained of low back pain, lumbar spine MRI on 10/24/2024 showed some likely degenerative endplate changes at right L4-L5 and left L5-S1, although early changes of discitis could also be in the differential.  ID was consulted on 10/25/2024 and patient transferred to [facility] for LINDSEY. LINDSEY on 10/29 shows mitral valve endocarditis.  - Admitted to inpatient.  - ID, Cardiology, and Cardiothoracic Surgery consulted, appreciate their assistance.  - LINDSEY on 10/29/2024 showed large vegetation on atrial aspect of mitral valve, prolapse of posterior mitral valve leaflet, severe mitral regurgitation.           -   - Coronary angiogram on 10/31/2024 showed normal  coronary arteries.  - Continue vancomycin per ID, pharmacy to dose.  - Blood culture on 10/24/2024 grew Corynebacterium striatum.  Repeat blood cultures 10/26-10/30 have been negative to date.  Now -   s/p Right mini-thoracotomy mitral valve replacement with a 31 mm Quinonez MITRIS bovine pericardial valve, right common femoral arterial and venous cannulation for cardiopulmonary bypass, intraoperative LINDSEY on 11/4/24 with Dr. Alexa Ellison.  - further postoperative and chest tube cares as per CT VS surgery  -Continue aspirin 81 mg daily, no statin indicated    - PT/OT consulted, appreciate their assistance - TCU recommended.     Severe hyponatremia, suspect SIADH  Sodium reportedly 118 at outside hospital when he first presented.  Felt to be secondary to dehydration, sodium mildly improved with IV fluids.  Sodium 120 on 10/23/2024 when he arrived at [facility].  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  He was started on salt tablets and placed on a fluid restriction.  Sodium ranged between 119-123 while he was at [facility].  - Sodium improved initially with fluid restriction, but then trended down again.  - Nephrology consulted, appreciate their assistance.  - Started on UreNa on 10/29/2024, then switched to salt tablets on 10/30/2024 per Nephrology.  Continue 3 times daily dosing.  Discussed with nephrology 11/6  - Continue fluid restriction of 1200 ml/day.    Left scapular lesion  Lesion culture positive for Stenotrophomonas maltophilia  Appearance is concerning for malignancy, see photos in epic on 10/23/2024.  Patient states this lesion has been present for several months, he is not sure how it started.  He has been covering it at home to manage drainage from the lesion.  - WOC nurse consulted at [facility].  - Continue wound cares as ordered by WOC nurse at previous facility.  - Initially started on doxycycline, but subsequently discontinued as this was felt to represent chronic skin  colonization rather than acute infection.  - S/p biopsy by general surgery on 10/31/2024, pathology pending.               - Sutures should be removed around 11/7/2024 - 11/10/2024.     History of prostate cancer  LUTS  Urinary retention s/p mclain 10/29  Reportedly treated in 2020, but details unclear.  PSA in July 2024 was 54, PSA in September 2024 was 57. Having urinary urgency, LUTS this admission. Noted to be retaining urine and mclain placed 10/29.  - Continue tamsulosin empirically.  -Planned for trial without Mclain 11/6 as per CT VS     Moderate protein calorie malnutrition  - Dietitian consulted.         Diet: Fluid restriction 1200 ML FLUID  Snacks/Supplements Adult: Ensure Enlive; With Meals  Advance Diet as Tolerated: Regular Diet Adult; Mechanical/Dental Soft Diet    DVT Prophylaxis: Pneumatic Compression Devices  Mclain Catheter: PRESENT, indication: ?  (Error. Value could not be saved.), Surgical procedure;ICU only: hourly urine output needed for patient care, Acute retention or obstruction  Lines: None     Cardiac Monitoring: ACTIVE order. Indication: Open heart surgery (72 hours)  Code Status: Full Code      Clinically Significant Risk Factors         # Hyponatremia: Lowest Na = 128 mmol/L in last 2 days, will monitor as appropriate  # Hypochloremia: Lowest Cl = 96 mmol/L in last 2 days, will monitor as appropriate  # Hypocalcemia: Lowest iCa = 3.9 mg/dL in last 2 days, will monitor and replace as appropriate     # Hypoalbuminemia: Lowest albumin = 2.5 g/dL at 11/4/2024  8:12 PM, will monitor as appropriate  # Coagulation Defect: INR = 1.80 (Ref range: 0.85 - 1.15) and/or PTT = 47 Seconds (Ref range: 22 - 38 Seconds), will monitor for bleeding                # Severe Malnutrition: based on nutrition assessment    # Financial/Environmental Concerns:           Disposition Plan     Medically Ready for Discharge: Anticipated in 5+ Days             Ramana Nieto MD  Hospitalist Service  [facility]  [facility]  Securely message with [employer] (more info)  Text page via [facility] Paging/Directory   _The above note was dictated using voice recognition software. Although reviewed after completion, some word and grammatical error may remain . Please contact the author for any clarifications.  _____________________________________________________________________    Interval History   Last 24-hour events noted.   Continues to do okay.  Pain controlled.  Denies any worsening shortness of breath    Physical Exam   /66   Pulse 91   Temp 98.6  F (37  C) (Bladder)   Resp 30   Wt 59 kg (130 lb 1.1 oz)   SpO2 97%   BMI 20.99 kg/m    Gen- pleasant   Neck- supple  CVS- I+II+ no m/r/g  RS-chest tubes in place  Abdo- soft, no tenderness . No g/r/r  Ext- no edema     Medical Decision Making       51 MINUTES SPENT BY ME on the date of service doing chart review, history, exam, documentation & further activities per the note.  Discussion with patient, bedside RN, nephrology service, review of CT VS note.  D/W  wife as well.    Data   ------------------------- PAST 24 HR DATA REVIEWED -----------------------------------------------    I have personally reviewed the following data over the past 24 hrs:    9.1  \   9.4 (L)   / 198     132 (L) 97 (L) 15.9 /  129 (H)   4.2 27 0.67 \       Imaging results reviewed over the past 24 hrs:   No results found for this or any previous visit (from the past 24 hours).

## 2024-11-06 NOTE — PROGRESS NOTES
[facility]  WOC Nurse Inpatient Assessment     Consulted for:  Left scapula    Summary:  Chronic wound of unclear etiology, noted during recent hospitalization at [facility] with concern for possibly malignancy.  Wound currently appears clean and stable, will continue gentle wound cares.  Pt to follow-up with Derm as output as desired.      Patient History (according to provider note(s):      Jayme Donahue is a 84 year old male with PMH prostate cancer, who was admitted on 10/25/2024 as a transfer from [facility] with Corynebacterium bacteremia and concern for endocarditis.     Areas Assessed:      Areas visualized during today's visit:  left upper back    Wound location: Left upper back/scapula area    Last photo: 11-6-24      Wound due to: Unknown Etiology; concern for possible malignancy  Wound history/plan of care: pt seen by [facility] service 10/23/24 while in hospital at [facility].   Wound base: pink moist smooth tissue, rolled edge     Palpation of the wound bed: normal      Drainage: moderate     Description of drainage: serosanguinous     Measurements (length x width x depth, in cm): approx 3.5 x 4 x 0.2cm, heart-shaped  Periwound skin: Intact and Erythema- blanchable      Color: pink      Temperature: normal   Odor: none  Pain: mild, tender  Pain interventions prior to dressing change: slow and gentle cares   Treatment goal: Drainage control, Infection control/prevention, and Maintain (prevention of deterioration)  STATUS: stable  Supplies ordered: supplies stored on unit       Treatment Plan:     Left scapula wound(s): Every other day and prn:  Cleanse with Vashe-moist gauze.  Allow the moist gauze to soak for at least 2 minutes, then remove and wipe clean.  Let dry.   Apply Aquacel Ag to wound, cut to fit.  Cover with Mepilex 4x4. No need to peel back for skin checks between wound cares.    Offload/cushion area with pillows as able.     Follow-up with Derm as outpatient.       Orders: Written    RECOMMEND PRIMARY TEAM ORDER: None, at this time  Education provided: plan of care  Discussed plan of care with: Patient and Nurse  WOC nurse follow-up plan: weekly  Notify WOC if wound(s) deteriorate.  Nursing to notify the Provider(s) and re-consult the WOC Nurse if new skin concern.    DATA:     Current support surface: Standard  Standard gel mattress (Isoflex)  Containment of urine/stool: Urinal and bedside commode  BMI: Body mass index is 20.99 kg/m .   Active diet order: Orders Placed This Encounter      Advance Diet as Tolerated: Regular Diet Adult; Mechanical/Dental Soft Diet     Output: I/O last 3 completed shifts:  In: 1415.84 [P.O.:900; I.V.:515.84]  Out: 995 [Urine:555; Chest Tube:440]     Labs:   Recent Labs   Lab 11/06/24  0537 11/04/24 2232 11/04/24 2012 11/01/24  0718 10/31/24  2105 10/31/24  0659   ALBUMIN  --   --  2.5*   < >  --  2.5*   PREALB  --   --   --   --  8.9*  --    HGB 9.4*   < > 8.3*   < >  --  10.0*   INR  --   --  1.80*   < >  --   --    WBC 9.1   < > 17.3*   < >  --  8.5   A1C  --   --   --   --   --  5.3    < > = values in this interval not displayed.     Pressure injury risk assessment:   Sensory Perception: 4-->no impairment  Moisture: 4-->rarely moist  Activity: 2-->chairfast  Mobility: 3-->slightly limited  Nutrition: 2-->probably inadequate  Friction and Shear: 2-->potential problem  Phillip Score: 17    Rafaela Elliott CWOCN   Dept. Vocera- Contact WO Nurse (Skye) via  Vocera   Dept. Office Number: [phone]

## 2024-11-06 NOTE — PHARMACY-VANCOMYCIN DOSING SERVICE
Pharmacy Vancomycin Note  Date of Service 2024  Patient's  1940   84 year old, male    Indication: Bacteremia  Day of Therapy: 16  Current vancomycin regimen:  1000 mg IV q12h  Current vancomycin monitoring method: AUC  Current vancomycin therapeutic monitoring goal: 400-600 mg*h/L    InsightRX Prediction of Current Vancomycin Regimen  Loading dose: N/A  Regimen: 1000 mg IV every 12 hours.  Start time: 13:39 on 2024  Exposure target: AUC24 (range)400-600 mg/L.hr   AUC24,ss: 473 mg/L.hr  Probability of AUC24 > 400: 91 %  Ctrough,ss: 12.4 mg/L  Probability of Ctrough,ss > 20: 0 %  Probability of nephrotoxicity (Lodise J CARLOS ): 8 %      Current estimated CrCl = Estimated Creatinine Clearance: 68.5 mL/min (based on SCr of 0.67 mg/dL).    Creatinine for last 3 days  11/3/2024:  4:44 PM Creatinine 0.58 mg/dL  2024:  6:12 AM Creatinine 0.59 mg/dL;  6:46 PM Creatinine 0.53 mg/dL;  8:12 PM Creatinine 0.52 mg/dL  2024:  4:36 AM Creatinine 0.56 mg/dL  2024:  5:37 AM Creatinine 0.67 mg/dL    Recent Vancomycin Levels (past 3 days)  2024: 12:28 PM Vancomycin 11.9 ug/mL    Vancomycin IV Administrations (past 72 hours)                     vancomycin (VANCOCIN) 1,000 mg in 200 mL dextrose intermittent infusion (mg) 1,000 mg New Bag 24 0139     1,000 mg New Bag 24 1415     1,000 mg New Bag  0157     1,000 mg New Bag 24 0135     1,000 mg New Bag 24 1415    vancomycin (VANCOCIN) 1,000 mg in 200 mL dextrose intermittent infusion (mg) 1,000 mg New Bag 24 1304                    Nephrotoxins and other renal medications (From now, onward)      Start     Dose/Rate Route Frequency Ordered Stop    24 1400  vancomycin (VANCOCIN) 1,000 mg in 200 mL dextrose intermittent infusion         1,000 mg  200 mL/hr over 1 Hours Intravenous EVERY 12 HOURS 24 1234                 Contrast Orders - past 72 hours (72h ago, onward)      None            Interpretation of  levels and current regimen:  Vancomycin level is reflective of -600    Has serum creatinine changed greater than 50% in last 72 hours: No    Urine output:  unable to determine    Renal Function: Stable    Plan:  Continue Current Dose  Vancomycin monitoring method: AUC  Vancomycin therapeutic monitoring goal: 400-600 mg*h/L  Pharmacy will check vancomycin levels as appropriate in 5-7 Days.  Serum creatinine levels will be ordered a minimum of twice weekly.    Kaylee Samayoa RPH

## 2024-11-06 NOTE — PROVIDER NOTIFICATION
1700: Text to Yana AUSTIN    Patient having frequent pauses/blocked PAC's, noticed on tele, EKG done and multiple noted on EKG. Pt asymptomatic, a few lower BP's but HR 70s. Please advise, thank you!    Yana AUSTIN response: Since HR stable, pt asymptomatic, continue to monitor closely for now.

## 2024-11-06 NOTE — PROGRESS NOTES
[facility]  Cardiovascular and Thoracic Surgery Daily Note      Assessment and Plan  Mr. Donahue is a very pleasant 84-year-old gentleman with a PMH of prostate cancer who was recently admitted to the hospital and was diagnosed with Corynebacterium bacteremia and urinary tract infection. 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     POD # 2 s/p Right mini-thoracotomy mitral valve replacement with a 31 mm Quinonez MITRIS bovine pericardial valve, right common femoral arterial and venous cannulation for cardiopulmonary bypass, intraoperative LINDSEY on 11/4/24 with Dr. Alexa Ellison.    - CVS: Pre-op TTE with preserved biventricular funtion. Postop vasoplegic shock, resolved. Off gtt's. HD stable in NSR with 1* AVB. Start BB this am. Continue ASA 81 mg daily, no statin indicated. Defer on volume or diuresis today. No PTA meds on hold. Chest tubes: in place with ssang output, leave in to suction. TPW: cap     - Resp: Postoperative mechanical ventilation, resolved. Extubated POD 0, now saturating well on RA<4LPM cannula. Encourage aggressive IS, pulmonary toilet.    - Neuro: Neuros intact, pain controlled on current regimen    - Renal: No history of significant renal disease. Cr stable. Trend BMP.  Volume management as above. 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 Salt tabs resumed today by nephrology.  Recent Labs   Lab 11/06/24  0537 11/05/24  0436 11/04/24 2012   CR 0.67 0.56* 0.52*       - GI: no BM, no flatus, continue bowel regimen. Large gastric bubble on CXR, scheduled simethicone QID    - : Mclain in place today for strict I/O. Hx prostate cancer s/p radiation. PTA flomax resumed, remove mclain today    - Endo: Postop stress hyperglycemia. Insulin infusion transitioned to sliding scale insulin.   Hemoglobin A1C   Date Value Ref Range Status   10/31/2024 5.3 <5.7 % Final     Comment:     Normal <5.7%   Prediabetes 5.7-6.4%     Diabetes 6.5% or higher     Note: Adopted from ADA consensus guidelines.        - FEN: Replace electrolytes as needed. ADAT    - ID: Postop leukocytosis. Afebrile. Infectious endocarditis: had Corynebacterium bacteremia and urinary tract infection. OR gram stain positive thus far for 3+ GPB. ID following. Currently on Vancomycin. Will eventually need a PICC when ID clears. Completing perioperative Cefazolin. Trend CBC and fever curve.   Recent Labs   Lab 11/06/24  0537 11/05/24  0436 11/05/24  0204   WBC 9.1 9.1 12.2*       - Heme: Acute blood loss anemia and thrombocytopenia due to surgery. Hgb and PLT stable. Trend CBC, transfuse PRN. Skin biopsy on back positive for basal cell carcinoma.  Recent Labs   Lab 11/06/24 0537 11/05/24 0436 11/05/24  0204   HGB 9.4* 9.7* 10.0*    192 201       - Proph: SCD, subcutaneous heparin, PPI    - Other:  Clinically Significant Risk Factors         # Hyponatremia: Lowest Na = 128 mmol/L in last 2 days, will monitor as appropriate  # Hypochloremia: Lowest Cl = 96 mmol/L in last 2 days, will monitor as appropriate  # Hypocalcemia: Lowest iCa = 3.9 mg/dL in last 2 days, will monitor and replace as appropriate     # Hypoalbuminemia: Lowest albumin = 2.5 g/dL at 11/4/2024  8:12 PM, will monitor as appropriate    # Coagulation Defect: INR = 1.80 (Ref range: 0.85 - 1.15) and/or PTT = 47 Seconds (Ref range: 22 - 38 Seconds), will monitor for bleeding                # Severe Malnutrition: based on nutrition assessment    # Financial/Environmental Concerns:           - Dispo: Transfer to station 33 today, ready to transfer 11/5, no bed available. Encourage mobility, therapies and pulm hygiene.      Interval History  Seen multiple times, sitting up on commode, breathing stable, tolerating diet, working with rehab, WOC saw pt for L upper scapula wound, pain controlled. Passing flatus, no BM, no n/v. Ambulating in the halls with assistance. Denies chest pain, palpitations,  dizziness, syncopal symptoms, chills, myalgias.      Medications  Current Facility-Administered Medications   Medication Dose Route Frequency Provider Last Rate Last Admin    acetaminophen (TYLENOL) tablet 975 mg  975 mg Oral or NG Tube Q8H To Tyler NP   975 mg at 11/06/24 0459    aspirin (ASA) chewable tablet 81 mg  81 mg Oral or NG Tube Daily To Tyler NP   81 mg at 11/06/24 0827    heparin ANTICOAGULANT injection 5,000 Units  5,000 Units Subcutaneous Q8H To Tyler NP   5,000 Units at 11/06/24 0500    insulin aspart (NovoLOG) injection (RAPID ACTING)  1-7 Units Subcutaneous TID AC Kerri Steen NP   1 Units at 11/05/24 1245    insulin aspart (NovoLOG) injection (RAPID ACTING)  1-5 Units Subcutaneous At Bedtime Kerri Steen NP        Lidocaine (LIDOCARE) 4 % Patch 2 patch  2 patch Transdermal Q24H To Tyler NP   2 patch at 11/06/24 0134    magnesium sulfate 2 g in 50 mL sterile water intermittent infusion  2 g Intravenous Once Alexa Ellison MD 50 mL/hr at 11/06/24 0828 2 g at 11/06/24 0828    pantoprazole (PROTONIX) 2 mg/mL suspension 40 mg  40 mg Oral or NG Tube Daily To Tyler NP   40 mg at 11/05/24 0953    Or    pantoprazole (PROTONIX) EC tablet 40 mg  40 mg Oral Daily To Tyler NP   40 mg at 11/06/24 0827    polyethylene glycol (MIRALAX) Packet 17 g  17 g Oral Daily To Tyler NP   17 g at 11/05/24 0952    senna-docusate (SENOKOT-S/PERICOLACE) 8.6-50 MG per tablet 1 tablet  1 tablet Oral or NG Tube BID To Tyler NP   1 tablet at 11/05/24 2059    simethicone (MYLICON) chewable tablet 80 mg  80 mg Oral 4x Daily Kerri Steen NP   80 mg at 11/06/24 0827    sodium chloride (PF) 0.9% PF flush 3 mL  3 mL Intracatheter Q8H To Tyler, JOANNE   3 mL at 11/06/24 0829    sodium chloride tablet 1 g  1 g Oral TID w/meals Nelly Iraheta MD   1 g at 11/06/24 0827    sodium phosphate 9 mmol in 250 mL NS intermittent infusion  9 mmol Intravenous  Once Alexa Ellison MD        tamsulosin (FLOMAX) capsule 0.4 mg  0.4 mg Oral Daily To Tyler NP   0.4 mg at 11/06/24 0827    vancomycin (VANCOCIN) 1,000 mg in 200 mL dextrose intermittent infusion  1,000 mg Intravenous Q12H To Tyler  mL/hr at 11/06/24 0139 1,000 mg at 11/06/24 0139     Current Facility-Administered Medications   Medication Dose Route Frequency Provider Last Rate Last Admin    [START ON 11/7/2024] acetaminophen (TYLENOL) tablet 650 mg  650 mg Oral Q4H PRN To Tyler NP        albuterol (PROVENTIL) neb solution 2.5 mg  2.5 mg Nebulization Q2H PRN Zbigniew Johansen PA-C        benzocaine-menthol (CHLORASEPTIC) 6-10 MG lozenge 1 lozenge  1 lozenge Buccal Q1H PRN To Tyler NP   1 lozenge at 11/04/24 0935    bisacodyl (DULCOLAX) suppository 10 mg  10 mg Rectal Daily PRN To Tyler NP        calcium carbonate (TUMS) chewable tablet 1,000 mg  1,000 mg Oral Daily PRN To Tyler NP        calcium gluconate 1 g in 50 mL in sodium chloride intermittent infusion  1 g Intravenous Once PRN To Tyler NP        calcium gluconate 2 g in  mL intermittent infusion  2 g Intravenous Once PRN To Tyler NP        calcium gluconate 3 g in sodium chloride 0.9 % 100 mL intermittent infusion  3 g Intravenous Once PRN To Tyler NP        glucose gel 15-30 g  15-30 g Oral Q15 Min PRN Kerri Steen NP        Or    dextrose 50 % injection 25-50 mL  25-50 mL Intravenous Q15 Min PRN Kerri Steen NP        Or    glucagon injection 1 mg  1 mg Subcutaneous Q15 Min PRN Kerri Steen NP        hydrALAZINE (APRESOLINE) injection 10 mg  10 mg Intravenous Q30 Min PRN To Tyler NP        HYDROmorphone (DILAUDID) injection 0.2 mg  0.2 mg Intravenous Q2H PRN To Tyler, NP   0.2 mg at 11/05/24 0450    Or    HYDROmorphone (DILAUDID) injection 0.4 mg  0.4 mg Intravenous Q2H PRN To Tyler NP   0.4 mg at 11/05/24 0031    lidocaine  (LMX4) cream   Topical Q1H PRN To Tyler NP        lidocaine 1 % 0.1-1 mL  0.1-1 mL Other Q1H PRN To Tyler NP        magnesium hydroxide (MILK OF MAGNESIA) suspension 30 mL  30 mL Oral Daily PRN To Tyler NP        methocarbamol (ROBAXIN) tablet 500 mg  500 mg Oral Q6H PRN To Tyler NP   500 mg at 11/04/24 2101    naloxone (NARCAN) injection 0.2 mg  0.2 mg Intravenous Q2 Min PRN To Tyler NP        Or    naloxone (NARCAN) injection 0.4 mg  0.4 mg Intravenous Q2 Min PRTo Smith NP        Or    naloxone (NARCAN) injection 0.2 mg  0.2 mg Intramuscular Q2 Min PRN To Tyler NP        Or    naloxone (NARCAN) injection 0.4 mg  0.4 mg Intramuscular Q2 Min PRTo Smith NP        ondansetron (ZOFRAN ODT) ODT tab 4 mg  4 mg Oral Q6H PRTo Smith NP        Or    ondansetron (ZOFRAN) injection 4 mg  4 mg Intravenous Q6H PRTo Smith NP        oxyCODONE (ROXICODONE) tablet 5 mg  5 mg Oral Q4H PRN To Tyler NP   5 mg at 11/04/24 2059    Or    oxyCODONE IR (ROXICODONE) half-tab 7.5 mg  7.5 mg Oral Q4H PRTo Smith NP   7.5 mg at 11/05/24 0105    prochlorperazine (COMPAZINE) injection 5 mg  5 mg Intravenous Q6H PRTo Smith NP        Or    prochlorperazine (COMPAZINE) tablet 5 mg  5 mg Oral Q6H PRN To Tyler NP        Reason beta blocker order not selected   Does not apply DOES NOT GO TO To Vicente NP        sodium chloride (PF) 0.9% PF flush 3 mL  3 mL Intracatheter q1 min prn Nayeli, Jennilyn M, NP             Physical Exam  Vitals were reviewed  Blood pressure 119/72, pulse 89, temperature 98.6  F (37  C), temperature source Bladder, resp. rate 30, weight 59 kg (130 lb 1.1 oz), SpO2 96%.  Rhythm: NSR 1* AVB    Lungs: diminished throughout on NC    Cardiovascular: S1, S2, RRR, no m/r/g    Abdomen: soft, NT, ND, rare BS    Extremeties: warm, no LE edema    Incisions: Right thoracotomy and right femoral  incisions CDI    CT: serosang output, no air leak    Weight:   Vitals:    10/29/24 0404 10/31/24 0450 11/04/24 2005 11/05/24 0500   Weight: 60.8 kg (134 lb) 56.8 kg (125 lb 4.8 oz) 56.2 kg (123 lb 14.4 oz) 57.7 kg (127 lb 3.3 oz)    11/06/24 0600   Weight: 59 kg (130 lb 1.1 oz)         Data  Recent Labs   Lab 11/06/24  0826 11/06/24  0537 11/05/24 2054 11/05/24  1704 11/05/24  1219 11/05/24  0614 11/05/24  0436 11/05/24  0432 11/05/24  0204 11/04/24  2232 11/04/24 2012 11/04/24  1847 11/04/24  1846 11/04/24  1540 11/04/24  0612   WBC  --  9.1  --   --   --   --  9.1  --  12.2*   < > 17.3*  --  13.2*  --  7.6   HGB  --  9.4*  --   --   --   --  9.7*  --  10.0*   < > 8.3*   < > 8.0*   < > 9.3*   MCV  --  89  --   --   --   --  86  --  87   < > 88  --  88  --  87   PLT  --  198  --   --   --   --  192  --  201   < > 184  --  160  --  270   INR  --   --   --   --   --   --   --   --   --   --  1.80*  --  2.04*  --   --    NA  --  130*  130*  --   --  130*  --  128*  --   --   --  133*   < > 132*   < > 126*   POTASSIUM  --  4.2  --   --   --   --  4.5  --   --   --  4.0   < > 4.3   < > 4.2   CHLORIDE  --  97*  --   --   --   --  96*  --   --   --  99  --  97*  --  91*   CO2  --  27  --   --   --   --  24  --   --   --  25  --  25  --  29   BUN  --  15.9  --   --   --   --  10.7  --   --   --  9.5  --  9.4  --  10.7   CR  --  0.67  --   --   --   --  0.56*  --   --   --  0.52*  --  0.53*  --  0.59*   ANIONGAP  --  6*  --   --   --   --  8  --   --   --  9  --  10  --  6*   AMALIA  --  8.3*  --   --   --   --  8.1*  --   --   --  8.3*  --  8.9  --  8.1*   GLC 97 99 115*   < >  --    < > 195*   < >  --   --  159*   < > 189*   < > 92   ALBUMIN  --   --   --   --   --   --   --   --   --   --  2.5*  --   --   --  2.6*   PROTTOTAL  --   --   --   --   --   --   --   --   --   --  4.3*  --   --   --   --    BILITOTAL  --   --   --   --   --   --   --   --   --   --  0.6  --   --   --   --    ALKPHOS  --   --   --   --   --   --    --   --   --   --  83  --   --   --   --    ALT  --   --   --   --   --   --   --   --   --   --  19  --   --   --   --    AST  --   --   --   --   --   --   --   --   --   --  34  --   --   --   --     < > = values in this interval not displayed.       Imaging:  No results found for this or any previous visit (from the past 24 hours).        Patient seen and discussed with Dr. Scot Rapp PA-C  Cardiothoracic Surgery  Pager: [phone]    CV Surgery Rounding Pager: [phone]  After hours please page surgeon on-call

## 2024-11-07 ENCOUNTER — APPOINTMENT (OUTPATIENT)
Dept: PHYSICAL THERAPY | Facility: CLINIC | Age: 84
End: 2024-11-07
Attending: INTERNAL MEDICINE
Payer: MEDICARE

## 2024-11-07 LAB
ANION GAP SERPL CALCULATED.3IONS-SCNC: 6 MMOL/L (ref 7–15)
ATRIAL RATE - MUSE: 87 BPM
BUN SERPL-MCNC: 18.7 MG/DL (ref 8–23)
CALCIUM SERPL-MCNC: 8.2 MG/DL (ref 8.8–10.4)
CHLORIDE SERPL-SCNC: 97 MMOL/L (ref 98–107)
CREAT SERPL-MCNC: 0.49 MG/DL (ref 0.67–1.17)
DIASTOLIC BLOOD PRESSURE - MUSE: NORMAL MMHG
EGFRCR SERPLBLD CKD-EPI 2021: >90 ML/MIN/1.73M2
ERYTHROCYTE [DISTWIDTH] IN BLOOD BY AUTOMATED COUNT: 15.3 % (ref 10–15)
GLUCOSE BLDC GLUCOMTR-MCNC: 108 MG/DL (ref 70–99)
GLUCOSE BLDC GLUCOMTR-MCNC: 93 MG/DL (ref 70–99)
GLUCOSE SERPL-MCNC: 98 MG/DL (ref 70–99)
HCO3 SERPL-SCNC: 25 MMOL/L (ref 22–29)
HCT VFR BLD AUTO: 28.8 % (ref 40–53)
HGB BLD-MCNC: 9.7 G/DL (ref 13.3–17.7)
INTERPRETATION ECG - MUSE: NORMAL
MAGNESIUM SERPL-MCNC: 1.8 MG/DL (ref 1.7–2.3)
MCH RBC QN AUTO: 29.8 PG (ref 26.5–33)
MCHC RBC AUTO-ENTMCNC: 33.7 G/DL (ref 31.5–36.5)
MCV RBC AUTO: 88 FL (ref 78–100)
P AXIS - MUSE: NORMAL DEGREES
PHOSPHATE SERPL-MCNC: 2.3 MG/DL (ref 2.5–4.5)
PLATELET # BLD AUTO: 231 10E3/UL (ref 150–450)
POTASSIUM SERPL-SCNC: 4.4 MMOL/L (ref 3.4–5.3)
POTASSIUM SERPL-SCNC: 4.4 MMOL/L (ref 3.4–5.3)
PR INTERVAL - MUSE: 288 MS
QRS DURATION - MUSE: 126 MS
QT - MUSE: 360 MS
QTC - MUSE: 433 MS
R AXIS - MUSE: 95 DEGREES
RBC # BLD AUTO: 3.26 10E6/UL (ref 4.4–5.9)
SODIUM SERPL-SCNC: 128 MMOL/L (ref 135–145)
SODIUM SERPL-SCNC: 130 MMOL/L (ref 135–145)
SYSTOLIC BLOOD PRESSURE - MUSE: NORMAL MMHG
T AXIS - MUSE: 84 DEGREES
VENTRICULAR RATE- MUSE: 87 BPM
WBC # BLD AUTO: 9.1 10E3/UL (ref 4–11)

## 2024-11-07 PROCEDURE — 97110 THERAPEUTIC EXERCISES: CPT | Mod: GP

## 2024-11-07 PROCEDURE — P9045 ALBUMIN (HUMAN), 5%, 250 ML: HCPCS | Performed by: SURGERY

## 2024-11-07 PROCEDURE — 36415 COLL VENOUS BLD VENIPUNCTURE: CPT | Performed by: INTERNAL MEDICINE

## 2024-11-07 PROCEDURE — 250N000013 HC RX MED GY IP 250 OP 250 PS 637: Performed by: SURGERY

## 2024-11-07 PROCEDURE — 250N000011 HC RX IP 250 OP 636: Performed by: SURGERY

## 2024-11-07 PROCEDURE — 93005 ELECTROCARDIOGRAM TRACING: CPT

## 2024-11-07 PROCEDURE — 250N000013 HC RX MED GY IP 250 OP 250 PS 637: Performed by: INTERNAL MEDICINE

## 2024-11-07 PROCEDURE — 250N000013 HC RX MED GY IP 250 OP 250 PS 637: Performed by: PHYSICIAN ASSISTANT

## 2024-11-07 PROCEDURE — 99232 SBSQ HOSP IP/OBS MODERATE 35: CPT | Performed by: INTERNAL MEDICINE

## 2024-11-07 PROCEDURE — 80048 BASIC METABOLIC PNL TOTAL CA: CPT | Performed by: SURGERY

## 2024-11-07 PROCEDURE — 93010 ELECTROCARDIOGRAM REPORT: CPT | Performed by: INTERNAL MEDICINE

## 2024-11-07 PROCEDURE — 120N000013 HC R&B IMCU

## 2024-11-07 PROCEDURE — 80048 BASIC METABOLIC PNL TOTAL CA: CPT | Performed by: PHYSICIAN ASSISTANT

## 2024-11-07 PROCEDURE — 250N000013 HC RX MED GY IP 250 OP 250 PS 637: Performed by: NURSE PRACTITIONER

## 2024-11-07 PROCEDURE — 36415 COLL VENOUS BLD VENIPUNCTURE: CPT | Performed by: PHYSICIAN ASSISTANT

## 2024-11-07 PROCEDURE — 85027 COMPLETE CBC AUTOMATED: CPT | Performed by: PHYSICIAN ASSISTANT

## 2024-11-07 PROCEDURE — 84295 ASSAY OF SERUM SODIUM: CPT | Performed by: INTERNAL MEDICINE

## 2024-11-07 PROCEDURE — 36415 COLL VENOUS BLD VENIPUNCTURE: CPT | Performed by: SURGERY

## 2024-11-07 PROCEDURE — 84100 ASSAY OF PHOSPHORUS: CPT | Performed by: SURGERY

## 2024-11-07 PROCEDURE — 97530 THERAPEUTIC ACTIVITIES: CPT | Mod: GP

## 2024-11-07 PROCEDURE — 258N000003 HC RX IP 258 OP 636: Performed by: PHYSICIAN ASSISTANT

## 2024-11-07 PROCEDURE — 83735 ASSAY OF MAGNESIUM: CPT | Performed by: SURGERY

## 2024-11-07 RX ORDER — MAGNESIUM OXIDE 400 MG/1
400 TABLET ORAL EVERY 4 HOURS
Status: COMPLETED | OUTPATIENT
Start: 2024-11-07 | End: 2024-11-07

## 2024-11-07 RX ORDER — MIDODRINE HYDROCHLORIDE 5 MG/1
5 TABLET ORAL
Status: DISCONTINUED | OUTPATIENT
Start: 2024-11-07 | End: 2024-11-13 | Stop reason: HOSPADM

## 2024-11-07 RX ADMIN — Medication 400 MG: at 09:49

## 2024-11-07 RX ADMIN — HEPARIN SODIUM 5000 UNITS: 5000 INJECTION, SOLUTION INTRAVENOUS; SUBCUTANEOUS at 13:58

## 2024-11-07 RX ADMIN — SODIUM CHLORIDE 250 ML: 9 INJECTION, SOLUTION INTRAVENOUS at 16:26

## 2024-11-07 RX ADMIN — ACETAMINOPHEN 975 MG: 325 TABLET, FILM COATED ORAL at 09:48

## 2024-11-07 RX ADMIN — SODIUM CHLORIDE TAB 1 GM 1 G: 1 TAB at 17:31

## 2024-11-07 RX ADMIN — OXYCODONE HYDROCHLORIDE 5 MG: 5 TABLET ORAL at 01:33

## 2024-11-07 RX ADMIN — Medication 400 MG: at 13:58

## 2024-11-07 RX ADMIN — TAMSULOSIN HYDROCHLORIDE 0.4 MG: 0.4 CAPSULE ORAL at 09:49

## 2024-11-07 RX ADMIN — MIDODRINE HYDROCHLORIDE 5 MG: 5 TABLET ORAL at 17:38

## 2024-11-07 RX ADMIN — ASPIRIN 81 MG CHEWABLE TABLET 81 MG: 81 TABLET CHEWABLE at 09:49

## 2024-11-07 RX ADMIN — HEPARIN SODIUM 5000 UNITS: 5000 INJECTION, SOLUTION INTRAVENOUS; SUBCUTANEOUS at 06:24

## 2024-11-07 RX ADMIN — ALBUMIN HUMAN 12.5 G: 0.05 INJECTION, SOLUTION INTRAVENOUS at 19:11

## 2024-11-07 RX ADMIN — VANCOMYCIN HYDROCHLORIDE 1000 MG: 1 INJECTION, SOLUTION INTRAVENOUS at 17:23

## 2024-11-07 RX ADMIN — SIMETHICONE 80 MG: 80 TABLET, CHEWABLE ORAL at 13:58

## 2024-11-07 RX ADMIN — Medication 1 PACKET: at 17:31

## 2024-11-07 RX ADMIN — SIMETHICONE 80 MG: 80 TABLET, CHEWABLE ORAL at 09:49

## 2024-11-07 RX ADMIN — SIMETHICONE 80 MG: 80 TABLET, CHEWABLE ORAL at 17:31

## 2024-11-07 RX ADMIN — SODIUM CHLORIDE TAB 1 GM 1 G: 1 TAB at 09:48

## 2024-11-07 RX ADMIN — VANCOMYCIN HYDROCHLORIDE 1000 MG: 1 INJECTION, SOLUTION INTRAVENOUS at 02:21

## 2024-11-07 RX ADMIN — Medication 1 PACKET: at 09:52

## 2024-11-07 RX ADMIN — Medication 1 PACKET: at 13:58

## 2024-11-07 RX ADMIN — LIDOCAINE 2 PATCH: 4 PATCH TOPICAL at 00:27

## 2024-11-07 RX ADMIN — SODIUM CHLORIDE TAB 1 GM 1 G: 1 TAB at 13:58

## 2024-11-07 RX ADMIN — PANTOPRAZOLE SODIUM 40 MG: 40 TABLET, DELAYED RELEASE ORAL at 09:49

## 2024-11-07 NOTE — PROGRESS NOTES
[facility]  Cardiovascular and Thoracic Surgery Daily Note      Assessment and Plan  Mr. Dnoahue is a very pleasant 84-year-old gentleman with a PMH of prostate cancer who was recently admitted to the hospital and was diagnosed with Corynebacterium bacteremia and urinary tract infection. 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     POD # 3 s/p Right mini-thoracotomy mitral valve replacement with a 31 mm Quinonez MITRIS bovine pericardial valve, right common femoral arterial and venous cannulation for cardiopulmonary bypass, intraoperative LINDSEY on 11/4/24 with Dr. Alexa Ellison.    - CVS: Pre-op TTE with preserved biventricular funtion. Postop vasoplegic shock, resolved. Off gtt's. HD stable. Started BB yesterday, pauses overnight. Will discontinue BB. Continue ASA 81 mg daily, no statin indicated. Appears euvolemic. Defer on volume or diuresis today. No PTA meds on hold. Chest tubes: 330ml/24 hr, serosanguinous output, leave in to suction. TPW: capped    - Resp: Postoperative mechanical ventilation, resolved. Extubated POD 0, now saturating well on RA<4LPM cannula. Encourage aggressive IS, pulmonary toilet.    - Neuro: Neuros intact, pain controlled on current regimen    - Renal: No history of significant renal disease. Cr stable. Trend BMP.  Volume management as above. 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 Salt tabs resumed by nephrology.  Recent Labs   Lab 11/07/24  0513 11/06/24  0537 11/05/24  0436   CR 0.49* 0.67 0.56*       - GI: + BM, +flatus, continue bowel regimen. Large gastric bubble on CXR, scheduled simethicone QID    - : remove mclain today. Hx prostate cancer s/p radiation. PTA flomax resumed.    - Endo: Postop stress hyperglycemia. Insulin infusion transitioned to sliding scale insulin.   Hemoglobin A1C   Date Value Ref Range Status   10/31/2024 5.3 <5.7 % Final     Comment:     Normal <5.7%   Prediabetes 5.7-6.4%     Diabetes 6.5% or higher     Note: Adopted from ADA consensus guidelines.        - FEN: Replace electrolytes as needed. ADAT    - ID: Postop leukocytosis. Afebrile. Infectious endocarditis: had Corynebacterium bacteremia and urinary tract infection. OR gram stain positive thus far for 3+ GPB. ID following. Currently on Vancomycin. Will eventually need a PICC when ID clears. Completing perioperative Cefazolin. Trend CBC and fever curve.   Recent Labs   Lab 11/06/24  0537 11/05/24  0436 11/05/24  0204   WBC 9.1 9.1 12.2*       - Heme: Acute blood loss anemia and thrombocytopenia due to surgery. Hgb and PLT stable. Trend CBC, transfuse PRN. Skin biopsy on back positive for basal cell carcinoma.  Recent Labs   Lab 11/06/24 0537 11/05/24  0436 11/05/24  0204   HGB 9.4* 9.7* 10.0*    192 201       - Proph: SCD, subcutaneous heparin, PPI    - Other:  Clinically Significant Risk Factors         # Hyponatremia: Lowest Na = 128 mmol/L in last 2 days, will monitor as appropriate  # Hypochloremia: Lowest Cl = 97 mmol/L in last 2 days, will monitor as appropriate      # Hypoalbuminemia: Lowest albumin = 2.5 g/dL at 11/4/2024  8:12 PM, will monitor as appropriate                   # Severe Malnutrition: based on nutrition assessment    # Financial/Environmental Concerns:           - Dispo: Transfer to station 33 today, ready to transfer 11/5, no bed available. Encourage mobility, therapies and pulm hygiene.      Interval History  Some pauses noted on tele overnight with associated low BP- given 1L bolus. Patient tired this am due to disruptions overnight due to rhythm. Tolerating up in chair. Tolerating diet +flatus and +BM.       Medications  Current Facility-Administered Medications   Medication Dose Route Frequency Provider Last Rate Last Admin    aspirin (ASA) chewable tablet 81 mg  81 mg Oral or NG Tube Daily To Tyler NP   81 mg at 11/07/24 0949    heparin ANTICOAGULANT injection 5,000 Units  5,000 Units  Subcutaneous Q8H To Tyler NP   5,000 Units at 11/07/24 0624    insulin aspart (NovoLOG) injection (RAPID ACTING)  1-7 Units Subcutaneous TID AC Kerri Steen NP   1 Units at 11/05/24 1245    insulin aspart (NovoLOG) injection (RAPID ACTING)  1-5 Units Subcutaneous At Bedtime Kerri Steen NP        Lidocaine (LIDOCARE) 4 % Patch 2 patch  2 patch Transdermal Q24H To Tyler NP   2 patch at 11/07/24 0027    magnesium oxide (MAG-OX) tablet 400 mg  400 mg Oral Q4H Alexa Ellison MD   400 mg at 11/07/24 0949    pantoprazole (PROTONIX) 2 mg/mL suspension 40 mg  40 mg Oral or NG Tube Daily To Tyler NP   40 mg at 11/05/24 0953    Or    pantoprazole (PROTONIX) EC tablet 40 mg  40 mg Oral Daily To Tyler NP   40 mg at 11/07/24 0949    polyethylene glycol (MIRALAX) Packet 17 g  17 g Oral Daily To Tyler NP   17 g at 11/05/24 0952    potassium & sodium phosphates (NEUTRA-PHOS) Packet 1 packet  1 packet Oral or Feeding Tube Q4H Alexa Ellison MD   1 packet at 11/07/24 0952    senna-docusate (SENOKOT-S/PERICOLACE) 8.6-50 MG per tablet 1 tablet  1 tablet Oral or NG Tube BID To Tyler NP   1 tablet at 11/05/24 2059    simethicone (MYLICON) chewable tablet 80 mg  80 mg Oral 4x Daily Kerri Steen NP   80 mg at 11/07/24 0949    sodium chloride (PF) 0.9% PF flush 3 mL  3 mL Intracatheter Q8H To Tyler NP   3 mL at 11/07/24 0450    sodium chloride tablet 1 g  1 g Oral TID w/meals Nelly Iraheta MD   1 g at 11/07/24 0948    tamsulosin (FLOMAX) capsule 0.4 mg  0.4 mg Oral Daily To Tyler NP   0.4 mg at 11/07/24 0949    vancomycin (VANCOCIN) 1,000 mg in 200 mL dextrose intermittent infusion  1,000 mg Intravenous Q12H To Tyler M,  mL/hr at 11/07/24 0221 1,000 mg at 11/07/24 0221     Current Facility-Administered Medications   Medication Dose Route Frequency Provider Last Rate Last Admin    acetaminophen (TYLENOL) tablet 650 mg  650 mg Oral  Q4H PRN To Tyler NP        albuterol (PROVENTIL) neb solution 2.5 mg  2.5 mg Nebulization Q2H PRN Zbigniew Johansen PA-C        benzocaine-menthol (CHLORASEPTIC) 6-10 MG lozenge 1 lozenge  1 lozenge Buccal Q1H PRN To Tyler NP   1 lozenge at 11/04/24 0935    bisacodyl (DULCOLAX) suppository 10 mg  10 mg Rectal Daily PRN To Tyler NP        calcium carbonate (TUMS) chewable tablet 1,000 mg  1,000 mg Oral Daily PRN To Tyler NP        calcium gluconate 1 g in 50 mL in sodium chloride intermittent infusion  1 g Intravenous Once PRN To Tyler NP        calcium gluconate 2 g in  mL intermittent infusion  2 g Intravenous Once PRN To Tyler NP        calcium gluconate 3 g in sodium chloride 0.9 % 100 mL intermittent infusion  3 g Intravenous Once PRN To Tyler NP        glucose gel 15-30 g  15-30 g Oral Q15 Min PRN Kerri Steen NP        Or    dextrose 50 % injection 25-50 mL  25-50 mL Intravenous Q15 Min PRN Kerri Steen NP        Or    glucagon injection 1 mg  1 mg Subcutaneous Q15 Min PRN Kerri Steen NP        hydrALAZINE (APRESOLINE) injection 10 mg  10 mg Intravenous Q30 Min PRN To Tyler NP        HYDROmorphone (DILAUDID) injection 0.2 mg  0.2 mg Intravenous Q2H PRN To Tyler NP   0.2 mg at 11/05/24 0450    Or    HYDROmorphone (DILAUDID) injection 0.4 mg  0.4 mg Intravenous Q2H PRTo Smith NP   0.4 mg at 11/05/24 0031    lidocaine (LMX4) cream   Topical Q1H PRN To Tyler NP        lidocaine 1 % 0.1-1 mL  0.1-1 mL Other Q1H PRN To Tyler NP        magnesium hydroxide (MILK OF MAGNESIA) suspension 30 mL  30 mL Oral Daily PRN To Tyler NP        methocarbamol (ROBAXIN) tablet 500 mg  500 mg Oral Q6H PRN To Tyler NP   500 mg at 11/04/24 2101    naloxone (NARCAN) injection 0.2 mg  0.2 mg Intravenous Q2 Min PRN To Tyler NP        Or    naloxone (NARCAN) injection 0.4 mg  0.4 mg  Intravenous Q2 Min PRTo Smith NP        Or    naloxone (NARCAN) injection 0.2 mg  0.2 mg Intramuscular Q2 Min To Madden NP        Or    naloxone (NARCAN) injection 0.4 mg  0.4 mg Intramuscular Q2 Min PRTo Smith NP        ondansetron (ZOFRAN ODT) ODT tab 4 mg  4 mg Oral Q6H PRTo Smith NP        Or    ondansetron (ZOFRAN) injection 4 mg  4 mg Intravenous Q6H PRTo Smith NP        oxyCODONE (ROXICODONE) tablet 5 mg  5 mg Oral Q4H PRTo Smith NP   5 mg at 11/07/24 0133    Or    oxyCODONE IR (ROXICODONE) half-tab 7.5 mg  7.5 mg Oral Q4H PRTo Smith NP   7.5 mg at 11/05/24 0105    prochlorperazine (COMPAZINE) injection 5 mg  5 mg Intravenous Q6H PRTo Smith NP        Or    prochlorperazine (COMPAZINE) tablet 5 mg  5 mg Oral Q6H PRTo Smith NP        Reason beta blocker order not selected   Does not apply DOES NOT GO TO To Vicente NP        sodium chloride (PF) 0.9% PF flush 3 mL  3 mL Intracatheter q1 min To Madden NP             Physical Exam  Vitals were reviewed  Blood pressure 109/60, pulse 92, temperature 99  F (37.2  C), resp. rate 16, weight 59 kg (130 lb 1.1 oz), SpO2 97%.  Rhythm: NSR 1* AVB    Lungs: diminished throughout on NC    Cardiovascular: S1, S2, RRR, no m/r/g    Abdomen: soft, NT, ND, rare BS    Extremeties: warm, no LE edema    Incisions: Right thoracotomy and right femoral incisions CDI    CT: serosang output, no air leak    Weight:   Vitals:    10/29/24 0404 10/31/24 0450 11/04/24 2005 11/05/24 0500   Weight: 60.8 kg (134 lb) 56.8 kg (125 lb 4.8 oz) 56.2 kg (123 lb 14.4 oz) 57.7 kg (127 lb 3.3 oz)    11/06/24 0600   Weight: 59 kg (130 lb 1.1 oz)         Data  Recent Labs   Lab 11/07/24  0513 11/06/24  2346 11/06/24  2217 11/06/24  1827 11/06/24  1747 11/06/24  0826 11/06/24  0537 11/05/24  0614 11/05/24  0436 11/05/24  0432 11/05/24  0204 11/04/24  2232 11/04/24  2012 11/04/24  1847  11/04/24  1846 11/04/24  1540 11/04/24  0612   WBC  --   --   --   --   --   --  9.1  --  9.1  --  12.2*   < > 17.3*  --  13.2*  --  7.6   HGB  --   --   --   --   --   --  9.4*  --  9.7*  --  10.0*   < > 8.3*   < > 8.0*   < > 9.3*   MCV  --   --   --   --   --   --  89  --  86  --  87   < > 88  --  88  --  87   PLT  --   --   --   --   --   --  198  --  192  --  201   < > 184  --  160  --  270   INR  --   --   --   --   --   --   --   --   --   --   --   --  1.80*  --  2.04*  --   --    *  128* 130*  --  131*  --    < > 130*  130*   < > 128*  --   --   --  133*   < > 132*   < > 126*   POTASSIUM 4.4  4.4  --   --   --   --   --  4.2  --  4.5  --   --   --  4.0   < > 4.3   < > 4.2   CHLORIDE 97*  --   --   --   --   --  97*  --  96*  --   --   --  99  --  97*  --  91*   CO2 25  --   --   --   --   --  27  --  24  --   --   --  25  --  25  --  29   BUN 18.7  --   --   --   --   --  15.9  --  10.7  --   --   --  9.5  --  9.4  --  10.7   CR 0.49*  --   --   --   --   --  0.67  --  0.56*  --   --   --  0.52*  --  0.53*  --  0.59*   ANIONGAP 6*  --   --   --   --   --  6*  --  8  --   --   --  9  --  10  --  6*   AMALIA 8.2*  --   --   --   --   --  8.3*  --  8.1*  --   --   --  8.3*  --  8.9  --  8.1*   GLC 98  --  85  --  109*   < > 99   < > 195*   < >  --   --  159*   < > 189*   < > 92   ALBUMIN  --   --   --   --   --   --   --   --   --   --   --   --  2.5*  --   --   --  2.6*   PROTTOTAL  --   --   --   --   --   --   --   --   --   --   --   --  4.3*  --   --   --   --    BILITOTAL  --   --   --   --   --   --   --   --   --   --   --   --  0.6  --   --   --   --    ALKPHOS  --   --   --   --   --   --   --   --   --   --   --   --  83  --   --   --   --    ALT  --   --   --   --   --   --   --   --   --   --   --   --  19  --   --   --   --    AST  --   --   --   --   --   --   --   --   --   --   --   --  34  --   --   --   --     < > = values in this interval not displayed.       Imaging:  No results found  for this or any previous visit (from the past 24 hours).        Patient seen and discussed with Dr. Mulvihill Brittany Smith, PA-C  Cardiothoracic Surgery  Pager: [phone]    CV Surgery Rounding Pager: [phone]  After hours please page surgeon on-call

## 2024-11-07 NOTE — PROVIDER NOTIFICATION
Kimberley page to NORMAN Briggs    hospitalist says shes ok with map of 60 or higher (BPs fluctuating alot, but on lower end) MAP of 60 or higher ok with you?     NORMAN Briggs Response:    Prefer map of 65. Is he eating/drinking?   Will come to bedside to assess patient.

## 2024-11-07 NOTE — SIGNIFICANT EVENT
Significant Event Note    Time of event: 7:41 PM November 6, 2024    Description of event:  RN notified me of Patient's BP 88/59 MAP 63, also some short episodes of bradycardia     Plan:    1L bolus ordered    Discussed with: bedside nurse    Vitaly Simmons MD

## 2024-11-07 NOTE — CARE PLAN
Occupational Therapy Discharge Summary    Reason for therapy discharge:    PT/CR seeing patient after MVR. Needs are being met by PT while here. PT rec TCU.    Progress towards therapy goal(s). See goals on Care Plan in [facility] electronic health record for goal details.  Goals not met.  Barriers to achieving goals:   Working with PT/CR while here.    Therapy recommendation(s):    OT carol at TCU.

## 2024-11-07 NOTE — PLAN OF CARE
Problem: Adult Inpatient Plan of Care  Goal: Optimal Comfort and Wellbeing  Intervention: Provide Person-Centered Care  Recent Flowsheet Documentation  Taken 11/6/2024 1600 by Camilla Ramos RN  Trust Relationship/Rapport:   care explained   questions encouraged   thoughts/feelings acknowledged  Taken 11/6/2024 1200 by Camilla Ramos, RN  Trust Relationship/Rapport:   care explained   questions encouraged   thoughts/feelings acknowledged  Taken 11/6/2024 0800 by Camilla Ramos RN  Trust Relationship/Rapport:   care explained   questions encouraged   thoughts/feelings acknowledged   Goal Outcome Evaluation:      Plan of Care Reviewed With: patient    Overall Patient Progress: improvingOverall Patient Progress: improving     [id number]: Patient alert and oriented, VSS on room air. Tele shows 1st degree AVB. This evening some pauses noted on telemetry, EKG showed blocked PACs, no sustained block rhythm. PA notified, plan to continue to monitor since patient's BP and HR stable. Pacer capped on standby on IV poll at bedside. CT w/170 ml total output this shift. Palmer with adequate output. Loose mucous stools continue. Pt tolerating mech soft diet, fair appetite. Plan to continue to monitor in ICU overnight, awaiting bed on IMC unit.

## 2024-11-07 NOTE — PROVIDER NOTIFICATION
"Text page to Dr. Simmons: Patient's BP 88/59 MAP 63, also some short episodes of bradycardia (looks like pauses on Tele), possibly trying to go into afib? Pt asymptomatic besides hypotension. EKG done earlier for \"pauses.\" Please advise, thank you!    (Handoff given to DEREK Pérez, awaiting MD response).  "

## 2024-11-07 NOTE — PROGRESS NOTES
INFECTIOUS DISEASE CHART CHECK    Patient continuing on Vancomycin for mitral valve endocarditis due to corynebacterium. Operative gram stain from mitral valve with GPB and GPC. Culture is no growth to date. Will continue to follow cultures to assure no new organism grows out. Will need 6 weeks IV antibiotics from date of surgery. Will eventually need PICC prior to discharge.  ID following.     Migdalia Butler PA-C

## 2024-11-07 NOTE — PLAN OF CARE
Goal Outcome Evaluation:      Problem: Adult Inpatient Plan of Care  Goal: Plan of Care Review  Description: The Plan of Care Review/Shift note should be completed every shift.  The Outcome Evaluation is a brief statement about your assessment that the patient is improving, declining, or no change.  This information will be displayed automatically on your shift  note.  Outcome: Progressing  Flowsheets (Taken 11/7/2024 0555)  Outcome Evaluation: Pt AOx4, CMS and neuros intact. Tele SR w/ 1 degree AVB. CT output WDL, dressing changed. Pacer wires capped, dressing changed. BP within parameters. Oxycodone given x1 for pain. Plan to transfer to  when bed available  Plan of Care Reviewed With: patient  Overall Patient Progress: improving     Problem: Cardiovascular Surgery  Goal: Improved Activity Tolerance  Outcome: Progressing  Intervention: Optimize Tolerance for Activity  Recent Flowsheet Documentation  Taken 11/7/2024 0400 by Yana Parada RN  Environmental Support: calm environment promoted  Taken 11/7/2024 0000 by Yana Parada RN  Environmental Support: calm environment promoted  Goal: Optimal Coping with Heart Surgery  Outcome: Progressing  Intervention: Support Psychosocial Response to Surgery  Recent Flowsheet Documentation  Taken 11/7/2024 0400 by Yana Parada RN  Supportive Measures: verbalization of feelings encouraged  Taken 11/7/2024 0000 by Yana Parada RN  Supportive Measures: verbalization of feelings encouraged  Goal: Absence of Bleeding  Outcome: Progressing  Intervention: Monitor and Manage Bleeding  Flowsheets (Taken 11/7/2024 0555)  Bleeding Management: dressing monitored  Goal: Effective Bowel Elimination  Outcome: Progressing  Goal: Effective Cardiac Function  Outcome: Progressing  Goal: Optimal Cerebral Tissue Perfusion  Outcome: Progressing  Intervention: Protect and Optimize Cerebral Perfusion  Flowsheets  Taken 11/7/2024 0555  Sensory Stimulation Regulation:   care  clustered   lighting decreased  Cerebral Perfusion Promotion: blood pressure monitored  Head of Bed (HOB) Positioning: HOB at 30-45 degrees  Taken 11/7/2024 0400  Sensory Stimulation Regulation:   care clustered   lighting decreased  Head of Bed (HOB) Positioning: HOB at 30-45 degrees  Taken 11/7/2024 0200  Head of Bed (HOB) Positioning: HOB at 30-45 degrees  Taken 11/7/2024 0000  Sensory Stimulation Regulation:   care clustered   lighting decreased  Head of Bed (HOB) Positioning: HOB at 30-45 degrees  Goal: Fluid and Electrolyte Balance  Outcome: Progressing  Goal: Blood Glucose Level Within Targeted Range  Outcome: Progressing  Goal: Absence of Infection Signs and Symptoms  Outcome: Progressing  Intervention: Prevent or Manage Infection  Flowsheets  Taken 11/7/2024 0555  Infection Prevention:   cohorting utilized   hand hygiene promoted   rest/sleep promoted   single patient room provided  Taken 11/7/2024 0400  Infection Prevention:   cohorting utilized   hand hygiene promoted   rest/sleep promoted   single patient room provided  Taken 11/7/2024 0000  Infection Prevention:   cohorting utilized   hand hygiene promoted   rest/sleep promoted   single patient room provided  Goal: Anesthesia/Sedation Recovery  Outcome: Progressing  Intervention: Optimize Anesthesia Recovery  Recent Flowsheet Documentation  Taken 11/7/2024 0400 by Yana Parada RN  Safety Promotion/Fall Prevention: activity supervised  Reorientation Measures: clock in view  Taken 11/7/2024 0000 by Yana Parada RN  Safety Promotion/Fall Prevention: activity supervised  Reorientation Measures: clock in view  Goal: Acceptable Pain Control  Outcome: Progressing  Goal: Nausea and Vomiting Relief  Outcome: Progressing  Goal: Effective Urinary Elimination  Outcome: Progressing  Goal: Effective Oxygenation and Ventilation  Outcome: Progressing  Intervention: Promote Airway Secretion Clearance  Recent Flowsheet Documentation  Taken 11/7/2024 0400 by  Yana Parada, RN  Cough And Deep Breathing: done with encouragement  Airway/Ventilation Management: airway patency maintained  Taken 11/7/2024 0000 by Yana Parada RN  Cough And Deep Breathing: done with encouragement  Airway/Ventilation Management: airway patency maintained

## 2024-11-07 NOTE — CONSULTS
"SPIRITUAL HEALTH SERVICES  SPIRITUAL ASSESSMENT Consult Note  [facility]. Unit ICU     REFERRAL SOURCE: patient requests ongoing spiritual support    Brief visit at bedside. Avelino stated he is tired, both due to not much sleep and also to heart \"rhythms.\"  Avelino welcomed prayer.    Plan: I will continue to follow for emotional/spiritual support at patient request.    Alexus Zhang MDiv [facility]  Staff   Please place consult order for routine Spiritual Health Services referrals.  SHS available 24/7 for emergent requests either by having the on-call  paged or by entering an ASAP/STAT consult in Epic (this will also page the on-call ).    "

## 2024-11-07 NOTE — PROGRESS NOTES
[facility]    Hospitalist Progress Note    Date of Admission: 10/25/2024    Assessment & Plan   Jayme Donahue is a 84 year old male with PMhx of prostate cancer who was admitted to [facility] on 10/23/24 with suspected urosepsis.  He was found to have bacterium due to corynebacterium stratum and echo showed severe mitral regurgitation with a mobile echodensity on the mitral valve.  He was subsequently transferred to [facility] on 10/25/2024 for ongoing evaluation and management of endocarditis.      He was evaluated by CV surgery during his stay.  Prior to undergoing valve procedures he underwent extraction of numerous teeth.  He ultimately underwent minimally invasive mitral valve replacement with tissue valve on 11/4/24.     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  He was found to be hyponatremic with a sodium 118 and diagnosed with sepsis due to UTI.  He became tensive and transferred to the ICU for initiation of vasopressors.  He was placed on broad-spectrum antibiotics with vancomycin and ceftriaxone.  Pressors were quickly weaned.  During this time blood cultures returned positive for corynebacterium striatum.  TTE showed severe mitral regurgitation and a mobile echodensity on the atrial side of the mitral valve (flail scallop vs vegetation).  Given some concurrent complaints of low back pain, MRI of the lumbar spine was obtained possibly suggestive of early changes of discitis.  ID was contacted, recommended transfer to [facility] for further cardiac evaluation.  *TTE this stay showed a large  vegetation on atrial aspect of mitral valve, prolapse of posterior mitral valve leaflet, severe mitral regurgitation.  *ID, cardiology and CV surgery following this stay.   *Repeat blood cultures have remained negative as of 10/26.  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  Initially felt to be secondary to dehydration and had mild improvement after some IV fluids.  Urine studies obtained and appeared consistent with SIADH.  He was placed on a fluid restriction and started on salt tablets.  Sodium stabilized at 119-123.  *Na 125 on transfer to [facility], though began to trend back down with continued fluid restriction.  *Nephrology consulted and following this day. Started on UreNa on 10/29, then switched to salt tablets on 10/30.    -- Na presently 128-130  -- cont salt tabs i TID, fluid restriction of 1200ml/d  -- per nephrology, once Na approaches 135 can liberalize fluid restriction and continue salt tabs     Left scapular lesion, biopsy positive for basal cell carcinoma  *Left scapular lesion was noted on admission.  Appearance was concerning for possible malignancy question (see photos in epic from 10/23/24).  Wound culture obtained at the time of admission and grew stenotrophomonas maltophilia.  Was initially placed on a of doxycycline, however this was ultimately stopped after it was felt that findings represented chronic skin colonization and not an acute infection.  *Seen by  "general surgery, underwent punch biopsy on 10/31/24. Pathology showed \"Basal cell carcinoma with keratotic features, incompletely excised\"  Surgery noted patient would require full thickness resection with 4mm margins and that this would require general anesthesia. No plans to pursue further surgical mgmt this stay.   *Sutures removed on 11/7. [facility].   *WOC RN saw patient, local cares advised     History of prostate cancer, s/p treatment in 2020.   Urinary retention s/p mclain placement 10/29  *Had reportedly underwent treatment for prostate cancer in 2020.  PSA levels elevated to 50s but trend was generally flat from 7/2024-9/2024. Reported some urgency this stay.   *Developed urinary retention this day requiring placement of a Mclain catheter.  Placed on empiric Flomax.  -- TOV in next 1-2d as mobility continues to improve     Moderate protein calorie malnutrition  *Nutritionist following. Encourage po intake.    S/p extraction of numerous teeth (#3, 5-14, 20-21, 23-29) on 10/31/24   *Underwent teeth extraction prior to undergoing valve surgery.     FEN: no IVFs, lytes stable, regular/mechanical soft diet with 1200ml/d fluid restriction  DVT Prophylaxis: PCDs, subQ heparin  Code Status: Full Code    Disposition: Condition continues to improve. Transfer to [facility] when bed available. Discharge date unclear at this time, pending postoperative course and stable labs, suspect 2-4 days.  PT/OT recommending TCU stay    Medically Ready for Discharge: Anticipated in 2-4 Days      Genna Sutton, DO    Clinically Significant Risk Factors         # Hyponatremia: Lowest Na = 128 mmol/L in last 2 days, will monitor as appropriate  # Hypochloremia: Lowest Cl = 97 mmol/L in last 2 days, will monitor as appropriate      # Hypoalbuminemia: Lowest albumin = 2.5 g/dL at 11/4/2024  8:12 PM, will monitor as appropriate                 # Severe Malnutrition: based on nutrition assessment    # " Financial/Environmental Concerns:             Medical Decision Making       Please see A&P for additional details of medical decision making.         Interval History   Chart reviewed.  Overnight event noted. Intermittent episodes of abnl heart rhythms but hasn't sustained, CV surgery notified. Was hypotensive with BPs 80/50 and MAP of 63 with some short episodes of bradycardia.  Hospitalist service contacted and was given 1 L fluid bolus. Seen this morning. Standing up at bedside participatig with morning cares. Denies complaints. Breathing okay. Pain well managed. Strength continues to improve. Tolerated mechanical soft diet. No abd pain/n/v.     -Data reviewed today: I reviewed all new labs and imaging results over the last 24 hours. I personally reviewed no images or EKG's today.    Physical Exam   Temp: 99  F (37.2  C) Temp src: Bladder BP: 109/60 Pulse: 92   Resp: 16 SpO2: 97 % O2 Device: None (Room air)    Vitals:    11/04/24 2005 11/05/24 0500 11/06/24 0600   Weight: 56.2 kg (123 lb 14.4 oz) 57.7 kg (127 lb 3.3 oz) 59 kg (130 lb 1.1 oz)     Vital Signs with Ranges  Temp:  [98.4  F (36.9  C)-99.9  F (37.7  C)] 99  F (37.2  C)  Pulse:  [66-92] 92  Resp:  [14-30] 16  BP: ()/(47-78) 109/60  SpO2:  [90 %-98 %] 97 %  I/O last 3 completed shifts:  In: 1670 [P.O.:720; I.V.:950]  Out: 1357 [Urine:1027; Chest Tube:330]    Constitutional: Resting comfortably, alert and answering questions appropriately, NAD  Respiratory: CTAB, no wheeze, no increased work of breathing, +chest tube in place  Cardiovascular: HRRR, no LE edema  GI: S, NT, ND, +BS  Skin/Integumen: warm/dry  Other: +mclain draining clear yellow urine    Medications   Current Facility-Administered Medications   Medication Dose Route Frequency Provider Last Rate Last Admin    Reason beta blocker order not selected   Does not apply DOES NOT GO TO To Vicente NP         Current Facility-Administered Medications   Medication Dose Route Frequency  Provider Last Rate Last Admin    acetaminophen (TYLENOL) tablet 975 mg  975 mg Oral or NG Tube Q8H To Tyler NP   975 mg at 11/06/24 2035    aspirin (ASA) chewable tablet 81 mg  81 mg Oral or NG Tube Daily To Tyler, NP   81 mg at 11/06/24 0827    heparin ANTICOAGULANT injection 5,000 Units  5,000 Units Subcutaneous Q8H To Tyler NP   5,000 Units at 11/07/24 0624    insulin aspart (NovoLOG) injection (RAPID ACTING)  1-7 Units Subcutaneous TID AC Kerri Steen NP   1 Units at 11/05/24 1245    insulin aspart (NovoLOG) injection (RAPID ACTING)  1-5 Units Subcutaneous At Bedtime Kerri Steen NP        Lidocaine (LIDOCARE) 4 % Patch 2 patch  2 patch Transdermal Q24H To Tyler NP   2 patch at 11/07/24 0027    magnesium oxide (MAG-OX) tablet 400 mg  400 mg Oral Q4H Alexa Ellison MD        metoprolol tartrate (LOPRESSOR) half-tab 12.5 mg  12.5 mg Oral BID Yana Rapp PA-C   12.5 mg at 11/06/24 2035    pantoprazole (PROTONIX) 2 mg/mL suspension 40 mg  40 mg Oral or NG Tube Daily oT Tyler NP   40 mg at 11/05/24 0953    Or    pantoprazole (PROTONIX) EC tablet 40 mg  40 mg Oral Daily To Tyler NP   40 mg at 11/06/24 0827    polyethylene glycol (MIRALAX) Packet 17 g  17 g Oral Daily To Tyler NP   17 g at 11/05/24 0952    potassium & sodium phosphates (NEUTRA-PHOS) Packet 1 packet  1 packet Oral or Feeding Tube Q4H Alexa Ellison MD        senna-docusate (SENOKOT-S/PERICOLACE) 8.6-50 MG per tablet 1 tablet  1 tablet Oral or NG Tube BID To Tyler NP   1 tablet at 11/05/24 2059    simethicone (MYLICON) chewable tablet 80 mg  80 mg Oral 4x Daily Kerri Steen NP   80 mg at 11/06/24 1752    sodium chloride (PF) 0.9% PF flush 3 mL  3 mL Intracatheter Q8H To Tyler NP   3 mL at 11/07/24 0450    sodium chloride tablet 1 g  1 g Oral TID w/meals Nelly Iraheta MD   1 g at 11/06/24 1752    tamsulosin (FLOMAX) capsule 0.4 mg  0.4 mg  Oral Daily To Tyler NP   0.4 mg at 11/06/24 0827    vancomycin (VANCOCIN) 1,000 mg in 200 mL dextrose intermittent infusion  1,000 mg Intravenous Q12H To Tyler  mL/hr at 11/07/24 0221 1,000 mg at 11/07/24 0221       Data   Recent Labs   Lab 11/07/24  0513 11/06/24  2346 11/06/24  2217 11/06/24  1827 11/06/24  1747 11/06/24  1228 11/06/24  0826 11/06/24  0537 11/05/24  0614 11/05/24  0436 11/05/24  0432 11/05/24  0204 11/04/24  2232 11/04/24 2012 11/04/24  1847 11/04/24  1846 11/04/24  1540 11/04/24  0612   WBC  --   --   --   --   --   --   --  9.1  --  9.1  --  12.2*   < > 17.3*  --  13.2*  --  7.6   HGB  --   --   --   --   --   --   --  9.4*  --  9.7*  --  10.0*   < > 8.3*   < > 8.0*   < > 9.3*   MCV  --   --   --   --   --   --   --  89  --  86  --  87   < > 88  --  88  --  87   PLT  --   --   --   --   --   --   --  198  --  192  --  201   < > 184  --  160  --  270   INR  --   --   --   --   --   --   --   --   --   --   --   --   --  1.80*  --  2.04*  --   --    * 130*  --  131*  --  132*  --  130*  130*   < > 128*  --   --   --  133*   < > 132*   < > 126*   POTASSIUM 4.4  --   --   --   --   --   --  4.2  --  4.5  --   --   --  4.0   < > 4.3   < > 4.2   CHLORIDE  --   --   --   --   --   --   --  97*  --  96*  --   --   --  99  --  97*  --  91*   CO2  --   --   --   --   --   --   --  27  --  24  --   --   --  25  --  25  --  29   BUN  --   --   --   --   --   --   --  15.9  --  10.7  --   --   --  9.5  --  9.4  --  10.7   CR  --   --   --   --   --   --   --  0.67  --  0.56*  --   --   --  0.52*  --  0.53*  --  0.59*   ANIONGAP  --   --   --   --   --   --   --  6*  --  8  --   --   --  9  --  10  --  6*   AMALIA  --   --   --   --   --   --   --  8.3*  --  8.1*  --   --   --  8.3*  --  8.9  --  8.1*   GLC  --   --  85  --  109* 129*   < > 99   < > 195*   < >  --   --  159*   < > 189*   < > 92   ALBUMIN  --   --   --   --   --   --   --   --   --   --   --   --   --  2.5*  --    --   --  2.6*   PROTTOTAL  --   --   --   --   --   --   --   --   --   --   --   --   --  4.3*  --   --   --   --    BILITOTAL  --   --   --   --   --   --   --   --   --   --   --   --   --  0.6  --   --   --   --    ALKPHOS  --   --   --   --   --   --   --   --   --   --   --   --   --  83  --   --   --   --    ALT  --   --   --   --   --   --   --   --   --   --   --   --   --  19  --   --   --   --    AST  --   --   --   --   --   --   --   --   --   --   --   --   --  34  --   --   --   --     < > = values in this interval not displayed.       No results found for this or any previous visit (from the past 24 hours).

## 2024-11-07 NOTE — PROGRESS NOTES
Surgery    Discussed with the patient and his wife his pathology result of the ulcer on his left shoulder.  It is basal cell carcinoma.  He would need a full thickness resection with at least 4 mm margins.  Given the size of this it would require general anesthesia.    Given that basal cell is slow growing this is far down the list of his many problems.  I suggested that they deal with this when he gets out of the hospital.  [facility] through that system.  Questions answered.  Continue wound cares over it as you are.    I did remove the suture.    Danilo Philip M.D., F.A.C.S.  [facility]  [facility]  [place], [place]  [phone]

## 2024-11-07 NOTE — PLAN OF CARE
"  Problem: Cardiovascular Surgery  Goal: Effective Bowel Elimination  Intervention: Enhance Bowel Motility and Elimination  Recent Flowsheet Documentation  Taken 11/7/2024 1200 by Camilla Ramos, RN  Bowel Motility Enhancement:   ambulation promoted   oral intake encouraged  Bowel Elimination Management: toileting offered  Taken 11/7/2024 0900 by Camilla Ramos, RN  Bowel Motility Enhancement:   ambulation promoted   oral intake encouraged  Bowel Elimination Management: toileting offered   Goal Outcome Evaluation:      Plan of Care Reviewed With: patient    Overall Patient Progress: no changeOverall Patient Progress: no change    [id number]: Patient alert and oriented, denies pain. Pt up with ax1, GB, walker to BSC. Small loose stool x3. Palmer patent. CT with serosanguinous output. Incisions WNL, pacer wires capped with pacer available at bedside. Pt states he \"feels more tired today\" but denies dizziness or SOB. Tele still showing occasional blocked PAC's. Hypotension noted this afternoon, PA notified (see note). Report given to Samantha REED, plan for PA to come to bedside      "

## 2024-11-08 ENCOUNTER — APPOINTMENT (OUTPATIENT)
Dept: PHYSICAL THERAPY | Facility: CLINIC | Age: 84
End: 2024-11-08
Attending: INTERNAL MEDICINE
Payer: MEDICARE

## 2024-11-08 ENCOUNTER — APPOINTMENT (OUTPATIENT)
Dept: GENERAL RADIOLOGY | Facility: CLINIC | Age: 84
End: 2024-11-08
Attending: PHYSICIAN ASSISTANT
Payer: MEDICARE

## 2024-11-08 LAB
ANION GAP SERPL CALCULATED.3IONS-SCNC: 10 MMOL/L (ref 7–15)
ATRIAL RATE - MUSE: 129 BPM
BACTERIA TISS BX CULT: NORMAL
BUN SERPL-MCNC: 20.2 MG/DL (ref 8–23)
CALCIUM SERPL-MCNC: 8.2 MG/DL (ref 8.8–10.4)
CHLORIDE SERPL-SCNC: 98 MMOL/L (ref 98–107)
CREAT SERPL-MCNC: 0.6 MG/DL (ref 0.67–1.17)
DIASTOLIC BLOOD PRESSURE - MUSE: NORMAL MMHG
EGFRCR SERPLBLD CKD-EPI 2021: >90 ML/MIN/1.73M2
ERYTHROCYTE [DISTWIDTH] IN BLOOD BY AUTOMATED COUNT: 15.5 % (ref 10–15)
GLUCOSE BLDC GLUCOMTR-MCNC: 103 MG/DL (ref 70–99)
GLUCOSE BLDC GLUCOMTR-MCNC: 108 MG/DL (ref 70–99)
GLUCOSE BLDC GLUCOMTR-MCNC: 77 MG/DL (ref 70–99)
GLUCOSE BLDC GLUCOMTR-MCNC: 94 MG/DL (ref 70–99)
GLUCOSE SERPL-MCNC: 136 MG/DL (ref 70–99)
HCO3 SERPL-SCNC: 23 MMOL/L (ref 22–29)
HCT VFR BLD AUTO: 28.2 % (ref 40–53)
HGB BLD-MCNC: 9.4 G/DL (ref 13.3–17.7)
INTERPRETATION ECG - MUSE: NORMAL
MAGNESIUM SERPL-MCNC: 2.4 MG/DL (ref 1.7–2.3)
MCH RBC QN AUTO: 29.7 PG (ref 26.5–33)
MCHC RBC AUTO-ENTMCNC: 33.3 G/DL (ref 31.5–36.5)
MCV RBC AUTO: 89 FL (ref 78–100)
P AXIS - MUSE: NORMAL DEGREES
PHOSPHATE SERPL-MCNC: 2.8 MG/DL (ref 2.5–4.5)
PLATELET # BLD AUTO: 258 10E3/UL (ref 150–450)
POTASSIUM SERPL-SCNC: 4.4 MMOL/L (ref 3.4–5.3)
PR INTERVAL - MUSE: NORMAL MS
QRS DURATION - MUSE: 114 MS
QT - MUSE: 418 MS
QTC - MUSE: 403 MS
R AXIS - MUSE: 66 DEGREES
RBC # BLD AUTO: 3.16 10E6/UL (ref 4.4–5.9)
SODIUM SERPL-SCNC: 131 MMOL/L (ref 135–145)
SODIUM SERPL-SCNC: 131 MMOL/L (ref 135–145)
SYSTOLIC BLOOD PRESSURE - MUSE: NORMAL MMHG
T AXIS - MUSE: 61 DEGREES
VENTRICULAR RATE- MUSE: 56 BPM
WBC # BLD AUTO: 7.8 10E3/UL (ref 4–11)

## 2024-11-08 PROCEDURE — 250N000011 HC RX IP 250 OP 636: Performed by: NURSE PRACTITIONER

## 2024-11-08 PROCEDURE — 99232 SBSQ HOSP IP/OBS MODERATE 35: CPT | Performed by: PHYSICIAN ASSISTANT

## 2024-11-08 PROCEDURE — 250N000011 HC RX IP 250 OP 636: Performed by: SURGERY

## 2024-11-08 PROCEDURE — 250N000013 HC RX MED GY IP 250 OP 250 PS 637: Performed by: SURGERY

## 2024-11-08 PROCEDURE — 93005 ELECTROCARDIOGRAM TRACING: CPT

## 2024-11-08 PROCEDURE — 84295 ASSAY OF SERUM SODIUM: CPT | Performed by: PHYSICIAN ASSISTANT

## 2024-11-08 PROCEDURE — 84100 ASSAY OF PHOSPHORUS: CPT | Performed by: SURGERY

## 2024-11-08 PROCEDURE — 99232 SBSQ HOSP IP/OBS MODERATE 35: CPT | Performed by: INTERNAL MEDICINE

## 2024-11-08 PROCEDURE — 250N000013 HC RX MED GY IP 250 OP 250 PS 637: Performed by: NURSE PRACTITIONER

## 2024-11-08 PROCEDURE — 85041 AUTOMATED RBC COUNT: CPT | Performed by: PHYSICIAN ASSISTANT

## 2024-11-08 PROCEDURE — 250N000013 HC RX MED GY IP 250 OP 250 PS 637: Performed by: PHYSICIAN ASSISTANT

## 2024-11-08 PROCEDURE — 83735 ASSAY OF MAGNESIUM: CPT | Performed by: SURGERY

## 2024-11-08 PROCEDURE — 85018 HEMOGLOBIN: CPT | Performed by: PHYSICIAN ASSISTANT

## 2024-11-08 PROCEDURE — 36415 COLL VENOUS BLD VENIPUNCTURE: CPT | Performed by: PHYSICIAN ASSISTANT

## 2024-11-08 PROCEDURE — 250N000013 HC RX MED GY IP 250 OP 250 PS 637: Performed by: INTERNAL MEDICINE

## 2024-11-08 PROCEDURE — 93010 ELECTROCARDIOGRAM REPORT: CPT | Mod: XP | Performed by: INTERNAL MEDICINE

## 2024-11-08 PROCEDURE — 120N000013 HC R&B IMCU

## 2024-11-08 PROCEDURE — 71045 X-RAY EXAM CHEST 1 VIEW: CPT

## 2024-11-08 PROCEDURE — 97530 THERAPEUTIC ACTIVITIES: CPT | Mod: GP

## 2024-11-08 PROCEDURE — 97110 THERAPEUTIC EXERCISES: CPT | Mod: GP

## 2024-11-08 PROCEDURE — 80048 BASIC METABOLIC PNL TOTAL CA: CPT | Performed by: PHYSICIAN ASSISTANT

## 2024-11-08 RX ADMIN — SIMETHICONE 80 MG: 80 TABLET, CHEWABLE ORAL at 22:03

## 2024-11-08 RX ADMIN — ASPIRIN 81 MG CHEWABLE TABLET 81 MG: 81 TABLET CHEWABLE at 08:17

## 2024-11-08 RX ADMIN — VANCOMYCIN HYDROCHLORIDE 1000 MG: 1 INJECTION, SOLUTION INTRAVENOUS at 06:29

## 2024-11-08 RX ADMIN — PANTOPRAZOLE SODIUM 40 MG: 40 TABLET, DELAYED RELEASE ORAL at 08:17

## 2024-11-08 RX ADMIN — SODIUM CHLORIDE TAB 1 GM 1 G: 1 TAB at 09:30

## 2024-11-08 RX ADMIN — SIMETHICONE 80 MG: 80 TABLET, CHEWABLE ORAL at 17:52

## 2024-11-08 RX ADMIN — MIDODRINE HYDROCHLORIDE 5 MG: 5 TABLET ORAL at 12:06

## 2024-11-08 RX ADMIN — SIMETHICONE 80 MG: 80 TABLET, CHEWABLE ORAL at 12:32

## 2024-11-08 RX ADMIN — TAMSULOSIN HYDROCHLORIDE 0.4 MG: 0.4 CAPSULE ORAL at 08:17

## 2024-11-08 RX ADMIN — METHOCARBAMOL 500 MG: 500 TABLET ORAL at 22:35

## 2024-11-08 RX ADMIN — SODIUM CHLORIDE TAB 1 GM 1 G: 1 TAB at 17:52

## 2024-11-08 RX ADMIN — OXYCODONE HYDROCHLORIDE 5 MG: 5 TABLET ORAL at 22:35

## 2024-11-08 RX ADMIN — VANCOMYCIN HYDROCHLORIDE 1000 MG: 1 INJECTION, SOLUTION INTRAVENOUS at 17:52

## 2024-11-08 RX ADMIN — SIMETHICONE 80 MG: 80 TABLET, CHEWABLE ORAL at 00:21

## 2024-11-08 RX ADMIN — SODIUM CHLORIDE TAB 1 GM 1 G: 1 TAB at 12:06

## 2024-11-08 RX ADMIN — MIDODRINE HYDROCHLORIDE 5 MG: 5 TABLET ORAL at 17:52

## 2024-11-08 RX ADMIN — HEPARIN SODIUM 5000 UNITS: 5000 INJECTION, SOLUTION INTRAVENOUS; SUBCUTANEOUS at 15:59

## 2024-11-08 RX ADMIN — HEPARIN SODIUM 5000 UNITS: 5000 INJECTION, SOLUTION INTRAVENOUS; SUBCUTANEOUS at 00:21

## 2024-11-08 RX ADMIN — HEPARIN SODIUM 5000 UNITS: 5000 INJECTION, SOLUTION INTRAVENOUS; SUBCUTANEOUS at 08:17

## 2024-11-08 RX ADMIN — MIDODRINE HYDROCHLORIDE 5 MG: 5 TABLET ORAL at 08:17

## 2024-11-08 RX ADMIN — SIMETHICONE 80 MG: 80 TABLET, CHEWABLE ORAL at 08:17

## 2024-11-08 NOTE — PLAN OF CARE
Goal Outcome Evaluation:      Plan of Care Reviewed With: patient    Overall Patient Progress: improvingOverall Patient Progress: improving    Outcome Evaluation: BP and urinary output improved after albumin bolus in the evening. Shortly after 0300 patient went into AFib SVR, but appears to have converted back later this morning. Declines pain medication. appears to be resting comfortably this shift.      Problem: Infection  Goal: Absence of Infection Signs and Symptoms  Outcome: Progressing  Intervention: Prevent or Manage Infection  Recent Flowsheet Documentation  Taken 11/8/2024 0400 by Rice, Kjersten, RN  Infection Management: aseptic technique maintained  Taken 11/8/2024 0000 by Rice, Kjersten, RN  Infection Management: aseptic technique maintained  Taken 11/7/2024 2000 by Rice, Kjersten, RN  Infection Management: aseptic technique maintained     Problem: Cardiovascular Surgery  Goal: Improved Activity Tolerance  Outcome: Progressing  Intervention: Optimize Tolerance for Activity  Recent Flowsheet Documentation  Taken 11/8/2024 0400 by Rice, Kjersten, RN  Environmental Support:   calm environment promoted   personal routine supported  Taken 11/8/2024 0000 by Rice, Kjersten, RN  Environmental Support:   calm environment promoted   personal routine supported  Taken 11/7/2024 2000 by Rice, Kjersten, RN  Environmental Support:   calm environment promoted   personal routine supported  Goal: Optimal Coping with Heart Surgery  Outcome: Progressing  Intervention: Support Psychosocial Response to Surgery  Recent Flowsheet Documentation  Taken 11/8/2024 0400 by Rice, Kjersten, RN  Supportive Measures:   positive reinforcement provided   active listening utilized  Taken 11/8/2024 0000 by Rice, Kjersten, RN  Supportive Measures:   positive reinforcement provided   active listening utilized  Taken 11/7/2024 2000 by Rice, Kjersten, RN  Supportive Measures:   positive reinforcement provided   active listening utilized  Goal:  Absence of Bleeding  Outcome: Progressing  Intervention: Monitor and Manage Bleeding  Recent Flowsheet Documentation  Taken 11/8/2024 0400 by Rice, Kjersten, RN  Bleeding Management: dressing monitored  Taken 11/8/2024 0000 by Rice, Kjersten, RN  Bleeding Management: dressing monitored  Taken 11/7/2024 2000 by Rice, Kjersten, RN  Bleeding Management: dressing monitored  Goal: Effective Bowel Elimination  Outcome: Progressing  Intervention: Enhance Bowel Motility and Elimination  Recent Flowsheet Documentation  Taken 11/8/2024 0400 by Rice, Kjersten, RN  Bowel Elimination Management: hygiene measures promoted  Taken 11/8/2024 0000 by Rice, Kjersten, RN  Bowel Elimination Management: hygiene measures promoted  Taken 11/7/2024 2000 by Rice, Kjersten, RN  Bowel Elimination Management: hygiene measures promoted  Goal: Effective Cardiac Function  Outcome: Progressing  Intervention: Optimize Cardiac Output and Blood Flow  Recent Flowsheet Documentation  Taken 11/8/2024 0400 by Rice, Kjersten, RN  Stabilization Measures: (boluses given prior shift) fluid resuscitation initiated  Taken 11/8/2024 0000 by Rice, Kjersten, RN  Stabilization Measures: (boluses given prior shift) fluid resuscitation initiated  Taken 11/7/2024 2000 by Rice, Kjersten, RN  Stabilization Measures: (boluses given prior shift) fluid resuscitation initiated  Goal: Optimal Cerebral Tissue Perfusion  Outcome: Progressing  Intervention: Protect and Optimize Cerebral Perfusion  Recent Flowsheet Documentation  Taken 11/8/2024 0600 by Rice, Kjersten, RN  Head of Bed (HOB) Positioning: HOB at 30 degrees  Taken 11/8/2024 0400 by Rice, Kjersten, RN  Sensory Stimulation Regulation: care clustered  Glycemic Management: blood glucose monitored  Head of Bed (HOB) Positioning: HOB at 30 degrees  Taken 11/8/2024 0200 by Rice, Kjersten, RN  Head of Bed (HOB) Positioning: HOB at 30-45 degrees  Taken 11/8/2024 0000 by Rice, Kjersten, RN  Sensory Stimulation Regulation:  care clustered  Glycemic Management: blood glucose monitored  Head of Bed (HOB) Positioning: HOB at 30 degrees  Taken 11/7/2024 2100 by Rice, Kjersten, RN  Head of Bed (HOB) Positioning: HOB at 30-45 degrees  Taken 11/7/2024 2000 by Rice, Kjersten, RN  Sensory Stimulation Regulation: care clustered  Glycemic Management: blood glucose monitored  Goal: Fluid and Electrolyte Balance  Outcome: Progressing  Intervention: Monitor and Manage Fluid and Electrolyte Balance  Recent Flowsheet Documentation  Taken 11/8/2024 0400 by Rice, Kjersten, RN  Fluid/Electrolyte Management:   fluids restricted   fluids provided   intravenous fluids adjusted  Taken 11/8/2024 0000 by Rice, Kjersten, RN  Fluid/Electrolyte Management:   fluids restricted   fluids provided   intravenous fluids adjusted  Taken 11/7/2024 2000 by Rice, Kjersten, RN  Fluid/Electrolyte Management:   fluids restricted   fluids provided   intravenous fluids adjusted  Goal: Blood Glucose Level Within Targeted Range  Outcome: Progressing  Intervention: Optimize Glycemic Control  Recent Flowsheet Documentation  Taken 11/8/2024 0400 by Rice, Kjersten, RN  Glycemic Management: blood glucose monitored  Taken 11/8/2024 0000 by Rice, Kjersten, RN  Glycemic Management: blood glucose monitored  Taken 11/7/2024 2000 by Rice, Kjersten, RN  Glycemic Management: blood glucose monitored  Goal: Absence of Infection Signs and Symptoms  Outcome: Progressing  Intervention: Prevent or Manage Infection  Recent Flowsheet Documentation  Taken 11/8/2024 0400 by Rice, Kjersten, RN  Infection Prevention:   environmental surveillance performed   equipment surfaces disinfected   hand hygiene promoted   personal protective equipment utilized   rest/sleep promoted   single patient room provided  Taken 11/8/2024 0000 by Rice, Kjersten, RN  Infection Prevention:   environmental surveillance performed   equipment surfaces disinfected   hand hygiene promoted   personal protective equipment  utilized   rest/sleep promoted   single patient room provided  Taken 11/7/2024 2000 by Rice, Kjersten, RN  Infection Prevention:   environmental surveillance performed   equipment surfaces disinfected   hand hygiene promoted   personal protective equipment utilized   rest/sleep promoted   single patient room provided  Goal: Anesthesia/Sedation Recovery  Outcome: Progressing  Intervention: Optimize Anesthesia Recovery  Recent Flowsheet Documentation  Taken 11/8/2024 0400 by Rice, Kjersten, RN  Safety Promotion/Fall Prevention:   activity supervised   assistive device/personal items within reach   clutter free environment maintained   increased rounding and observation   lighting adjusted   nonskid shoes/slippers when out of bed   safety round/check completed   supervised activity   treat reversible contributory factors  Reorientation Measures: clock in view  Stabilization Measures: (boluses given prior shift) fluid resuscitation initiated  Taken 11/8/2024 0000 by Rice, Kjersten, RN  Safety Promotion/Fall Prevention:   activity supervised   assistive device/personal items within reach   clutter free environment maintained   increased rounding and observation   lighting adjusted   nonskid shoes/slippers when out of bed   safety round/check completed   supervised activity   treat reversible contributory factors  Administration (IS): self-administered  Reorientation Measures: clock in view  Patient Tolerance (IS): good  Stabilization Measures: (boluses given prior shift) fluid resuscitation initiated  Taken 11/7/2024 2000 by Rice, Kjersten, RN  Safety Promotion/Fall Prevention:   activity supervised   assistive device/personal items within reach   clutter free environment maintained   increased rounding and observation   lighting adjusted   nonskid shoes/slippers when out of bed   safety round/check completed   supervised activity   treat reversible contributory factors  Administration (IS):  self-administered  Reorientation Measures: clock in view  Level Incentive Spirometer (mL): 750  Number of Repetitions (IS): 5  Patient Tolerance (IS): good  Stabilization Measures: (boluses given prior shift) fluid resuscitation initiated  Goal: Acceptable Pain Control  Outcome: Progressing  Intervention: Prevent or Manage Pain  Recent Flowsheet Documentation  Taken 11/8/2024 0400 by Rice, Kjersten, RN  Pain Management Interventions:   medication offered but refused   quiet environment facilitated  Taken 11/8/2024 0000 by Rice, Kjersten, RN  Pain Management Interventions:   care clustered   declines   quiet environment facilitated  Taken 11/7/2024 2200 by Rice, Kjersten, RN  Pain Management Interventions:   care clustered   declines   quiet environment facilitated  Taken 11/7/2024 2000 by Rice, Kjersten, RN  Pain Management Interventions:   care clustered   declines   quiet environment facilitated  Goal: Nausea and Vomiting Relief  Outcome: Progressing  Goal: Effective Urinary Elimination  Outcome: Progressing  Intervention: Monitor and Manage Urinary Retention  Recent Flowsheet Documentation  Taken 11/8/2024 0400 by Rice, Kjersten, RN  Urinary Elimination Promotion: catheter patency maintained  Taken 11/8/2024 0000 by Rice, Kjersten, RN  Urinary Elimination Promotion: catheter patency maintained  Taken 11/7/2024 2000 by Rice, Kjersten, RN  Urinary Elimination Promotion: catheter patency maintained  Goal: Effective Oxygenation and Ventilation  Outcome: Progressing  Intervention: Promote Airway Secretion Clearance  Recent Flowsheet Documentation  Taken 11/8/2024 0400 by Rice, Kjersten, RN  Airway/Ventilation Management: pulmonary hygiene promoted  Taken 11/8/2024 0000 by Rice, Kjersten, RN  Administration (IS): self-administered  Airway/Ventilation Management: pulmonary hygiene promoted  Patient Tolerance (IS): good  Taken 11/7/2024 2000 by Rice, Kjersten, RN  Administration (IS): self-administered  Level Incentive  Spirometer (mL): 750  Number of Repetitions (IS): 5  Airway/Ventilation Management: pulmonary hygiene promoted  Patient Tolerance (IS): good  Intervention: Optimize Oxygenation and Ventilation  Recent Flowsheet Documentation  Taken 11/8/2024 0400 by Rice, Kjersten, RN  Chest Tube Safety:   all connections secured   suction checked   rubber-tipped hemostat(s) with patient  Taken 11/8/2024 0000 by Rice, Kjersten, RN  Chest Tube Safety:   all connections secured   suction checked   rubber-tipped hemostat(s) with patient  Taken 11/7/2024 2000 by Rice, Kjersten, RN  Chest Tube Safety:   all connections secured   suction checked   rubber-tipped hemostat(s) with patient

## 2024-11-08 NOTE — CONSULTS
"SPIRITUAL HEALTH SERVICES Consult Note  [facility]. Unit [facility] [place]    Referral Source/Reason for Visit: patient requests ongoing spiritual support    Summary and Recommendations -  Avelino described today as \"a red letter day\" noting that he had one tube and one wire removed today and is feeling much better than yesterday. We discussed some scripture he has been listening to, provided prayer of blessing.  Plan: I will continue to follow for emotional and spiritual support as arranged with patient    Alexus Zhang MDiv [facility]  Staff   Please place consult order for routine Spiritual Health Services referrals.  [facility] available 24/7 for emergent requests either by having the on-call  paged or by entering an ASAP/STAT consult in Epic (this will also page the on-call ).    Assessment    Saw pt Jayme Donahue for 15 minutes at bedside.    Patient / Family Understanding of Illness and Goals of Care - Avelino described today as \"a red letter day\" noting that he had one tube and one wire removed today and is feeling much better than yesterday.    Distress and Loss -     Strengths, Coping, and Resources - Avelino is not sure if Pamela will visit this weekend, grateful for his neighbors who have brought her 3 times now. Avelino spoke of looking forward to visiting with staff who care for him over the weekend.    Meaning, Beliefs, and Spirituality - Avelino is listening to Scripture today, engaged in discussion about Twan' teaching in the book of Cyril. Welcomed prayer of blessing at the end of the visit.    "

## 2024-11-08 NOTE — PLAN OF CARE
Problem: Adult Inpatient Plan of Care  Goal: Plan of Care Review  Description: The Plan of Care Review/Shift note should be completed every shift.  The Outcome Evaluation is a brief statement about your assessment that the patient is improving, declining, or no change.  This information will be displayed automatically on your shift  note.  Outcome: Progressing  Flowsheets (Taken 11/8/2024 1149)  Outcome Evaluation: Pt continues to improve, remains alert and oriented, intact ex gen weakness. LS dim, IS encouraged, on RA. 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 500 ml oral intake today, fluid restriction of 1200 ml. Minced and moist diet with good appettite. Palmer removed at 1100. Loose BM today. Up with Ax1-2 GB/W. Walked in hallway with OT this morning. Chest tubes in place with approximately 70 ml bloody output. Plan to removed this afternoon. SBP goal <140. BPs 90-100s this morning, midodrine given per schedule. Pt denies symptoms with activity. C/o of R shoulder pain, hot pack applied and massaged shoulder. All incisions WNL.  Plan of Care Reviewed With: patient  Overall Patient Progress: improving     Problem: Adult Inpatient Plan of Care  Goal: Absence of Hospital-Acquired Illness or Injury  Intervention: Identify and Manage Fall Risk  Recent Flowsheet Documentation  Taken 11/8/2024 0800 by Nelida Pedersen, RN  Safety Promotion/Fall Prevention:   activity supervised   assistive device/personal items within reach   clutter free environment maintained   increased rounding and observation   lighting adjusted   nonskid shoes/slippers when out of bed   safety round/check completed   supervised activity   treat reversible contributory factors     Problem: Adult Inpatient Plan of Care  Goal: Absence of Hospital-Acquired Illness or Injury  Intervention: Identify and Manage Fall Risk  Recent Flowsheet Documentation  Taken 11/8/2024 0800 by Slava  Nelida VALLEJO RN  Safety Promotion/Fall Prevention:   activity supervised   assistive device/personal items within reach   clutter free environment maintained   increased rounding and observation   lighting adjusted   nonskid shoes/slippers when out of bed   safety round/check completed   supervised activity   treat reversible contributory factors

## 2024-11-08 NOTE — PLAN OF CARE
Problem: Adult Inpatient Plan of Care  Goal: Absence of Hospital-Acquired Illness or Injury  Intervention: Prevent Skin Injury  Recent Flowsheet Documentation  Taken 11/7/2024 1800 by Samantha Jenkins RN  Body Position: weight shifting  Taken 11/7/2024 1530 by Samantha Jenkins RN  Body Position: weight shifting     Problem: Adult Inpatient Plan of Care  Goal: Absence of Hospital-Acquired Illness or Injury  Intervention: Prevent and Manage VTE (Venous Thromboembolism) Risk  Recent Flowsheet Documentation  Taken 11/7/2024 1600 by Samantha Jenkins RN  VTE Prevention/Management: SCDs on (sequential compression devices)     Problem: Adult Inpatient Plan of Care  Goal: Optimal Comfort and Wellbeing  Intervention: Monitor Pain and Promote Comfort  Recent Flowsheet Documentation  Taken 11/7/2024 1800 by Samantha Jenkins RN  Pain Management Interventions:   rest   repositioned   relaxation techniques promoted  Taken 11/7/2024 1530 by Samantha Jenkins RN  Pain Management Interventions:   rest   repositioned   relaxation techniques promoted     Problem: Cardiovascular Surgery  Goal: Effective Bowel Elimination  Intervention: Enhance Bowel Motility and Elimination  Recent Flowsheet Documentation  Taken 11/7/2024 1600 by Samantha Jenkins RN  Bowel Motility Enhancement:   ambulation promoted   oral intake encouraged  Bowel Elimination Management: toileting offered     Problem: Cardiovascular Surgery  Goal: Optimal Cerebral Tissue Perfusion  Intervention: Protect and Optimize Cerebral Perfusion  Recent Flowsheet Documentation  Taken 11/7/2024 1600 by Samantha Jenkins RN  Sensory Stimulation Regulation: care clustered  Cerebral Perfusion Promotion:   blood pressure monitored   normothermia promoted  Glycemic Management: blood glucose monitored

## 2024-11-08 NOTE — PROGRESS NOTES
CT and EPW removed 1430. CXR review prior and no evidence of effusion or pneumothorax. No air leak on exam. Rhythm stable prior and following removal. Hemoglobin and platelets stable. No signs of bleeding/ or change in status following removal, VSS. Post-removal CXR ordered.     Belkis Ching PA-C   Cardiothoracic Surgery   Pager #[phone]  November 8, 2024 at 2:38 PM

## 2024-11-08 NOTE — PROGRESS NOTES
[facility]    Hospitalist Progress Note    Date of Admission: 10/25/2024    Assessment & Plan   Jayme Donahue is a 84 year old male with PMhx of prostate cancer who was admitted to [facility] on 10/23/24 with suspected urosepsis.  He was found to have bacterium due to corynebacterium stratum and echo showed severe mitral regurgitation with a mobile echodensity on the mitral valve.  He was subsequently transferred to [facility] on 10/25/2024 for ongoing evaluation and management of endocarditis.      He was evaluated by CV surgery during his stay.  Prior to undergoing valve procedures he underwent extraction of numerous teeth.  He ultimately underwent minimally invasive mitral valve replacement with tissue valve on 11/4/24.     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  He was found to be hyponatremic with a sodium 118 and diagnosed with sepsis due to UTI.  He became tensive and transferred to the ICU for initiation of vasopressors.  He was placed on broad-spectrum antibiotics with vancomycin and ceftriaxone.  Pressors were quickly weaned.  During this time blood cultures returned positive for corynebacterium striatum.  TTE showed severe mitral regurgitation and a mobile echodensity on the atrial side of the mitral valve (flail scallop vs vegetation).  Given some concurrent complaints of low back pain, MRI of the lumbar spine was obtained possibly suggestive of early changes of discitis.  ID was contacted, recommended transfer to [facility] for further cardiac evaluation.  *TTE this stay showed a large  vegetation on atrial aspect of mitral valve, prolapse of posterior mitral valve leaflet, severe mitral regurgitation.  *ID, cardiology and CV surgery following this stay.   *Repeat blood cultures have remained negative as of 10/26.  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  Initially felt to be secondary to dehydration and had mild improvement after some IV fluids.  Urine studies obtained and appeared consistent with SIADH.  He was placed on a fluid restriction and started on salt tablets.  Sodium stabilized at 119-123.  *Na 125 on transfer to [facility], though began to trend back down with continued fluid restriction.  *Nephrology consulted and following this day. Started on UreNa on 10/29, then switched to salt tablets on 10/30.    -- Na presently 128-131; monitor daily BMP  -- cont salt tabs i TID, fluid restriction of 1200ml/d  -- per nephrology, once Na approaches 135 can liberalize fluid restriction and continue salt tabs     Left scapular lesion, biopsy positive for basal cell carcinoma  *Left scapular lesion was noted on admission.  Appearance was concerning for possible malignancy question (see photos in epic from 10/23/24).  Wound culture obtained at the time of admission and grew stenotrophomonas maltophilia.  Was initially placed on a of doxycycline, however this was ultimately stopped after it was  "felt that findings represented chronic skin colonization and not an acute infection.  *Seen by general surgery, underwent punch biopsy on 10/31/24. Pathology showed \"Basal cell carcinoma with keratotic features, incompletely excised\"  Surgery noted patient would require full thickness resection with 4mm margins and that this would require general anesthesia. No plans to pursue further surgical mgmt this stay.   *Sutures removed on 11/7. [facility].   *WOC RN saw patient, local cares advised     History of prostate cancer, s/p treatment in 2020.   Urinary retention s/p mclain placement 10/29 and removal on 11/8:  Resolved  *Had reportedly underwent treatment for prostate cancer in 2020.  PSA levels elevated to 50s but trend was generally flat from 7/2024-9/2024. Reported some urgency this stay.   *Developed urinary retention this day requiring placement of a Mclain catheter.  Placed on empiric Flomax.  *Mclain catheter removed 11/8. No issues with persistent retention.     Moderate protein calorie malnutrition  *Nutritionist following. Encourage po intake.    S/p extraction of numerous teeth (#3, 5-14, 20-21, 23-29) on 10/31/24   *Underwent teeth extraction prior to undergoing valve surgery.     FEN: no IVFs, lytes stable, regular/mechanical soft diet with 1200ml/d fluid restriction  DVT Prophylaxis: PCDs, subQ heparin  Code Status: Full Code    Disposition: Condition continues to improve. Discharge date unclear at this time, pending postoperative course and stable labs, suspect 2-4 days.  PT/OT recommending TCU stay    Medically Ready for Discharge: Anticipated in 2-4 Days    Patient's spouse Pamela updated over the phone this morning    Genna Sutton, DO    Clinically Significant Risk Factors         # Hyponatremia: Lowest Na = 128 mmol/L in last 2 days, will monitor as appropriate  # Hypochloremia: Lowest Cl = 97 mmol/L in last 2 days, will monitor as appropriate      # " Hypoalbuminemia: Lowest albumin = 2.5 g/dL at 11/4/2024  8:12 PM, will monitor as appropriate                 # Severe Malnutrition: based on nutrition assessment    # Financial/Environmental Concerns:             Medical Decision Making       Please see A&P for additional details of medical decision making.         Interval History   Chart reviewed.  Intermittent arrhythmias and hypotension noted yesterday.  Was started on midodrine per CV surgery.  Blood pressures have since improved.  Had self-limited episode of A-fib last evening.  Given albumin infusion.  I saw patient this morning, he was resting comfortably in the chair about to be transferred from the ICU to [facility] status.  His Palmer catheter has been removed and he is urinating without difficulty.  He says his pain continues to improve.  No shortness of breath, abdominal pain nausea or vomiting.  Generally seems to be in good spirits and is thankful for care he is received    -Data reviewed today: I reviewed all new labs and imaging results over the last 24 hours. I personally reviewed no images or EKG's today.    Physical Exam   Temp: 97.3  F (36.3  C) Temp src: Axillary BP: 102/69 Pulse: 84   Resp: 27 SpO2: 95 % O2 Device: None (Room air) Oxygen Delivery: 2 LPM  Vitals:    11/05/24 0500 11/06/24 0600 11/08/24 0613   Weight: 57.7 kg (127 lb 3.3 oz) 59 kg (130 lb 1.1 oz) 61.7 kg (136 lb)     Vital Signs with Ranges  Temp:  [97.3  F (36.3  C)-97.7  F (36.5  C)] 97.3  F (36.3  C)  Pulse:  [] 84  Resp:  [11-34] 27  BP: ()/(47-78) 102/69  SpO2:  [92 %-100 %] 95 %  I/O last 3 completed shifts:  In: 1860 [P.O.:960; I.V.:400; IV Piggyback:250]  Out: 1340 [Urine:1120; Chest Tube:220]    Constitutional: Resting comfortably, alert and answering questions appropriately, NAD  Respiratory: CTAB, no wheeze, no increased work of breathing, +chest tube in place  Cardiovascular: HRRR, no LE edema  GI: S, NT, ND, +BS  Skin/Integumen: warm/dry  Other:     Medications    Current Facility-Administered Medications   Medication Dose Route Frequency Provider Last Rate Last Admin    Reason beta blocker order not selected   Does not apply DOES NOT GO TO To Vicente NP         Current Facility-Administered Medications   Medication Dose Route Frequency Provider Last Rate Last Admin    aspirin (ASA) chewable tablet 81 mg  81 mg Oral or NG Tube Daily To Tyler NP   81 mg at 11/08/24 0817    heparin ANTICOAGULANT injection 5,000 Units  5,000 Units Subcutaneous Q8H To Tyler NP   5,000 Units at 11/08/24 0817    insulin aspart (NovoLOG) injection (RAPID ACTING)  1-7 Units Subcutaneous TID AC Kerri Steen NP   1 Units at 11/05/24 1245    insulin aspart (NovoLOG) injection (RAPID ACTING)  1-5 Units Subcutaneous At Bedtime Kerri Steen NP        Lidocaine (LIDOCARE) 4 % Patch 2 patch  2 patch Transdermal Q24H To Tyler NP   2 patch at 11/07/24 0027    midodrine (PROAMATINE) tablet 5 mg  5 mg Oral TID w/meals Brigitte García PA-C   5 mg at 11/08/24 0817    pantoprazole (PROTONIX) 2 mg/mL suspension 40 mg  40 mg Oral or NG Tube Daily To Tyler NP   40 mg at 11/05/24 0953    Or    pantoprazole (PROTONIX) EC tablet 40 mg  40 mg Oral Daily To Tyler NP   40 mg at 11/08/24 0817    polyethylene glycol (MIRALAX) Packet 17 g  17 g Oral Daily To Tyler NP   17 g at 11/05/24 0952    senna-docusate (SENOKOT-S/PERICOLACE) 8.6-50 MG per tablet 1 tablet  1 tablet Oral or NG Tube BID To Tyler NP   1 tablet at 11/05/24 2059    simethicone (MYLICON) chewable tablet 80 mg  80 mg Oral 4x Daily Kerri Steen NP   80 mg at 11/08/24 0817    sodium chloride (PF) 0.9% PF flush 3 mL  3 mL Intracatheter Q8H To Tyler NP   3 mL at 11/08/24 0632    sodium chloride tablet 1 g  1 g Oral TID w/meals Nelyl Iraheta MD   1 g at 11/08/24 0930    tamsulosin (FLOMAX) capsule 0.4 mg  0.4 mg Oral Daily To Tyler NP   0.4 mg at 11/08/24 0817     vancomycin (VANCOCIN) 1,000 mg in 200 mL dextrose intermittent infusion  1,000 mg Intravenous Q12H To Tyler, JOANNE 200 mL/hr at 11/08/24 0629 1,000 mg at 11/08/24 0629       Data   Recent Labs   Lab 11/08/24  0623 11/08/24  0028 11/07/24  2341 11/07/24  1620 11/07/24  1350 11/07/24  1146 11/07/24  0513 11/06/24  0826 11/06/24  0537 11/04/24  2232 11/04/24 2012 11/04/24  1847 11/04/24  1846 11/04/24  1540 11/04/24  0612   WBC 7.8  --   --   --   --  9.1  --   --  9.1   < > 17.3*  --  13.2*  --  7.6   HGB 9.4*  --   --   --   --  9.7*  --   --  9.4*   < > 8.3*   < > 8.0*   < > 9.3*   MCV 89  --   --   --   --  88  --   --  89   < > 88  --  88  --  87     --   --   --   --  231  --   --  198   < > 184  --  160  --  270   INR  --   --   --   --   --   --   --   --   --   --  1.80*  --  2.04*  --   --    *  131*  --  128*  --   --  128* 128*  128*   < > 130*  130*   < > 133*   < > 132*   < > 126*   POTASSIUM 4.4  --   --   --   --   --  4.4  4.4  --  4.2   < > 4.0   < > 4.3   < > 4.2   CHLORIDE 98  --   --   --   --   --  97*  --  97*   < > 99  --  97*  --  91*   CO2 23  --   --   --   --   --  25  --  27   < > 25  --  25  --  29   BUN 20.2  --   --   --   --   --  18.7  --  15.9   < > 9.5  --  9.4  --  10.7   CR 0.60*  --   --   --   --   --  0.49*  --  0.67   < > 0.52*  --  0.53*  --  0.59*   ANIONGAP 10  --   --   --   --   --  6*  --  6*   < > 9  --  10  --  6*   AMALIA 8.2*  --   --   --   --   --  8.2*  --  8.3*   < > 8.3*  --  8.9  --  8.1*   * 77  --  108*   < >  --  98   < > 99   < > 159*   < > 189*   < > 92   ALBUMIN  --   --   --   --   --   --   --   --   --   --  2.5*  --   --   --  2.6*   PROTTOTAL  --   --   --   --   --   --   --   --   --   --  4.3*  --   --   --   --    BILITOTAL  --   --   --   --   --   --   --   --   --   --  0.6  --   --   --   --    ALKPHOS  --   --   --   --   --   --   --   --   --   --  83  --   --   --   --    ALT  --   --   --   --   --   --   --    --   --   --  19  --   --   --   --    AST  --   --   --   --   --   --   --   --   --   --  34  --   --   --   --     < > = values in this interval not displayed.       No results found for this or any previous visit (from the past 24 hours).

## 2024-11-08 NOTE — PROGRESS NOTES
[facility]  Cardiovascular and Thoracic Surgery Daily Note      Assessment and Plan  Mr. Donahue is a very pleasant 84-year-old gentleman with a PMH of prostate cancer who was recently admitted to the hospital and was diagnosed with Corynebacterium bacteremia and urinary tract infection. 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     POD # 4 s/p Right mini-thoracotomy mitral valve replacement with a 31 mm Quinonez MITRIS bovine pericardial valve, right common femoral arterial and venous cannulation for cardiopulmonary bypass, intraoperative LINDSEY on 11/4/24 with Dr. Alexa Ellison.    - CVS: Pre-op TTE with preserved biventricular funtion. Postop vasoplegic shock, resolved. Off gtt's. HD stable. BB discontinued d/t to pauses. EKG with type II AV block this am. Initiated Midodrine TID yesterday due to low BPs.  ASA 81 mg daily, no statin indicated. Appears euvolemic. Defer on volume or diuresis today. No PTA meds on hold. Chest tubes: 220 ml/24 hr, serosanguinous output. TPW: capped. Chest tubes removed today without issue.     - Resp: Postoperative mechanical ventilation, resolved. Extubated POD 0, now saturating well on RA. cannula. Encourage aggressive IS, pulmonary toilet.    - Neuro: Neuros intact, pain controlled on current regimen    - Renal: No history of significant renal disease. Cr stable. Trend BMP.  Volume management as above. 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 Salt tabs resumed by nephrology.  Recent Labs   Lab 11/08/24  0623 11/07/24  0513 11/06/24  0537   CR 0.60* 0.49* 0.67       - GI: + BM, +flatus, continue bowel regimen. Large gastric bubble on CXR, scheduled simethicone QID    - : remove mclain today. Hx prostate cancer s/p radiation. PTA flomax resumed.    - Endo: Postop stress hyperglycemia. Insulin infusion transitioned to sliding scale insulin.   Hemoglobin A1C   Date Value Ref Range Status   10/31/2024 5.3  <5.7 % Final     Comment:     Normal <5.7%   Prediabetes 5.7-6.4%    Diabetes 6.5% or higher     Note: Adopted from ADA consensus guidelines.        - FEN: Replace electrolytes as needed. ADAT    - ID: Postop leukocytosis. Afebrile. Infectious endocarditis: had Corynebacterium bacteremia and urinary tract infection. OR gram stain positive thus far for 3+ GPB. ID following. Currently on Vancomycin. Will eventually need a PICC when ID clears. Completing perioperative Cefazolin. Trend CBC and fever curve.   Recent Labs   Lab 11/08/24  0623 11/07/24  1146 11/06/24  0537   WBC 7.8 9.1 9.1       - Heme: Acute blood loss anemia and thrombocytopenia due to surgery. Hgb and PLT stable. Trend CBC, transfuse PRN. Skin biopsy on back positive for basal cell carcinoma.  Recent Labs   Lab 11/08/24  0623 11/07/24  1146 11/06/24  0537   HGB 9.4* 9.7* 9.4*    231 198       - Proph: SCD, subcutaneous heparin, PPI    - Other:  Clinically Significant Risk Factors         # Hyponatremia: Lowest Na = 128 mmol/L in last 2 days, will monitor as appropriate  # Hypochloremia: Lowest Cl = 97 mmol/L in last 2 days, will monitor as appropriate      # Hypoalbuminemia: Lowest albumin = 2.5 g/dL at 11/4/2024  8:12 PM, will monitor as appropriate                   # Severe Malnutrition: based on nutrition assessment    # Financial/Environmental Concerns:           - Dispo: Transfer to [facility]. Therapies recommending TCU- social work consulted. Remove chest tubes today. Will need IV abx at discharge. Medically Ready for Discharge: Anticipated in 2-4 Days       Interval History  Some pauses noted on tele overnight with associated low BP- given 1L bolus. Patient tired this am due to disruptions overnight due to rhythm. Tolerating up in chair. Tolerating diet +flatus and +BM.       Medications  Current Facility-Administered Medications   Medication Dose Route Frequency Provider Last Rate Last Admin    aspirin (ASA) chewable tablet 81 mg  81 mg  Oral or NG Tube Daily Kerri Steen NP   81 mg at 11/08/24 0817    heparin ANTICOAGULANT injection 5,000 Units  5,000 Units Subcutaneous Q8H Kerri Steen NP   5,000 Units at 11/08/24 0817    insulin aspart (NovoLOG) injection (RAPID ACTING)  1-7 Units Subcutaneous TID AC Kerri Steen NP   1 Units at 11/05/24 1245    insulin aspart (NovoLOG) injection (RAPID ACTING)  1-5 Units Subcutaneous At Bedtime Kerri Steen NP        Lidocaine (LIDOCARE) 4 % Patch 2 patch  2 patch Transdermal Q24H Kerri Steen NP   2 patch at 11/07/24 0027    midodrine (PROAMATINE) tablet 5 mg  5 mg Oral TID w/meals Brigitte García PA-C   5 mg at 11/08/24 1206    pantoprazole (PROTONIX) 2 mg/mL suspension 40 mg  40 mg Oral or NG Tube Daily Kerri Steen NP   40 mg at 11/05/24 0953    Or    pantoprazole (PROTONIX) EC tablet 40 mg  40 mg Oral Daily Kerri Steen NP   40 mg at 11/08/24 0817    polyethylene glycol (MIRALAX) Packet 17 g  17 g Oral Daily Kerri Steen NP   17 g at 11/05/24 0952    senna-docusate (SENOKOT-S/PERICOLACE) 8.6-50 MG per tablet 1 tablet  1 tablet Oral or NG Tube BID Kerri Steen NP   1 tablet at 11/05/24 2059    simethicone (MYLICON) chewable tablet 80 mg  80 mg Oral 4x Daily Kerri Steen NP   80 mg at 11/08/24 1232    sodium chloride (PF) 0.9% PF flush 3 mL  3 mL Intracatheter Q8H Kerri Steen NP   3 mL at 11/08/24 1207    sodium chloride tablet 1 g  1 g Oral TID w/meals Kerri Steen NP   1 g at 11/08/24 1206    tamsulosin (FLOMAX) capsule 0.4 mg  0.4 mg Oral Daily Kerri Steen NP   0.4 mg at 11/08/24 0817    vancomycin (VANCOCIN) 1,000 mg in 200 mL dextrose intermittent infusion  1,000 mg Intravenous Q12H Kerri Steen  mL/hr at 11/08/24 0629 1,000 mg at 11/08/24 0629     Current Facility-Administered Medications   Medication Dose Route Frequency Provider Last Rate Last Admin    acetaminophen (TYLENOL) tablet 650 mg  650 mg Oral Q4H PRN Kerri Steen, JOANNE        albuterol  (PROVENTIL) neb solution 2.5 mg  2.5 mg Nebulization Q2H PRN Kerri Steen NP        benzocaine-menthol (CHLORASEPTIC) 6-10 MG lozenge 1 lozenge  1 lozenge Buccal Q1H PRN Kerri Steen NP   1 lozenge at 11/04/24 0935    bisacodyl (DULCOLAX) suppository 10 mg  10 mg Rectal Daily PRN Kerri Steen NP        calcium carbonate (TUMS) chewable tablet 1,000 mg  1,000 mg Oral Daily PRN Kerri Steen NP        calcium gluconate 1 g in 50 mL in sodium chloride intermittent infusion  1 g Intravenous Once PRN Kerri Steen NP        calcium gluconate 2 g in  mL intermittent infusion  2 g Intravenous Once PRN Kerri Steen NP        calcium gluconate 3 g in sodium chloride 0.9 % 100 mL intermittent infusion  3 g Intravenous Once PRN Kerri Steen NP        glucose gel 15-30 g  15-30 g Oral Q15 Min PRN Kerri Steen NP        Or    dextrose 50 % injection 25-50 mL  25-50 mL Intravenous Q15 Min PRN Kerri Steen NP        Or    glucagon injection 1 mg  1 mg Subcutaneous Q15 Min PRN Kerri Steen NP        hydrALAZINE (APRESOLINE) injection 10 mg  10 mg Intravenous Q30 Min PRN Kerri Steen NP        HYDROmorphone (DILAUDID) injection 0.2 mg  0.2 mg Intravenous Q2H PRN Kerri Steen NP   0.2 mg at 11/05/24 0450    Or    HYDROmorphone (DILAUDID) injection 0.4 mg  0.4 mg Intravenous Q2H PRN Kerri Steen NP   0.4 mg at 11/05/24 0031    lidocaine (LMX4) cream   Topical Q1H PRN Kerri Steen NP        lidocaine 1 % 0.1-1 mL  0.1-1 mL Other Q1H PRN Kerri Steen NP        magnesium hydroxide (MILK OF MAGNESIA) suspension 30 mL  30 mL Oral Daily PRN Kerri Steen NP        methocarbamol (ROBAXIN) tablet 500 mg  500 mg Oral Q6H PRN Kerri Steen NP   500 mg at 11/04/24 2101    naloxone (NARCAN) injection 0.2 mg  0.2 mg Intravenous Q2 Min PRN Kerri Steen NP        Or    naloxone (NARCAN) injection 0.4 mg  0.4 mg Intravenous Q2 Min PRN Kerri Steen NP        Or    naloxone (NARCAN) injection  0.2 mg  0.2 mg Intramuscular Q2 Min PRN Kerri Steen NP        Or    naloxone (NARCAN) injection 0.4 mg  0.4 mg Intramuscular Q2 Min PRN Kerri Steen NP        ondansetron (ZOFRAN ODT) ODT tab 4 mg  4 mg Oral Q6H PRN Kerri Steen NP        Or    ondansetron (ZOFRAN) injection 4 mg  4 mg Intravenous Q6H PRN Kerri Steen NP        oxyCODONE (ROXICODONE) tablet 5 mg  5 mg Oral Q4H PRN Kerri Steen NP   5 mg at 11/07/24 0133    Or    oxyCODONE IR (ROXICODONE) half-tab 7.5 mg  7.5 mg Oral Q4H PRN Kerri Steen NP   7.5 mg at 11/05/24 0105    Reason beta blocker order not selected   Does not apply DOES NOT GO TO Kerri Odell NP        sodium chloride (PF) 0.9% PF flush 3 mL  3 mL Intracatheter q1 min prn Kerri Steen NP             Physical Exam  Vitals were reviewed  Blood pressure 93/61, pulse 105, temperature 97.5  F (36.4  C), temperature source Oral, resp. rate 22, weight 61.7 kg (136 lb), SpO2 94%.  Rhythm: NSR 1* AVB    Lungs: diminished throughout on NC    Cardiovascular: S1, S2, RRR, no m/r/g    Abdomen: soft, NT, ND, rare BS    Extremeties: warm, no LE edema    Incisions: Right thoracotomy and right femoral incisions CDI    CT: serosang output, no air leak    Weight:   Vitals:    10/31/24 0450 11/04/24 2005 11/05/24 0500 11/06/24 0600   Weight: 56.8 kg (125 lb 4.8 oz) 56.2 kg (123 lb 14.4 oz) 57.7 kg (127 lb 3.3 oz) 59 kg (130 lb 1.1 oz)    11/08/24 0613   Weight: 61.7 kg (136 lb)         Data  Recent Labs   Lab 11/08/24  1213 11/08/24  0623 11/08/24  0028 11/07/24  2341 11/07/24  1350 11/07/24  1146 11/07/24  0513 11/06/24  0826 11/06/24  0537 11/04/24  2232 11/04/24 2012 11/04/24  1847 11/04/24  1846 11/04/24  1540 11/04/24  0612   WBC  --  7.8  --   --   --  9.1  --   --  9.1   < > 17.3*  --  13.2*  --  7.6   HGB  --  9.4*  --   --   --  9.7*  --   --  9.4*   < > 8.3*   < > 8.0*   < > 9.3*   MCV  --  89  --   --   --  88  --   --  89   < > 88  --  88  --  87   PLT  --  258  --   --    --  231  --   --  198   < > 184  --  160  --  270   INR  --   --   --   --   --   --   --   --   --   --  1.80*  --  2.04*  --   --    NA  --  131*  131*  --  128*  --  128* 128*  128*   < > 130*  130*   < > 133*   < > 132*   < > 126*   POTASSIUM  --  4.4  --   --   --   --  4.4  4.4  --  4.2   < > 4.0   < > 4.3   < > 4.2   CHLORIDE  --  98  --   --   --   --  97*  --  97*   < > 99  --  97*  --  91*   CO2  --  23  --   --   --   --  25  --  27   < > 25  --  25  --  29   BUN  --  20.2  --   --   --   --  18.7  --  15.9   < > 9.5  --  9.4  --  10.7   CR  --  0.60*  --   --   --   --  0.49*  --  0.67   < > 0.52*  --  0.53*  --  0.59*   ANIONGAP  --  10  --   --   --   --  6*  --  6*   < > 9  --  10  --  6*   AMALIA  --  8.2*  --   --   --   --  8.2*  --  8.3*   < > 8.3*  --  8.9  --  8.1*   GLC 94 136* 77  --    < >  --  98   < > 99   < > 159*   < > 189*   < > 92   ALBUMIN  --   --   --   --   --   --   --   --   --   --  2.5*  --   --   --  2.6*   PROTTOTAL  --   --   --   --   --   --   --   --   --   --  4.3*  --   --   --   --    BILITOTAL  --   --   --   --   --   --   --   --   --   --  0.6  --   --   --   --    ALKPHOS  --   --   --   --   --   --   --   --   --   --  83  --   --   --   --    ALT  --   --   --   --   --   --   --   --   --   --  19  --   --   --   --    AST  --   --   --   --   --   --   --   --   --   --  34  --   --   --   --     < > = values in this interval not displayed.       Imaging:  No results found for this or any previous visit (from the past 24 hours).        Patient seen and discussed with Dr. Scot García PA-C  Cardiothoracic Surgery  Pager: [phone]    CV Surgery Rounding Pager: [phone]  After hours please page surgeon on-call

## 2024-11-08 NOTE — PROGRESS NOTES
[facility]    Infectious Disease Progress Note    Date of Service (when I saw the patient): 11/08/2024     Assessment & Plan   Jayme Donahue is a 84 year old male who was admitted on 10/25/2024.     Impression:  85 yo with PMH prostate cancer, who was admitted on 10/25/2024 as a transfer from [facility] [place] with Corynebacterium bacteremia and concern for endocarditis.   Admitted with weakness and poor oral intake to an outside hospital  Blood cultures from 10/22 grew out Corynebacterium striatum, 2 out of 2  The same day he also had positive urine cultures for Corynebacterium stratum  And a wound culture from the scapular lesion also positive for Corynebacterium striatum  With TTE done on 10/23 positive for a mobile echodensity on the atrial side of the mitral valve that likely represents flail scallop although vegetation is also in differential. LINDSEY: Large vegetation noted on the atrial aspect of the mitral valve. They measure about 1.5 to 1.7 cm. There is associated flail segment and distorted mitral valve leaflet anatomy. There seems to be prolapse of the posterior mitral valve leaflet.   Patient also complained of back pain but there is no abscesses or osteomyelitis on the back MRI  Of note patient has a left scapular lesion that has been present for several months, s/p biopsy with path consistent with BCC.   He is currently on vancomycin  Poor dentition. S/p extraction of all remaining teeth 10/31/24.   S/p MVR 11/4/24. Valve culture with no growth, gram stain with GPB and GPC.     Recommendations:   Continue on vancomycin.  Following OR culture of valve.   Will need 6 weeks from day of surgery IV antibiotics upon discharge (until 12/16/24).   Will need PICC placed closer to discharge.      Patient and plan discussed with Dr. Carrasco.     Migdalia Butler PA-C    Interval History   Tolerating antibiotics ok   No new rashes or issues with antibiotics   Feeling well post-op. No complaints  presently. Being transferred out of ICU.   Labs reviewed   No changes to past medical, social or family history         Physical Exam   Temp: 97.5  F (36.4  C) Temp src: Oral BP: 93/61 Pulse: 105   Resp: 22 SpO2: 94 % O2 Device: None (Room air) Oxygen Delivery: 2 LPM  Vitals:    11/05/24 0500 11/06/24 0600 11/08/24 0613   Weight: 57.7 kg (127 lb 3.3 oz) 59 kg (130 lb 1.1 oz) 61.7 kg (136 lb)     Vital Signs with Ranges  Temp:  [97.3  F (36.3  C)-97.7  F (36.5  C)] 97.5  F (36.4  C)  Pulse:  [] 105  Resp:  [11-34] 22  BP: ()/(47-78) 93/61  SpO2:  [90 %-100 %] 94 %    Constitutional: Awake, alert, cooperative, no apparent distress  Lungs: Clear to auscultation bilaterally, no crackles or wheezing. Chest tubes have been removed.   Cardiovascular: Regular rate and rhythm, normal S1 and S2, and no murmur noted  Abdomen: Normal bowel sounds, soft, non-distended, non-tender  Skin: skin wound on the scapula appears to the same   Other:    Medications   Current Facility-Administered Medications   Medication Dose Route Frequency Provider Last Rate Last Admin    Reason beta blocker order not selected   Does not apply DOES NOT GO TO Kerri Odell NP         Current Facility-Administered Medications   Medication Dose Route Frequency Provider Last Rate Last Admin    aspirin (ASA) chewable tablet 81 mg  81 mg Oral or NG Tube Daily Kerri Steen NP   81 mg at 11/08/24 0817    heparin ANTICOAGULANT injection 5,000 Units  5,000 Units Subcutaneous Q8H Kerri Steen NP   5,000 Units at 11/08/24 0817    insulin aspart (NovoLOG) injection (RAPID ACTING)  1-7 Units Subcutaneous TID AC Kerri Steen NP   1 Units at 11/05/24 1245    insulin aspart (NovoLOG) injection (RAPID ACTING)  1-5 Units Subcutaneous At Bedtime Kerri Steen NP        Lidocaine (LIDOCARE) 4 % Patch 2 patch  2 patch Transdermal Q24H Kerri Steen NP   2 patch at 11/07/24 0027    midodrine (PROAMATINE) tablet 5 mg  5 mg Oral TID w/meals Ricky  Brigitte JACOME PA-C   5 mg at 11/08/24 1206    pantoprazole (PROTONIX) 2 mg/mL suspension 40 mg  40 mg Oral or NG Tube Daily Kerri Steen NP   40 mg at 11/05/24 0953    Or    pantoprazole (PROTONIX) EC tablet 40 mg  40 mg Oral Daily Kerri Steen NP   40 mg at 11/08/24 0817    polyethylene glycol (MIRALAX) Packet 17 g  17 g Oral Daily Kerri Steen NP   17 g at 11/05/24 0952    senna-docusate (SENOKOT-S/PERICOLACE) 8.6-50 MG per tablet 1 tablet  1 tablet Oral or NG Tube BID Kerri Steen NP   1 tablet at 11/05/24 2059    simethicone (MYLICON) chewable tablet 80 mg  80 mg Oral 4x Daily Kerri Steen NP   80 mg at 11/08/24 0817    sodium chloride (PF) 0.9% PF flush 3 mL  3 mL Intracatheter Q8H Kerri Steen NP   3 mL at 11/08/24 1207    sodium chloride tablet 1 g  1 g Oral TID w/meals Kerri Steen NP   1 g at 11/08/24 1206    tamsulosin (FLOMAX) capsule 0.4 mg  0.4 mg Oral Daily Kerri Steen NP   0.4 mg at 11/08/24 0817    vancomycin (VANCOCIN) 1,000 mg in 200 mL dextrose intermittent infusion  1,000 mg Intravenous Q12H Kerri Steen  mL/hr at 11/08/24 0629 1,000 mg at 11/08/24 0629       Data   All microbiology laboratory data reviewed.  Recent Labs   Lab Test 11/08/24  0623 11/07/24  1146 11/06/24  0537   WBC 7.8 9.1 9.1   HGB 9.4* 9.7* 9.4*   HCT 28.2* 28.8* 28.2*   MCV 89 88 89    231 198     Recent Labs   Lab Test 11/08/24  0623 11/07/24  0513 11/06/24  0537   CR 0.60* 0.49* 0.67         11/04/2024 1723 11/04/2024 1733 Anaerobic Bacterial Culture Routine [[id number]]   Biopsy from Heart Valve, Mitral (Bicuspid)    In process Component Value   No component results             11/04/2024 1723 11/04/2024 2227 Gram Stain [[id number]]   (Abnormal)   Biopsy from Heart Valve, Mitral (Bicuspid)    Final result Component Value   GS Culture See corresponding culture for results   Gram Stain Result 3+ Gram positive bacilli Abnormal    Corrected on November 4, 2024/10:25 PM by Jeannine  Suphavong  Previously reported as: 2+ Gram negative bacilli.    Corrected result: Previously reported as No organisms seen on 11/4/2024 at  6:04 PM CST.   Gram Stain Result 1+ Gram positive cocci Abnormal    Gram Stain Result 3+ WBC seen Abnormal    Predominantly PMNs          11/04/2024 1723 11/04/2024 1733 Fungal or Yeast Culture Routine [[id number]]   Biopsy from Heart Valve, Mitral (Bicuspid)    In process Component Value   No component results             11/04/2024 1723 11/05/2024 1447 Biopsy Aerobic Bacterial Culture Routine [[id number]]   Biopsy from Heart Valve, Mitral (Bicuspid)    Preliminary result Component Value   Culture No growth, less than 1 day P           10/31/2024 1552 11/04/2024 0732 Tissue Aerobic Bacterial Culture Routine With Gram Stain [[id number]]   Tissue from Shoulder, Left    Preliminary result Component Value   Culture No growth after 2 days P   Gram Stain Result No organisms seen P    No white blood cells seen P             10/31/2024 1552 11/03/2024 1805 Anaerobic Bacterial Culture Routine [[id number]]   Tissue from Shoulder, Left    Preliminary result Component Value   Culture No anaerobic organisms isolated after 2 days P             10/30/2024 0627 11/04/2024 1006 Blood Culture Arm, Left [[id number]]   Blood from Arm, Left    Final result Component Value   Culture No Growth             10/29/2024 0600 11/03/2024 0805 Blood Culture Arm, Left [[id number]]    Blood from Arm, Left    Final result Component Value   Culture No Growth             10/28/2024 0616 11/02/2024 1116 Blood Culture Arm, Right [[id number]]   Blood from Arm, Right    Final result Component Value   Culture No Growth             10/27/2024 0811 11/01/2024 1046 Blood Culture Peripheral Blood [[id number]]    Peripheral Blood    Final result Component Value   Culture No Growth             10/26/2024 2252 11/01/2024 0031 Blood Culture Arm, Right [[id number]]   Blood from Arm, Right    Final result  Component Value   Culture No Growth             10/26/2024 1811 10/27/2024 2213 Urine Culture [[id number]]   Urine, Midstream    Final result Component Value   Culture No Growth             10/26/2024 1513 10/31/2024 1804 Blood Culture Hand, Left [[id number]]   Blood from Hand, Left    Final result Component Value   Culture No Growth             10/24/2024 1444 10/28/2024 1329 Blood Culture Arm, Right [[id number]]   (Abnormal)   Blood from Arm, Right    Final result Component Value   Culture Positive on the 2nd day of incubation Abnormal            10/22/2024 1645 10/26/2024 0814 Microbiology Isolate Referral [[id number]]    (Abnormal)   Blood from Other    Final result Component Value   Culture Corynebacterium striatum Abnormal     Organism identified by client.       Susceptibility     Corynebacterium striatum     HARRIS     Ceftriaxone 3 ug/mL Resistant     Clindamycin 1.0 ug/mL Intermediate     Doxycycline 0.125 ug/mL Susceptible     Meropenem 0.125 ug/mL Susceptible     Penicillin 0.25 ug/mL Intermediate     Trimethoprim/Sulfamethoxazole 0.250 ug/mL Susceptible     Vancomycin 0.5 ug/mL Susceptible              Complete LINDSEY Adult. 10/25/24  ______________________________________________________________________________  Interpretation Summary     This was a transesophageal echocardiogram done for evaluation of endocarditis  & MR. Study was technically difficult. Probe insertion was technically quite  challenging. Patient was short of breath and coughing a lot despite sedation.  I had to request one of my partners Dr. Galicia who was kind to come and assist  with LINDSEY probe insertion.     Normal left ventricular size and systolic function.  Normal RV size and systolic function.  No evidence of left atrial appendage thrombus. No color shunt across the  interatrial septum.  Large vegetation noted on the atrial aspect of the mitral valve. They measure  about 1.5 to 1.7 cm. There is associated flail segment and  distorted mitral  valve leaflet anatomy. There seems to be prolapse of the posterior mitral  valve leaflet. 3D imaging was quite challenging due to patient tolerance of  the procedure. We had to terminate prematurely.  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 I was not able  to get 2D and 3D quantification of the mitral regurgitation due to patient  tolerance of the procedure but visually MR is clearly severe.  No pericardial effusion.

## 2024-11-08 NOTE — PROVIDER NOTIFICATION
Text paged CV surgery, M. MULVIHILL the following :     Went into Afib slow/controlled rate with competing juntional pacemaker. HR 50-70 - asymptomatic. normotensive. adequate urinary output.    3574 Page Returned- Patient still has pacer wires if needed, holding off on beta blocker/antiarrhythmic for now. Team to address later this morning.    EKG conditional orders utilized and resulted:    EKG 12-lead, tracing only     Value    Systolic Blood Pressure 102    Diastolic Blood Pressure 59    Ventricular Rate 56    Atrial Rate 129    TX Interval     QRS Duration 114        QTc 403    P Axis     R AXIS 66    T Axis 61    Interpretation ECG      Atrial fibrillation with slow ventricular response with a competing junctional pacemaker  Incomplete right bundle branch block  Abnormal ECG  When compared with ECG of 07-Nov-2024 08:04,  Atrial fibrillation has replaced Sinus rhythm  Vent. rate has decreased by  31 bpm

## 2024-11-08 NOTE — PROGRESS NOTES
Care Management Follow Up    Length of Stay (days): 14    Expected Discharge Date:       Concerns to be Addressed: discharge planning     Patient plan of care discussed at interdisciplinary rounds: Yes    Anticipated Discharge Disposition: Transitional Care        Anticipated Discharge Services:    Anticipated Discharge DME:      Patient/family educated on Medicare website which has current facility and service quality ratings: yes  Education Provided on the Discharge Plan: Yes  Patient/Family in Agreement with the Plan: yes    Referrals Placed by CM/SW: Post Acute Facilities  Private pay costs discussed: Not applicable    Discussed  Partnership in Safe Discharge Planning  document with patient/family: No     Handoff Completed: No, handoff not indicated or clinically appropriate    Additional Information:  FERCHO reviewed chart. Discussed with NORMAN Briggs. Possible discharge in the next 2-3 days. FERCHO sent updated information to [facility] bed and left a message for Caroline in admissions, .     Next Steps: Secure bed. FERCHO following for discharge planning.     FANTASMA Ruggiero, LICSW  [phone] Desk phone  [phone] Cell/text (Preferred)  [facility]

## 2024-11-08 NOTE — CONSULTS
CARDIOTHORACIC SURGERY CONSULT NOTE  10/30/2024        CV Attending    Native MV endocarditis. Recommend MVR. Will evaluate options for minimally invasive approach with my partner, Dr. Ellison. Coronary angiography and dental extractions pending.    TOÑO Hardy MD  Cardiothoracic Surgery  Pager [phone]        Reason for Consult: mitral valve endocarditis with severe mitral regurgitation        ASSESSMENT/PLAN:   Jayme Donahue is a 84 year old male with a past medical history of prostate cancer who was admitted on 10/25/24 from [facility] presented initially with nausea and poor appetite on 10/18/2024. He presented to an outside hospital and was found to be hyponatremic with a sodium of 118 and diagnosed with sepsis secondary to a UTI.  Blood cultures were obtained and returned positive for corynebacterium striatum. Subsequently became hypotensive and was transferred to [facility] ICU and started on vasopressors on 10/23/2024.  He was treated with vancomycin and Rocephin.  Levophed weaned off on the night of 10/23/2024.  Blood cultures returned positive for corynebacterium striatum. TTE on 10/23/2024 showed severe mitral regurgitation and a mobile echodensity on the atrial side of the mitral valve that likely represents flail scallop although vegetation is also in the differential. Complained of low back pain, lumbar spine MRI on 10/24/2024 showed some likely degenerative endplate changes at right L4-L5 and left L5-S1, although early changes of discitis could also be in the differential.  ID was consulted on 10/25/2024 and patient transferred to [facility] for LINDSEY. LINDSEY on 10/29 shows mitral valve endocarditis with a large vegetation noted on the atrial aspect of the MV measuring 1.5-1.7 cm with severe mitral regurg. Normal LV EF and RV is normal in size and function. CT surgery was consulted due to severe mitral regurgitation and large vegetation.     Pt states that he hasn't been to  the dentist for some time. Denies any pain in his teeth but states that his dental health is not good.      Pt is currently on vancomycin for endocarditis and doxycycline for L scapular wound.      - Will need coronary angiogram and CT of teeth  - Will obtain bilateral carotid duplex US, CT chest w/o contrast  - Other cares per primary team  - Thank you for the opportunity to participate in the care of this patient.  - also d/w ID and will plan to have a biopsy of L scapular wound  - pt is willing to suspend DNR/DNI for surgery and recovery period     STS Risk Score for isolated MVR     Procedure Type: Isolated MVR  Perioperative OutcomeEstimate %  Operative Mortality4.19%  Morbidity & Cssykecbt99.4%  Stroke2.1%  Renal Failure1.08%  Reoperation4.58%  Prolonged Ventilation6.38%  Deep Sternal Wound Infection0.034%  Long Hospital Stay (>14 days)15.4%  Short Hospital Stay (<6 days)*15.2%        Patient and plan discussed with attending, Dr Hardy.        Abdi Hodges PA-C  Cardiothoracic Surgery  Pager [phone]           ________________________________________________________________________________________________     HPI:   Pt is noted to have a left scapular lesion which he states has been present for several months. Per notes he is to follow up as an outpatient. Picture available in [facility] nurse consult note on 10/23/24. Culture from L scapular wound positive for stenotrophomonas maltophilia that ID added doxycycline.     Pt was noted to have urinary retention and a mclain was placed on 10/29 as well as flomax being started. Pt is also noted to have moderate protein calorie malnutrition.      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 Currently being treated with fluid restriction, sodium checks q 8 hours and urena 15 g daily.      Pt has been  since 1987 and home is currently 100 miles away. Prior to this pt states that he was driving and grocery shopping. Pt is also a  ([employer]      Patient currently denies any complaints. He otherwise denies any chest pain, SOB, lightheadedness, dizziness, palpitations, LE edema.      PMH:       Past Medical History:   Diagnosis Date    Cataract      Iliac artery aneurysm (H)      Prostate cancer (H)           PSH:  History reviewed. No pertinent surgical history.     FH:  History reviewed. No pertinent family history.  States that he has a brother who had his mitral valve replaced a couple of times.     SH:  Social History            Socioeconomic History    Marital status:        Spouse name: None    Number of children: None    Years of education: None    Highest education level: None   Tobacco Use    Smoking status: Never    Smokeless tobacco: Never   Substance and Sexual Activity    Alcohol use: Not Currently    Drug use: Never      Social Drivers of Health           Financial Resource Strain: Low Risk  (10/28/2024)     Financial Resource Strain      Within the past 12 months, have you or your family members you live with been unable to get utilities (heat, electricity) when it was really needed?: No   Food Insecurity: Low Risk  (10/28/2024)     Food Insecurity      Within the past 12 months, did you worry that your food would run out before you got money to buy more?: No      Within the past 12 months, did the food you bought just not last and you didn t have money to get more?: No   Transportation Needs: Low Risk  (10/28/2024)     Transportation Needs      Within the past 12 months, has lack of transportation kept you from medical appointments, getting your medicines, non-medical meetings or appointments, work, or from getting things that you need?: No   Interpersonal Safety: Low Risk  (10/28/2024)     Interpersonal Safety      Do you feel physically and emotionally safe where you currently live?: Yes      Within the past 12 months, have you been hit, slapped, kicked or otherwise physically hurt by someone?: No      Within the past 12 months,  have you been humiliated or emotionally abused in other ways by your partner or ex-partner?: No   Recent Concern: Interpersonal Safety - High Risk (10/23/2024)     Interpersonal Safety      Do you feel physically and emotionally safe where you currently live?: No      Within the past 12 months, have you been hit, slapped, kicked or otherwise physically hurt by someone?: No      Within the past 12 months, have you been humiliated or emotionally abused in other ways by your partner or ex-partner?: No   Housing Stability: Low Risk  (10/28/2024)     Housing Stability      Do you have housing? : Yes      Are you worried about losing your housing?: No         Home Meds:          Medications Prior to Admission   Medication Sig Dispense Refill Last Dose/Taking    Capsicum, Cayenne, (CAYENNE PEPPER PO) Take 1 capsule by mouth daily.     Past Month    Misc Natural Products (SAW PALMETTO) CAPS Take 1 capsule by mouth daily.     Past Month    multivitamin  with lutein (OCUVITE WITH LUTEIN) CAPS per capsule Take 1 capsule by mouth daily.     Past Month      Allergies:  No Known Allergies  ROS: 10 point ROS neg other than the symptoms noted above in the HPI.        Physical Exam:  Temp:  [97.8  F (36.6  C)-98.5  F (36.9  C)] 98.2  F (36.8  C)  Pulse:  [68-93] 93  Resp:  [15-16] 15  BP: (105-136)/(57-87) 107/57  SpO2:  [95 %-99 %] 97 %  Gen: up in chair, comfortable, -O2, very thin with ribs very prominent  CV: reg rate, -edema LE, +systolic murmur III/VI  Pulm: clear to auscultation bilaterally, no rhonchi or wheezes  Abd: soft, non-tender, no guarding, +BM  Neuro: grossly normal  Psych: calm, cooperative         Labs:  ABG No lab results found in last 7 days.  CBC         Recent Labs   Lab 10/30/24  0627 10/26/24  0834 10/25/24  0619 10/24/24  0637   WBC 8.8 8.2 8.7 9.6   HGB 10.5* 11.3* 12.1* 11.3*    224 209 183      BMP            Recent Labs   Lab 10/30/24  0627 10/30/24  0222 10/29/24  2017 10/29/24  1441  10/29/24  0600 10/27/24  1600 10/27/24  0811   *  123* 118* 120* 120* 121*  121*   < > 126*  126*   POTASSIUM 4.7  --  4.2  --  4.9  --  4.3   CHLORIDE 88*  --  85*  --  89*  --  93*   CO2 29  --  33*  --  29  --  29   BUN 20.4  --  10.6  --  10.0  --  12.2   CR 0.55*  --  0.61*  --  0.51*  --  0.58*   GLC 96  --  117*  --  90  --  95    < > = values in this interval not displayed.      LFT      Recent Labs   Lab 10/30/24  0627   ALBUMIN 2.7*      PancreasNo lab results found in last 7 days.     Imaging:      Recent Results (from the past 24 hours)   Transesophageal Echocardiogram     Narrative     [id number]  [facility]  [id number]  [id number]^[email or web address]^SAJI     [facility]  Echocardiography Laboratory  [address] [place] [address]     Name: FABIOLA ELI  MRN: [id number]  : 1940  Study Date: 10/29/2024 01:02 PM  Age: 84 yrs  Gender: Male  Patient Location: [facility]  Ordering Physician: PARKER NGUYEN  Referring Physician: JACQUELYN KISER  Performed By: Christin Bunch     BSA: 1.7 m2  Height: 66 in  Weight: 134 lb  HR: 90  BP: 132/83 mmHg  ______________________________________________________________________________  Procedure  Complete LINDSEY Adult. LINDSEY Probe serial #[id number] was used during the procedure.  The heart rate, respiratory rate and response to care were monitored  throughout the procedure with the assistance of the nurse.  ______________________________________________________________________________  Interpretation Summary     This was a transesophageal echocardiogram done for evaluation of endocarditis  & MR. Study was technically difficult. Probe insertion was technically quite  challenging. Patient was short of breath and coughing a lot despite sedation.  I had to request one of my partners Dr. Galicia who was kind to come and assist  with LINDSEY probe insertion.     Normal left ventricular size and systolic function.  Normal RV size and systolic function.  No  evidence of left atrial appendage thrombus. No color shunt across the  interatrial septum.  Large vegetation noted on the atrial aspect of the mitral valve. They measure  about 1.5 to 1.7 cm. There is associated flail segment and distorted mitral  valve leaflet anatomy. There seems to be prolapse of the posterior mitral  valve leaflet. 3D imaging was quite challenging due to patient tolerance of  the procedure. We had to terminate prematurely.  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 I was not able  to get 2D and 3D quantification of the mitral regurgitation due to patient  tolerance of the procedure but visually MR is clearly severe.  No pericardial effusion.  ______________________________________________________________________________  LINDSEY  Versed (2.5mg) was given intravenously. Fentanyl (75mcg) was given  intravenously. I determined this patient to be an appropriate candidate for  the planned sedation and procedure and have reassessed the patient immediately  prior to sedation and procedure. Total sedation time: 19 minutes minutes of  continuous bedside 1:1 monitoring. Consent to the procedure was obtained prior  to sedation. Prior to the exam, the oral cavity was checked and no  overcrowding was noted. The transesophageal probe insertion was technically  difficult. There were no complications associated with this procedure. The LINDSEY  was terminated due to patient intolerance of procedure.     Left Ventricle  The left ventricle is normal in size. Left ventricular systolic function is  normal.     Right Ventricle  The right ventricle is normal in size and function.     Atria  The left atrium is moderately dilated. Right atrial size is normal. There is  no color Doppler evidence of an atrial shunt. No thrombus is detected in the  left atrial appendage.     Mitral Valve  There is severe (4+) mitral regurgitation.      Tricuspid Valve  The tricuspid valve is normal in structure and function.     Aortic Valve  The aortic valve is normal in structure and function.     Pulmonic Valve  The pulmonic valve is not well seen, but is grossly normal.     Vessels  The aortic root is normal size. Normal size ascending aorta.     Pericardial/Pleural  There is no pericardial effusion.  ______________________________________________________________________________  Report approved by: Shemar Chan 10/29/2024 01:45 PM

## 2024-11-09 ENCOUNTER — APPOINTMENT (OUTPATIENT)
Dept: GENERAL RADIOLOGY | Facility: CLINIC | Age: 84
End: 2024-11-09
Attending: PHYSICIAN ASSISTANT
Payer: MEDICARE

## 2024-11-09 ENCOUNTER — APPOINTMENT (OUTPATIENT)
Dept: PHYSICAL THERAPY | Facility: CLINIC | Age: 84
End: 2024-11-09
Attending: INTERNAL MEDICINE
Payer: MEDICARE

## 2024-11-09 LAB
ANION GAP SERPL CALCULATED.3IONS-SCNC: 11 MMOL/L (ref 7–15)
ATRIAL RATE - MUSE: 110 BPM
ATRIAL RATE - MUSE: 90 BPM
ATRIAL RATE - MUSE: 94 BPM
BUN SERPL-MCNC: 22.8 MG/DL (ref 8–23)
CALCIUM SERPL-MCNC: 8.4 MG/DL (ref 8.8–10.4)
CHLORIDE SERPL-SCNC: 96 MMOL/L (ref 98–107)
CREAT SERPL-MCNC: 0.54 MG/DL (ref 0.67–1.17)
DIASTOLIC BLOOD PRESSURE - MUSE: NORMAL MMHG
EGFRCR SERPLBLD CKD-EPI 2021: >90 ML/MIN/1.73M2
ERYTHROCYTE [DISTWIDTH] IN BLOOD BY AUTOMATED COUNT: 15.5 % (ref 10–15)
GLUCOSE BLDC GLUCOMTR-MCNC: 92 MG/DL (ref 70–99)
GLUCOSE SERPL-MCNC: 114 MG/DL (ref 70–99)
HCO3 SERPL-SCNC: 21 MMOL/L (ref 22–29)
HCT VFR BLD AUTO: 27.3 % (ref 40–53)
HGB BLD-MCNC: 9.4 G/DL (ref 13.3–17.7)
INTERPRETATION ECG - MUSE: NORMAL
MAGNESIUM SERPL-MCNC: 1.8 MG/DL (ref 1.7–2.3)
MCH RBC QN AUTO: 30.1 PG (ref 26.5–33)
MCHC RBC AUTO-ENTMCNC: 34.4 G/DL (ref 31.5–36.5)
MCV RBC AUTO: 88 FL (ref 78–100)
P AXIS - MUSE: 57 DEGREES
P AXIS - MUSE: NORMAL DEGREES
P AXIS - MUSE: NORMAL DEGREES
PHOSPHATE SERPL-MCNC: 2.8 MG/DL (ref 2.5–4.5)
PLATELET # BLD AUTO: 266 10E3/UL (ref 150–450)
POTASSIUM SERPL-SCNC: 4.1 MMOL/L (ref 3.4–5.3)
PR INTERVAL - MUSE: 328 MS
PR INTERVAL - MUSE: NORMAL MS
PR INTERVAL - MUSE: NORMAL MS
QRS DURATION - MUSE: 126 MS
QRS DURATION - MUSE: 128 MS
QRS DURATION - MUSE: 128 MS
QT - MUSE: 370 MS
QT - MUSE: 372 MS
QT - MUSE: 372 MS
QTC - MUSE: 421 MS
QTC - MUSE: 426 MS
QTC - MUSE: 442 MS
R AXIS - MUSE: 33 DEGREES
R AXIS - MUSE: 58 DEGREES
R AXIS - MUSE: 91 DEGREES
RBC # BLD AUTO: 3.12 10E6/UL (ref 4.4–5.9)
SODIUM SERPL-SCNC: 128 MMOL/L (ref 135–145)
SYSTOLIC BLOOD PRESSURE - MUSE: NORMAL MMHG
T AXIS - MUSE: 38 DEGREES
T AXIS - MUSE: 56 DEGREES
T AXIS - MUSE: 61 DEGREES
VANCOMYCIN SERPL-MCNC: 11 UG/ML
VENTRICULAR RATE- MUSE: 78 BPM
VENTRICULAR RATE- MUSE: 79 BPM
VENTRICULAR RATE- MUSE: 85 BPM
WBC # BLD AUTO: 6.6 10E3/UL (ref 4–11)

## 2024-11-09 PROCEDURE — 93010 ELECTROCARDIOGRAM REPORT: CPT | Performed by: INTERNAL MEDICINE

## 2024-11-09 PROCEDURE — 120N000001 HC R&B MED SURG/OB

## 2024-11-09 PROCEDURE — 36415 COLL VENOUS BLD VENIPUNCTURE: CPT | Performed by: SURGERY

## 2024-11-09 PROCEDURE — 99232 SBSQ HOSP IP/OBS MODERATE 35: CPT | Performed by: INTERNAL MEDICINE

## 2024-11-09 PROCEDURE — 250N000013 HC RX MED GY IP 250 OP 250 PS 637: Performed by: INTERNAL MEDICINE

## 2024-11-09 PROCEDURE — 80202 ASSAY OF VANCOMYCIN: CPT | Performed by: SURGERY

## 2024-11-09 PROCEDURE — 71046 X-RAY EXAM CHEST 2 VIEWS: CPT

## 2024-11-09 PROCEDURE — 250N000013 HC RX MED GY IP 250 OP 250 PS 637: Performed by: NURSE PRACTITIONER

## 2024-11-09 PROCEDURE — 85049 AUTOMATED PLATELET COUNT: CPT | Performed by: PHYSICIAN ASSISTANT

## 2024-11-09 PROCEDURE — 250N000013 HC RX MED GY IP 250 OP 250 PS 637: Performed by: PHYSICIAN ASSISTANT

## 2024-11-09 PROCEDURE — 250N000011 HC RX IP 250 OP 636: Performed by: NURSE PRACTITIONER

## 2024-11-09 PROCEDURE — 258N000003 HC RX IP 258 OP 636: Performed by: PHYSICIAN ASSISTANT

## 2024-11-09 PROCEDURE — 97110 THERAPEUTIC EXERCISES: CPT | Mod: GP

## 2024-11-09 PROCEDURE — 84100 ASSAY OF PHOSPHORUS: CPT | Performed by: PHYSICIAN ASSISTANT

## 2024-11-09 PROCEDURE — 93005 ELECTROCARDIOGRAM TRACING: CPT

## 2024-11-09 PROCEDURE — 80048 BASIC METABOLIC PNL TOTAL CA: CPT | Performed by: PHYSICIAN ASSISTANT

## 2024-11-09 PROCEDURE — 250N000011 HC RX IP 250 OP 636: Performed by: PHYSICIAN ASSISTANT

## 2024-11-09 PROCEDURE — 83735 ASSAY OF MAGNESIUM: CPT | Performed by: PHYSICIAN ASSISTANT

## 2024-11-09 PROCEDURE — 85014 HEMATOCRIT: CPT | Performed by: PHYSICIAN ASSISTANT

## 2024-11-09 PROCEDURE — 97530 THERAPEUTIC ACTIVITIES: CPT | Mod: GP

## 2024-11-09 PROCEDURE — 36415 COLL VENOUS BLD VENIPUNCTURE: CPT | Performed by: PHYSICIAN ASSISTANT

## 2024-11-09 RX ORDER — MAGNESIUM OXIDE 400 MG/1
400 TABLET ORAL EVERY 4 HOURS
Status: COMPLETED | OUTPATIENT
Start: 2024-11-09 | End: 2024-11-09

## 2024-11-09 RX ORDER — SODIUM CHLORIDE 1 G/1
2 TABLET ORAL
Status: DISCONTINUED | OUTPATIENT
Start: 2024-11-09 | End: 2024-11-13 | Stop reason: HOSPADM

## 2024-11-09 RX ADMIN — AMIODARONE HYDROCHLORIDE 1 MG/MIN: 50 INJECTION, SOLUTION INTRAVENOUS at 15:22

## 2024-11-09 RX ADMIN — VANCOMYCIN HYDROCHLORIDE 1000 MG: 1 INJECTION, SOLUTION INTRAVENOUS at 18:38

## 2024-11-09 RX ADMIN — ACETAMINOPHEN 650 MG: 325 TABLET, FILM COATED ORAL at 11:52

## 2024-11-09 RX ADMIN — VANCOMYCIN HYDROCHLORIDE 1000 MG: 1 INJECTION, SOLUTION INTRAVENOUS at 06:14

## 2024-11-09 RX ADMIN — MIDODRINE HYDROCHLORIDE 5 MG: 5 TABLET ORAL at 11:52

## 2024-11-09 RX ADMIN — SIMETHICONE 80 MG: 80 TABLET, CHEWABLE ORAL at 07:51

## 2024-11-09 RX ADMIN — MIDODRINE HYDROCHLORIDE 5 MG: 5 TABLET ORAL at 18:22

## 2024-11-09 RX ADMIN — SIMETHICONE 80 MG: 80 TABLET, CHEWABLE ORAL at 13:06

## 2024-11-09 RX ADMIN — HEPARIN SODIUM 5000 UNITS: 5000 INJECTION, SOLUTION INTRAVENOUS; SUBCUTANEOUS at 07:51

## 2024-11-09 RX ADMIN — TAMSULOSIN HYDROCHLORIDE 0.4 MG: 0.4 CAPSULE ORAL at 07:51

## 2024-11-09 RX ADMIN — HEPARIN SODIUM 5000 UNITS: 5000 INJECTION, SOLUTION INTRAVENOUS; SUBCUTANEOUS at 15:24

## 2024-11-09 RX ADMIN — HEPARIN SODIUM 5000 UNITS: 5000 INJECTION, SOLUTION INTRAVENOUS; SUBCUTANEOUS at 23:01

## 2024-11-09 RX ADMIN — MIDODRINE HYDROCHLORIDE 5 MG: 5 TABLET ORAL at 07:51

## 2024-11-09 RX ADMIN — Medication 400 MG: at 10:31

## 2024-11-09 RX ADMIN — ASPIRIN 81 MG CHEWABLE TABLET 81 MG: 81 TABLET CHEWABLE at 07:51

## 2024-11-09 RX ADMIN — LIDOCAINE 2 PATCH: 4 PATCH TOPICAL at 00:35

## 2024-11-09 RX ADMIN — Medication 400 MG: at 15:24

## 2024-11-09 RX ADMIN — SIMETHICONE 80 MG: 80 TABLET, CHEWABLE ORAL at 20:27

## 2024-11-09 RX ADMIN — SIMETHICONE 80 MG: 80 TABLET, CHEWABLE ORAL at 18:22

## 2024-11-09 RX ADMIN — HEPARIN SODIUM 5000 UNITS: 5000 INJECTION, SOLUTION INTRAVENOUS; SUBCUTANEOUS at 00:35

## 2024-11-09 RX ADMIN — ACETAMINOPHEN 650 MG: 325 TABLET, FILM COATED ORAL at 20:26

## 2024-11-09 RX ADMIN — SODIUM CHLORIDE TAB 1 GM 1 G: 1 TAB at 11:52

## 2024-11-09 RX ADMIN — SODIUM CHLORIDE TAB 1 GM 2 G: 1 TAB at 18:22

## 2024-11-09 RX ADMIN — SODIUM CHLORIDE TAB 1 GM 1 G: 1 TAB at 07:51

## 2024-11-09 RX ADMIN — PANTOPRAZOLE SODIUM 40 MG: 40 TABLET, DELAYED RELEASE ORAL at 07:51

## 2024-11-09 NOTE — PLAN OF CARE
"Goal Outcome Evaluation:  Patient Name: Rusty  MRN: [id number]  Date of Admission: 10/25/2024  Reason for Admission: Bacteremia  [place] [facility]    Vitals:   BP Readings from Last 1 Encounters:  11/08/24 : 93/61    Pulse Readings from Last 1 Encounters:  11/08/24 : 105    Wt Readings from Last 1 Encounters:  11/08/24 : 61.7 kg (136 lb)    Ht Readings from Last 1 Encounters:  10/23/24 : 1.676 m (5' 6\")    Estimated body mass index is 21.95 kg/m  as calculated from the following:    Height as of 10/23/24: 1.676 m (5' 6\").    Weight as of this encounter: 61.7 kg (136 lb).  Temp Readings from Last 1 Encounters:  11/08/24 : 97.5  F (36.4  C) (Oral)      Pain: Pain goal 0 Pain Rating 0    CV Surgery Patient: Yes Post Op Day #: 4    Assessment    General: Afebrile yes  Rhythm: 2* AVB mobitz type 1  Blood Pressure Medications given/held: N/a. BP's soft, midodrine given. Asymptomatic.   Resp: Oxygen Status: RA  Patient slept last night Yes   Incentive Spirometry Q 1-2 hour when awake: yes Volume: 750  Neuro: Alert yes Orientation: self, person, time, and place  GI/:          Bowel Activity: yes if yes indicate when: 11/8          Bowel Medications: not applicable          Urinary Catheter: no  Skin:          Incision: Incision status: covered          Epicardial Pacing Wires: no      Assessment    Resp: RA  Telemetry: 2* AVB mobitz type 1  Neuro: A&Ox4  GI/: Minced/ moist diet. 1200 FR. Palmer removed this AM, voiding into toilet. . BM+  Skin/Wounds: Abrasion to left scapula. Right groin incision. Right thora and lap site. Right CT site, covered. Pacer wire removal site. Scattered bruising. Redness to coccyx.   Lines/Drains: PIVx2 SL  Activity: Ax1 GB,W  Sleep: Fair  Abnormal Labs: Na 131     Aggression Stop Light: Green          Patient Care Plan: CT removed, CXR completed this evening. Abx for 6 weeks- will need PICC closer to discharge. Therapies recommend Tcu.      "

## 2024-11-09 NOTE — PROGRESS NOTES
[facility]    Infectious Disease Progress Note    Date of Service (when I saw the patient): 11/09/2024     Assessment & Plan   Jayme Donahue is a 84 year old male who was admitted on 10/25/2024.     Impression:  83 yo with PMH prostate cancer, who was admitted on 10/25/2024 as a transfer from [facility] with Corynebacterium bacteremia and concern for endocarditis.   Admitted with weakness and poor oral intake to an outside hospital  Blood cultures from 10/22 grew out Corynebacterium striatum, 2 out of 2  The same day he also had positive urine cultures for Corynebacterium stratum  And a wound culture from the scapular lesion also positive for Corynebacterium striatum  With TTE done on 10/23 positive for a mobile echodensity on the atrial side of the mitral valve that likely represents flail scallop although vegetation is also in differential. LINDSEY: Large vegetation noted on the atrial aspect of the mitral valve. They measure about 1.5 to 1.7 cm. There is associated flail segment and distorted mitral valve leaflet anatomy. There seems to be prolapse of the posterior mitral valve leaflet.   Patient also complained of back pain but there is no abscesses or osteomyelitis on the back MRI  Of note patient has a left scapular lesion that has been present for several months, s/p biopsy with path consistent with BCC.   He is currently on vancomycin  Poor dentition. S/p extraction of all remaining teeth 10/31/24.   S/p MVR 11/4/24. Valve culture with no growth, gram stain with GPB and GPC.     Recommendations:   Continue on vancomycin.  Following OR culture of valve.  Remains negative with Gram stain positive consistent with the known corynebacterium  Plan 6 weeks from day of surgery IV antibiotics upon discharge (until 12/16/24).   Will need PICC placed closer to discharge.   Continue same will follow-up Monday         Interval History   Tolerating antibiotics ok   No new rashes or issues  with antibiotics   Feeling well post-op. No complaints presently. Being transferred out of ICU.   Labs reviewed   No changes to past medical, social or family history         Physical Exam   Temp: 97.7  F (36.5  C) Temp src: Oral BP: 90/56 Pulse: 101   Resp: 18 SpO2: 95 % O2 Device: None (Room air)    Vitals:    11/06/24 0600 11/08/24 0613 11/09/24 0500   Weight: 59 kg (130 lb 1.1 oz) 61.7 kg (136 lb) 62.7 kg (138 lb 3.2 oz)     Vital Signs with Ranges  Temp:  [97.2  F (36.2  C)-98  F (36.7  C)] 97.7  F (36.5  C)  Pulse:  [] 101  Resp:  [18-28] 18  BP: ()/(56-64) 90/56  SpO2:  [94 %-99 %] 95 %    Constitutional: Awake, alert, cooperative, no apparent distress  Lungs: Clear to auscultation bilaterally, no crackles or wheezing. Chest tubes have been removed.   Cardiovascular: Regular rate and rhythm, normal S1 and S2, and no murmur noted  Abdomen: Normal bowel sounds, soft, non-distended, non-tender  Skin: skin wound on the scapula appears to the same   Other:    Medications   Current Facility-Administered Medications   Medication Dose Route Frequency Provider Last Rate Last Admin    Reason beta blocker order not selected   Does not apply DOES NOT GO TO Kerri Odell NP         Current Facility-Administered Medications   Medication Dose Route Frequency Provider Last Rate Last Admin    aspirin (ASA) chewable tablet 81 mg  81 mg Oral or NG Tube Daily Kerri Steen NP   81 mg at 11/09/24 0751    heparin ANTICOAGULANT injection 5,000 Units  5,000 Units Subcutaneous Q8H Kerri Steen NP   5,000 Units at 11/09/24 0751    Lidocaine (LIDOCARE) 4 % Patch 2 patch  2 patch Transdermal Q24H Kerri Steen NP   2 patch at 11/09/24 0035    midodrine (PROAMATINE) tablet 5 mg  5 mg Oral TID w/meals Brigitte García PA-C   5 mg at 11/09/24 0751    pantoprazole (PROTONIX) 2 mg/mL suspension 40 mg  40 mg Oral or NG Tube Daily Kerri Steen NP   40 mg at 11/05/24 0953    Or    pantoprazole (PROTONIX) EC tablet  40 mg  40 mg Oral Daily Kerri Steen NP   40 mg at 11/09/24 0751    polyethylene glycol (MIRALAX) Packet 17 g  17 g Oral Daily Kerri Steen NP   17 g at 11/05/24 0952    senna-docusate (SENOKOT-S/PERICOLACE) 8.6-50 MG per tablet 1 tablet  1 tablet Oral or NG Tube BID Kerri Steen NP   1 tablet at 11/05/24 2059    simethicone (MYLICON) chewable tablet 80 mg  80 mg Oral 4x Daily Kerri Steen NP   80 mg at 11/09/24 0751    sodium chloride (PF) 0.9% PF flush 3 mL  3 mL Intracatheter Q8H Kerri Steen NP   3 mL at 11/09/24 0427    sodium chloride tablet 1 g  1 g Oral TID w/meals Kerri Steen NP   1 g at 11/09/24 0751    tamsulosin (FLOMAX) capsule 0.4 mg  0.4 mg Oral Daily Kerri Steen NP   0.4 mg at 11/09/24 0751    vancomycin (VANCOCIN) 1,000 mg in 200 mL dextrose intermittent infusion  1,000 mg Intravenous Q12H Kerri Steen  mL/hr at 11/09/24 0614 1,000 mg at 11/09/24 0614       Data   All microbiology laboratory data reviewed.  Recent Labs   Lab Test 11/09/24  0653 11/08/24  0623 11/07/24  1146   WBC 6.6 7.8 9.1   HGB 9.4* 9.4* 9.7*   HCT 27.3* 28.2* 28.8*   MCV 88 89 88    258 231     Recent Labs   Lab Test 11/09/24  0653 11/08/24  0623 11/07/24  0513   CR 0.54* 0.60* 0.49*         11/04/2024 1723 11/04/2024 1733 Anaerobic Bacterial Culture Routine [[id number]]   Biopsy from Heart Valve, Mitral (Bicuspid)    In process Component Value   No component results             11/04/2024 1723 11/04/2024 2227 Gram Stain [[id number]]   (Abnormal)   Biopsy from Heart Valve, Mitral (Bicuspid)    Final result Component Value   GS Culture See corresponding culture for results   Gram Stain Result 3+ Gram positive bacilli Abnormal    Corrected on November 4, 2024/10:25 PM by Jeannine Rodriguez  Previously reported as: 2+ Gram negative bacilli.    Corrected result: Previously reported as No organisms seen on 11/4/2024 at  6:04 PM CST.   Gram Stain Result 1+ Gram positive cocci Abnormal    Gram  Stain Result 3+ WBC seen Abnormal    Predominantly PMNs          11/04/2024 1723 11/04/2024 1733 Fungal or Yeast Culture Routine [[id number]]   Biopsy from Heart Valve, Mitral (Bicuspid)    In process Component Value   No component results             11/04/2024 1723 11/05/2024 1447 Biopsy Aerobic Bacterial Culture Routine [[id number]]   Biopsy from Heart Valve, Mitral (Bicuspid)    Preliminary result Component Value   Culture No growth, less than 1 day P           10/31/2024 1552 11/04/2024 0732 Tissue Aerobic Bacterial Culture Routine With Gram Stain [[id number]]   Tissue from Shoulder, Left    Preliminary result Component Value   Culture No growth after 2 days P   Gram Stain Result No organisms seen P    No white blood cells seen P             10/31/2024 1552 11/03/2024 1805 Anaerobic Bacterial Culture Routine [[id number]]   Tissue from Shoulder, Left    Preliminary result Component Value   Culture No anaerobic organisms isolated after 2 days P             10/30/2024 0627 11/04/2024 1006 Blood Culture Arm, Left [[id number]]   Blood from Arm, Left    Final result Component Value   Culture No Growth             10/29/2024 0600 11/03/2024 0805 Blood Culture Arm, Left [[id number]]    Blood from Arm, Left    Final result Component Value   Culture No Growth             10/28/2024 0616 11/02/2024 1116 Blood Culture Arm, Right [[id number]]   Blood from Arm, Right    Final result Component Value   Culture No Growth             10/27/2024 0811 11/01/2024 1046 Blood Culture Peripheral Blood [[id number]]    Peripheral Blood    Final result Component Value   Culture No Growth             10/26/2024 2252 11/01/2024 0031 Blood Culture Arm, Right [[id number]]   Blood from Arm, Right    Final result Component Value   Culture No Growth             10/26/2024 1811 10/27/2024 2213 Urine Culture [[id number]]   Urine, Midstream    Final result Component Value   Culture No Growth             10/26/2024 1513  10/31/2024 1804 Blood Culture Hand, Left [[id number]]   Blood from Hand, Left    Final result Component Value   Culture No Growth             10/24/2024 1444 10/28/2024 1329 Blood Culture Arm, Right [[id number]]   (Abnormal)   Blood from Arm, Right    Final result Component Value   Culture Positive on the 2nd day of incubation Abnormal            10/22/2024 1645 10/26/2024 0814 Microbiology Isolate Referral [[id number]]    (Abnormal)   Blood from Other    Final result Component Value   Culture Corynebacterium striatum Abnormal     Organism identified by client.       Susceptibility     Corynebacterium striatum     HARRIS     Ceftriaxone 3 ug/mL Resistant     Clindamycin 1.0 ug/mL Intermediate     Doxycycline 0.125 ug/mL Susceptible     Meropenem 0.125 ug/mL Susceptible     Penicillin 0.25 ug/mL Intermediate     Trimethoprim/Sulfamethoxazole 0.250 ug/mL Susceptible     Vancomycin 0.5 ug/mL Susceptible              Complete LINDSEY Adult. 10/25/24  ______________________________________________________________________________  Interpretation Summary     This was a transesophageal echocardiogram done for evaluation of endocarditis  & MR. Study was technically difficult. Probe insertion was technically quite  challenging. Patient was short of breath and coughing a lot despite sedation.  I had to request one of my partners Dr. Galicia who was kind to come and assist  with LINDSEY probe insertion.     Normal left ventricular size and systolic function.  Normal RV size and systolic function.  No evidence of left atrial appendage thrombus. No color shunt across the  interatrial septum.  Large vegetation noted on the atrial aspect of the mitral valve. They measure  about 1.5 to 1.7 cm. There is associated flail segment and distorted mitral  valve leaflet anatomy. There seems to be prolapse of the posterior mitral  valve leaflet. 3D imaging was quite challenging due to patient tolerance of  the procedure. We had to terminate  prematurely.  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 I was not able  to get 2D and 3D quantification of the mitral regurgitation due to patient  tolerance of the procedure but visually MR is clearly severe.  No pericardial effusion.

## 2024-11-09 NOTE — PROGRESS NOTES
[facility]    Hospitalist Progress Note    Date of Admission: 10/25/2024    Assessment & Plan   Jayme Donahue is a 84 year old male with PMhx of prostate cancer who was admitted to [facility] on 10/23/24 with suspected urosepsis.  He was found to have bacterium due to corynebacterium stratum and echo showed severe mitral regurgitation with a mobile echodensity on the mitral valve.  He was subsequently transferred to [facility] on 10/25/2024 for ongoing evaluation and management of endocarditis.      He was evaluated by CV surgery during his stay.  Prior to undergoing valve procedures he underwent extraction of numerous teeth.  He ultimately underwent minimally invasive mitral valve replacement with tissue valve on 11/4/24.     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  He was found to be hyponatremic with a sodium 118 and diagnosed with sepsis due to UTI.  He became tensive and transferred to the ICU for initiation of vasopressors.  He was placed on broad-spectrum antibiotics with vancomycin and ceftriaxone.  Pressors were quickly weaned.  During this time blood cultures returned positive for corynebacterium striatum.  TTE showed severe mitral regurgitation and a mobile echodensity on the atrial side of the mitral valve (flail scallop vs vegetation).  Given some concurrent complaints of low back pain, MRI of the lumbar spine was obtained possibly suggestive of early changes of discitis.  ID was contacted, recommended transfer to [facility] for further cardiac evaluation.  *TTE this stay showed a large  vegetation on atrial aspect of mitral valve, prolapse of posterior mitral valve leaflet, severe mitral regurgitation.  *ID, cardiology and CV surgery following this stay.   *Repeat blood cultures have remained negative as of 10/26.  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  Initially felt to be secondary to dehydration and had mild improvement after some IV fluids.  Urine studies obtained and appeared consistent with SIADH.  He was placed on a fluid restriction and started on salt tablets.  Sodium stabilized at 119-123.  *Na 125 on transfer to [facility], though began to trend back down with continued fluid restriction.  *Nephrology consulted and following this day. Started on UreNa on 10/29, then switched to salt tablets on 10/30.    *Slight drop Bee from 11/7-11/9 felt to be due in part to IVFs that were given for hypotension during this time.  -- Na presently 128-131; monitor daily BMP  -- cont salt tabs i TID, fluid restriction of 1200ml/d  -- per nephrology, once Na approaches 135 can liberalize fluid restriction and continue salt tabs     Left scapular lesion, biopsy positive for basal cell carcinoma  *Left scapular lesion was noted on admission.  Appearance was concerning for possible malignancy question (see photos in epic from 10/23/24).  Wound culture obtained at the time of admission and grew stenotrophomonas  "maltophilia.  Was initially placed on a of doxycycline, however this was ultimately stopped after it was felt that findings represented chronic skin colonization and not an acute infection.  *Seen by general surgery, underwent punch biopsy on 10/31/24. Pathology showed \"Basal cell carcinoma with keratotic features, incompletely excised\"  Surgery noted patient would require full thickness resection with 4mm margins and that this would require general anesthesia. No plans to pursue further surgical mgmt this stay.   *Sutures removed on 11/7. [facility].   *WOC RN saw patient, local cares advised     History of prostate cancer, s/p treatment in 2020.   Urinary retention s/p mclain placement 10/29 and removal on 11/8:  Resolved  *Had reportedly underwent treatment for prostate cancer in 2020.  PSA levels elevated to 50s but trend was generally flat from 7/2024-9/2024. Reported some urgency this stay.   *Developed urinary retention this day requiring placement of a Mclain catheter.  Placed on empiric Flomax.  *Mclain catheter removed 11/8. No issues with persistent retention.     Moderate protein calorie malnutrition  *Nutritionist following. Encourage po intake.    S/p extraction of numerous teeth (#3, 5-14, 20-21, 23-29) on 10/31/24   *Underwent teeth extraction prior to undergoing valve surgery.     FEN: no IVFs, lytes stable, regular/mechanical soft diet with 1200ml/d fluid restriction  DVT Prophylaxis: PCDs, subQ heparin  Code Status: Full Code    Disposition: Condition continues to improve. Discharge date unclear at this time, pending postoperative course and stable labs, suspect 2-3 days.  PT/OT recommending TCU stay, SW following.    Medically Ready for Discharge: Anticipated in 2-4 Days    Patient's spouse Pamela updated over the phone again this morning, questions answered.    Genna Sutton, DO    Clinically Significant Risk Factors         # Hyponatremia: Lowest Na = 128 " mmol/L in last 2 days, will monitor as appropriate  # Hypochloremia: Lowest Cl = 96 mmol/L in last 2 days, will monitor as appropriate      # Hypoalbuminemia: Lowest albumin = 2.5 g/dL at 11/4/2024  8:12 PM, will monitor as appropriate                 # Severe Malnutrition: based on nutrition assessment    # Financial/Environmental Concerns:             Medical Decision Making       Please see A&P for additional details of medical decision making.         Interval History   Chart reviewed.  Seen this morning.  Feeling well.  Chest tube has been removed.  No issues with voiding after Palmer catheter was removed.  In good spirits.  Pain well-managed.  Tolerating oral intake.  No specific complaints this morning when I saw him.  Blood sugars been stable.  Patient is very appreciative of cares he is received this day.    -Data reviewed today: I reviewed all new labs and imaging results over the last 24 hours. I personally reviewed no images or EKG's today.    Physical Exam   Temp: 97.7  F (36.5  C) Temp src: Oral BP: 90/56 Pulse: 101   Resp: 18 SpO2: 95 % O2 Device: None (Room air)    Vitals:    11/06/24 0600 11/08/24 0613 11/09/24 0500   Weight: 59 kg (130 lb 1.1 oz) 61.7 kg (136 lb) 62.7 kg (138 lb 3.2 oz)     Vital Signs with Ranges  Temp:  [97.2  F (36.2  C)-98  F (36.7  C)] 97.7  F (36.5  C)  Pulse:  [] 101  Resp:  [13-28] 18  BP: ()/(56-65) 90/56  SpO2:  [90 %-99 %] 95 %  I/O last 3 completed shifts:  In: 1120 [P.O.:1120]  Out: 560 [Urine:460; Chest Tube:100]    Constitutional: Resting comfortably, alert and answering questions appropriately, NAD  Respiratory: Few faint bibasilar crackles but otherwise CTAB, no wheeze, no increased work of breathing  Cardiovascular: HRRR, +bilateral LE edema  GI: S, NT, ND, +BS  Skin/Integumen: warm/dry  Other:     Medications   Current Facility-Administered Medications   Medication Dose Route Frequency Provider Last Rate Last Admin    Reason beta blocker order not  selected   Does not apply DOES NOT GO TO Kerri Odell NP         Current Facility-Administered Medications   Medication Dose Route Frequency Provider Last Rate Last Admin    aspirin (ASA) chewable tablet 81 mg  81 mg Oral or NG Tube Daily Kerri Steen NP   81 mg at 11/09/24 0751    heparin ANTICOAGULANT injection 5,000 Units  5,000 Units Subcutaneous Q8H Kerri Steen NP   5,000 Units at 11/09/24 0751    insulin aspart (NovoLOG) injection (RAPID ACTING)  1-7 Units Subcutaneous TID AC Kerri Steen NP   1 Units at 11/05/24 1245    insulin aspart (NovoLOG) injection (RAPID ACTING)  1-5 Units Subcutaneous At Bedtime Kerri Steen NP        Lidocaine (LIDOCARE) 4 % Patch 2 patch  2 patch Transdermal Q24H Kerri Steen NP   2 patch at 11/09/24 0035    midodrine (PROAMATINE) tablet 5 mg  5 mg Oral TID w/meals Brigitte García PA-C   5 mg at 11/09/24 0751    pantoprazole (PROTONIX) 2 mg/mL suspension 40 mg  40 mg Oral or NG Tube Daily Kerri Steen NP   40 mg at 11/05/24 0953    Or    pantoprazole (PROTONIX) EC tablet 40 mg  40 mg Oral Daily Kerri Steen NP   40 mg at 11/09/24 0751    polyethylene glycol (MIRALAX) Packet 17 g  17 g Oral Daily Kerri Steen NP   17 g at 11/05/24 0952    senna-docusate (SENOKOT-S/PERICOLACE) 8.6-50 MG per tablet 1 tablet  1 tablet Oral or NG Tube BID Kerri Steen NP   1 tablet at 11/05/24 2059    simethicone (MYLICON) chewable tablet 80 mg  80 mg Oral 4x Daily Kerri Steen NP   80 mg at 11/09/24 0751    sodium chloride (PF) 0.9% PF flush 3 mL  3 mL Intracatheter Q8H Kerri Steen NP   3 mL at 11/09/24 0427    sodium chloride tablet 1 g  1 g Oral TID w/meals Kerri Steen NP   1 g at 11/09/24 0751    tamsulosin (FLOMAX) capsule 0.4 mg  0.4 mg Oral Daily Kerri Steen NP   0.4 mg at 11/09/24 0751    vancomycin (VANCOCIN) 1,000 mg in 200 mL dextrose intermittent infusion  1,000 mg Intravenous Q12H Kerri Steen  mL/hr at 11/09/24 0614 1,000 mg at  11/09/24 0614       Data   Recent Labs   Lab 11/09/24  0653 11/09/24  0315 11/08/24  2129 11/08/24  1213 11/08/24  0623 11/08/24  0028 11/07/24  2341 11/07/24  1350 11/07/24  1146 11/07/24  0513 11/04/24  2232 11/04/24 2012 11/04/24  1847 11/04/24  1846 11/04/24  1540 11/04/24  0612   WBC 6.6  --   --   --  7.8  --   --   --  9.1  --    < > 17.3*  --  13.2*  --  7.6   HGB 9.4*  --   --   --  9.4*  --   --   --  9.7*  --    < > 8.3*   < > 8.0*   < > 9.3*   MCV 88  --   --   --  89  --   --   --  88  --    < > 88  --  88  --  87     --   --   --  258  --   --   --  231  --    < > 184  --  160  --  270   INR  --   --   --   --   --   --   --   --   --   --   --  1.80*  --  2.04*  --   --    *  --   --   --  131*  131*  --  128*  --  128* 128*  128*   < > 133*   < > 132*   < > 126*   POTASSIUM 4.1  --   --   --  4.4  --   --   --   --  4.4  4.4   < > 4.0   < > 4.3   < > 4.2   CHLORIDE 96*  --   --   --  98  --   --   --   --  97*   < > 99  --  97*  --  91*   CO2 21*  --   --   --  23  --   --   --   --  25   < > 25  --  25  --  29   BUN 22.8  --   --   --  20.2  --   --   --   --  18.7   < > 9.5  --  9.4  --  10.7   CR 0.54*  --   --   --  0.60*  --   --   --   --  0.49*   < > 0.52*  --  0.53*  --  0.59*   ANIONGAP 11  --   --   --  10  --   --   --   --  6*   < > 9  --  10  --  6*   AMALIA 8.4*  --   --   --  8.2*  --   --   --   --  8.2*   < > 8.3*  --  8.9  --  8.1*   * 92 108*   < > 136*   < >  --    < >  --  98   < > 159*   < > 189*   < > 92   ALBUMIN  --   --   --   --   --   --   --   --   --   --   --  2.5*  --   --   --  2.6*   PROTTOTAL  --   --   --   --   --   --   --   --   --   --   --  4.3*  --   --   --   --    BILITOTAL  --   --   --   --   --   --   --   --   --   --   --  0.6  --   --   --   --    ALKPHOS  --   --   --   --   --   --   --   --   --   --   --  83  --   --   --   --    ALT  --   --   --   --   --   --   --   --   --   --   --  19  --   --   --   --    AST  --   --    --   --   --   --   --   --   --   --   --  34  --   --   --   --     < > = values in this interval not displayed.       Recent Results (from the past 24 hours)   XR Chest Port 1 View    Narrative    EXAM: XR CHEST PORT 1 VIEW  LOCATION: [facility]  DATE: 11/8/2024    INDICATION: s p CT removal  COMPARISON: 11/5/2024      Impression    IMPRESSION: Interval removal of right chest tube with new small right pneumothorax measuring 2.8 cm at the right lung apex. Left lung and pleural space are clear. Normal cardiomediastinal silhouette. Mitral valve prosthesis.

## 2024-11-09 NOTE — PROGRESS NOTES
[facility]  Cardiovascular and Thoracic Surgery Daily Note      Assessment and Plan  Mr. Donahue is a very pleasant 84-year-old gentleman with a PMH of prostate cancer who was recently admitted to the hospital and was diagnosed with Corynebacterium bacteremia and urinary tract infection. 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     POD # 5 s/p Right mini-thoracotomy mitral valve replacement with a 31 mm Quinonez MITRIS bovine pericardial valve, right common femoral arterial and venous cannulation for cardiopulmonary bypass, intraoperative LINDSEY on 11/4/24 with Dr. Alexa Ellison.    - CVS: Pre-op TTE with preserved biventricular funtion. Postop vasoplegic shock, resolved. HD stable. BB discontinued d/t to pauses. EKG with type II AV block this am-resolved now in a.fib 100s-110s, start amiodarone. Initiated Midodrine TID pod#3 due to low BPs.  ASA 81 mg daily, no statin indicated. Appears euvolemic. Defer on volume or diuresis today. No PTA meds on hold. Chest tubes: 220 ml/24 hr, serosanguinous output. TPW: capped. Chest tubes removed pod#4 without issue. Small ptx on cxr, repeat today    - Resp: Postoperative mechanical ventilation, resolved. Extubated POD 0, now saturating well on RA. cannula. Encourage aggressive IS, pulmonary toilet.    - Neuro: Neuros intact, pain controlled on current regimen    - Renal: No history of significant renal disease. Cr stable. Trend BMP.  Volume management as above. 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 Salt tabs resumed by nephrology.  Recent Labs   Lab 11/08/24  0623 11/07/24  0513 11/06/24  0537   CR 0.60* 0.49* 0.67       - GI: + BM, +flatus, continue bowel regimen. Large gastric bubble on CXR, scheduled simethicone QID    - : voiding on own. Hx prostate cancer s/p radiation. PTA flomax resumed.    - Endo: Postop stress hyperglycemia. Insulin infusion transitioned to sliding scale insulin. Stop  checking  Hemoglobin A1C   Date Value Ref Range Status   10/31/2024 5.3 <5.7 % Final     Comment:     Normal <5.7%   Prediabetes 5.7-6.4%    Diabetes 6.5% or higher     Note: Adopted from ADA consensus guidelines.        - FEN: Replace electrolytes as needed. ADAT    - ID: Postop leukocytosis. Afebrile. Infectious endocarditis: had Corynebacterium bacteremia and urinary tract infection. OR gram stain positive thus far for 3+ GPB. ID following. Currently on Vancomycin. Will eventually need a PICC when ID clears. Completing perioperative Cefazolin. Trend CBC and fever curve.   Recent Labs   Lab 11/08/24  0623 11/07/24  1146 11/06/24  0537   WBC 7.8 9.1 9.1       - Heme: Acute blood loss anemia and thrombocytopenia due to surgery. Hgb and PLT stable. Trend CBC, transfuse PRN. Skin biopsy on back positive for basal cell carcinoma.  Recent Labs   Lab 11/08/24 0623 11/07/24  1146 11/06/24  0537   HGB 9.4* 9.7* 9.4*    231 198       - Proph: SCD, subcutaneous heparin, PPI    - Other:  Clinically Significant Risk Factors         # Hyponatremia: Lowest Na = 128 mmol/L in last 2 days, will monitor as appropriate       # Hypoalbuminemia: Lowest albumin = 2.5 g/dL at 11/4/2024  8:12 PM, will monitor as appropriate                   # Severe Malnutrition: based on nutrition assessment    # Financial/Environmental Concerns:           - Dispo: [address] [facility]. Therapies recommending TCU- social work consulted. Remove chest tubes today. Will need IV abx at discharge. Medically Ready for Discharge: Anticipated in 2-4 Days       Interval History  No pauses noted but in 100-110s in a.fib, will start amiodarone gtt, up in chair, feeling better each day, denies pain, tolerating diet, working with rehab      Medications  Current Facility-Administered Medications   Medication Dose Route Frequency Provider Last Rate Last Admin    aspirin (ASA) chewable tablet 81 mg  81 mg Oral or NG Tube Daily Kerri Steen NP   81 mg at 11/08/24 0817     heparin ANTICOAGULANT injection 5,000 Units  5,000 Units Subcutaneous Q8H Kerri Steen NP   5,000 Units at 11/09/24 0035    insulin aspart (NovoLOG) injection (RAPID ACTING)  1-7 Units Subcutaneous TID AC Kerri Steen NP   1 Units at 11/05/24 1245    insulin aspart (NovoLOG) injection (RAPID ACTING)  1-5 Units Subcutaneous At Bedtime Kerri Steen NP        Lidocaine (LIDOCARE) 4 % Patch 2 patch  2 patch Transdermal Q24H Kerri Steen NP   2 patch at 11/09/24 0035    midodrine (PROAMATINE) tablet 5 mg  5 mg Oral TID w/meals Brigitte García PA-C   5 mg at 11/08/24 1752    pantoprazole (PROTONIX) 2 mg/mL suspension 40 mg  40 mg Oral or NG Tube Daily Kerri Steen NP   40 mg at 11/05/24 0953    Or    pantoprazole (PROTONIX) EC tablet 40 mg  40 mg Oral Daily Kerri Steen NP   40 mg at 11/08/24 0817    polyethylene glycol (MIRALAX) Packet 17 g  17 g Oral Daily Kerri Steen NP   17 g at 11/05/24 0952    senna-docusate (SENOKOT-S/PERICOLACE) 8.6-50 MG per tablet 1 tablet  1 tablet Oral or NG Tube BID Kerri Steen NP   1 tablet at 11/05/24 2059    simethicone (MYLICON) chewable tablet 80 mg  80 mg Oral 4x Daily Kerri Steen NP   80 mg at 11/08/24 2203    sodium chloride (PF) 0.9% PF flush 3 mL  3 mL Intracatheter Q8H Kerri Steen NP   3 mL at 11/09/24 0427    sodium chloride tablet 1 g  1 g Oral TID w/meals Kerri Steen NP   1 g at 11/08/24 1752    tamsulosin (FLOMAX) capsule 0.4 mg  0.4 mg Oral Daily Kerri Steen NP   0.4 mg at 11/08/24 0817    vancomycin (VANCOCIN) 1,000 mg in 200 mL dextrose intermittent infusion  1,000 mg Intravenous Q12H Kerri Steen  mL/hr at 11/09/24 0614 1,000 mg at 11/09/24 0614     Current Facility-Administered Medications   Medication Dose Route Frequency Provider Last Rate Last Admin    acetaminophen (TYLENOL) tablet 650 mg  650 mg Oral Q4H PRN Kerri Steen, JOANNE        albuterol (PROVENTIL) neb solution 2.5 mg  2.5 mg Nebulization Q2H PRN Enio,  Kerri BENJAMIN NP        benzocaine-menthol (CHLORASEPTIC) 6-10 MG lozenge 1 lozenge  1 lozenge Buccal Q1H PRN Kerri Steen NP   1 lozenge at 11/04/24 0935    bisacodyl (DULCOLAX) suppository 10 mg  10 mg Rectal Daily PRN Kerri Steen NP        calcium carbonate (TUMS) chewable tablet 1,000 mg  1,000 mg Oral Daily PRN Kerri Steen NP        calcium gluconate 1 g in 50 mL in sodium chloride intermittent infusion  1 g Intravenous Once PRN Kerri Steen NP        calcium gluconate 2 g in  mL intermittent infusion  2 g Intravenous Once PRN Kerri Steen NP        calcium gluconate 3 g in sodium chloride 0.9 % 100 mL intermittent infusion  3 g Intravenous Once PRN Kerri Steen NP        glucose gel 15-30 g  15-30 g Oral Q15 Min PRN Kerri Steen NP        Or    dextrose 50 % injection 25-50 mL  25-50 mL Intravenous Q15 Min PRN Kerri Steen NP        Or    glucagon injection 1 mg  1 mg Subcutaneous Q15 Min PRN Kerri Steen NP        hydrALAZINE (APRESOLINE) injection 10 mg  10 mg Intravenous Q30 Min PRN Kerri Steen NP        HYDROmorphone (DILAUDID) injection 0.2 mg  0.2 mg Intravenous Q2H PRN Kerri Steen NP   0.2 mg at 11/05/24 0450    Or    HYDROmorphone (DILAUDID) injection 0.4 mg  0.4 mg Intravenous Q2H PRN Kerri Steen NP   0.4 mg at 11/05/24 0031    lidocaine (LMX4) cream   Topical Q1H PRN Kerri Steen NP        lidocaine 1 % 0.1-1 mL  0.1-1 mL Other Q1H PRN Kerri Steen NP        magnesium hydroxide (MILK OF MAGNESIA) suspension 30 mL  30 mL Oral Daily PRN Kerri Steen NP        methocarbamol (ROBAXIN) tablet 500 mg  500 mg Oral Q6H PRN Kerri Steen NP   500 mg at 11/08/24 2235    naloxone (NARCAN) injection 0.2 mg  0.2 mg Intravenous Q2 Min PRN Kerri Steen NP        Or    naloxone (NARCAN) injection 0.4 mg  0.4 mg Intravenous Q2 Min PRN Kerri Steen NP        Or    naloxone (NARCAN) injection 0.2 mg  0.2 mg Intramuscular Q2 Min PRN Kerri Steen NP        Or     naloxone (NARCAN) injection 0.4 mg  0.4 mg Intramuscular Q2 Min PRN Kerri Steen NP        ondansetron (ZOFRAN ODT) ODT tab 4 mg  4 mg Oral Q6H PRN Kerri Steen NP        Or    ondansetron (ZOFRAN) injection 4 mg  4 mg Intravenous Q6H PRN Kerri Steen NP        oxyCODONE (ROXICODONE) tablet 5 mg  5 mg Oral Q4H PRN Kerri Steen NP   5 mg at 11/08/24 2235    Or    oxyCODONE IR (ROXICODONE) half-tab 7.5 mg  7.5 mg Oral Q4H PRN Kerri Steen NP   7.5 mg at 11/05/24 0105    Reason beta blocker order not selected   Does not apply DOES NOT GO TO Kerri Odell NP        sodium chloride (PF) 0.9% PF flush 3 mL  3 mL Intracatheter q1 min prn Kerri Steen NP             Physical Exam  Vitals were reviewed  Blood pressure 99/62, pulse 105, temperature 98  F (36.7  C), temperature source Oral, resp. rate 20, weight 62.7 kg (138 lb 3.2 oz), SpO2 97%.  Rhythm: afib cvr/rvr    Lungs: diminished throughout on NC    Cardiovascular: S1, S2, no m/r/g    Abdomen: soft, NT, ND, rare BS    Extremeties: warm, no LE edema    Incisions: Right thoracotomy and right femoral incisions CDI    CT: serosang output, no air leak    Weight:   Vitals:    11/04/24 2005 11/05/24 0500 11/06/24 0600 11/08/24 0613   Weight: 56.2 kg (123 lb 14.4 oz) 57.7 kg (127 lb 3.3 oz) 59 kg (130 lb 1.1 oz) 61.7 kg (136 lb)    11/09/24 0500   Weight: 62.7 kg (138 lb 3.2 oz)         Data  Recent Labs   Lab 11/09/24  0315 11/08/24  2129 11/08/24  1751 11/08/24  1213 11/08/24  0623 11/08/24  0028 11/07/24  2341 11/07/24  1350 11/07/24  1146 11/07/24  0513 11/06/24  0826 11/06/24  0537 11/04/24  2232 11/04/24 2012 11/04/24  1847 11/04/24  1846 11/04/24  1540 11/04/24  0612   WBC  --   --   --   --  7.8  --   --   --  9.1  --   --  9.1   < > 17.3*  --  13.2*  --  7.6   HGB  --   --   --   --  9.4*  --   --   --  9.7*  --   --  9.4*   < > 8.3*   < > 8.0*   < > 9.3*   MCV  --   --   --   --  89  --   --   --  88  --   --  89   < > 88  --  88  --  87    PLT  --   --   --   --  258  --   --   --  231  --   --  198   < > 184  --  160  --  270   INR  --   --   --   --   --   --   --   --   --   --   --   --   --  1.80*  --  2.04*  --   --    NA  --   --   --   --  131*  131*  --  128*  --  128* 128*  128*   < > 130*  130*   < > 133*   < > 132*   < > 126*   POTASSIUM  --   --   --   --  4.4  --   --   --   --  4.4  4.4  --  4.2   < > 4.0   < > 4.3   < > 4.2   CHLORIDE  --   --   --   --  98  --   --   --   --  97*  --  97*   < > 99  --  97*  --  91*   CO2  --   --   --   --  23  --   --   --   --  25  --  27   < > 25  --  25  --  29   BUN  --   --   --   --  20.2  --   --   --   --  18.7  --  15.9   < > 9.5  --  9.4  --  10.7   CR  --   --   --   --  0.60*  --   --   --   --  0.49*  --  0.67   < > 0.52*  --  0.53*  --  0.59*   ANIONGAP  --   --   --   --  10  --   --   --   --  6*  --  6*   < > 9  --  10  --  6*   AMALIA  --   --   --   --  8.2*  --   --   --   --  8.2*  --  8.3*   < > 8.3*  --  8.9  --  8.1*   GLC 92 108* 103*   < > 136*   < >  --    < >  --  98   < > 99   < > 159*   < > 189*   < > 92   ALBUMIN  --   --   --   --   --   --   --   --   --   --   --   --   --  2.5*  --   --   --  2.6*   PROTTOTAL  --   --   --   --   --   --   --   --   --   --   --   --   --  4.3*  --   --   --   --    BILITOTAL  --   --   --   --   --   --   --   --   --   --   --   --   --  0.6  --   --   --   --    ALKPHOS  --   --   --   --   --   --   --   --   --   --   --   --   --  83  --   --   --   --    ALT  --   --   --   --   --   --   --   --   --   --   --   --   --  19  --   --   --   --    AST  --   --   --   --   --   --   --   --   --   --   --   --   --  34  --   --   --   --     < > = values in this interval not displayed.       Imaging:  Recent Results (from the past 24 hours)   XR Chest Port 1 View    Narrative    EXAM: XR CHEST PORT 1 VIEW  LOCATION: [facility]  DATE: 11/8/2024    INDICATION: s p CT removal  COMPARISON: 11/5/2024       Impression    IMPRESSION: Interval removal of right chest tube with new small right pneumothorax measuring 2.8 cm at the right lung apex. Left lung and pleural space are clear. Normal cardiomediastinal silhouette. Mitral valve prosthesis.           Patient seen and discussed with Dr. Candelario Rapp PA-C  Cardiothoracic Surgery  Pager: [phone]    CV Surgery Rounding Pager: [phone]  After hours please page surgeon on-call

## 2024-11-09 NOTE — PLAN OF CARE
"Patient Name: Rusty  MRN: [id number]  Date of Admission: 10/25/2024  Reason for Admission: S/P R thora, MVR and LINDSEY  Level of Care: [facility] [place]    Vitals:   BP Readings from Last 1 Encounters:   11/09/24 99/62     Pulse Readings from Last 1 Encounters:   11/09/24 105     Wt Readings from Last 1 Encounters:   11/08/24 61.7 kg (136 lb)     Ht Readings from Last 1 Encounters:   10/23/24 1.676 m (5' 6\")     Estimated body mass index is 21.95 kg/m  as calculated from the following:    Height as of 10/23/24: 1.676 m (5' 6\").    Weight as of this encounter: 61.7 kg (136 lb).  Temp Readings from Last 1 Encounters:   11/09/24 98  F (36.7  C) (Oral)       Pain: Pain goal 0-1 Pain Rating 3-6 Effective pain medication/regimen rest, repositioning, PRN Robaxin and Oxycodone    CV Surgery Patient: Yes Post Op Day #: 5    Assessment    General: Afebrile yes  Rhythm:  a-fib CVR/RVR  Blood Pressure Medications Other: soft BP at times  Resp: Oxygen Status: RA  Patient slept last night Yes Approx hours slept about 4 hrs.  Incentive Spirometry Q 1-2 hour when awake: yes   Neuro: Alert yes Orientation: self, person, time, and place  GI/:          Bowel Activity: yes if yes indicate when: 11/9/2024          Bowel Medications: no          Urinary Catheter: no  Skin:          Incision: Incision status: covered           Assessment    Resp: RA  Telemetry: a-fib CVR/RVR; 2nd degree AVB Type 1  Neuro: A&O  GI/: stress incontinence, fecal incontinence  Skin/Wounds: R thora incision CDI, old CT sites and lap sites, R groin site intact; L shoulder dressing intact  Lines/Drains: PIV-SL  Activity: A1-RW/GB  Sleep: intermittent, short intervals  Abnormal Labs: K/Mg/Ph replacement protocol-recheck 11/9    Aggression Stop Light: Green          Patient Care Plan: continue abx, ambulation or activity as tolerated, pain mgt.     "

## 2024-11-10 ENCOUNTER — APPOINTMENT (OUTPATIENT)
Dept: PHYSICAL THERAPY | Facility: CLINIC | Age: 84
End: 2024-11-10
Attending: INTERNAL MEDICINE
Payer: MEDICARE

## 2024-11-10 ENCOUNTER — APPOINTMENT (OUTPATIENT)
Dept: CARDIOLOGY | Facility: CLINIC | Age: 84
End: 2024-11-10
Attending: PHYSICIAN ASSISTANT
Payer: MEDICARE

## 2024-11-10 LAB
ANION GAP SERPL CALCULATED.3IONS-SCNC: 5 MMOL/L (ref 7–15)
ATRIAL RATE - MUSE: 117 BPM
ATRIAL RATE - MUSE: 97 BPM
BUN SERPL-MCNC: 25.7 MG/DL (ref 8–23)
C DIFF TOX B STL QL: NEGATIVE
CALCIUM SERPL-MCNC: 8.1 MG/DL (ref 8.8–10.4)
CHLORIDE SERPL-SCNC: 99 MMOL/L (ref 98–107)
CREAT SERPL-MCNC: 0.58 MG/DL (ref 0.67–1.17)
DIASTOLIC BLOOD PRESSURE - MUSE: NORMAL MMHG
DIASTOLIC BLOOD PRESSURE - MUSE: NORMAL MMHG
EGFRCR SERPLBLD CKD-EPI 2021: >90 ML/MIN/1.73M2
ERYTHROCYTE [DISTWIDTH] IN BLOOD BY AUTOMATED COUNT: 15.7 % (ref 10–15)
GLUCOSE SERPL-MCNC: 91 MG/DL (ref 70–99)
HCO3 SERPL-SCNC: 28 MMOL/L (ref 22–29)
HCT VFR BLD AUTO: 24.7 % (ref 40–53)
HGB BLD-MCNC: 8.4 G/DL (ref 13.3–17.7)
INTERPRETATION ECG - MUSE: NORMAL
INTERPRETATION ECG - MUSE: NORMAL
LVEF ECHO: NORMAL
MAGNESIUM SERPL-MCNC: 1.7 MG/DL (ref 1.7–2.3)
MCH RBC QN AUTO: 30.2 PG (ref 26.5–33)
MCHC RBC AUTO-ENTMCNC: 34 G/DL (ref 31.5–36.5)
MCV RBC AUTO: 89 FL (ref 78–100)
P AXIS - MUSE: NORMAL DEGREES
P AXIS - MUSE: NORMAL DEGREES
PHOSPHATE SERPL-MCNC: 2.8 MG/DL (ref 2.5–4.5)
PLATELET # BLD AUTO: 276 10E3/UL (ref 150–450)
POTASSIUM SERPL-SCNC: 4.3 MMOL/L (ref 3.4–5.3)
PR INTERVAL - MUSE: 288 MS
PR INTERVAL - MUSE: NORMAL MS
QRS DURATION - MUSE: 124 MS
QRS DURATION - MUSE: 128 MS
QT - MUSE: 304 MS
QT - MUSE: 350 MS
QTC - MUSE: 386 MS
QTC - MUSE: 462 MS
R AXIS - MUSE: 9 DEGREES
R AXIS - MUSE: 93 DEGREES
RBC # BLD AUTO: 2.78 10E6/UL (ref 4.4–5.9)
SODIUM SERPL-SCNC: 132 MMOL/L (ref 135–145)
SYSTOLIC BLOOD PRESSURE - MUSE: NORMAL MMHG
SYSTOLIC BLOOD PRESSURE - MUSE: NORMAL MMHG
T AXIS - MUSE: 47 DEGREES
T AXIS - MUSE: 57 DEGREES
VENTRICULAR RATE- MUSE: 105 BPM
VENTRICULAR RATE- MUSE: 97 BPM
WBC # BLD AUTO: 6.1 10E3/UL (ref 4–11)

## 2024-11-10 PROCEDURE — 84100 ASSAY OF PHOSPHORUS: CPT | Performed by: INTERNAL MEDICINE

## 2024-11-10 PROCEDURE — 250N000013 HC RX MED GY IP 250 OP 250 PS 637: Performed by: NURSE PRACTITIONER

## 2024-11-10 PROCEDURE — 250N000013 HC RX MED GY IP 250 OP 250 PS 637: Performed by: PHYSICIAN ASSISTANT

## 2024-11-10 PROCEDURE — 250N000011 HC RX IP 250 OP 636: Performed by: NURSE PRACTITIONER

## 2024-11-10 PROCEDURE — 85014 HEMATOCRIT: CPT | Performed by: PHYSICIAN ASSISTANT

## 2024-11-10 PROCEDURE — 250N000013 HC RX MED GY IP 250 OP 250 PS 637: Performed by: SURGERY

## 2024-11-10 PROCEDURE — 120N000001 HC R&B MED SURG/OB

## 2024-11-10 PROCEDURE — 93005 ELECTROCARDIOGRAM TRACING: CPT

## 2024-11-10 PROCEDURE — 93010 ELECTROCARDIOGRAM REPORT: CPT | Performed by: INTERNAL MEDICINE

## 2024-11-10 PROCEDURE — 97530 THERAPEUTIC ACTIVITIES: CPT | Mod: GP

## 2024-11-10 PROCEDURE — 99233 SBSQ HOSP IP/OBS HIGH 50: CPT | Performed by: INTERNAL MEDICINE

## 2024-11-10 PROCEDURE — 80048 BASIC METABOLIC PNL TOTAL CA: CPT | Performed by: PHYSICIAN ASSISTANT

## 2024-11-10 PROCEDURE — 83735 ASSAY OF MAGNESIUM: CPT | Performed by: INTERNAL MEDICINE

## 2024-11-10 PROCEDURE — 97110 THERAPEUTIC EXERCISES: CPT | Mod: GP

## 2024-11-10 PROCEDURE — 93325 DOPPLER ECHO COLOR FLOW MAPG: CPT

## 2024-11-10 PROCEDURE — 93321 DOPPLER ECHO F-UP/LMTD STD: CPT | Mod: 26 | Performed by: INTERNAL MEDICINE

## 2024-11-10 PROCEDURE — 93325 DOPPLER ECHO COLOR FLOW MAPG: CPT | Mod: 26 | Performed by: INTERNAL MEDICINE

## 2024-11-10 PROCEDURE — 93308 TTE F-UP OR LMTD: CPT | Mod: 26 | Performed by: INTERNAL MEDICINE

## 2024-11-10 PROCEDURE — 250N000011 HC RX IP 250 OP 636: Performed by: INTERNAL MEDICINE

## 2024-11-10 PROCEDURE — 87493 C DIFF AMPLIFIED PROBE: CPT | Performed by: PHYSICIAN ASSISTANT

## 2024-11-10 PROCEDURE — 36415 COLL VENOUS BLD VENIPUNCTURE: CPT | Performed by: PHYSICIAN ASSISTANT

## 2024-11-10 RX ORDER — MAGNESIUM OXIDE 400 MG/1
400 TABLET ORAL EVERY 4 HOURS
Status: COMPLETED | OUTPATIENT
Start: 2024-11-10 | End: 2024-11-10

## 2024-11-10 RX ORDER — AMIODARONE HYDROCHLORIDE 200 MG/1
400 TABLET ORAL 2 TIMES DAILY
Status: DISCONTINUED | OUTPATIENT
Start: 2024-11-10 | End: 2024-11-13 | Stop reason: HOSPADM

## 2024-11-10 RX ADMIN — AMIODARONE HYDROCHLORIDE 400 MG: 200 TABLET ORAL at 08:34

## 2024-11-10 RX ADMIN — HEPARIN SODIUM 5000 UNITS: 5000 INJECTION, SOLUTION INTRAVENOUS; SUBCUTANEOUS at 16:32

## 2024-11-10 RX ADMIN — PANTOPRAZOLE SODIUM 40 MG: 40 TABLET, DELAYED RELEASE ORAL at 08:34

## 2024-11-10 RX ADMIN — VANCOMYCIN HYDROCHLORIDE 1000 MG: 1 INJECTION, SOLUTION INTRAVENOUS at 06:18

## 2024-11-10 RX ADMIN — SODIUM CHLORIDE TAB 1 GM 2 G: 1 TAB at 18:09

## 2024-11-10 RX ADMIN — AMIODARONE HYDROCHLORIDE 400 MG: 200 TABLET ORAL at 21:36

## 2024-11-10 RX ADMIN — SODIUM CHLORIDE TAB 1 GM 2 G: 1 TAB at 12:27

## 2024-11-10 RX ADMIN — HEPARIN SODIUM 5000 UNITS: 5000 INJECTION, SOLUTION INTRAVENOUS; SUBCUTANEOUS at 23:51

## 2024-11-10 RX ADMIN — VANCOMYCIN HYDROCHLORIDE 1000 MG: 1 INJECTION, SOLUTION INTRAVENOUS at 18:07

## 2024-11-10 RX ADMIN — HYALURONIDASE (HUMAN RECOMBINANT) 150 UNITS: 150 INJECTION, SOLUTION SUBCUTANEOUS at 08:39

## 2024-11-10 RX ADMIN — ASPIRIN 81 MG CHEWABLE TABLET 81 MG: 81 TABLET CHEWABLE at 08:34

## 2024-11-10 RX ADMIN — MIDODRINE HYDROCHLORIDE 5 MG: 5 TABLET ORAL at 18:09

## 2024-11-10 RX ADMIN — TAMSULOSIN HYDROCHLORIDE 0.4 MG: 0.4 CAPSULE ORAL at 08:34

## 2024-11-10 RX ADMIN — SODIUM CHLORIDE TAB 1 GM 2 G: 1 TAB at 08:34

## 2024-11-10 RX ADMIN — SIMETHICONE 80 MG: 80 TABLET, CHEWABLE ORAL at 08:34

## 2024-11-10 RX ADMIN — MIDODRINE HYDROCHLORIDE 5 MG: 5 TABLET ORAL at 08:34

## 2024-11-10 RX ADMIN — ACETAMINOPHEN 650 MG: 325 TABLET, FILM COATED ORAL at 16:38

## 2024-11-10 RX ADMIN — MIDODRINE HYDROCHLORIDE 5 MG: 5 TABLET ORAL at 12:27

## 2024-11-10 RX ADMIN — Medication 400 MG: at 10:13

## 2024-11-10 RX ADMIN — HEPARIN SODIUM 5000 UNITS: 5000 INJECTION, SOLUTION INTRAVENOUS; SUBCUTANEOUS at 08:42

## 2024-11-10 NOTE — PROGRESS NOTES
[facility]    Hospitalist Progress Note    Date of Admission: 10/25/2024    Assessment & Plan   Jayme Donahue is a 84 year old male with PMhx of prostate cancer who was admitted to [facility] on 10/23/24 with suspected urosepsis.  He was found to have bacterium due to corynebacterium stratum and echo showed severe mitral regurgitation with a mobile echodensity on the mitral valve.  He was subsequently transferred to [facility] on 10/25/2024 for ongoing evaluation and management of endocarditis.      He was evaluated by CV surgery during his stay.  Prior to undergoing valve procedures he underwent extraction of numerous teeth.  He ultimately underwent minimally invasive mitral valve replacement with tissue valve on 11/4/24.     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  He was found to be hyponatremic with a sodium 118 and diagnosed with sepsis due to UTI.  He became tensive and transferred to the ICU for initiation of vasopressors.  He was placed on broad-spectrum antibiotics with vancomycin and ceftriaxone.  Pressors were quickly weaned.  During this time blood cultures returned positive for corynebacterium striatum.  TTE showed severe mitral regurgitation and a mobile echodensity on the atrial side of the mitral valve (flail scallop vs vegetation).  Given some concurrent complaints of low back pain, MRI of the lumbar spine was obtained possibly suggestive of early changes of discitis.  ID was contacted, recommended transfer to [facility] for further cardiac evaluation.  *TTE this stay showed a large  vegetation on atrial aspect of mitral valve, prolapse of posterior mitral valve leaflet, severe mitral regurgitation.  *ID, cardiology and CV surgery following this stay.   *Repeat blood cultures have remained negative as of 10/26.  *Underwent preoperative evaluation with coronary angiogram on 10/31 that showed normal coronary arteries  *Ultimately underwent Right mini-thoracotomy mitral valve replacement with a 31 mm Quinonez MITRIS bovine pericardial valve on 11/4/24  -- routine postop cares per CV surgery including pain mgmt, postop arrhythmias  -- placed on midodrine on 11/7 for postop hypotension  -- conts on IV vancomycin per ID, anticipate prolonged course of IV abx on discharge; plan is to place PICC  to discharge  -- conts on ASA, no indication for statin  -- PT/OT recommending TCU stay at discharge    Postoperative A-fib  *Started on amiodarone gtt on 11/9 per CV surgery.  Converted back to NSR.  Changed to oral amiodarone.  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  Initially felt to be secondary to dehydration and had mild improvement after some IV fluids.  Urine studies obtained and appeared consistent with SIADH.  He was placed on a fluid restriction and started on salt tablets.  Sodium stabilized at 119-123.  *Na 125 on transfer to [facility], though began to trend back down with continued fluid restriction.  *Nephrology consulted and following this day. Started on UreNa on 10/29, then switched to salt tablets on 10/30.    *Slight drop Bee from 11/7-11/9 felt to be due in part to IVFs that were given for hypotension during this time.  -- Na improving; monitor daily BMP  -- cont salt tabs i TID  -- fluid restriction intensified from 1200ml/d to 1000ml/d per CV surgery on 11/10  -- per nephrology, once Na approaches 135 can liberalize fluid restriction and continue salt tabs    Loose  "stools  *Noted postop. Hold sched bowel regimen.  -- cdiff testing ordered per CV surgery, though less suspicious as patient has been on sched bowel regimen/Mag replacement     Left scapular lesion, biopsy positive for basal cell carcinoma  *Left scapular lesion was noted on admission.  Appearance was concerning for possible malignancy question (see photos in epic from 10/23/24).  Wound culture obtained at the time of admission and grew stenotrophomonas maltophilia.  Was initially placed on a of doxycycline, however this was ultimately stopped after it was felt that findings represented chronic skin colonization and not an acute infection.  *Seen by general surgery, underwent punch biopsy on 10/31/24. Pathology showed \"Basal cell carcinoma with keratotic features, incompletely excised\"  Surgery noted patient would require full thickness resection with 4mm margins and that this would require general anesthesia. No plans to pursue further surgical mgmt this stay.   *Sutures removed on 11/7. [facility].   *WOC RN saw patient, local cares advised     History of prostate cancer, s/p treatment in 2020.   Urinary retention s/p mclain placement 10/29 and removal on 11/8:  Resolved  *Had reportedly underwent treatment for prostate cancer in 2020.  PSA levels elevated to 50s but trend was generally flat from 7/2024-9/2024. Reported some urgency this stay.   *Developed urinary retention this day requiring placement of a Mclain catheter.  Placed on empiric Flomax.  *Mclain catheter removed 11/8. No issues with persistent retention.  -- cont Flomax     Moderate protein calorie malnutrition  *Nutritionist following. Encourage po intake.    S/p extraction of numerous teeth (#3, 5-14, 20-21, 23-29) on 10/31/24   *Underwent teeth extraction prior to undergoing valve surgery.     FEN: no IVFs, lytes stable, regular/mechanical soft diet with 1000ml/d fluid restriction  DVT Prophylaxis: PCDs, subQ " heparin  Code Status: Full Code    Disposition: Condition continues to improve. Discharge pending postoperative course and stable labs, suspect 2-3 days.  PT/OT recommending TCU stay, SW following.  Patient and spouse hoping for TCU bed close to where they reside ([place], [place])    Medically Ready for Discharge: Anticipated in 2-4 Days    Patient's spouse Pamela updated over the phone this afternoon, questions answered.    Genna Sutton, DO    Clinically Significant Risk Factors         # Hyponatremia: Lowest Na = 128 mmol/L in last 2 days, will monitor as appropriate  # Hypochloremia: Lowest Cl = 96 mmol/L in last 2 days, will monitor as appropriate      # Hypoalbuminemia: Lowest albumin = 2.5 g/dL at 11/4/2024  8:12 PM, will monitor as appropriate                 # Severe Malnutrition: based on nutrition assessment    # Financial/Environmental Concerns:             Medical Decision Making       Please see A&P for additional details of medical decision making.         Interval History   Chart reviewed.  AM events noted -- IV infiltrated while receiving amiodarone and vanco. Protocol followed.  Also noted to have had several bowel movements overnight, some ongoing loose stools this afternoon.  I saw patient this afternoon.  He was resting comfortably.  Denies chest pain, shortness of breath, cough, abdominal pain, nausea or vomiting.  Using hot pack over the site of where his IV infiltrated.  Otherwise in good spirits and continues to remain thankful and appreciative for the care he is received this stay.    -Data reviewed today: I reviewed all new labs and imaging results over the last 24 hours. I personally reviewed no images or EKG's today.    Physical Exam   Temp: 98  F (36.7  C) Temp src: Axillary BP: 99/66 Pulse: 101   Resp: 16 SpO2: 97 % O2 Device: None (Room air)    Vitals:    11/08/24 0613 11/09/24 0500 11/10/24 0300   Weight: 61.7 kg (136 lb) 62.7 kg (138 lb 3.2 oz) 64.5 kg (142 lb 1.6 oz)      Vital Signs with Ranges  Temp:  [98  F (36.7  C)-98.5  F (36.9  C)] 98  F (36.7  C)  Pulse:  [] 101  Resp:  [15-18] 16  BP: ()/(57-81) 99/66  SpO2:  [96 %-100 %] 97 %  I/O last 3 completed shifts:  In: 780 [P.O.:780]  Out: -     Constitutional: Resting comfortably, alert and answering questions appropriately, NAD  Respiratory: Few faint bibasilar crackles but otherwise CTAB, no wheeze, no increased work of breathing  Cardiovascular: HRRR, +bilateral LE edema  GI: S, NT, ND, +BS  Skin/Integumen: warm/dry  Other:     Medications   Current Facility-Administered Medications   Medication Dose Route Frequency Provider Last Rate Last Admin    Reason beta blocker order not selected   Does not apply DOES NOT GO TO Kerri Odell NP         Current Facility-Administered Medications   Medication Dose Route Frequency Provider Last Rate Last Admin    amiodarone (PACERONE) tablet 400 mg  400 mg Oral BID Yana Rapp PA-C   400 mg at 11/10/24 0834    aspirin (ASA) chewable tablet 81 mg  81 mg Oral or NG Tube Daily Kerri Steen NP   81 mg at 11/10/24 0834    heparin ANTICOAGULANT injection 5,000 Units  5,000 Units Subcutaneous Q8H Kerri Steen NP   5,000 Units at 11/10/24 0842    Lidocaine (LIDOCARE) 4 % Patch 2 patch  2 patch Transdermal Q24H Kerri Steen NP   2 patch at 11/09/24 0035    magnesium oxide (MAG-OX) tablet 400 mg  400 mg Oral Q4H Alexa Ellison MD   400 mg at 11/10/24 1013    midodrine (PROAMATINE) tablet 5 mg  5 mg Oral TID w/meals Brigitte García PA-C   5 mg at 11/10/24 0834    pantoprazole (PROTONIX) 2 mg/mL suspension 40 mg  40 mg Oral or NG Tube Daily Kerri Steen NP   40 mg at 11/05/24 0953    Or    pantoprazole (PROTONIX) EC tablet 40 mg  40 mg Oral Daily Kerri Steen NP   40 mg at 11/10/24 0834    [Held by provider] polyethylene glycol (MIRALAX) Packet 17 g  17 g Oral Daily Kerri Steen, NP   17 g at 11/05/24 0508    [Held by provider] senna-docusate  (SENOKOT-S/PERICOLACE) 8.6-50 MG per tablet 1 tablet  1 tablet Oral or NG Tube BID Kerri Steen NP   1 tablet at 11/05/24 2059    [Held by provider] simethicone (MYLICON) chewable tablet 80 mg  80 mg Oral 4x Daily Kerri Steen NP   80 mg at 11/10/24 0834    sodium chloride (PF) 0.9% PF flush 3 mL  3 mL Intracatheter Q8H Kerri Steen NP   3 mL at 11/09/24 1154    sodium chloride tablet 2 g  2 g Oral TID w/meals Yana Rapp PA-C   2 g at 11/10/24 0834    tamsulosin (FLOMAX) capsule 0.4 mg  0.4 mg Oral Daily Kerri Steen NP   0.4 mg at 11/10/24 0834    vancomycin (VANCOCIN) 1,000 mg in 200 mL dextrose intermittent infusion  1,000 mg Intravenous Q12H Kerri Steen  mL/hr at 11/10/24 0732 Restarted at 11/10/24 0732       Data   Recent Labs   Lab 11/10/24  0654 11/09/24  0653 11/09/24  0315 11/08/24  1213 11/08/24  0623 11/04/24  2232 11/04/24  2012 11/04/24  1847 11/04/24  1846 11/04/24  1540 11/04/24  0612   WBC 6.1 6.6  --   --  7.8   < > 17.3*  --  13.2*  --  7.6   HGB 8.4* 9.4*  --   --  9.4*   < > 8.3*   < > 8.0*   < > 9.3*   MCV 89 88  --   --  89   < > 88  --  88  --  87    266  --   --  258   < > 184  --  160  --  270   INR  --   --   --   --   --   --  1.80*  --  2.04*  --   --    * 128*  --   --  131*  131*   < > 133*   < > 132*   < > 126*   POTASSIUM 4.3 4.1  --   --  4.4   < > 4.0   < > 4.3   < > 4.2   CHLORIDE 99 96*  --   --  98   < > 99  --  97*  --  91*   CO2 28 21*  --   --  23   < > 25  --  25  --  29   BUN 25.7* 22.8  --   --  20.2   < > 9.5  --  9.4  --  10.7   CR 0.58* 0.54*  --   --  0.60*   < > 0.52*  --  0.53*  --  0.59*   ANIONGAP 5* 11  --   --  10   < > 9  --  10  --  6*   AMALIA 8.1* 8.4*  --   --  8.2*   < > 8.3*  --  8.9  --  8.1*   GLC 91 114* 92   < > 136*   < > 159*   < > 189*   < > 92   ALBUMIN  --   --   --   --   --   --  2.5*  --   --   --  2.6*   PROTTOTAL  --   --   --   --   --   --  4.3*  --   --   --   --    BILITOTAL  --   --   --   --   --    --  0.6  --   --   --   --    ALKPHOS  --   --   --   --   --   --  83  --   --   --   --    ALT  --   --   --   --   --   --  19  --   --   --   --    AST  --   --   --   --   --   --  34  --   --   --   --     < > = values in this interval not displayed.       Recent Results (from the past 24 hours)   XR Chest 2 Views    Narrative    EXAM: XR CHEST 2 VIEWS  LOCATION: [facility]  DATE: 11/9/2024    INDICATION: s p mini MVR, pnx noted on prior cxr  COMPARISON: 11/8/2024      Impression    IMPRESSION: Small right apical pneumothorax persists though somewhat improved currently measuring 2 cm, previously measuring 2.8 cm. Slight blunting costophrenic angles bilaterally consistent with small pleural effusions. Heart size normal with   prosthetic cardiac valve present. No new abnormality.

## 2024-11-10 NOTE — PROVIDER NOTIFICATION
MD Notification    Notified Person: MD    Notified Person Name: Dr. Sutton    Notification Date/Time: 11/10/2024 0737    Notification Interaction: Page    Purpose of Notification: FYI amio and vanco extravasated. Following extravasation protocol.     Orders Received: No new orders.    Comments:

## 2024-11-10 NOTE — PLAN OF CARE
"Goal Outcome Evaluation:  Patient Name: Rusty  MRN: [id number]  Date of Admission: 10/25/2024  Reason for Admission: Bacteremia  Level of Care: Med/ surg    Vitals:   BP Readings from Last 1 Encounters:  11/09/24 : 90/56    Pulse Readings from Last 1 Encounters:  11/09/24 : 101    Wt Readings from Last 1 Encounters:  11/09/24 : 62.7 kg (138 lb 3.2 oz)    Ht Readings from Last 1 Encounters:  10/23/24 : 1.676 m (5' 6\")    Estimated body mass index is 22.31 kg/m  as calculated from the following:    Height as of 10/23/24: 1.676 m (5' 6\").    Weight as of this encounter: 62.7 kg (138 lb 3.2 oz).  Temp Readings from Last 1 Encounters:  11/09/24 : 97.7  F (36.5  C) (Oral)      Pain: Pain goal 0 Pain Rating 0     CV Surgery Patient: Yes Post Op Day #: 5    Assessment    General: Afebrile yes  Rhythm: ST w/ 1* AVB w/ occ. pauses.   Blood Pressure Medications given/held: Midodrine given  Resp: Oxygen Status: RA  Patient slept last night Yes   Incentive Spirometry Q 1-2 hour when awake: yes Volume: 750  Neuro: Alert yes Orientation: self, person, time, and place  GI/:          Bowel Activity: yes if yes indicate when: 11/9          Bowel Medications: no          Urinary Catheter: no  Skin:          Incision: Incision status: covered          Epicardial Pacing Wires: no      Assessment    Resp: RA  Telemetry: ST w/ 1* AVB w/ frequent pauses.   Neuro: A&Ox4, forgetful  GI/: Minced/ moist diet. 1000 FR. Adequate UOP. BM+.   Skin/Wounds: Thora site and lap sites. Right groin site. Left back site.   Lines/Drains: PIV. Amio gtt running.   Activity: Ax1 GB, W. Frequent trips to the bathroom and a walk in the [place] today.   Sleep: Fair  Abnormal Labs: Na 128. Monitor fluid intake. Lytes recheck in the morning.     Aggression Stop Light: Green          Patient Care Plan: HR's elevated, amio gtt started. EKG in results tab. CXR resulted. Echo pending completion tomorrow.     "

## 2024-11-10 NOTE — PLAN OF CARE
"Goal Outcome Evaluation:  Patient Name: Rusty  MRN: [id number]  Date of Admission: 10/25/2024  Reason for Admission: Bacteremia  Level of Care: Med/surg    Vitals:   BP Readings from Last 1 Encounters:  11/10/24 : 95/62    Pulse Readings from Last 1 Encounters:  11/10/24 : 88    Wt Readings from Last 1 Encounters:  11/10/24 : 64.5 kg (142 lb 1.6 oz)    Ht Readings from Last 1 Encounters:  10/23/24 : 1.676 m (5' 6\")    Estimated body mass index is 22.94 kg/m  as calculated from the following:    Height as of 10/23/24: 1.676 m (5' 6\").    Weight as of this encounter: 64.5 kg (142 lb 1.6 oz).  Temp Readings from Last 1 Encounters:  11/10/24 : 99.6  F (37.6  C) (Axillary)      Pain: Pain goal 0 Pain Rating 0-2 Effective pain medication/regimen Tylenol for back pain, repositioning helps    CV Surgery Patient: Yes Post Op Day #: 6    Assessment    General: Afebrile yes  Rhythm: normal sinus rhythm w/ 1*AVB and BBB  Blood Pressure Medications given/held: Midodrine for BP's  Resp: Oxygen Status: RA  Patient slept last night Yes   Incentive Spirometry Q 1-2 hour when awake: yes Volume: 750  Neuro: Alert yes Orientation: self, person, time, and place  GI/:          Bowel Activity: yes if yes indicate when: 11/10          Bowel Medications: no          Urinary Catheter: no  Skin:          Incision: Incision status: healing well          Epicardial Pacing Wires: no    Assessment    Resp: RA  Telemetry: SR w/ 1*AVB and BBB  Neuro: A&Ox4 but forgetful  GI/: Minced/ moist diet. 1000 FR. Adequate UOP. Multiple loose Bms today. C diff negative.   Skin/Wounds: redness to bottom. Thora site with lap sites. CT sites. Right groin site. Left upper extremity extravasation site, baseline pictures documented. Heat compress 4 times x20 minutes today.   Lines/Drains: PIV SL  Activity: Ax1. 4 walks in [place].   Sleep: Fair  Abnormal Labs: Lytes recheck in AM.     Aggression Stop Light: Green          Patient Care Plan: Amio gtt " transitioned to PO amio.  Echo completed. Showered today. Needs PICC prior to discharge. Compression stockings during the day for LE edema.

## 2024-11-10 NOTE — PHARMACY-VANCOMYCIN DOSING SERVICE
Pharmacy Vancomycin Note  Date of Service 2024  Patient's  1940   84 year old, male    Indication: Bacteremia  Day of Therapy:   Current vancomycin regimen:  1000 mg IV q12h  Current vancomycin monitoring method: AUC  Current vancomycin therapeutic monitoring goal: 400-600 mg*h/L    Insight[employer] Prediction of Current Vancomycin Regimen  Loading dose: N/A  Regimen: 1000 mg IV every 12 hours.  Start time: 06:38 on 11/10/2024  Exposure target: AUC24 (range)400-600 mg/L.hr   AUC24,ss: 407 mg/L.hr  Probability of AUC24 > 400: 56 %  Ctrough,ss: 10.4 mg/L  Probability of Ctrough,ss > 20: 0 %  Probability of nephrotoxicity (Lodise J CARLOS ): 6 %    Current estimated CrCl = Estimated Creatinine Clearance: 90.3 mL/min (A) (based on SCr of 0.54 mg/dL (L)).    Creatinine for last 3 days  2024:  5:13 AM Creatinine 0.49 mg/dL  2024:  6:23 AM Creatinine 0.60 mg/dL  2024:  6:53 AM Creatinine 0.54 mg/dL    Recent Vancomycin Levels (past 3 days)  2024:  6:38 PM Vancomycin 11.0 ug/mL    Vancomycin IV Administrations (past 72 hours)                     vancomycin (VANCOCIN) 1,000 mg in 200 mL dextrose intermittent infusion (mg) 1,000 mg New Bag 24 1838     1,000 mg New Bag  0614     1,000 mg New Bag 24 1752     1,000 mg New Bag  0629     1,000 mg New Bag 24 1723     1,000 mg New Bag  0221                    Nephrotoxins and other renal medications (From now, onward)      Start     Dose/Rate Route Frequency Ordered Stop    24 1400  vancomycin (VANCOCIN) 1,000 mg in 200 mL dextrose intermittent infusion         1,000 mg  200 mL/hr over 1 Hours Intravenous EVERY 12 HOURS 24 1234                 Contrast Orders - past 72 hours (72h ago, onward)      None            Interpretation of levels and current regimen:  Vancomycin level is reflective of -600    Has serum creatinine changed greater than 50% in last 72 hours: No    Urine output:  unable to determine    Renal  Function: Stable    Plan:  Continue Current Dose  Vancomycin monitoring method: AUC  Vancomycin therapeutic monitoring goal: 400-600 mg*h/L  Pharmacy will check vancomycin levels as appropriate in 1-3 Days.  Serum creatinine levels will be ordered daily for the first week of therapy and at least twice weekly for subsequent weeks.    Akil Aliheyder, RP

## 2024-11-10 NOTE — PROGRESS NOTES
[facility]  Cardiovascular and Thoracic Surgery Daily Note    Today's Plans: 1. Echo 2. Cdiff r/o 3. Therapy-encourage pt to eat in chair not bed 4. Shower   Tomorrow-?Picc line for IV abx, send referral to [place] [facility]      Assessment and Plan  Mr. Donahue is a very pleasant 84-year-old gentleman with a PMH of prostate cancer who was recently admitted to the hospital and was diagnosed with Corynebacterium bacteremia and urinary tract infection. 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     POD # 6 s/p Right mini-thoracotomy mitral valve replacement with a 31 mm Quinonez MITRIS bovine pericardial valve, right common femoral arterial and venous cannulation for cardiopulmonary bypass, intraoperative LINDSEY on 11/4/24 with Dr. Alexa Ellison.    - CVS: Pre-op TTE with preserved biventricular funtion. Postop vasoplegic shock, resolved. HD stable. BB discontinued d/t to pauses. EKG with type II AV block this am-resolved now in a.fib 100s-110s, Amiodarone gtt started 11/9 transitioned to PO 11/10. Initiated Midodrine TID pod#3 due to low BPs.  ASA 81 mg daily, no statin indicated. Appears euvolemic. Defer on volume or diuresis today. No PTA meds on hold. Chest tubes removed pod#4 without issue. Small ptx on cxr, repeat stable.     - Resp: Postoperative mechanical ventilation, resolved. Extubated POD 0, now saturating well on RA. cannula. Encourage aggressive IS, pulmonary toilet.    - Neuro: Neuros intact, pain controlled on current regimen    - Renal: No history of significant renal disease. Cr stable. Trend BMP.  Volume management as above. 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 Salt tabs resumed by nephrology.  Recent Labs   Lab 11/09/24  0653 11/08/24  0623 11/07/24  0513   CR 0.54* 0.60* 0.49*       - GI: + BM, +flatus, continue bowel regimen. Large gastric bubble on CXR, scheduled simethicone QID    - : voiding on own. Hx prostate cancer  s/p radiation. PTA flomax resumed.    - Endo: Postop stress hyperglycemia. Insulin infusion transitioned to sliding scale insulin. Stop checking  Hemoglobin A1C   Date Value Ref Range Status   10/31/2024 5.3 <5.7 % Final     Comment:     Normal <5.7%   Prediabetes 5.7-6.4%    Diabetes 6.5% or higher     Note: Adopted from ADA consensus guidelines.        - FEN: Replace electrolytes as needed. ADAT    - ID: Postop leukocytosis. Afebrile. Infectious endocarditis: had Corynebacterium bacteremia and urinary tract infection. OR gram stain positive thus far for 3+ GPB. ID following. Currently on Vancomycin. Will eventually need a PICC when ID clears. Completing perioperative Cefazolin. Trend CBC and fever curve.   Recent Labs   Lab 11/09/24  0653 11/08/24  0623 11/07/24  1146   WBC 6.6 7.8 9.1       - Heme: Acute blood loss anemia and thrombocytopenia due to surgery. Hgb and PLT stable. Trend CBC, transfuse PRN. Skin biopsy on back positive for basal cell carcinoma.  Recent Labs   Lab 11/09/24  0653 11/08/24 0623 11/07/24  1146   HGB 9.4* 9.4* 9.7*    258 231       - Proph: SCD, subcutaneous heparin, PPI    - Other:  Clinically Significant Risk Factors         # Hyponatremia: Lowest Na = 128 mmol/L in last 2 days, will monitor as appropriate  # Hypochloremia: Lowest Cl = 96 mmol/L in last 2 days, will monitor as appropriate      # Hypoalbuminemia: Lowest albumin = 2.5 g/dL at 11/4/2024  8:12 PM, will monitor as appropriate                   # Severe Malnutrition: based on nutrition assessment    # Financial/Environmental Concerns:           - Dispo: [address] [facility] Therapies recommending TCU- social work consulted. Remove chest tubes today. Will need IV abx at discharge. Medically Ready for Discharge: 1-2 days      Interval History  Sitting up in bed, seen earlier walking hallway with therapy, having numerous BMs (4x overnight), r/o cdiff, eating well, breathing stable, denies pain      Medications  Current  Facility-Administered Medications   Medication Dose Route Frequency Provider Last Rate Last Admin    aspirin (ASA) chewable tablet 81 mg  81 mg Oral or NG Tube Daily Kerri Steen NP   81 mg at 11/09/24 0751    heparin ANTICOAGULANT injection 5,000 Units  5,000 Units Subcutaneous Q8H Kerri Steen NP   5,000 Units at 11/09/24 2301    Lidocaine (LIDOCARE) 4 % Patch 2 patch  2 patch Transdermal Q24H Kerri Steen NP   2 patch at 11/09/24 0035    midodrine (PROAMATINE) tablet 5 mg  5 mg Oral TID w/meals Brigitte García PA-C   5 mg at 11/09/24 1822    pantoprazole (PROTONIX) 2 mg/mL suspension 40 mg  40 mg Oral or NG Tube Daily Kerri Steen NP   40 mg at 11/05/24 0953    Or    pantoprazole (PROTONIX) EC tablet 40 mg  40 mg Oral Daily Kerri Steen NP   40 mg at 11/09/24 0751    polyethylene glycol (MIRALAX) Packet 17 g  17 g Oral Daily Kerri Steen NP   17 g at 11/05/24 0952    senna-docusate (SENOKOT-S/PERICOLACE) 8.6-50 MG per tablet 1 tablet  1 tablet Oral or NG Tube BID Kerri Steen NP   1 tablet at 11/05/24 2059    simethicone (MYLICON) chewable tablet 80 mg  80 mg Oral 4x Daily Kerri Steen NP   80 mg at 11/09/24 2027    sodium chloride (PF) 0.9% PF flush 3 mL  3 mL Intracatheter Q8H Kerri Steen NP   3 mL at 11/09/24 1154    sodium chloride tablet 2 g  2 g Oral TID w/meals Yana Rapp PA-C   2 g at 11/09/24 1822    tamsulosin (FLOMAX) capsule 0.4 mg  0.4 mg Oral Daily Kerri Steen NP   0.4 mg at 11/09/24 0751    vancomycin (VANCOCIN) 1,000 mg in 200 mL dextrose intermittent infusion  1,000 mg Intravenous Q12H Kerri Steen  mL/hr at 11/10/24 0618 1,000 mg at 11/10/24 0618     Current Facility-Administered Medications   Medication Dose Route Frequency Provider Last Rate Last Admin    acetaminophen (TYLENOL) tablet 650 mg  650 mg Oral Q4H PRN Kerri Steen, NP   650 mg at 11/09/24 2026    benzocaine-menthol (CHLORASEPTIC) 6-10 MG lozenge 1 lozenge  1 lozenge Buccal Q1H  PRN Kerri Steen NP   1 lozenge at 11/04/24 0935    bisacodyl (DULCOLAX) suppository 10 mg  10 mg Rectal Daily PRN Kerri Steen NP        calcium carbonate (TUMS) chewable tablet 1,000 mg  1,000 mg Oral Daily PRN Kerri Steen NP        calcium gluconate 1 g in 50 mL in sodium chloride intermittent infusion  1 g Intravenous Once PRN Kerri Seten NP        calcium gluconate 2 g in  mL intermittent infusion  2 g Intravenous Once PRN Kerri Steen NP        calcium gluconate 3 g in sodium chloride 0.9 % 100 mL intermittent infusion  3 g Intravenous Once PRN Kerri Steen NP        glucose gel 15-30 g  15-30 g Oral Q15 Min PRN Kerri Steen NP        Or    dextrose 50 % injection 25-50 mL  25-50 mL Intravenous Q15 Min PRN Kerri Steen NP        Or    glucagon injection 1 mg  1 mg Subcutaneous Q15 Min PRN Kerri Steen NP        hydrALAZINE (APRESOLINE) injection 10 mg  10 mg Intravenous Q30 Min PRN Kerri Steen NP        HYDROmorphone (DILAUDID) injection 0.2 mg  0.2 mg Intravenous Q2H PRN Kerri Steen NP   0.2 mg at 11/05/24 0450    Or    HYDROmorphone (DILAUDID) injection 0.4 mg  0.4 mg Intravenous Q2H PRN Kerri Steen NP   0.4 mg at 11/05/24 0031    lidocaine (LMX4) cream   Topical Q1H PRN Kerri Steen NP        lidocaine 1 % 0.1-1 mL  0.1-1 mL Other Q1H PRN Kerri Steen NP        magnesium hydroxide (MILK OF MAGNESIA) suspension 30 mL  30 mL Oral Daily PRN Kerri Steen NP        methocarbamol (ROBAXIN) tablet 500 mg  500 mg Oral Q6H PRN Kerri Steen NP   500 mg at 11/08/24 2235    naloxone (NARCAN) injection 0.2 mg  0.2 mg Intravenous Q2 Min PRN Kerri Steen NP        Or    naloxone (NARCAN) injection 0.4 mg  0.4 mg Intravenous Q2 Min PRN Kerri Steen NP        Or    naloxone (NARCAN) injection 0.2 mg  0.2 mg Intramuscular Q2 Min PRN Kerri Steen NP        Or    naloxone (NARCAN) injection 0.4 mg  0.4 mg Intramuscular Q2 Min PRN Kerri Steen, NP         ondansetron (ZOFRAN ODT) ODT tab 4 mg  4 mg Oral Q6H PRN Kerri Steen NP        Or    ondansetron (ZOFRAN) injection 4 mg  4 mg Intravenous Q6H PRN Kerri Steen NP        oxyCODONE (ROXICODONE) tablet 5 mg  5 mg Oral Q4H PRN Kerri Steen NP   5 mg at 11/08/24 2235    Or    oxyCODONE IR (ROXICODONE) half-tab 7.5 mg  7.5 mg Oral Q4H PRN Kerri Steen NP   7.5 mg at 11/05/24 0105    Reason beta blocker order not selected   Does not apply DOES NOT GO TO Kerri Odell NP        sodium chloride (PF) 0.9% PF flush 3 mL  3 mL Intracatheter q1 min prn Kerri Steen NP             Physical Exam  Vitals were reviewed  Blood pressure 99/64, pulse 93, temperature 98  F (36.7  C), temperature source Oral, resp. rate 16, weight 64.5 kg (142 lb 1.6 oz), SpO2 97%.  Rhythm: afib cvr/    Lungs: diminished throughout on NC    Cardiovascular: S1, S2, no m/r/g    Abdomen: soft, NT, ND, rare BS    Extremeties: warm, no LE edema    Incisions: Right thoracotomy and right femoral incisions CDI    CT: serosang output, no air leak    Weight:   Vitals:    11/05/24 0500 11/06/24 0600 11/08/24 0613 11/09/24 0500   Weight: 57.7 kg (127 lb 3.3 oz) 59 kg (130 lb 1.1 oz) 61.7 kg (136 lb) 62.7 kg (138 lb 3.2 oz)    11/10/24 0300   Weight: 64.5 kg (142 lb 1.6 oz)         Data  Recent Labs   Lab 11/09/24  0653 11/09/24  0315 11/08/24  2129 11/08/24  1213 11/08/24  0623 11/08/24  0028 11/07/24  2341 11/07/24  1350 11/07/24  1146 11/07/24  0513 11/04/24  2232 11/04/24 2012 11/04/24  1847 11/04/24  1846 11/04/24  1540 11/04/24  0612   WBC 6.6  --   --   --  7.8  --   --   --  9.1  --    < > 17.3*  --  13.2*  --  7.6   HGB 9.4*  --   --   --  9.4*  --   --   --  9.7*  --    < > 8.3*   < > 8.0*   < > 9.3*   MCV 88  --   --   --  89  --   --   --  88  --    < > 88  --  88  --  87     --   --   --  258  --   --   --  231  --    < > 184  --  160  --  270   INR  --   --   --   --   --   --   --   --   --   --   --  1.80*  --  2.04*  --    --    *  --   --   --  131*  131*  --  128*  --  128* 128*  128*   < > 133*   < > 132*   < > 126*   POTASSIUM 4.1  --   --   --  4.4  --   --   --   --  4.4  4.4   < > 4.0   < > 4.3   < > 4.2   CHLORIDE 96*  --   --   --  98  --   --   --   --  97*   < > 99  --  97*  --  91*   CO2 21*  --   --   --  23  --   --   --   --  25   < > 25  --  25  --  29   BUN 22.8  --   --   --  20.2  --   --   --   --  18.7   < > 9.5  --  9.4  --  10.7   CR 0.54*  --   --   --  0.60*  --   --   --   --  0.49*   < > 0.52*  --  0.53*  --  0.59*   ANIONGAP 11  --   --   --  10  --   --   --   --  6*   < > 9  --  10  --  6*   AMALIA 8.4*  --   --   --  8.2*  --   --   --   --  8.2*   < > 8.3*  --  8.9  --  8.1*   * 92 108*   < > 136*   < >  --    < >  --  98   < > 159*   < > 189*   < > 92   ALBUMIN  --   --   --   --   --   --   --   --   --   --   --  2.5*  --   --   --  2.6*   PROTTOTAL  --   --   --   --   --   --   --   --   --   --   --  4.3*  --   --   --   --    BILITOTAL  --   --   --   --   --   --   --   --   --   --   --  0.6  --   --   --   --    ALKPHOS  --   --   --   --   --   --   --   --   --   --   --  83  --   --   --   --    ALT  --   --   --   --   --   --   --   --   --   --   --  19  --   --   --   --    AST  --   --   --   --   --   --   --   --   --   --   --  34  --   --   --   --     < > = values in this interval not displayed.       Imaging:  Recent Results (from the past 24 hours)   XR Chest 2 Views    Narrative    EXAM: XR CHEST 2 VIEWS  LOCATION: [facility]  DATE: 11/9/2024    INDICATION: s p mini MVR, pnx noted on prior cxr  COMPARISON: 11/8/2024      Impression    IMPRESSION: Small right apical pneumothorax persists though somewhat improved currently measuring 2 cm, previously measuring 2.8 cm. Slight blunting costophrenic angles bilaterally consistent with small pleural effusions. Heart size normal with   prosthetic cardiac valve present. No new abnormality.            Patient seen and discussed with Dr. Candelario Rapp PA-C  Cardiothoracic Surgery  Pager: [phone]    CV Surgery Rounding Pager: [phone]  After hours please page surgeon on-call

## 2024-11-10 NOTE — PLAN OF CARE
"Goal Outcome Evaluation:  Patient Name: Rusty  MRN: [id number]  Date of Admission: 10/25/2024  Reason for Admission: Bacteremia  [place] [facility] [place]    Vitals:   BP Readings from Last 1 Encounters:   11/10/24 99/64     Pulse Readings from Last 1 Encounters:   11/10/24 93     Wt Readings from Last 1 Encounters:   11/09/24 62.7 kg (138 lb 3.2 oz)     Ht Readings from Last 1 Encounters:   10/23/24 1.676 m (5' 6\")     Estimated body mass index is 22.31 kg/m  as calculated from the following:    Height as of 10/23/24: 1.676 m (5' 6\").    Weight as of this encounter: 62.7 kg (138 lb 3.2 oz).  Temp Readings from Last 1 Encounters:   11/10/24 98  F (36.7  C) (Oral)       Pain: Pain goal 0 Pain Rating 0 Effective pain medication/regimen PRN tylenol    CV Surgery Patient: Yes Post Op Day #: 6    Assessment    General: Afebrile yes  Rhythm: SR w/1*AVB and BBB. W/occ.pauses.  Blood Pressure Medications given/held:NA.    Resp: Oxygen Status: RA  Patient slept last night Yes Approx hours slept   Incentive Spirometry Q 1-2 hour when awake: yes Volume: 750  Neuro: Alert yes Orientation: self, person, time, and place  GI/:          Bowel Activity: yes if yes indicate when: 11/10. Multiple Loose Bms            Bowel Medications: no. Held loose BM          Urinary Catheter: no  Skin:          Incision: Incision status: healing well          Epicardial Pacing Wires: no      Assessment    Resp: RA  Telemetry: SR w/1*AVB and BBB.   Neuro: A/Ox4. forgetful  GI/: loose BM this shift. Freq trips to the bathroom.adequate uop.  Skin/Wounds: Thora site and lap sites.covered. R groin site. Left back site covered. 2+ BLE edema.  Lines/Drains: left upper arm PIV infusing Amio gtt per Emar.   Activity: up Ax1 GB/W  Sleep: good  Abnormal Labs: see charts.     Aggression Stop Light: Green          Patient Care Plan: converted to SR. Continues Amio drip. Echo pending completion today.     Plan of Care Reviewed With: patient    Overall Patient " Progress: improvingOverall Patient Progress: improving

## 2024-11-11 ENCOUNTER — APPOINTMENT (OUTPATIENT)
Dept: PHYSICAL THERAPY | Facility: CLINIC | Age: 84
End: 2024-11-11
Attending: INTERNAL MEDICINE
Payer: MEDICARE

## 2024-11-11 LAB
ANION GAP SERPL CALCULATED.3IONS-SCNC: 6 MMOL/L (ref 7–15)
BACTERIA TISS BX CULT: NO GROWTH
BACTERIA TISS BX CULT: NORMAL
BUN SERPL-MCNC: 21.8 MG/DL (ref 8–23)
CALCIUM SERPL-MCNC: 8.4 MG/DL (ref 8.8–10.4)
CHLORIDE SERPL-SCNC: 99 MMOL/L (ref 98–107)
CREAT SERPL-MCNC: 0.53 MG/DL (ref 0.67–1.17)
EGFRCR SERPLBLD CKD-EPI 2021: >90 ML/MIN/1.73M2
ERYTHROCYTE [DISTWIDTH] IN BLOOD BY AUTOMATED COUNT: 15.9 % (ref 10–15)
GLUCOSE BLDC GLUCOMTR-MCNC: 100 MG/DL (ref 70–99)
GLUCOSE SERPL-MCNC: 98 MG/DL (ref 70–99)
HCO3 SERPL-SCNC: 25 MMOL/L (ref 22–29)
HCT VFR BLD AUTO: 25.2 % (ref 40–53)
HGB BLD-MCNC: 8.7 G/DL (ref 13.3–17.7)
MAGNESIUM SERPL-MCNC: 1.7 MG/DL (ref 1.7–2.3)
MCH RBC QN AUTO: 30.5 PG (ref 26.5–33)
MCHC RBC AUTO-ENTMCNC: 34.5 G/DL (ref 31.5–36.5)
MCV RBC AUTO: 88 FL (ref 78–100)
PHOSPHATE SERPL-MCNC: 3.1 MG/DL (ref 2.5–4.5)
PLATELET # BLD AUTO: 321 10E3/UL (ref 150–450)
POTASSIUM SERPL-SCNC: 4.3 MMOL/L (ref 3.4–5.3)
RBC # BLD AUTO: 2.85 10E6/UL (ref 4.4–5.9)
SODIUM SERPL-SCNC: 130 MMOL/L (ref 135–145)
VANCOMYCIN SERPL-MCNC: 11.7 UG/ML
WBC # BLD AUTO: 6.9 10E3/UL (ref 4–11)

## 2024-11-11 PROCEDURE — 85048 AUTOMATED LEUKOCYTE COUNT: CPT | Performed by: PHYSICIAN ASSISTANT

## 2024-11-11 PROCEDURE — 80048 BASIC METABOLIC PNL TOTAL CA: CPT | Performed by: PHYSICIAN ASSISTANT

## 2024-11-11 PROCEDURE — 272N000450 HC KIT 4FR POWER PICC SINGLE LUMEN

## 2024-11-11 PROCEDURE — 250N000013 HC RX MED GY IP 250 OP 250 PS 637: Performed by: PHYSICIAN ASSISTANT

## 2024-11-11 PROCEDURE — 258N000003 HC RX IP 258 OP 636: Performed by: INTERNAL MEDICINE

## 2024-11-11 PROCEDURE — 36415 COLL VENOUS BLD VENIPUNCTURE: CPT | Performed by: PHYSICIAN ASSISTANT

## 2024-11-11 PROCEDURE — 250N000011 HC RX IP 250 OP 636: Performed by: SURGERY

## 2024-11-11 PROCEDURE — 36569 INSJ PICC 5 YR+ W/O IMAGING: CPT

## 2024-11-11 PROCEDURE — 250N000011 HC RX IP 250 OP 636: Performed by: NURSE PRACTITIONER

## 2024-11-11 PROCEDURE — 250N000013 HC RX MED GY IP 250 OP 250 PS 637: Performed by: NURSE PRACTITIONER

## 2024-11-11 PROCEDURE — 84100 ASSAY OF PHOSPHORUS: CPT | Performed by: SURGERY

## 2024-11-11 PROCEDURE — 250N000011 HC RX IP 250 OP 636: Performed by: INTERNAL MEDICINE

## 2024-11-11 PROCEDURE — 250N000011 HC RX IP 250 OP 636: Performed by: PHYSICIAN ASSISTANT

## 2024-11-11 PROCEDURE — 83735 ASSAY OF MAGNESIUM: CPT | Performed by: SURGERY

## 2024-11-11 PROCEDURE — 36415 COLL VENOUS BLD VENIPUNCTURE: CPT | Performed by: SURGERY

## 2024-11-11 PROCEDURE — 80202 ASSAY OF VANCOMYCIN: CPT | Performed by: SURGERY

## 2024-11-11 PROCEDURE — 97530 THERAPEUTIC ACTIVITIES: CPT | Mod: GP

## 2024-11-11 PROCEDURE — 250N000009 HC RX 250: Performed by: PHYSICIAN ASSISTANT

## 2024-11-11 PROCEDURE — 120N000001 HC R&B MED SURG/OB

## 2024-11-11 PROCEDURE — 85014 HEMATOCRIT: CPT | Performed by: PHYSICIAN ASSISTANT

## 2024-11-11 PROCEDURE — 97110 THERAPEUTIC EXERCISES: CPT | Mod: GP

## 2024-11-11 PROCEDURE — 99232 SBSQ HOSP IP/OBS MODERATE 35: CPT | Performed by: INTERNAL MEDICINE

## 2024-11-11 RX ORDER — MAGNESIUM SULFATE HEPTAHYDRATE 40 MG/ML
2 INJECTION, SOLUTION INTRAVENOUS ONCE
Status: COMPLETED | OUTPATIENT
Start: 2024-11-11 | End: 2024-11-11

## 2024-11-11 RX ORDER — ASPIRIN 81 MG/1
81 TABLET ORAL DAILY
DISCHARGE
Start: 2024-11-12

## 2024-11-11 RX ORDER — LIDOCAINE 40 MG/G
CREAM TOPICAL
Status: DISCONTINUED | OUTPATIENT
Start: 2024-11-11 | End: 2024-11-13 | Stop reason: HOSPADM

## 2024-11-11 RX ORDER — TAMSULOSIN HYDROCHLORIDE 0.4 MG/1
0.4 CAPSULE ORAL DAILY
DISCHARGE
Start: 2024-11-11

## 2024-11-11 RX ORDER — ACETAMINOPHEN 325 MG/1
650 TABLET ORAL EVERY 4 HOURS PRN
DISCHARGE
Start: 2024-11-11

## 2024-11-11 RX ORDER — AMOXICILLIN 250 MG
1 CAPSULE ORAL
Status: DISCONTINUED | OUTPATIENT
Start: 2024-11-11 | End: 2024-11-13 | Stop reason: HOSPADM

## 2024-11-11 RX ORDER — FUROSEMIDE 10 MG/ML
20 INJECTION INTRAMUSCULAR; INTRAVENOUS ONCE
Status: COMPLETED | OUTPATIENT
Start: 2024-11-11 | End: 2024-11-11

## 2024-11-11 RX ORDER — PANTOPRAZOLE SODIUM 40 MG/1
40 TABLET, DELAYED RELEASE ORAL DAILY
DISCHARGE
Start: 2024-11-11

## 2024-11-11 RX ORDER — ASPIRIN 81 MG/1
81 TABLET ORAL DAILY
Status: DISCONTINUED | OUTPATIENT
Start: 2024-11-12 | End: 2024-11-13 | Stop reason: HOSPADM

## 2024-11-11 RX ORDER — POLYETHYLENE GLYCOL 3350 17 G/17G
17 POWDER, FOR SOLUTION ORAL 2 TIMES DAILY PRN
Status: DISCONTINUED | OUTPATIENT
Start: 2024-11-11 | End: 2024-11-13 | Stop reason: HOSPADM

## 2024-11-11 RX ORDER — VANCOMYCIN HYDROCHLORIDE 1 G/200ML
1000 INJECTION, SOLUTION INTRAVENOUS EVERY 12 HOURS
DISCHARGE
Start: 2024-11-11 | End: 2024-12-16

## 2024-11-11 RX ADMIN — SODIUM CHLORIDE TAB 1 GM 2 G: 1 TAB at 12:52

## 2024-11-11 RX ADMIN — VANCOMYCIN HYDROCHLORIDE 1250 MG: 10 INJECTION, POWDER, LYOPHILIZED, FOR SOLUTION INTRAVENOUS at 17:43

## 2024-11-11 RX ADMIN — MIDODRINE HYDROCHLORIDE 5 MG: 5 TABLET ORAL at 12:51

## 2024-11-11 RX ADMIN — SODIUM CHLORIDE TAB 1 GM 2 G: 1 TAB at 10:10

## 2024-11-11 RX ADMIN — AMIODARONE HYDROCHLORIDE 400 MG: 200 TABLET ORAL at 20:59

## 2024-11-11 RX ADMIN — AMIODARONE HYDROCHLORIDE 400 MG: 200 TABLET ORAL at 10:10

## 2024-11-11 RX ADMIN — TAMSULOSIN HYDROCHLORIDE 0.4 MG: 0.4 CAPSULE ORAL at 10:10

## 2024-11-11 RX ADMIN — PANTOPRAZOLE SODIUM 40 MG: 40 TABLET, DELAYED RELEASE ORAL at 10:10

## 2024-11-11 RX ADMIN — ASPIRIN 81 MG CHEWABLE TABLET 81 MG: 81 TABLET CHEWABLE at 10:10

## 2024-11-11 RX ADMIN — LIDOCAINE HYDROCHLORIDE 1 ML: 10 INJECTION, SOLUTION INFILTRATION; PERINEURAL at 18:43

## 2024-11-11 RX ADMIN — FUROSEMIDE 20 MG: 10 INJECTION, SOLUTION INTRAMUSCULAR; INTRAVENOUS at 16:31

## 2024-11-11 RX ADMIN — HEPARIN SODIUM 5000 UNITS: 5000 INJECTION, SOLUTION INTRAVENOUS; SUBCUTANEOUS at 10:25

## 2024-11-11 RX ADMIN — MIDODRINE HYDROCHLORIDE 5 MG: 5 TABLET ORAL at 10:10

## 2024-11-11 RX ADMIN — VANCOMYCIN HYDROCHLORIDE 1000 MG: 1 INJECTION, SOLUTION INTRAVENOUS at 05:21

## 2024-11-11 RX ADMIN — MIDODRINE HYDROCHLORIDE 5 MG: 5 TABLET ORAL at 17:31

## 2024-11-11 RX ADMIN — MAGNESIUM SULFATE HEPTAHYDRATE 2 G: 40 INJECTION, SOLUTION INTRAVENOUS at 10:10

## 2024-11-11 RX ADMIN — HEPARIN SODIUM 5000 UNITS: 5000 INJECTION, SOLUTION INTRAVENOUS; SUBCUTANEOUS at 23:50

## 2024-11-11 RX ADMIN — METOPROLOL TARTRATE 6.25 MG: 25 TABLET, FILM COATED ORAL at 21:00

## 2024-11-11 RX ADMIN — SODIUM CHLORIDE TAB 1 GM 2 G: 1 TAB at 17:31

## 2024-11-11 RX ADMIN — HEPARIN SODIUM 5000 UNITS: 5000 INJECTION, SOLUTION INTRAVENOUS; SUBCUTANEOUS at 16:31

## 2024-11-11 NOTE — PROGRESS NOTES
CLINICAL NUTRITION SERVICES - REASSESSMENT NOTE      Recommendations Ordered by Registered Dietitian (RD):   Continue supplements as ordered      Malnutrition:   Severe malnutrition          EVALUATION OF PROGRESS TOWARD GOALS   Diet:  Mechanical/dental soft +  1L Fluid restriction    Supplements:  Ensure enlive 3x/day    Intake/Tolerance:  Eating % of 2 meals per day (eg - pot roast and yogurt). Drinking ensure as well. Pt disliked magic cups. Overall, suspect patient is meeting nutrition needs.       ASSESSED NUTRITION NEEDS:  Dosing Weight: 59 kg (actual)   Estimated Energy Needs: 0935-0585 kcals (25-30 Kcal/Kg)  Justification: maintenance  Estimated Protein Needs: 70-90+ grams protein (1.2-1.5 g pro/Kg)  Justification: hypercatabolism with acute illness  Estimated Fluid Needs: 1000  mL   Justification: restriction per MD, hyponatremic      NEW FINDINGS:   GI: Several loose stools per day, oral magnesium and bowel regimen held. C. Diff negative 11/10.   Wt trends: 140 lbs today. Remains > admit weight of 134 lbs.        Previous Goals:   Patient will consume at least 75% meals and supplements   Evaluation: Met    Previous Nutrition Diagnosis:   Increased nutrient needs (protein) related to recent heart surgery as evidenced by need for additional oral nutritional supplements   Evaluation: No change      MALNUTRITION  % Weight Loss:  None noted  % Intake:  No decreased intake noted  Subcutaneous Fat Loss:  Globally severe   Muscle Loss:  Globally severe   Fluid Retention:  None noted     Malnutrition Diagnosis: Severe malnutrition  In Context of:  Acute illness or injury    CURRENT NUTRITION DIAGNOSIS  Increased nutrient needs (protein) related to recent heart surgery as evidenced by need for additional oral nutritional supplements     INTERVENTIONS  Recommendations / Nutrition Prescription  See above    Goals  Pt to consume at least 75% of three nutritionally adequate meals per day (or equivalent via  snacks/supplements).       MONITORING AND EVALUATION:  Progress towards goals will be monitored and evaluated per protocol and Practice Guidelines    Nicole High RD, LD, CNSC   Available on [employer]

## 2024-11-11 NOTE — PLAN OF CARE
"Goal Outcome Evaluation:    Patient Name: Jayme Donahue  MRN: [id number]  Date of Admission: 10/25/2024  Reason for Admission: bacteremia  Level of Care: medical surgical    Vitals:   BP Readings from Last 1 Encounters:   11/11/24 121/67     Pulse Readings from Last 1 Encounters:   11/10/24 94     Wt Readings from Last 1 Encounters:   11/10/24 64.5 kg (142 lb 1.6 oz)     Ht Readings from Last 1 Encounters:   10/23/24 1.676 m (5' 6\")     Estimated body mass index is 22.94 kg/m  as calculated from the following:    Height as of 10/23/24: 1.676 m (5' 6\").    Weight as of this encounter: 64.5 kg (142 lb 1.6 oz).  Temp Readings from Last 1 Encounters:   11/10/24 97.3  F (36.3  C) (Axillary)       Pain: Pain goal 0 Pain Rating 0 Effective pain medication/regimen     CV Surgery Patient: Yes Post Op Day #: 7      Assessment     General: Afebrile yes  Rhythm: SR w/1*AVB and BBB.   Blood Pressure Medications given/held:NA.    Resp: Oxygen Status: RA  Patient slept last night Yes Approx hours slept   Incentive Spirometry Q 1-2 hour when awake: yes Volume: 750  Neuro: Alert yes Orientation: self, person, time, and place  GI/:          Bowel Activity: yes if yes indicate when: 11/11. Multiple Loose Bms            Bowel Medications: no.           Urinary Catheter: no  Skin:          Incision: Incision status: healing well          Epicardial Pacing Wires: no        Assessment     Resp: RA  Telemetry: SR w/1*AVB and BBB.   Neuro: A/Ox4. Forgetful.pleasant.   GI/: minced/moist diet. 1000 FR. Adequate uop.multiple loose BMs.c-diff negative. Adequate uop.   Skin/Wounds: Thora site and lap sites.covered. R groin site. Left back site covered. 2+ BLE edema.left upper extremity extravasation site.heat compresses per protocol.    Lines/Drains:   Activity: up Ax1 GB/W. Freq up in bedside commode due to loose BM. Walked in the halls x3 this shift. Tolerated well.  Sleep: good  Abnormal Labs: see charts.      Aggression Stop Light: Green   "        Patient Care Plan:Needs PICC prior to discharge.

## 2024-11-11 NOTE — CONSULTS
"SPIRITUAL HEALTH SERVICES Consult Note  [facility]. [facility]    Referral Source/Reason for Visit: patient requests ongoing spiritual support    Saw pt Jayme C Godfrey at bedside for 15 minutes.     Patient / Family Understanding of Illness and Goals of Care - Avelino is upbeat, cheerful today as he talked about feeling ready and discharge likely today or tomorrow to a care facility that \"is 22 miles from home instead of 120 miles.\"    Distress and Loss -      Strengths, Coping, and Resources - Avelino talks daily with his wife Pamela by phone. Avelino expressed gratitude for his neighbors who have supported her, their [employer] family, and for \"the fantastic team\" as he spoke of the care he has received here. Avelino reflected on gratitude in his life and shared about plans for the Thanksgiving holiday.    Meaning, Beliefs, and Spirituality - Avelino welcomed the gift of a prayer blanket as a blessing of prayer to take with him as he discharges.    Plan: No plans to follow up at this time due to anticipated discharge.    Alexus Zhang MDiv [facility]  Staff   Please place consult order for routine Spiritual Health Services referrals.  [facility] available 24/7 for emergent requests either by having the on-call  paged or by entering an ASAP/STAT consult in Epic (this will also page the on-call ).    "

## 2024-11-11 NOTE — PROGRESS NOTES
[facility]    Infectious Disease Progress Note    Date of Service (when I saw the patient): 11/11/2024     Assessment & Plan   Jayme Donahue is a 84 year old male who was admitted on 10/25/2024.     Impression:  85 yo with PMH prostate cancer, who was admitted on 10/25/2024 as a transfer from [facility] with Corynebacterium bacteremia and concern for endocarditis.   Admitted with weakness and poor oral intake to an outside hospital  Blood cultures from 10/22 grew out Corynebacterium striatum, 2 out of 2  The same day he also had positive urine cultures for Corynebacterium stratum  And a wound culture from the scapular lesion also positive for Corynebacterium striatum  With TTE done on 10/23 positive for a mobile echodensity on the atrial side of the mitral valve that likely represents flail scallop although vegetation is also in differential. LINDSEY: Large vegetation noted on the atrial aspect of the mitral valve. They measure about 1.5 to 1.7 cm. There is associated flail segment and distorted mitral valve leaflet anatomy. There seems to be prolapse of the posterior mitral valve leaflet.   Patient also complained of back pain but there is no abscesses or osteomyelitis on the back MRI  Of note patient has a left scapular lesion that has been present for several months, s/p biopsy with path consistent with BCC.   He is currently on vancomycin  Poor dentition. S/p extraction of all remaining teeth 10/31/24.   S/p MVR 11/4/24. Valve culture with no growth, gram stain with GPB and GPC (consistent with known corynebacterium).     Recommendations:   Continue on IV vancomycin.  Plan 6 weeks from day of surgery IV antibiotics upon discharge (until 12/16/24). Orders placed in discharge navigator.   PICC to be placed today.   Will arrange follow-up with ID in 2-3 weeks.   Will need repeat blood cultures one week after completion of antibiotics.     Patient and plan discussed with Dr. Belcher.      Migdalia Butler PA-C           Interval History   Tolerating antibiotics ok   No new rashes or issues with antibiotics   Feeling well post-op. No complaints presently. Occasional loose stool. Getting up with therapy   Labs reviewed   No changes to past medical, social or family history         Physical Exam   Temp: 97.8  F (36.6  C) Temp src: Oral BP: 119/59 Pulse: 102   Resp: 15 SpO2: 95 % O2 Device: None (Room air)    Vitals:    11/09/24 0500 11/10/24 0300 11/11/24 0520   Weight: 62.7 kg (138 lb 3.2 oz) 64.5 kg (142 lb 1.6 oz) 63.9 kg (140 lb 12.8 oz)     Vital Signs with Ranges  Temp:  [97.3  F (36.3  C)-99.6  F (37.6  C)] 97.8  F (36.6  C)  Pulse:  [] 102  Resp:  [15] 15  BP: ()/(54-69) 119/59  SpO2:  [95 %-97 %] 95 %    Constitutional: Awake, alert, cooperative, no apparent distress  Lungs: Clear to auscultation bilaterally, no crackles or wheezing. Chest tubes have been removed.   Cardiovascular: Regular rate and rhythm, normal S1 and S2, and no murmur noted  Abdomen: Normal bowel sounds, soft, non-distended, non-tender  Skin: skin wound on the scapula appears to the same. Surgical site on right side and chest tube sites are c/d/I.   Other: Bilateral lower extremity edema.     Medications   Current Facility-Administered Medications   Medication Dose Route Frequency Provider Last Rate Last Admin    Reason beta blocker order not selected   Does not apply DOES NOT GO TO Kerri Odell NP         Current Facility-Administered Medications   Medication Dose Route Frequency Provider Last Rate Last Admin    amiodarone (PACERONE) tablet 400 mg  400 mg Oral BID Yana Rapp PA-C   400 mg at 11/11/24 1010    aspirin (ASA) chewable tablet 81 mg  81 mg Oral or NG Tube Daily Kerri Steen NP   81 mg at 11/11/24 1010    heparin ANTICOAGULANT injection 5,000 Units  5,000 Units Subcutaneous Q8H Kerri Steen NP   5,000 Units at 11/11/24 1025    Lidocaine (LIDOCARE) 4 % Patch 2 patch  2 patch  Transdermal Q24H Kerri Steen NP   2 patch at 11/09/24 0035    magnesium sulfate 2 g in 50 mL sterile water intermittent infusion  2 g Intravenous Once Alexa Ellison MD 50 mL/hr at 11/11/24 1010 2 g at 11/11/24 1010    midodrine (PROAMATINE) tablet 5 mg  5 mg Oral TID w/meals Brigitte García PA-C   5 mg at 11/11/24 1010    pantoprazole (PROTONIX) EC tablet 40 mg  40 mg Oral Daily Kerri Steen NP   40 mg at 11/11/24 1010    sodium chloride (PF) 0.9% PF flush 3 mL  3 mL Intracatheter Q8H Kerri Steen NP   3 mL at 11/11/24 0521    sodium chloride tablet 2 g  2 g Oral TID w/meals Yana Rapp PA-C   2 g at 11/11/24 1010    tamsulosin (FLOMAX) capsule 0.4 mg  0.4 mg Oral Daily Kerri Steen NP   0.4 mg at 11/11/24 1010    vancomycin (VANCOCIN) 1,000 mg in 200 mL dextrose intermittent infusion  1,000 mg Intravenous Q12H Kerri Steen  mL/hr at 11/11/24 0521 1,000 mg at 11/11/24 0521       Data   All microbiology laboratory data reviewed.  Recent Labs   Lab Test 11/11/24  0658 11/10/24  0654 11/09/24  0653   WBC 6.9 6.1 6.6   HGB 8.7* 8.4* 9.4*   HCT 25.2* 24.7* 27.3*   MCV 88 89 88    276 266     Recent Labs   Lab Test 11/11/24  0658 11/10/24  0654 11/09/24  0653   CR 0.53* 0.58* 0.54*         11/10/2024 0852 11/10/2024 1344 C. difficile Toxin B PCR with reflex to C. difficile Antigen and Toxins A/B EIA [[id number]]    Stool from Per Rectum    Final result Component Value   C Difficile Toxin B by PCR Negative   A negative result does not exclude actual disease due to C. difficile and may be due to improper collection, handling and storage of the specimen or the number of organisms in the specimen is below the detection limit of the assay.          11/04/2024 1723 11/11/2024 0751 Anaerobic Bacterial Culture Routine [[id number]]   Biopsy from Heart Valve, Mitral (Bicuspid)    Final result Component Value   Culture No anaerobic organisms isolated             11/04/2024 1723  11/04/2024 2227 Gram Stain [[id number]]   (Abnormal)   Biopsy from Heart Valve, Mitral (Bicuspid)    Final result Component Value   GS Culture See corresponding culture for results   Gram Stain Result 3+ Gram positive bacilli Abnormal    Corrected on November 4, 2024/10:25 PM by Jeannine Rodriguez  Previously reported as: 2+ Gram negative bacilli.    Corrected result: Previously reported as No organisms seen on 11/4/2024 at  6:04 PM CST.   Gram Stain Result 1+ Gram positive cocci Abnormal    Gram Stain Result 3+ WBC seen Abnormal    Predominantly PMNs          11/04/2024 1723 11/10/2024 2046 Fungal or Yeast Culture Routine [[id number]]   Biopsy from Heart Valve, Mitral (Bicuspid)    Preliminary result Component Value   Culture No growth after 6 days P             11/04/2024 1723 11/11/2024 1010 Biopsy Aerobic Bacterial Culture Routine [[id number]]   Biopsy from Heart Valve, Mitral (Bicuspid)    Final result Component Value   Culture No Growth           10/31/2024 1552 11/04/2024 0732 Tissue Aerobic Bacterial Culture Routine With Gram Stain [[id number]]   Tissue from Shoulder, Left    Preliminary result Component Value   Culture No growth after 2 days P   Gram Stain Result No organisms seen P    No white blood cells seen P             10/31/2024 1552 11/03/2024 1805 Anaerobic Bacterial Culture Routine [[id number]]   Tissue from Shoulder, Left    Preliminary result Component Value   Culture No anaerobic organisms isolated after 2 days P             10/30/2024 0627 11/04/2024 1006 Blood Culture Arm, Left [[id number]]   Blood from Arm, Left    Final result Component Value   Culture No Growth             10/29/2024 0600 11/03/2024 0805 Blood Culture Arm, Left [[id number]]    Blood from Arm, Left    Final result Component Value   Culture No Growth             10/28/2024 0616 11/02/2024 1116 Blood Culture Arm, Right [[id number]]   Blood from Arm, Right    Final result Component Value   Culture No Growth              10/27/2024 0811 11/01/2024 1046 Blood Culture Peripheral Blood [[id number]]    Peripheral Blood    Final result Component Value   Culture No Growth             10/26/2024 2252 11/01/2024 0031 Blood Culture Arm, Right [[id number]]   Blood from Arm, Right    Final result Component Value   Culture No Growth             10/26/2024 1811 10/27/2024 2213 Urine Culture [[id number]]   Urine, Midstream    Final result Component Value   Culture No Growth             10/26/2024 1513 10/31/2024 1804 Blood Culture Hand, Left [[id number]]   Blood from Hand, Left    Final result Component Value   Culture No Growth             10/24/2024 1444 10/28/2024 1329 Blood Culture Arm, Right [[id number]]   (Abnormal)   Blood from Arm, Right    Final result Component Value   Culture Positive on the 2nd day of incubation Abnormal            10/22/2024 1645 10/26/2024 0814 Microbiology Isolate Referral [[id number]]    (Abnormal)   Blood from Other    Final result Component Value   Culture Corynebacterium striatum Abnormal     Organism identified by client.       Susceptibility     Corynebacterium striatum     HARRIS     Ceftriaxone 3 ug/mL Resistant     Clindamycin 1.0 ug/mL Intermediate     Doxycycline 0.125 ug/mL Susceptible     Meropenem 0.125 ug/mL Susceptible     Penicillin 0.25 ug/mL Intermediate     Trimethoprim/Sulfamethoxazole 0.250 ug/mL Susceptible     Vancomycin 0.5 ug/mL Susceptible              Complete LINDSEY Adult. 10/25/24  ______________________________________________________________________________  Interpretation Summary     This was a transesophageal echocardiogram done for evaluation of endocarditis  & MR. Study was technically difficult. Probe insertion was technically quite  challenging. Patient was short of breath and coughing a lot despite sedation.  I had to request one of my partners Dr. Galicia who was kind to come and assist  with LINDSEY probe insertion.     Normal left ventricular size and systolic  function.  Normal RV size and systolic function.  No evidence of left atrial appendage thrombus. No color shunt across the  interatrial septum.  Large vegetation noted on the atrial aspect of the mitral valve. They measure  about 1.5 to 1.7 cm. There is associated flail segment and distorted mitral  valve leaflet anatomy. There seems to be prolapse of the posterior mitral  valve leaflet. 3D imaging was quite challenging due to patient tolerance of  the procedure. We had to terminate prematurely.  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 I was not able  to get 2D and 3D quantification of the mitral regurgitation due to patient  tolerance of the procedure but visually MR is clearly severe.  No pericardial effusion.

## 2024-11-11 NOTE — PROGRESS NOTES
[facility]  Cardiovascular and Thoracic Surgery Daily Note    Today's Plans: 1. TCU placement in [place] 2. PICC 3. Trial 6.25 mg metoprolol 4. lasix    Assessment and Plan  Mr. Donahue is a very pleasant 84-year-old gentleman with a PMH of prostate cancer who was recently admitted to the hospital and was diagnosed with Corynebacterium bacteremia and urinary tract infection. 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     POD # 7 s/p Right mini-thoracotomy mitral valve replacement with a 31 mm Quinonez MITRIS bovine pericardial valve, right common femoral arterial and venous cannulation for cardiopulmonary bypass, intraoperative LINDSEY on 11/4/24 with Dr. Alexa Ellison.    - CVS: Pre-op TTE with preserved biventricular funtion. Postop vasoplegic shock, resolved. HD stable. BB discontinued d/t to pauses. EKG with type II AV block this am-resolved now nsr with 1st deg block, trial 6.25 metoprolol, Amiodarone gtt started 11/9 transitioned to PO 11/10. Initiated Midodrine TID pod#3 due to low BPs.  ASA 81 mg daily, no statin indicated. Appears euvolemic. Defer on volume or diuresis today. No PTA meds on hold. Chest tubes removed pod#4 without issue. Small ptx on cxr, repeat stable. Post op echo stable, nml MV gradients    - Resp: Postoperative mechanical ventilation, resolved. Extubated POD 0, now saturating well on RA. cannula. Encourage aggressive IS, pulmonary toilet.    - Neuro: Neuros intact, pain controlled on current regimen    - Renal: No history of significant renal disease. Cr stable. Trend BMP.  Volume management as above. 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 Salt tabs resumed by nephrology.  Recent Labs   Lab 11/11/24  0658 11/10/24  0654 11/09/24  0653   CR 0.53* 0.58* 0.54*       - GI: + BM, +flatus, continue bowel regimen. Cdiff neg as having multiple stools    - : voiding on own. Hx prostate cancer s/p radiation. PTA flomax  resumed.    - Endo: Postop stress hyperglycemia. Insulin infusion transitioned to sliding scale insulin. Stop checking  Hemoglobin A1C   Date Value Ref Range Status   10/31/2024 5.3 <5.7 % Final     Comment:     Normal <5.7%   Prediabetes 5.7-6.4%    Diabetes 6.5% or higher     Note: Adopted from ADA consensus guidelines.        - FEN: Replace electrolytes as needed. ADAT    - ID: Postop leukocytosis. Afebrile. Infectious endocarditis: had Corynebacterium bacteremia and urinary tract infection. OR gram stain positive thus far for 3+ GPB. ID following. Currently on Vancomycin. Will eventually need a PICC when ID clears. Completing perioperative Cefazolin. Trend CBC and fever curve.   Recent Labs   Lab 11/11/24  0658 11/10/24  0654 11/09/24  0653   WBC 6.9 6.1 6.6       - Heme: Acute blood loss anemia and thrombocytopenia due to surgery. Hgb and PLT stable. Trend CBC, transfuse PRN. Skin biopsy on back positive for basal cell carcinoma.  Recent Labs   Lab 11/11/24 0658 11/10/24  0654 11/09/24  0653   HGB 8.7* 8.4* 9.4*    276 266       - Proph: SCD, subcutaneous heparin, PPI    - Other:  Clinically Significant Risk Factors         # Hyponatremia: Lowest Na = 130 mmol/L in last 2 days, will monitor as appropriate       # Hypoalbuminemia: Lowest albumin = 2.5 g/dL at 11/4/2024  8:12 PM, will monitor as appropriate                   # Severe Malnutrition: based on nutrition assessment    # Financial/Environmental Concerns:           - Dispo: [address] [facility] Therapies recommending TCU- social work consulted. Will need IV abx at discharge. Medically Ready for Discharge: 1-2 days      Interval History  Sitting up in chair, breathing stable, tolerating diet, working with rehab, awaiting TCU hopeful for tomorrow      Medications  Current Facility-Administered Medications   Medication Dose Route Frequency Provider Last Rate Last Admin    amiodarone (PACERONE) tablet 400 mg  400 mg Oral BID Yana Rapp PA-C   400 mg at  11/11/24 1010    aspirin (ASA) chewable tablet 81 mg  81 mg Oral or NG Tube Daily Kerri Steen NP   81 mg at 11/11/24 1010    heparin ANTICOAGULANT injection 5,000 Units  5,000 Units Subcutaneous Q8H Kerri Steen NP   5,000 Units at 11/11/24 1025    Lidocaine (LIDOCARE) 4 % Patch 2 patch  2 patch Transdermal Q24H Kerri Steen NP   2 patch at 11/09/24 0035    midodrine (PROAMATINE) tablet 5 mg  5 mg Oral TID w/meals Brigitte García PA-C   5 mg at 11/11/24 1251    pantoprazole (PROTONIX) EC tablet 40 mg  40 mg Oral Daily Kerri Steen NP   40 mg at 11/11/24 1010    sodium chloride (PF) 0.9% PF flush 3 mL  3 mL Intracatheter Q8H Kerri Steen NP   3 mL at 11/11/24 1252    sodium chloride tablet 2 g  2 g Oral TID w/meals Yana Rapp PA-C   2 g at 11/11/24 1252    tamsulosin (FLOMAX) capsule 0.4 mg  0.4 mg Oral Daily Kerri Steen NP   0.4 mg at 11/11/24 1010    vancomycin (VANCOCIN) 1,000 mg in 200 mL dextrose intermittent infusion  1,000 mg Intravenous Q12H Kerri Steen  mL/hr at 11/11/24 0521 1,000 mg at 11/11/24 0521     Current Facility-Administered Medications   Medication Dose Route Frequency Provider Last Rate Last Admin    acetaminophen (TYLENOL) tablet 650 mg  650 mg Oral Q4H PRN Kerri Steen NP   650 mg at 11/10/24 1638    benzocaine-menthol (CHLORASEPTIC) 6-10 MG lozenge 1 lozenge  1 lozenge Buccal Q1H PRN Kerri Steen NP   1 lozenge at 11/04/24 0935    bisacodyl (DULCOLAX) suppository 10 mg  10 mg Rectal Daily PRN Kerri Steen NP        calcium carbonate (TUMS) chewable tablet 1,000 mg  1,000 mg Oral Daily PRN Kerri Steen NP        glucose gel 15-30 g  15-30 g Oral Q15 Min PRN Kerri Steen, NP        Or    dextrose 50 % injection 25-50 mL  25-50 mL Intravenous Q15 Min PRN Kerri Steen NP        Or    glucagon injection 1 mg  1 mg Subcutaneous Q15 Min PRN Kerri Steen, NP        hydrALAZINE (APRESOLINE) injection 10 mg  10 mg Intravenous Q30 Min PRN  Kerri Steen NP        HYDROmorphone (DILAUDID) injection 0.2 mg  0.2 mg Intravenous Q2H PRN Kerri Steen NP   0.2 mg at 11/05/24 0450    Or    HYDROmorphone (DILAUDID) injection 0.4 mg  0.4 mg Intravenous Q2H PRN Kerri Steen NP   0.4 mg at 11/05/24 0031    lidocaine (LMX4) cream   Topical Q1H PRN Caroline Monae PA-C        lidocaine (LMX4) cream   Topical Q1H PRN Kerri Steen NP        lidocaine 1 % 0.1-1 mL  0.1-1 mL Other Q1H PRN Kerri Steen NP        lidocaine 1 % 0.1-5 mL  0.1-5 mL Other Q1H PRN Caroline Monae PA-C        magnesium hydroxide (MILK OF MAGNESIA) suspension 30 mL  30 mL Oral Daily PRN Kerri Steen NP        methocarbamol (ROBAXIN) tablet 500 mg  500 mg Oral Q6H PRN Kerri Steen NP   500 mg at 11/08/24 2235    naloxone (NARCAN) injection 0.2 mg  0.2 mg Intravenous Q2 Min PRN Kerri Steen NP        Or    naloxone (NARCAN) injection 0.4 mg  0.4 mg Intravenous Q2 Min PRN Kerri Steen NP        Or    naloxone (NARCAN) injection 0.2 mg  0.2 mg Intramuscular Q2 Min PRN Kerri Steen NP        Or    naloxone (NARCAN) injection 0.4 mg  0.4 mg Intramuscular Q2 Min PRN Kerri Steen NP        ondansetron (ZOFRAN ODT) ODT tab 4 mg  4 mg Oral Q6H PRN Kerri Steen NP        Or    ondansetron (ZOFRAN) injection 4 mg  4 mg Intravenous Q6H PRN Kerri Steen NP        oxyCODONE (ROXICODONE) tablet 5 mg  5 mg Oral Q4H PRN Kerri Steen NP   5 mg at 11/08/24 2235    Or    oxyCODONE IR (ROXICODONE) half-tab 7.5 mg  7.5 mg Oral Q4H PRN Kerri Steen NP   7.5 mg at 11/05/24 0105    polyethylene glycol (MIRALAX) Packet 17 g  17 g Oral BID PRN Genna Sutton DO        Reason beta blocker order not selected   Does not apply DOES NOT GO TO Kerri Odell, NP        senna-docusate (SENOKOT-S/PERICOLACE) 8.6-50 MG per tablet 1 tablet  1 tablet Oral At Bedtime PRN Genna Sutton,         sodium chloride (PF) 0.9% PF flush 10-40 mL  10-40 mL  Intracatheter Once PRN Caroline Monae PA-C        sodium chloride (PF) 0.9% PF flush 3 mL  3 mL Intracatheter q1 min prn Kerri Steen, NP             Physical Exam  Vitals were reviewed  Blood pressure 106/70, pulse 83, temperature 97.2  F (36.2  C), temperature source Oral, resp. rate 15, weight 63.9 kg (140 lb 12.8 oz), SpO2 94%.  Rhythm: nsr 1d AVB    Lungs: diminished throughout on NC    Cardiovascular: S1, S2, no m/r/g    Abdomen: soft, NT, ND, rare BS    Extremeties: warm, no LE edema    Incisions: Right thoracotomy and right femoral incisions CDI    CT: na    Weight:   Vitals:    11/06/24 0600 11/08/24 0613 11/09/24 0500 11/10/24 0300   Weight: 59 kg (130 lb 1.1 oz) 61.7 kg (136 lb) 62.7 kg (138 lb 3.2 oz) 64.5 kg (142 lb 1.6 oz)    11/11/24 0520   Weight: 63.9 kg (140 lb 12.8 oz)         Data  Recent Labs   Lab 11/11/24  0658 11/10/24  0654 11/09/24  0653 11/04/24  2232 11/04/24 2012 11/04/24  1847 11/04/24  1846   WBC 6.9 6.1 6.6   < > 17.3*  --  13.2*   HGB 8.7* 8.4* 9.4*   < > 8.3*   < > 8.0*   MCV 88 89 88   < > 88  --  88    276 266   < > 184  --  160   INR  --   --   --   --  1.80*  --  2.04*   * 132* 128*   < > 133*   < > 132*   POTASSIUM 4.3 4.3 4.1   < > 4.0   < > 4.3   CHLORIDE 99 99 96*   < > 99  --  97*   CO2 25 28 21*   < > 25  --  25   BUN 21.8 25.7* 22.8   < > 9.5  --  9.4   CR 0.53* 0.58* 0.54*   < > 0.52*  --  0.53*   ANIONGAP 6* 5* 11   < > 9  --  10   AMALIA 8.4* 8.1* 8.4*   < > 8.3*  --  8.9   GLC 98 91 114*   < > 159*   < > 189*   ALBUMIN  --   --   --   --  2.5*  --   --    PROTTOTAL  --   --   --   --  4.3*  --   --    BILITOTAL  --   --   --   --  0.6  --   --    ALKPHOS  --   --   --   --  83  --   --    ALT  --   --   --   --  19  --   --    AST  --   --   --   --  34  --   --     < > = values in this interval not displayed.       Imaging:  Recent Results (from the past 24 hours)   Echocardiogram Limited   Result Value    LVEF  60-65%    Narrative     [id number]  [id number]  [id number]^FERNANDA^CESAR^RYLEE     [facility]  Echocardiography Laboratory  [address] [place] [address]     Name: FABIOLA ELI  MRN: [id number]  : 1940  Study Date: 11/10/2024 02:49 PM  Age: 84 yrs  Gender: Male  Patient Location: [facility]  Reason For Study: Mitral Valve Replacement  Ordering Physician: CESAR MICHAEL  Referring Physician: JACQUELYN KISER  Performed By: Dillon Justin     BSA: 1.7 m2  Height: 66 in  Weight: 138 lb  HR: 96  BP: 91/58 mmHg  ______________________________________________________________________________  Procedure  Limited Portable Echo Adult.  ______________________________________________________________________________  Interpretation Summary     MINIMALLY INVASIVE MITRAL VALVE REPLACEMENT WITH TISSUE HEART VALVE MITRISâ    RESILIAâ   MITRAL VALVE SIZE: 31MM 2024  Normal prosthetic mitral valve gradients. MDG 6.7mmHg @HR 84  The rhythm was sinus with wide QRS with first degree AV block.  Left ventricular systolic function is normal.  The visual ejection fraction is 60-65%.  The left ventricle is normal in size.  The right ventricle is normal in structure, function and size.  Normal prosthetic mitral valve gradients.     Since 10/23/2024 thre has been interim placement of a normally functioning  bioprosthetic mitral valve.  ______________________________________________________________________________  Left Ventricle  The left ventricle is normal in size. There is normal left ventricular wall  thickness. Left ventricular systolic function is normal. The visual ejection  fraction is 60-65%. Septal motion is consistent with post-operative state.  There is no thrombus seen in the left ventricle.     Right Ventricle  The right ventricle is normal in structure, function and size. There is no  mass or thrombus in the right ventricle.     Atria  Normal left atrial size. Right atrial size is normal. There is no atrial  shunt  seen. The left atrial appendage is not well visualized.     Mitral Valve  There is no mitral regurgitation noted. The mean mitral valve gradient is 6.7  mmHg. There is a prosthetic mitral valve. Normal prosthetic mitral valve  gradients.     Tricuspid Valve  Normal tricuspid valve. The right ventricular systolic pressure is  approximated at 25.7 mmHg plus the right atrial pressure. Right ventricle  systolic pressure estimate normal. There is no tricuspid stenosis.     Aortic Valve  The aortic valve is trileaflet. No aortic regurgitation is present. No aortic  stenosis is present.     Pulmonic Valve  Normal pulmonic valve. There is no pulmonic valvular regurgitation. There is  no pulmonic valvular stenosis.     Vessels  The aortic root is normal size. Normal size ascending aorta. The inferior vena  cava is normal. The pulmonary artery is normal size.     Pericardium  The pericardium appears normal. There is no pleural effusion.     Rhythm  The rhythm was sinus with wide QRS.  ______________________________________________________________________________  MMode/2D Measurements & Calculations  IVSd: 0.82 cm     LVIDd: 4.2 cm  LVIDs: 2.7 cm  LVPWd: 1.0 cm  FS: 36.6 %  LV mass(C)d: 126.0 grams  LV mass(C)dI: 73.7 grams/m2  asc Aorta Diam: 2.9 cm  Asc Ao diam index BSA (cm/m2): 1.7  Asc Ao diam index Ht(cm/m): 1.7  RWT: 0.50     Doppler Measurements & Calculations  MV max P.5 mmHg  MV mean P.7 mmHg  MV V2 VTI: 36.9 cm  TR max rupa: 253.5 cm/sec  TR max P.7 mmHg     ______________________________________________________________________________  Report approved by: Dr. Nash Zimmerman 11/10/2024 06:02 PM                 Patient seen and discussed with Dr. Scot Rapp PA-C  Cardiothoracic Surgery  Pager: [phone]    CV Surgery Rounding Pager: [phone]  After hours please page surgeon on-call

## 2024-11-11 NOTE — PROGRESS NOTES
Care Management Follow Up    Length of Stay (days): 17    Expected Discharge Date: 11/11/2024     Concerns to be Addressed: discharge planning     Patient plan of care discussed at interdisciplinary rounds: Yes    Anticipated Discharge Disposition: Transitional Care              Anticipated Discharge Services:    Anticipated Discharge DME:      Patient/family educated on Medicare website which has current facility and service quality ratings: yes  Education Provided on the Discharge Plan: Yes  Patient/Family in Agreement with the Plan: yes    Referrals Placed by CM/SW: Post Acute Facilities  Private pay costs discussed: Not applicable    Discussed  Partnership in Safe Discharge Planning  document with patient/family: No     Handoff Completed: No, handoff not indicated or clinically appropriate    Additional Information:  Writer spoke with  who states possible discharge tomorrow. Writer spoke with Caroline AUSTIN for Card she states that she feels pt would be stable for discharge tomorrow if pt were to be accepted at [facility]. Writer will call and check.    Writer placed phone call to [facility] Nena [phone]. Nena states that they are needing a new referral sent but anticipate being able to accept pt tomorrow. Nena states that she will review the referral packet and then call writer back to verify if pt is able to discharge to them tomorrow. Nena verified the fax number is correct.     Writer faxed new referral to Nena at [facility].     Addendum:  Writer placed phone call to [facility] 230pm. No answer. Writer left voicemail asking for a call back.    Writer received phone call from Caroline with [facility]. Caroline states that they are able to accept pt tomorrow. However, she states that she would like writer to inform pt that they are going to try and keep him as long as they can at the swing bed but they cannot guarantee how long that will be due to  insurance. She states they cannot guarantee even a week. Writer will inform pt of this. Kumar states that they will make sure that when pt is set to discharge from swing bed to ensure he has a safe discharge plan to home. Writer will inform pt of Kumar's message.    Writer spoke with bedside RN who states that pt is alert and oriented to discuss discharge planning.    Writer met with pt at bedside. Pt states agreement to attend [facility] swing bed tomorrow. Writer informed pt that per discussion with Kumar at [facility] swing bed they stated they can accept pt but they are not sure how long they will be able to keep pt at [facility] as a lot of it is dependent on insurance. Pt ask if he will at least be able to stay at swing bed at least for a week. Writer informed pt that per kumar they cannot even guarantee a week at swing bed as they stated it all depends on insurance. Pt sates understanding. Pt is still wanting to attend swing bed. Discussed transport with pt. He would like a wheelchair ride. Writer informed pt of 90 dollar base pay approximately and 6 dollars per mile approximately costs private pay as not covered by insurance. Pt states that understanding of private pay cost and is agreeable still to wheelchair transport. Pt asked if he can take a pillow or two from hospital with him to assist him in being comfortable. Writer will ask nursing. Writer asked pt if he feels he will be able to hold himself up in the wheelchair the entire 2 hour or so ride. Pt states he will be and feels comfortable with wheelchair ride. Writer will call his wife to update. Pt asking about when he will get the picc line. Writer informed him that writer will ask bedside Rn to update him. Pt states understanding. Pt states no questions or concerns for writer further.     Writer placed phone call to pt's wife Pamela. No answer. Writer left voicemail.    Writer spoke with charge RN who states agreement to pt taking two pillows  with him for ride tomorrow to swing bed.  Writer set up transport via [facility] transport [facility] for [phone] wheelchair tomorrow.     Writer spoke with bedside Rn who feels pt will tolerate wheelchair transport. Bedside RN will update pt of picc line time.     Writer spoke with  and informed her of discharge tomorrow. Doctor states she will leave notice for colleague to finish orders in the morning as she will not be on service tomorrow. Doctor ask that CV Surgery be updated. Writer attempted to do so via vocera but cv surg pa not on vocera.    Writer spoke with charge RN. Writer informed charge of plan time for noon discharge. Writer informed charge RN of doctor Sutton wanting cv surgery to update. Charge RN states she will call and inform CV surgery of need for orders by 10am as writer is requesting.     Writer called to update swing bed at 419pm.  Lora states that kumar and mayra are gone for the day. She took down note to notify them right away in the morning of plan for pt to discharge tomorrow between [id number] and will plan to  send orders over by 10am. Writer provided contact number for unit  requesting they call back right away in the morning.    Writer left notice for unit  to call right away in the morning as well to swing bed to verify discharge time works ok for them.     Next Steps: awaiting medical stability; awaiting acceptance from swing bed.     WILLY Shepherd  Social Work  [facility]

## 2024-11-11 NOTE — PHARMACY-VANCOMYCIN DOSING SERVICE
Pharmacy Vancomycin Note  Date of Service 2024  Patient's  1940   84 year old, male    Indication: Bacteremia  Day of Therapy: 19  Current vancomycin regimen:  1000 mg IV q12h  Current vancomycin monitoring method: AUC  Current vancomycin therapeutic monitoring goal: 400-600 mg*h/L    InsightRX Prediction of Current Vancomycin Regimen  Regimen: 1000 mg IV every 12 hours.  Start time: 17:21 on 2024  Exposure target: AUC24 (range)400-600 mg/L.hr   AUC24,ss: 397 mg/L.hr  Probability of AUC24 > 400: 48 %  Ctrough,ss: 10 mg/L  Probability of Ctrough,ss > 20: 0 %  Probability of nephrotoxicity (Lodise J CARLOS ): 6 %    Current estimated CrCl = Estimated Creatinine Clearance: 93.8 mL/min (A) (based on SCr of 0.53 mg/dL (L)).    Creatinine for last 3 days  2024:  6:53 AM Creatinine 0.54 mg/dL  11/10/2024:  6:54 AM Creatinine 0.58 mg/dL  2024:  6:58 AM Creatinine 0.53 mg/dL    Recent Vancomycin Levels (past 3 days)  2024:  6:38 PM Vancomycin 11.0 ug/mL  2024:  3:09 PM Vancomycin 11.7 ug/mL    Vancomycin IV Administrations (past 72 hours)                     vancomycin (VANCOCIN) 1,000 mg in 200 mL dextrose intermittent infusion (mg) 1,000 mg New Bag 24 0521     1,000 mg New Bag 11/10/24 1807      Restarted  0732     1,000 mg New Bag  0618     1,000 mg New Bag 24 1838     1,000 mg New Bag  0614     1,000 mg New Bag 24 1752                    Nephrotoxins and other renal medications (From now, onward)      Start     Dose/Rate Route Frequency Ordered Stop    24 1800  vancomycin (VANCOCIN) 1,250 mg in 0.9% NaCl 262.5 mL intermittent infusion         1,250 mg  over 90 Minutes Intravenous EVERY 12 HOURS 24 1614      24 1400  furosemide (LASIX) injection 20 mg         20 mg  over 1-3 Minutes Intravenous ONCE 24 1338      11/11/24 0000  vancomycin (VANCOCIN) 1000 mg in dextrose 5% 200 mL PREMIX         1,000 mg  200 mL/hr over 1 Hours  Intravenous EVERY 12 HOURS 11/11/24 1041 12/16/24 2359               Contrast Orders - past 72 hours (72h ago, onward)      None            Interpretation of levels and current regimen:  Vancomycin level is reflective of AUC less than 400    Has serum creatinine changed greater than 50% in last 72 hours: No    Urine output:  good urine output    Renal Function: Stable    InsightRX Prediction of Planned New Vancomycin Regimen  Regimen: 1250 mg IV every 12 hours.  Start time: 17:21 on 11/11/2024  Exposure target: AUC24 (range)400-600 mg/L.hr   AUC24,ss: 496 mg/L.hr  Probability of AUC24 > 400: 96 %  Ctrough,ss: 12.6 mg/L  Probability of Ctrough,ss > 20: 0 %  Probability of nephrotoxicity (Lodise J CARLOS 2009): 8 %    Plan:  Increase Dose to 1250 mg IV every 12 hours  Vancomycin monitoring method: AUC  Vancomycin therapeutic monitoring goal: 400-600 mg*h/L  Pharmacy will check vancomycin levels as appropriate in 5-7 Days.  Serum creatinine levels will be ordered a minimum of twice weekly.    Vandana Izaguirre RP

## 2024-11-11 NOTE — PROGRESS NOTES
[facility]    Hospitalist Progress Note    Date of Admission: 10/25/2024    Assessment & Plan   Jayme Donahue is a 84 year old male with PMhx of prostate cancer who was admitted to [facility] on 10/23/24 with suspected urosepsis.  He was found to have bacterium due to corynebacterium stratum and echo showed severe mitral regurgitation with a mobile echodensity on the mitral valve.  He was subsequently transferred to [facility] on 10/25/2024 for ongoing evaluation and management of endocarditis.      He was evaluated by CV surgery during his stay.  Prior to undergoing valve procedures he underwent extraction of numerous teeth.  He ultimately underwent minimally invasive mitral valve replacement with tissue valve on 11/4/24.     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  He was found to be hyponatremic with a sodium 118 and diagnosed with sepsis due to UTI.  He became tensive and transferred to the ICU for initiation of vasopressors.  He was placed on broad-spectrum antibiotics with vancomycin and ceftriaxone.  Pressors were quickly weaned.  During this time blood cultures returned positive for corynebacterium striatum.  TTE showed severe mitral regurgitation and a mobile echodensity on the atrial side of the mitral valve (flail scallop vs vegetation).  Given some concurrent complaints of low back pain, MRI of the lumbar spine was obtained possibly suggestive of early changes of discitis.  ID was contacted, recommended transfer to [facility] for further cardiac evaluation.  *TTE this stay showed a large  vegetation on atrial aspect of mitral valve, prolapse of posterior mitral valve leaflet, severe mitral regurgitation.  *ID, cardiology and CV surgery following this stay.   *Repeat blood cultures have remained negative as of 10/26.  *Underwent preoperative evaluation with coronary angiogram on 10/31 that showed normal coronary arteries  *Ultimately underwent Right mini-thoracotomy mitral valve replacement with a 31 mm Quinonez MITRIS bovine pericardial valve on 11/4/24  -- routine postop cares per CV surgery including pain mgmt, postop arrhythmias  -- placed on midodrine on 11/7 for postop hypotension  -- conts on IV vancomycin per ID, anticipate prolonged course of IV abx on discharge; plan for PICC line placement on 11/11  -- conts on ASA, no indication for statin  -- PT/OT recommending TCU stay at discharge    Postoperative A-fib  *Started on amiodarone gtt on 11/9 per CV surgery. Converted back to NSR.  Changed to oral amiodarone.  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  Initially felt to be secondary to dehydration and had mild improvement after some IV fluids.  Urine studies obtained and appeared consistent with SIADH.  He was placed on a fluid restriction and started on salt tablets.  Sodium stabilized at 119-123.  *Na 125 on transfer to [facility], though began to trend back down with continued fluid restriction.  *Nephrology consulted and following this day. Started on UreNa on 10/29, then switched to salt tablets on 10/30.    *Slight drop Bee from 11/7-11/9 felt to be due in part to IVFs that were given for hypotension during this time.  -- Na improving; monitor daily BMP  -- cont salt tabs i TID  -- fluid restriction intensified from 1200ml/d to 1000ml/d per CV surgery on 11/10  -- per nephrology, once Na approaches 135 can  "liberalize fluid restriction and continue salt tabs    Loose stools: Improved  *Noted postop.  Suspected due to scheduled bowel regimen.    *Held sched bowel regimen and oral Mag.  C. difficile testing done 11/10 was negative.  -- stools improving off sched bowel regimen. monitor     Left scapular lesion, biopsy positive for basal cell carcinoma  *Left scapular lesion was noted on admission.  Appearance was concerning for possible malignancy question (see photos in epic from 10/23/24).  Wound culture obtained at the time of admission and grew stenotrophomonas maltophilia.  Was initially placed on a of doxycycline, however this was ultimately stopped after it was felt that findings represented chronic skin colonization and not an acute infection.  *Seen by general surgery, underwent punch biopsy on 10/31/24. Pathology showed \"Basal cell carcinoma with keratotic features, incompletely excised\"  Surgery noted patient would require full thickness resection with 4mm margins and that this would require general anesthesia. No plans to pursue further surgical mgmt this stay.   *Sutures removed on 11/7. [facility].   *WOC RN saw patient, local cares advised     History of prostate cancer, s/p treatment in 2020.   Urinary retention s/p mclain placement 10/29 and removal on 11/8:  Resolved  *Had reportedly underwent treatment for prostate cancer in 2020.  PSA levels elevated to 50s but trend was generally flat from 7/2024-9/2024. Reported some urgency this stay.   *Developed urinary retention this day requiring placement of a Mclain catheter.  Placed on empiric Flomax.  *Mclain catheter removed 11/8. No issues with persistent retention.  -- cont Flomax     Moderate protein calorie malnutrition  *Nutritionist following. Encourage po intake.    S/p extraction of numerous teeth (#3, 5-14, 20-21, 23-29) on 10/31/24   *Underwent teeth extraction prior to undergoing valve surgery.     FEN: no IVFs, lytes " stable, regular/mechanical soft diet with 1000ml/d fluid restriction  DVT Prophylaxis: PCDs, subQ heparin  Code Status: Full Code    Disposition: Condition continues to improve. To have PICC line placed. Anticipate patient will be stable for discharge in the next 1-2d, pending okay per CV surgery. PT/OT recommending TCU stay, SW following.  Patient and spouse hoping for TCU bed close to where they reside ([place], [place])    Medically Ready for Discharge: Anticipated Tomorrow    Will update patient's spouse Pamela over the phone later this afternoon.    Genna Sutton, DO    Clinically Significant Risk Factors         # Hyponatremia: Lowest Na = 130 mmol/L in last 2 days, will monitor as appropriate       # Hypoalbuminemia: Lowest albumin = 2.5 g/dL at 11/4/2024  8:12 PM, will monitor as appropriate                 # Severe Malnutrition: based on nutrition assessment    # Financial/Environmental Concerns:             Medical Decision Making       Please see A&P for additional details of medical decision making.       Interval History   Chart reviewed.  Seen this morning after therapy session.  Resting comfortably in chair.  Alert and conversing appropriately.  Denies specific complaints at present.  No chest pain, shortness of breath, abdominal pain, nausea or vomiting.  Loose BMs now improved after scheduled bowel regimen was held yesterday.  Was happy to hear that C. difficile testing was negative.  Continues to be in good spirits and remains appreciative for hospital staff and cares received during his stay.     -Data reviewed today: I reviewed all new labs and imaging results over the last 24 hours. I personally reviewed no images or EKG's today.    Physical Exam   Temp: 97.3  F (36.3  C) Temp src: Axillary BP: 121/67 Pulse: 84   Resp: 15 SpO2: 95 % O2 Device: None (Room air)    Vitals:    11/09/24 0500 11/10/24 0300 11/11/24 0520   Weight: 62.7 kg (138 lb 3.2 oz) 64.5 kg (142 lb 1.6 oz) 63.9 kg (140 lb  12.8 oz)     Vital Signs with Ranges  Temp:  [97.3  F (36.3  C)-99.6  F (37.6  C)] 97.3  F (36.3  C)  Pulse:  [] 84  Resp:  [15] 15  BP: ()/(54-78) 121/67  SpO2:  [95 %-98 %] 95 %  I/O last 3 completed shifts:  In: 600 [P.O.:600]  Out: -     Constitutional: Resting comfortably, alert and answering questions appropriately, NAD  Respiratory: Few faint bibasilar crackles but otherwise CTAB, no wheeze, no increased work of breathing  Cardiovascular: HRRR, no MGR, ++bilateral LE edema  GI: S, NT, ND, +BS  Skin/Integumen: warm/dry  Other:     Medications   Current Facility-Administered Medications   Medication Dose Route Frequency Provider Last Rate Last Admin    Reason beta blocker order not selected   Does not apply DOES NOT GO TO Kerri Odell NP         Current Facility-Administered Medications   Medication Dose Route Frequency Provider Last Rate Last Admin    amiodarone (PACERONE) tablet 400 mg  400 mg Oral BID Yana Rapp PA-C   400 mg at 11/10/24 2136    aspirin (ASA) chewable tablet 81 mg  81 mg Oral or NG Tube Daily Kerri Steen NP   81 mg at 11/10/24 0834    heparin ANTICOAGULANT injection 5,000 Units  5,000 Units Subcutaneous Q8H Kerri Steen NP   5,000 Units at 11/10/24 2351    Lidocaine (LIDOCARE) 4 % Patch 2 patch  2 patch Transdermal Q24H Kerri Steen NP   2 patch at 11/09/24 0035    magnesium sulfate 2 g in 50 mL sterile water intermittent infusion  2 g Intravenous Once Alexa Ellison MD        midodrine (PROAMATINE) tablet 5 mg  5 mg Oral TID w/meals Brigitte García PA-C   5 mg at 11/10/24 1809    pantoprazole (PROTONIX) 2 mg/mL suspension 40 mg  40 mg Oral or NG Tube Daily Kerri Steen NP   40 mg at 11/05/24 0953    Or    pantoprazole (PROTONIX) EC tablet 40 mg  40 mg Oral Daily Kerri Steen NP   40 mg at 11/10/24 0834    [Held by provider] polyethylene glycol (MIRALAX) Packet 17 g  17 g Oral Daily Kerri Steen, NP   17 g at 11/05/24 0934    [Held  by provider] senna-docusate (SENOKOT-S/PERICOLACE) 8.6-50 MG per tablet 1 tablet  1 tablet Oral or NG Tube BID Kerri Steen NP   1 tablet at 11/05/24 2059    [Held by provider] simethicone (MYLICON) chewable tablet 80 mg  80 mg Oral 4x Daily Kerri Steen NP   80 mg at 11/10/24 0834    sodium chloride (PF) 0.9% PF flush 3 mL  3 mL Intracatheter Q8H Kerri Steen NP   3 mL at 11/11/24 0521    sodium chloride tablet 2 g  2 g Oral TID w/meals Yana Rapp PA-C   2 g at 11/10/24 1809    tamsulosin (FLOMAX) capsule 0.4 mg  0.4 mg Oral Daily Kerri Steen NP   0.4 mg at 11/10/24 0834    vancomycin (VANCOCIN) 1,000 mg in 200 mL dextrose intermittent infusion  1,000 mg Intravenous Q12H Kerri Steen  mL/hr at 11/11/24 0521 1,000 mg at 11/11/24 0521       Data   Recent Labs   Lab 11/11/24  0658 11/10/24  0654 11/09/24  0653 11/04/24  2232 11/04/24 2012 11/04/24  1847 11/04/24  1846   WBC 6.9 6.1 6.6   < > 17.3*  --  13.2*   HGB 8.7* 8.4* 9.4*   < > 8.3*   < > 8.0*   MCV 88 89 88   < > 88  --  88    276 266   < > 184  --  160   INR  --   --   --   --  1.80*  --  2.04*   * 132* 128*   < > 133*   < > 132*   POTASSIUM 4.3 4.3 4.1   < > 4.0   < > 4.3   CHLORIDE 99 99 96*   < > 99  --  97*   CO2 25 28 21*   < > 25  --  25   BUN 21.8 25.7* 22.8   < > 9.5  --  9.4   CR 0.53* 0.58* 0.54*   < > 0.52*  --  0.53*   ANIONGAP 6* 5* 11   < > 9  --  10   AMALIA 8.4* 8.1* 8.4*   < > 8.3*  --  8.9   GLC 98 91 114*   < > 159*   < > 189*   ALBUMIN  --   --   --   --  2.5*  --   --    PROTTOTAL  --   --   --   --  4.3*  --   --    BILITOTAL  --   --   --   --  0.6  --   --    ALKPHOS  --   --   --   --  83  --   --    ALT  --   --   --   --  19  --   --    AST  --   --   --   --  34  --   --     < > = values in this interval not displayed.       Recent Results (from the past 24 hours)   Echocardiogram Limited   Result Value    LVEF  60-65%    Narrative     [id number]  [id number]  [id number]^FERNANDA^CESAR^RYLEE     [facility]  Echocardiography Laboratory  [address] [facility] [place] [address]     Name: FABIOLA ELI  MRN: [id number]  : 1940  Study Date: 11/10/2024 02:49 PM  Age: 84 yrs  Gender: Male  Patient Location: [facility]  Reason For Study: Mitral Valve Replacement  Ordering Physician: CESAR MICHAEL  Referring Physician: JACQUELYN KISER  Performed By: Dillon Justin     BSA: 1.7 m2  Height: 66 in  Weight: 138 lb  HR: 96  BP: 91/58 mmHg  ______________________________________________________________________________  Procedure  Limited Portable Echo Adult.  ______________________________________________________________________________  Interpretation Summary     MINIMALLY INVASIVE MITRAL VALVE REPLACEMENT WITH TISSUE HEART VALVE MITRISâ    RESILIAâ   MITRAL VALVE SIZE: 31MM 2024  Normal prosthetic mitral valve gradients. MDG 6.7mmHg @HR 84  The rhythm was sinus with wide QRS with first degree AV block.  Left ventricular systolic function is normal.  The visual ejection fraction is 60-65%.  The left ventricle is normal in size.  The right ventricle is normal in structure, function and size.  Normal prosthetic mitral valve gradients.     Since 10/23/2024 thre has been interim placement of a normally functioning  bioprosthetic mitral valve.  ______________________________________________________________________________  Left Ventricle  The left ventricle is normal in size. There is normal left ventricular wall  thickness. Left ventricular systolic function is normal. The visual ejection  fraction is 60-65%. Septal motion is consistent with post-operative state.  There is no thrombus seen in the left ventricle.     Right Ventricle  The right ventricle is normal in structure, function and size. There is no  mass or thrombus in the right ventricle.     Atria  Normal left atrial size. Right atrial size is normal. There is no atrial  shunt  seen. The left atrial appendage is not well visualized.     Mitral Valve  There is no mitral regurgitation noted. The mean mitral valve gradient is 6.7  mmHg. There is a prosthetic mitral valve. Normal prosthetic mitral valve  gradients.     Tricuspid Valve  Normal tricuspid valve. The right ventricular systolic pressure is  approximated at 25.7 mmHg plus the right atrial pressure. Right ventricle  systolic pressure estimate normal. There is no tricuspid stenosis.     Aortic Valve  The aortic valve is trileaflet. No aortic regurgitation is present. No aortic  stenosis is present.     Pulmonic Valve  Normal pulmonic valve. There is no pulmonic valvular regurgitation. There is  no pulmonic valvular stenosis.     Vessels  The aortic root is normal size. Normal size ascending aorta. The inferior vena  cava is normal. The pulmonary artery is normal size.     Pericardium  The pericardium appears normal. There is no pleural effusion.     Rhythm  The rhythm was sinus with wide QRS.  ______________________________________________________________________________  MMode/2D Measurements & Calculations  IVSd: 0.82 cm     LVIDd: 4.2 cm  LVIDs: 2.7 cm  LVPWd: 1.0 cm  FS: 36.6 %  LV mass(C)d: 126.0 grams  LV mass(C)dI: 73.7 grams/m2  asc Aorta Diam: 2.9 cm  Asc Ao diam index BSA (cm/m2): 1.7  Asc Ao diam index Ht(cm/m): 1.7  RWT: 0.50     Doppler Measurements & Calculations  MV max P.5 mmHg  MV mean P.7 mmHg  MV V2 VTI: 36.9 cm  TR max rupa: 253.5 cm/sec  TR max P.7 mmHg     ______________________________________________________________________________  Report approved by: Dr. Nash Zimmerman 11/10/2024 06:02 PM

## 2024-11-12 ENCOUNTER — ANESTHESIA EVENT (OUTPATIENT)
Dept: SURGERY | Facility: CLINIC | Age: 84
End: 2024-11-12
Payer: MEDICARE

## 2024-11-12 ENCOUNTER — APPOINTMENT (OUTPATIENT)
Dept: PHYSICAL THERAPY | Facility: CLINIC | Age: 84
End: 2024-11-12
Attending: INTERNAL MEDICINE
Payer: MEDICARE

## 2024-11-12 ENCOUNTER — ANESTHESIA (OUTPATIENT)
Dept: SURGERY | Facility: CLINIC | Age: 84
End: 2024-11-12
Payer: MEDICARE

## 2024-11-12 LAB
ANION GAP SERPL CALCULATED.3IONS-SCNC: 7 MMOL/L (ref 7–15)
BUN SERPL-MCNC: 20.6 MG/DL (ref 8–23)
CALCIUM SERPL-MCNC: 8.6 MG/DL (ref 8.8–10.4)
CHLORIDE SERPL-SCNC: 100 MMOL/L (ref 98–107)
CREAT SERPL-MCNC: 0.6 MG/DL (ref 0.67–1.17)
EGFRCR SERPLBLD CKD-EPI 2021: >90 ML/MIN/1.73M2
ERYTHROCYTE [DISTWIDTH] IN BLOOD BY AUTOMATED COUNT: 16.6 % (ref 10–15)
GLUCOSE BLDC GLUCOMTR-MCNC: 107 MG/DL (ref 70–99)
GLUCOSE SERPL-MCNC: 92 MG/DL (ref 70–99)
HCO3 SERPL-SCNC: 27 MMOL/L (ref 22–29)
HCT VFR BLD AUTO: 26.1 % (ref 40–53)
HGB BLD-MCNC: 8.8 G/DL (ref 13.3–17.7)
MAGNESIUM SERPL-MCNC: 2 MG/DL (ref 1.7–2.3)
MCH RBC QN AUTO: 30.2 PG (ref 26.5–33)
MCHC RBC AUTO-ENTMCNC: 33.7 G/DL (ref 31.5–36.5)
MCV RBC AUTO: 90 FL (ref 78–100)
PHOSPHATE SERPL-MCNC: 3.5 MG/DL (ref 2.5–4.5)
PLATELET # BLD AUTO: 368 10E3/UL (ref 150–450)
POTASSIUM SERPL-SCNC: 4.4 MMOL/L (ref 3.4–5.3)
RBC # BLD AUTO: 2.91 10E6/UL (ref 4.4–5.9)
SODIUM SERPL-SCNC: 134 MMOL/L (ref 135–145)
WBC # BLD AUTO: 7.5 10E3/UL (ref 4–11)

## 2024-11-12 PROCEDURE — 87205 SMEAR GRAM STAIN: CPT | Performed by: STUDENT IN AN ORGANIZED HEALTH CARE EDUCATION/TRAINING PROGRAM

## 2024-11-12 PROCEDURE — 0WC3XZZ EXTIRPATION OF MATTER FROM ORAL CAVITY AND THROAT, EXTERNAL APPROACH: ICD-10-PCS | Performed by: STUDENT IN AN ORGANIZED HEALTH CARE EDUCATION/TRAINING PROGRAM

## 2024-11-12 PROCEDURE — 250N000013 HC RX MED GY IP 250 OP 250 PS 637: Performed by: PHYSICIAN ASSISTANT

## 2024-11-12 PROCEDURE — 87075 CULTR BACTERIA EXCEPT BLOOD: CPT | Performed by: STUDENT IN AN ORGANIZED HEALTH CARE EDUCATION/TRAINING PROGRAM

## 2024-11-12 PROCEDURE — 370N000017 HC ANESTHESIA TECHNICAL FEE, PER MIN: Performed by: STUDENT IN AN ORGANIZED HEALTH CARE EDUCATION/TRAINING PROGRAM

## 2024-11-12 PROCEDURE — 88300 SURGICAL PATH GROSS: CPT | Mod: TC | Performed by: STUDENT IN AN ORGANIZED HEALTH CARE EDUCATION/TRAINING PROGRAM

## 2024-11-12 PROCEDURE — 87070 CULTURE OTHR SPECIMN AEROBIC: CPT | Performed by: STUDENT IN AN ORGANIZED HEALTH CARE EDUCATION/TRAINING PROGRAM

## 2024-11-12 PROCEDURE — 250N000013 HC RX MED GY IP 250 OP 250 PS 637: Performed by: NURSE PRACTITIONER

## 2024-11-12 PROCEDURE — 120N000001 HC R&B MED SURG/OB

## 2024-11-12 PROCEDURE — 272N000001 HC OR GENERAL SUPPLY STERILE: Performed by: STUDENT IN AN ORGANIZED HEALTH CARE EDUCATION/TRAINING PROGRAM

## 2024-11-12 PROCEDURE — 87106 FUNGI IDENTIFICATION YEAST: CPT | Performed by: STUDENT IN AN ORGANIZED HEALTH CARE EDUCATION/TRAINING PROGRAM

## 2024-11-12 PROCEDURE — 250N000013 HC RX MED GY IP 250 OP 250 PS 637: Performed by: INTERNAL MEDICINE

## 2024-11-12 PROCEDURE — 80048 BASIC METABOLIC PNL TOTAL CA: CPT | Performed by: PHYSICIAN ASSISTANT

## 2024-11-12 PROCEDURE — 250N000009 HC RX 250: Performed by: NURSE ANESTHETIST, CERTIFIED REGISTERED

## 2024-11-12 PROCEDURE — 97110 THERAPEUTIC EXERCISES: CPT | Mod: GP

## 2024-11-12 PROCEDURE — 250N000011 HC RX IP 250 OP 636: Performed by: INTERNAL MEDICINE

## 2024-11-12 PROCEDURE — 250N000009 HC RX 250: Performed by: STUDENT IN AN ORGANIZED HEALTH CARE EDUCATION/TRAINING PROGRAM

## 2024-11-12 PROCEDURE — 258N000003 HC RX IP 258 OP 636: Performed by: NURSE ANESTHETIST, CERTIFIED REGISTERED

## 2024-11-12 PROCEDURE — 85018 HEMOGLOBIN: CPT | Performed by: PHYSICIAN ASSISTANT

## 2024-11-12 PROCEDURE — 258N000003 HC RX IP 258 OP 636: Performed by: INTERNAL MEDICINE

## 2024-11-12 PROCEDURE — 250N000011 HC RX IP 250 OP 636: Performed by: NURSE PRACTITIONER

## 2024-11-12 PROCEDURE — 83735 ASSAY OF MAGNESIUM: CPT | Performed by: SURGERY

## 2024-11-12 PROCEDURE — 250N000011 HC RX IP 250 OP 636: Performed by: NURSE ANESTHETIST, CERTIFIED REGISTERED

## 2024-11-12 PROCEDURE — 250N000011 HC RX IP 250 OP 636: Performed by: STUDENT IN AN ORGANIZED HEALTH CARE EDUCATION/TRAINING PROGRAM

## 2024-11-12 PROCEDURE — 99232 SBSQ HOSP IP/OBS MODERATE 35: CPT | Performed by: INTERNAL MEDICINE

## 2024-11-12 PROCEDURE — 999N000141 HC STATISTIC PRE-PROCEDURE NURSING ASSESSMENT: Performed by: STUDENT IN AN ORGANIZED HEALTH CARE EDUCATION/TRAINING PROGRAM

## 2024-11-12 PROCEDURE — 87176 TISSUE HOMOGENIZATION CULTR: CPT | Performed by: STUDENT IN AN ORGANIZED HEALTH CARE EDUCATION/TRAINING PROGRAM

## 2024-11-12 PROCEDURE — 710N000009 HC RECOVERY PHASE 1, LEVEL 1, PER MIN: Performed by: STUDENT IN AN ORGANIZED HEALTH CARE EDUCATION/TRAINING PROGRAM

## 2024-11-12 PROCEDURE — G0463 HOSPITAL OUTPT CLINIC VISIT: HCPCS

## 2024-11-12 PROCEDURE — 360N000075 HC SURGERY LEVEL 2, PER MIN: Performed by: STUDENT IN AN ORGANIZED HEALTH CARE EDUCATION/TRAINING PROGRAM

## 2024-11-12 PROCEDURE — 84100 ASSAY OF PHOSPHORUS: CPT | Performed by: SURGERY

## 2024-11-12 RX ORDER — FENTANYL CITRATE 0.05 MG/ML
25 INJECTION, SOLUTION INTRAMUSCULAR; INTRAVENOUS EVERY 5 MIN PRN
Status: DISCONTINUED | OUTPATIENT
Start: 2024-11-12 | End: 2024-11-12 | Stop reason: HOSPADM

## 2024-11-12 RX ORDER — HYDROMORPHONE HCL IN WATER/PF 6 MG/30 ML
0.2 PATIENT CONTROLLED ANALGESIA SYRINGE INTRAVENOUS EVERY 5 MIN PRN
Status: DISCONTINUED | OUTPATIENT
Start: 2024-11-12 | End: 2024-11-12 | Stop reason: HOSPADM

## 2024-11-12 RX ORDER — LOPERAMIDE HYDROCHLORIDE 2 MG/1
2 CAPSULE ORAL 4 TIMES DAILY PRN
Status: DISCONTINUED | OUTPATIENT
Start: 2024-11-12 | End: 2024-11-13 | Stop reason: HOSPADM

## 2024-11-12 RX ORDER — SODIUM CHLORIDE, SODIUM LACTATE, POTASSIUM CHLORIDE, CALCIUM CHLORIDE 600; 310; 30; 20 MG/100ML; MG/100ML; MG/100ML; MG/100ML
INJECTION, SOLUTION INTRAVENOUS CONTINUOUS
Status: DISCONTINUED | OUTPATIENT
Start: 2024-11-12 | End: 2024-11-12 | Stop reason: HOSPADM

## 2024-11-12 RX ORDER — DEXAMETHASONE SODIUM PHOSPHATE 4 MG/ML
4 INJECTION, SOLUTION INTRA-ARTICULAR; INTRALESIONAL; INTRAMUSCULAR; INTRAVENOUS; SOFT TISSUE
Status: DISCONTINUED | OUTPATIENT
Start: 2024-11-12 | End: 2024-11-12 | Stop reason: HOSPADM

## 2024-11-12 RX ORDER — LABETALOL HYDROCHLORIDE 5 MG/ML
10 INJECTION, SOLUTION INTRAVENOUS
Status: DISCONTINUED | OUTPATIENT
Start: 2024-11-12 | End: 2024-11-12 | Stop reason: HOSPADM

## 2024-11-12 RX ORDER — ONDANSETRON 2 MG/ML
4 INJECTION INTRAMUSCULAR; INTRAVENOUS EVERY 30 MIN PRN
Status: DISCONTINUED | OUTPATIENT
Start: 2024-11-12 | End: 2024-11-12 | Stop reason: HOSPADM

## 2024-11-12 RX ORDER — LIDOCAINE HCL/EPINEPHRINE/PF 2%-1:200K
VIAL (ML) INJECTION PRN
Status: DISCONTINUED | OUTPATIENT
Start: 2024-11-12 | End: 2024-11-12 | Stop reason: HOSPADM

## 2024-11-12 RX ORDER — MAGNESIUM HYDROXIDE 1200 MG/15ML
LIQUID ORAL PRN
Status: DISCONTINUED | OUTPATIENT
Start: 2024-11-12 | End: 2024-11-12 | Stop reason: HOSPADM

## 2024-11-12 RX ORDER — MAGNESIUM OXIDE 400 MG/1
400 TABLET ORAL EVERY 4 HOURS
Status: DISCONTINUED | OUTPATIENT
Start: 2024-11-12 | End: 2024-11-12

## 2024-11-12 RX ORDER — NALOXONE HYDROCHLORIDE 0.4 MG/ML
0.1 INJECTION, SOLUTION INTRAMUSCULAR; INTRAVENOUS; SUBCUTANEOUS
Status: DISCONTINUED | OUTPATIENT
Start: 2024-11-12 | End: 2024-11-12 | Stop reason: HOSPADM

## 2024-11-12 RX ORDER — MAGNESIUM SULFATE HEPTAHYDRATE 40 MG/ML
2 INJECTION, SOLUTION INTRAVENOUS ONCE
Status: COMPLETED | OUTPATIENT
Start: 2024-11-12 | End: 2024-11-12

## 2024-11-12 RX ORDER — SODIUM CHLORIDE, SODIUM LACTATE, POTASSIUM CHLORIDE, CALCIUM CHLORIDE 600; 310; 30; 20 MG/100ML; MG/100ML; MG/100ML; MG/100ML
INJECTION, SOLUTION INTRAVENOUS CONTINUOUS PRN
Status: DISCONTINUED | OUTPATIENT
Start: 2024-11-12 | End: 2024-11-12

## 2024-11-12 RX ORDER — HYDRALAZINE HYDROCHLORIDE 20 MG/ML
2.5-5 INJECTION INTRAMUSCULAR; INTRAVENOUS EVERY 10 MIN PRN
Status: DISCONTINUED | OUTPATIENT
Start: 2024-11-12 | End: 2024-11-12 | Stop reason: HOSPADM

## 2024-11-12 RX ORDER — ONDANSETRON 2 MG/ML
INJECTION INTRAMUSCULAR; INTRAVENOUS PRN
Status: DISCONTINUED | OUTPATIENT
Start: 2024-11-12 | End: 2024-11-12

## 2024-11-12 RX ORDER — FENTANYL CITRATE 50 UG/ML
INJECTION, SOLUTION INTRAMUSCULAR; INTRAVENOUS PRN
Status: DISCONTINUED | OUTPATIENT
Start: 2024-11-12 | End: 2024-11-12

## 2024-11-12 RX ORDER — LIDOCAINE HYDROCHLORIDE 20 MG/ML
INJECTION, SOLUTION INFILTRATION; PERINEURAL PRN
Status: DISCONTINUED | OUTPATIENT
Start: 2024-11-12 | End: 2024-11-12

## 2024-11-12 RX ORDER — ONDANSETRON 4 MG/1
4 TABLET, ORALLY DISINTEGRATING ORAL EVERY 30 MIN PRN
Status: DISCONTINUED | OUTPATIENT
Start: 2024-11-12 | End: 2024-11-12 | Stop reason: HOSPADM

## 2024-11-12 RX ORDER — PROPOFOL 10 MG/ML
INJECTION, EMULSION INTRAVENOUS CONTINUOUS PRN
Status: DISCONTINUED | OUTPATIENT
Start: 2024-11-12 | End: 2024-11-12

## 2024-11-12 RX ADMIN — PROPOFOL 30 MCG/KG/MIN: 10 INJECTION, EMULSION INTRAVENOUS at 20:56

## 2024-11-12 RX ADMIN — MIDODRINE HYDROCHLORIDE 5 MG: 5 TABLET ORAL at 11:53

## 2024-11-12 RX ADMIN — LOPERAMIDE HYDROCHLORIDE 2 MG: 2 CAPSULE ORAL at 08:17

## 2024-11-12 RX ADMIN — VANCOMYCIN HYDROCHLORIDE 1250 MG: 10 INJECTION, POWDER, LYOPHILIZED, FOR SOLUTION INTRAVENOUS at 05:16

## 2024-11-12 RX ADMIN — MIDODRINE HYDROCHLORIDE 5 MG: 5 TABLET ORAL at 08:17

## 2024-11-12 RX ADMIN — FENTANYL CITRATE 25 MCG: 50 INJECTION INTRAMUSCULAR; INTRAVENOUS at 21:56

## 2024-11-12 RX ADMIN — TAMSULOSIN HYDROCHLORIDE 0.4 MG: 0.4 CAPSULE ORAL at 08:17

## 2024-11-12 RX ADMIN — PANTOPRAZOLE SODIUM 40 MG: 40 TABLET, DELAYED RELEASE ORAL at 08:17

## 2024-11-12 RX ADMIN — HEPARIN SODIUM 5000 UNITS: 5000 INJECTION, SOLUTION INTRAVENOUS; SUBCUTANEOUS at 08:17

## 2024-11-12 RX ADMIN — MAGNESIUM SULFATE HEPTAHYDRATE 2 G: 40 INJECTION, SOLUTION INTRAVENOUS at 08:17

## 2024-11-12 RX ADMIN — FENTANYL CITRATE 25 MCG: 50 INJECTION INTRAMUSCULAR; INTRAVENOUS at 21:45

## 2024-11-12 RX ADMIN — MIDODRINE HYDROCHLORIDE 5 MG: 5 TABLET ORAL at 17:03

## 2024-11-12 RX ADMIN — SODIUM CHLORIDE TAB 1 GM 2 G: 1 TAB at 17:03

## 2024-11-12 RX ADMIN — VANCOMYCIN HYDROCHLORIDE 1250 MG: 10 INJECTION, POWDER, LYOPHILIZED, FOR SOLUTION INTRAVENOUS at 17:03

## 2024-11-12 RX ADMIN — SODIUM CHLORIDE, POTASSIUM CHLORIDE, SODIUM LACTATE AND CALCIUM CHLORIDE: 600; 310; 30; 20 INJECTION, SOLUTION INTRAVENOUS at 20:52

## 2024-11-12 RX ADMIN — FENTANYL CITRATE 25 MCG: 50 INJECTION INTRAMUSCULAR; INTRAVENOUS at 21:28

## 2024-11-12 RX ADMIN — ASPIRIN 81 MG: 81 TABLET, COATED ORAL at 08:17

## 2024-11-12 RX ADMIN — ONDANSETRON 4 MG: 2 INJECTION INTRAMUSCULAR; INTRAVENOUS at 21:00

## 2024-11-12 RX ADMIN — SODIUM CHLORIDE TAB 1 GM 2 G: 1 TAB at 08:17

## 2024-11-12 RX ADMIN — SODIUM CHLORIDE TAB 1 GM 2 G: 1 TAB at 11:53

## 2024-11-12 RX ADMIN — LIDOCAINE HYDROCHLORIDE 30 MG: 20 INJECTION, SOLUTION INFILTRATION; PERINEURAL at 20:56

## 2024-11-12 RX ADMIN — METOPROLOL TARTRATE 6.25 MG: 25 TABLET, FILM COATED ORAL at 08:18

## 2024-11-12 RX ADMIN — AMIODARONE HYDROCHLORIDE 400 MG: 200 TABLET ORAL at 08:17

## 2024-11-12 RX ADMIN — FENTANYL CITRATE 25 MCG: 50 INJECTION INTRAMUSCULAR; INTRAVENOUS at 20:56

## 2024-11-12 NOTE — PLAN OF CARE
"Patient Name: Rusty  MRN: [id number]  Date of Admission: 10/25/2024  Reason for Admission: MVR, bacteremia   Level of Care: Medical     Vitals:   BP Readings from Last 1 Encounters:   11/11/24 123/73     Pulse Readings from Last 1 Encounters:   11/11/24 84     Wt Readings from Last 1 Encounters:   11/11/24 63.9 kg (140 lb 12.8 oz)     Ht Readings from Last 1 Encounters:   10/23/24 1.676 m (5' 6\")     Estimated body mass index is 22.73 kg/m  as calculated from the following:    Height as of 10/23/24: 1.676 m (5' 6\").    Weight as of this encounter: 63.9 kg (140 lb 12.8 oz).  Temp Readings from Last 1 Encounters:   11/11/24 98  F (36.7  C) (Oral)       Pain: Pain goal 0 Pain Rating 0 Effective pain medication/regimen N/A    CV Surgery Patient: Yes Post Op Day #: 7    Assessment    General: Afebrile yes  Rhythm: normal sinus rhythm  Resp: Oxygen Status: RA  Patient slept last night Yes Approx hours slept SABINE  Neuro: Alert yes Orientation: self, person, time, and place  GI/:          Bowel Activity: yes if yes indicate when: 11/11          Bowel Medications: no          Urinary Catheter: no  Skin:          Incision: Incision status: healing well          Epicardial Pacing Wires: no  Chest Tubes   Pleural: no Draining: not applicable               Suction: no              Mediastinal: no Draining: not applicable               Suction: no   Dressing Change Daily: not applicable     Assessment    Resp: LS clear on room air  Telemetry: SR w/ 1* AVB  Neuro: A&Ox4  GI/: Diarrhea. AUO. Mech/dental soft diet w/ fluid restriction.   Skin/Wounds: R thoracotomy and lap sites. Blanchable redness to sacrum/buttocks. LUE extravasation site.   Lines/Drains: R PIV SL.   Activity: Ax1 GBW  Sleep: Between cares  Abnormal Labs: Mg 1.7, replaced. Na 130     Aggression Stop Light: Green          Patient Care Plan: Will need oral packing removed by oral surg tomorrow. Holding discharge per oral surgery until 11/13. TCU at discharge. "

## 2024-11-12 NOTE — PROGRESS NOTES
"ORAL & MAXILLOFACIAL SURGERY   PROGRESS NOTE  Jayme Donahue,  MRN: [id number],  : 1940           ASSESSMENT:  84 year old male with history of prostate cancer admitted for bacteremia and endocarditis. Patient is now s/p full mouth tooth extraction on 10/31/24 and mitral valve replacement on 24. During extraction procedure a material was placed into extraction sockets that was not gel foam and does not dissolve, this was brought to the surgeons attention on 24. Plan to return to OR today, 24, to remove packing and replace with gel foam.      PLAN:  - Plan for OR today, 24, tentatively at 4:00pm  - NPO after 7:30 AM  - Stop heparin 2 hours prior to OR, can restart 2 hours post   - Liquid diet after surgery, can advance to soft diet   - OK for discharge 24 from OMFS standpoint  - Please contact OMFS surgeon with any questions    Gabbie Palacio DDS    Please contact the OMFS resident on-call with questions or concerns.  ____________________________________      SUBJECTIVE:  \"I'm doing well, I didn't have any pain\" Patient is sitting up in chair, denies any pain or symptoms with mouth.     PHYSICAL EXAM:   Vitals:    24 2026 24 2100 24 2330 24 0336   BP: 124/65 114/71 112/74 110/75   BP Location: Left arm  Left arm Left arm   Patient Position:   Semi-France's Semi-France's   Cuff Size:   Adult Regular Adult Regular   Pulse: 86 85 81 84   Resp: 18  16 16   Temp: 98.2  F (36.8  C)  97.8  F (36.6  C) 97.7  F (36.5  C)   TempSrc: Oral  Oral Oral   SpO2: 97%  96% 95%   Weight:    62.7 kg (138 lb 3.2 oz)        GEN: WD/WN male, NAD, pleasant, no extraoral asymmetry  HEENT: NC/AT, EOMI, PERRL, edentulous maxilla and mandible, sutures dissolving, small amount of white packing can be appreciated in each quad, no erythema, edema or purulence. No vestibular edema, FOM soft, granulation tissue present over some extraction sites.   CV: RRR  PULM: breathing comfortably " on room air  MSK: HOLLIDAY, no peripheral extremity edema  NEURO: AAOx4, CN II-XII intact bilaterally  PSYCH: Appropriate mood and affect    LABS:   CBC RESULTS:   Recent Labs   Lab Test 11/12/24  0515   WBC 7.5   RBC 2.91*   HGB 8.8*   HCT 26.1*   MCV 90   MCH 30.2   MCHC 33.7   RDW 16.6*         Last Comprehensive Metabolic Panel:  Sodium   Date Value Ref Range Status   11/12/2024 134 (L) 135 - 145 mmol/L Final     Potassium   Date Value Ref Range Status   11/12/2024 4.4 3.4 - 5.3 mmol/L Final     Potassium POCT   Date Value Ref Range Status   11/04/2024 4.1 3.4 - 5.3 mmol/L Final     Chloride   Date Value Ref Range Status   11/12/2024 100 98 - 107 mmol/L Final     Carbon Dioxide (CO2)   Date Value Ref Range Status   11/12/2024 27 22 - 29 mmol/L Final     Anion Gap   Date Value Ref Range Status   11/12/2024 7 7 - 15 mmol/L Final     Glucose   Date Value Ref Range Status   11/12/2024 92 70 - 99 mg/dL Final     GLUCOSE BY METER POCT   Date Value Ref Range Status   11/11/2024 100 (H) 70 - 99 mg/dL Final     Urea Nitrogen   Date Value Ref Range Status   11/12/2024 20.6 8.0 - 23.0 mg/dL Final     Creatinine   Date Value Ref Range Status   11/12/2024 0.60 (L) 0.67 - 1.17 mg/dL Final     GFR Estimate   Date Value Ref Range Status   11/12/2024 >90 >60 mL/min/1.73m2 Final     Comment:     eGFR calculated using 2021 CKD-EPI equation.   03/31/2021 81 >60 mL/min/[1.73_m2] Final     Calcium   Date Value Ref Range Status   11/12/2024 8.6 (L) 8.8 - 10.4 mg/dL Final     Comment:     Reference intervals for this test were updated on 7/16/2024 to reflect our healthy population more accurately. There may be differences in the flagging of prior results with similar values performed with this method. Those prior results can be interpreted in the context of the updated reference intervals.

## 2024-11-12 NOTE — PLAN OF CARE
"Patient Name: Rusty  MRN: [id number]  Date of Admission: 10/25/2024  Reason for Admission: Bacteremia, POD# 8 MVR  Level of Care: Medical heart     Vitals:   BP Readings from Last 1 Encounters:   11/11/24 112/74     Pulse Readings from Last 1 Encounters:   11/11/24 81     Wt Readings from Last 1 Encounters:   11/11/24 63.9 kg (140 lb 12.8 oz)     Ht Readings from Last 1 Encounters:   10/23/24 1.676 m (5' 6\")     Estimated body mass index is 22.73 kg/m  as calculated from the following:    Height as of 10/23/24: 1.676 m (5' 6\").    Weight as of this encounter: 63.9 kg (140 lb 12.8 oz).  Temp Readings from Last 1 Encounters:   11/11/24 97.8  F (36.6  C) (Oral)       Pain: Denies pain     CV Surgery Patient: Yes Post Op Day #: 8    Assessment    General: Afebrile yes  Rhythm: normal sinus rhythm  Blood Pressure Medications given/held: Amio Given Beta blocker Given Other   Resp: Oxygen Status: RA  Patient slept last night Yes Approx hours slept 4-5  Neuro: Alert yes Orientation: self, person, time, and place  GI/:          Bowel Activity: yes if yes indicate when: 11/11/2024          Bowel Medications: no          Urinary Catheter: no  Skin:          Incision: Incision status: healing well          Epicardial Pacing Wires: not applicable  Chest Tubes   Pleural: not applicable Draining: not applicable               Suction: not applicable              Mediastinal: not applicable Draining: not applicable               Suction: not applicable       Assessment    Resp: LS clear on RA  Telemetry: SR w/1*AVB and BBB  Neuro: A&O x4  GI/: 3-4 small soft Bms overnight. Voiding without difficulty   Skin/Wounds: R- thoracotomy site and lap site, R groin site, Blanchable redness to buttocks, Left scapular wound.   Lines/Drains: 1 PIVs, SL. PICC infusing vanco  Activity: Ax1 GBW, 1 walk completed today  Sleep: good   Abnormal Labs: Pending AM labs     Note: NPO since midnight    Aggression Stop Light: Green          Patient " Care Plan: Procedure at 4PM to remove oral packing. Continue plan of care.

## 2024-11-12 NOTE — PROGRESS NOTES
"[facility] Nurse Inpatient Assessment     Consulted for: Wound left scapula     Summary: patient with POA Chronic non healing wound of left scapula, biopsy completed this admission 10/31 by general surgery, results discussed with patient 11/7 \"basal cell carcinoma\" and continue topical cares    Patient History (according to provider note(s):      \"Given that basal cell is slow growing this is far down the list of his many problems. I suggested that they deal with this when he gets out of the hospital. [facility] through [facility]. Questions answered. Continue wound cares over it as you are.\"    Assessment:      Areas visualized during today's visit: Focused: and upper back     Wound location: left upper back over scapula area     Last photo: 11/12  Wound due to: Malignancy confirmed by biopsy   Wound history/plan of care: found with cares in place per POC   Wound base:  Dermis and Stalled, firm smooth      Palpation of the wound bed: firm moderate      Drainage: small     Description of drainage: serous     Measurements (length x width x depth, in cm): 3.5  x 4  x  0.1 cm      Tunneling: N/A     Undermining: N/A  Periwound skin: Intact      Color: normal and consistent with surrounding tissue, with purple ring surrounding       Temperature: normal   Odor: none  Pain: denies , none  Pain interventions prior to dressing change: patient tolerated well  Treatment goal: Protection  STATUS: stable and stalled  Supplies ordered: supplies stored on unit       Treatment Plan:     10/28/24 1519  Wound care  EVERY OTHER DAY      Comments: Left scapula wound(s): Every other day and prn:  1. Cleanse with Vashe-moist gauze.  Allow the moist gauze to soak for at least 2 minutes, then remove and wipe clean.  Let dry.  2. Apply Aquacel Ag to wound, cut to fit.  3. Cover with Mepilex 4x4. No need to peel back for skin checks between wound cares.    4. " Offload/cushion area with pillows as able.          Orders: Reviewed, added to discharge     RECOMMEND PRIMARY TEAM ORDER: None, at this time   Education provided: plan of care and wound progress  Discussed plan of care with: Patient, Nurse, and Physician 11/12  [facility] nurse follow-up plan: weekly  Notify [facility] if wound(s) deteriorate.  Nursing to notify the Provider(s) and re-consult the [facility] Nurse if new skin concern.    DATA:     Current support surface: Standard  Standard gel mattress (Isoflex)  Containment of urine/stool: Incontinence Protocol and Incontinent pad in bed  BMI: Body mass index is 22.31 kg/m .   Active diet order: Orders Placed This Encounter      Advance Diet as Tolerated: Regular Diet Adult; Mechanical/Dental Soft Diet     Output: I/O last 3 completed shifts:  In: 1217 [P.O.:680; I.V.:537]  Out: 200 [Urine:200]     Labs:   Recent Labs   Lab 11/12/24  0515   HGB 8.8*   WBC 7.5     Pressure injury risk assessment:   Sensory Perception: 4-->no impairment  Moisture: 3-->occasionally moist  Activity: 3-->walks occasionally  Mobility: 3-->slightly limited  Nutrition: 3-->adequate  Friction and Shear: 2-->potential problem  Phillip Score: 18    Suha CWOCN   1st choice: Securely message with [employer] ([facility] [employer])   (2nd option: [facility] Office Phone [phone], messages checked periodically Mon-Fri 8a-4p)

## 2024-11-12 NOTE — PROGRESS NOTES
[facility]    Hospitalist Progress Note    Interval History   - No acute issues today. Plan back to OR to change teeth packing  - Anticipate discharge to TCU tomorrow    Assessment & Plan   Summary: Jayme Donahue is a 84 year old male with PMH prostate cancer who was admitted on 10/25/2024 from [facility] with Corynebactermia bacteremia and endocarditis. He was evaluated by CV surgery during his stay.  Prior to undergoing valve procedures he underwent extraction of numerous teeth.  He ultimately underwent minimally invasive mitral valve replacement with tissue valve on 11/4/24.     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  He was found to be hyponatremic with a sodium 118 and diagnosed with sepsis due to UTI.  He became tensive and transferred to the ICU for initiation of vasopressors.  He was placed on broad-spectrum antibiotics with vancomycin and ceftriaxone.  Pressors were quickly weaned.  During this time blood cultures returned positive for corynebacterium striatum.  TTE showed severe mitral regurgitation and a mobile echodensity on the atrial side of the mitral valve (flail scallop vs vegetation).  Given some concurrent complaints of low back pain, MRI of the lumbar spine was obtained possibly suggestive of early changes of discitis.  ID was contacted, recommended transfer to [facility] for further cardiac evaluation.  *TTE this stay showed a large vegetation on atrial aspect of mitral valve, prolapse of posterior mitral valve leaflet, severe mitral regurgitation.  *ID, cardiology and CV surgery following this  stay.   *Repeat blood cultures have remained negative as of 10/26.  *Underwent preoperative evaluation with coronary angiogram on 10/31 that showed normal coronary arteries  *Ultimately underwent Right mini-thoracotomy mitral valve replacement with a 31 mm Quinonez MITRIS bovine pericardial valve on 11/4/24  -- routine postop cares per CV surgery including pain mgmt, postop arrhythmias  -- placed on midodrine on 11/7 for postop hypotension  -- conts on IV vancomycin per ID, anticipate prolonged course of IV abx on discharge; plan for PICC line placement on 11/11  -- conts on ASA, no indication for statin  -- PT/OT recommending TCU stay at discharge     Postoperative A-fib  *Started on amiodarone gtt on 11/9 per CV surgery. Converted back to NSR.  Changed to oral amiodarone.  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  Initially felt to be secondary to dehydration and had mild improvement after some IV fluids.  Urine studies obtained and appeared consistent with SIADH.  He was placed on a fluid restriction and started on salt tablets.  Sodium stabilized at 119-123.  *Na 125 on transfer to [facility], though began to trend back down with continued fluid restriction.  *Nephrology consulted and following this day. Started on UreNa on 10/29, then switched to salt tablets on 10/30.    *Slight drop Bee from 11/7-11/9 felt to be due in part to IVFs that were given for hypotension during this time.  -- Na improving; monitor daily BMP  -- cont salt tabs i TID  -- fluid restriction intensified from 1200ml/d to 1000ml/d per CV surgery on 11/10  -- per nephrology, once Na approaches 135 can liberalize fluid restriction and continue salt tabs  - back to 1200mL daily on 11/12     Loose stools: Improved  *Noted postop.  Suspected due to scheduled bowel regimen.   "  *Held sched bowel regimen and oral Mag.  C. difficile testing done 11/10 was negative.  -- stools improving off sched bowel regimen. monitor     Left scapular lesion, biopsy positive for basal cell carcinoma  *Left scapular lesion was noted on admission.  Appearance was concerning for possible malignancy question (see photos in epic from 10/23/24).  Wound culture obtained at the time of admission and grew stenotrophomonas maltophilia.  Was initially placed on a of doxycycline, however this was ultimately stopped after it was felt that findings represented chronic skin colonization and not an acute infection.  *Seen by general surgery, underwent punch biopsy on 10/31/24. Pathology showed \"Basal cell carcinoma with keratotic features, incompletely excised\"  Surgery noted patient would require full thickness resection with 4mm margins and that this would require general anesthesia. No plans to pursue further surgical mgmt this stay.   *Sutures removed on 11/7. [facility].   *[facility] RN saw patient, local cares advised  - Outpatient surgery follow up     History of prostate cancer, s/p treatment in 2020.   Urinary retention s/p mclain placement 10/29 and removal on 11/8:  Resolved  *Had reportedly underwent treatment for prostate cancer in 2020.  PSA levels elevated to 50s but trend was generally flat from 7/2024-9/2024. Reported some urgency this stay.   *Developed urinary retention this day requiring placement of a Mclain catheter.  Placed on empiric Flomax.  *Mclain catheter removed 11/8. No issues with persistent retention.  -- cont Flomax     Moderate protein calorie malnutrition  *Nutritionist following. Encourage po intake.     S/p extraction of numerous teeth (#3, 5-14, 20-21, 23-29) on 10/31/24   *Underwent teeth extraction prior to undergoing valve surgery  - Change packing 11/12       Clinically Significant Risk Factors         # Hyponatremia: Lowest Na = 130 mmol/L in last 2 days, " will monitor as appropriate       # Hypoalbuminemia: Lowest albumin = 2.5 g/dL at 11/4/2024  8:12 PM, will monitor as appropriate                 # Severe Malnutrition: based on nutrition assessment    # Financial/Environmental Concerns:            PT/OT: ordered  Diet: Snacks/Supplements Adult: Ensure Enlive; With Meals  Advance Diet as Tolerated: Regular Diet Adult; Mechanical/Dental Soft Diet  Fluid restriction 1000 ML FLUID    DVT Prophylaxis: heparin SQ  Palmer Catheter: Not present  Lines: PRESENT      PICC 11/11/24 Single Lumen Right Basilic-Site Assessment: WDL      Cardiac Monitoring: ACTIVE order. Indication: Open heart surgery (7 days)  Code Status: Full Code    Medically Ready for Discharge: Anticipated Tomorrow to [facility]      Shin Rodríguez MD  Hospitalist Service  [facility]  Securely message with [employer] (more info)  Text page via [employer] Paging/Directory     - Total time spent on encounter is 35 minutes, which includes counseling, coordination of care, time spent discussing with family, and/or time spent discussing with consultants.    Data reviewed today: I reviewed all new labs and imaging results over the last 24 hours.    Physical Exam   Temp: 98  F (36.7  C) Temp src: Oral BP: 120/75 Pulse: 82   Resp: 16 SpO2: 95 % O2 Device: None (Room air)    Vitals:    11/10/24 0300 11/11/24 0520 11/12/24 0336   Weight: 64.5 kg (142 lb 1.6 oz) 63.9 kg (140 lb 12.8 oz) 62.7 kg (138 lb 3.2 oz)     Vital Signs with Ranges  Temp:  [97.2  F (36.2  C)-98.2  F (36.8  C)] 98  F (36.7  C)  Pulse:  [81-91] 82  Resp:  [15-20] 16  BP: (106-124)/(59-75) 120/75  SpO2:  [94 %-97 %] 95 %  I/O last 3 completed shifts:  In: 1217 [P.O.:680; I.V.:537]  Out: 200 [Urine:200]  O2 requirements: none    Constitutional: Male in NAD  HEENT: Eyes nonicteric, oral mucosa moist  Cardiovascular: RRR, normal S1/2, no m/r/g  Respiratory: CTAB, no wheezing or crackles  Vascular: 2+ BLE pitting edema  GI: Normoactive  bowel sounds, nontender  Skin/Integumen: left scapular lesion healing  Neuro/Psych: Appropriate affect and mood. A&Ox3, moves all extremities    Medications   Current Facility-Administered Medications   Medication Dose Route Frequency Provider Last Rate Last Admin    Reason beta blocker order not selected   Does not apply DOES NOT GO TO Kerri Odell NP         Current Facility-Administered Medications   Medication Dose Route Frequency Provider Last Rate Last Admin    amiodarone (PACERONE) tablet 400 mg  400 mg Oral BID aYna Rapp PA-C   400 mg at 11/12/24 0817    aspirin EC tablet 81 mg  81 mg Oral Daily Genna Sutton DO   81 mg at 11/12/24 0817    heparin ANTICOAGULANT injection 5,000 Units  5,000 Units Subcutaneous Q8H Kerri Steen NP   5,000 Units at 11/12/24 0817    Lidocaine (LIDOCARE) 4 % Patch 2 patch  2 patch Transdermal Q24H Kerri Steen NP   2 patch at 11/09/24 0035    metoprolol tartrate (LOPRESSOR) quarter-tab 6.25 mg  6.25 mg Oral BID Yana Rapp PA-C   6.25 mg at 11/12/24 0818    midodrine (PROAMATINE) tablet 5 mg  5 mg Oral TID w/meals Brigitte García PA-C   5 mg at 11/12/24 0817    pantoprazole (PROTONIX) EC tablet 40 mg  40 mg Oral Daily Kerri Steen NP   40 mg at 11/12/24 0817    sodium chloride (PF) 0.9% PF flush 3 mL  3 mL Intracatheter Q8H Kerri Steen NP   3 mL at 11/12/24 0519    sodium chloride tablet 2 g  2 g Oral TID w/meals Yana Rapp PA-C   2 g at 11/12/24 0817    tamsulosin (FLOMAX) capsule 0.4 mg  0.4 mg Oral Daily Kerri Steen NP   0.4 mg at 11/12/24 0817    vancomycin (VANCOCIN) 1,250 mg in 0.9% NaCl 262.5 mL intermittent infusion  1,250 mg Intravenous Q12H Genna Sutton DO   1,250 mg at 11/12/24 0516       Data   Recent Labs   Lab 11/12/24  0515 11/11/24  2201 11/11/24  0658 11/10/24  0654   WBC 7.5  --  6.9 6.1   HGB 8.8*  --  8.7* 8.4*   MCV 90  --  88 89     --  321 276   *  --  130* 132*    POTASSIUM 4.4  --  4.3 4.3   CHLORIDE 100  --  99 99   CO2 27  --  25 28   BUN 20.6  --  21.8 25.7*   CR 0.60*  --  0.53* 0.58*   ANIONGAP 7  --  6* 5*   AMALIA 8.6*  --  8.4* 8.1*   GLC 92 100* 98 91       Imaging:   No results found for this or any previous visit (from the past 24 hours).

## 2024-11-12 NOTE — PROCEDURES
[facility]    Single Lumen PICC Placement    Date/Time: 11/11/2024 6:37 PM    Performed by: Sabiha Guerrero RN  Authorized by: Caroline Monae PA-C  Indications: vascular access      UNIVERSAL PROTOCOL   Site Marked: Yes  Prior Images Obtained and Reviewed:  Yes  Required items: Required blood products, implants, devices and special equipment available    Patient identity confirmed:  Verbally with patient, arm band and hospital-assigned identification number  NA - No sedation, light sedation, or local anesthesia  Confirmation Checklist:  Patient's identity using two indicators, relevant allergies, procedure was appropriate and matched the consent or emergent situation and correct equipment/implants were available  Time out: Immediately prior to the procedure a time out was called    Universal Protocol: the Joint Commission Universal Protocol was followed    Preparation: Patient was prepped and draped in usual sterile fashion    ESBL (mL):  0     ANESTHESIA    Anesthesia:  See MAR for details  Local Anesthetic:  Lidocaine 1% without epinephrine  Anesthetic Total (mL):  1      SEDATION    Patient Sedated: No        Preparation: skin prepped with ChloraPrep  Skin prep agent: skin prep agent completely dried prior to procedure  Sterile barriers: maximum sterile barriers were used: cap, mask, sterile gown, sterile gloves, and large sterile sheet  Hand hygiene: hand hygiene performed prior to central venous catheter insertion  Type of line used: PICC  Catheter type: single lumen  Lumen type: power PICC  Catheter size: 4 Fr  Brand: Bard  Lot number: [id number]  Placement method: venipuncture, MST, ultrasound and tip navigation system  Number of attempts: 1  Difficulty threading catheter: no  Successful placement: yes  Orientation: right    Location: basilic vein  Tip Location: SVC/RA Junction  Arm circumference: adults 10 cm  Extremity circumference: 22  Visible catheter length: 0  Total  catheter length: 48  Dressing and securement: blood cleaned with CHG, chlorhexidine patch applied, blood removed, dressing applied, gloves changed prior to final dressing, line secured, occlusive dressing applied, statlock, site cleansed, sterile dressing applied and transparent dressing  Post procedure assessment: blood return through all ports, free fluid flow and placement verified by 3CG technology  PROCEDURE   Patient Tolerance:  Patient tolerated the procedure well with no immediate complicationsDescribe Procedure: Pt. Understood procedure and it went well   GOOD TO USE  Disposal: sharps and needle count correct at the end of procedure, needles and guidewire disposed in sharps container

## 2024-11-12 NOTE — DISCHARGE INSTRUCTIONS
AFTER YOU GO HOME FROM YOUR HEART SURGERY  (Right mini-thoracotomy for mitral valve replacement on 10/04/2024 with Dr. Ellison)    You had a mini thoracotomy. No lifting over 10 pounds for 4 weeks from surgery, which this time has now passed so able to gradually increase activity as tolerated.     No driving for 4 weeks after surgery or while on pain medication.     Shower or wash your incisions daily with antibacterial soap and water (or as instructed), pat dry. Keep wound clean and dry. No baths or swimming for 6 weeks and/or until incisions are completely healed over. Cover chest tube sites with gauze or a Bandaid until they stop draining, then leave open to air. It is normal for chest tube sites to drain fluid for up to 2-3 weeks after surgery. It is not normal to have drainage from other incisions.     Watch for signs of infection: increased redness, tenderness, warmth or any drainage from mini thoracotomy incision.  Also a temperature > 100.5 F or chills. Call your surgeon or primary care provider's office immediately.     Exercise is very important in your recovery. Please follow the guidelines set up for you in your cardiac rehab classes at the hospital. If outpatient cardiac rehab was ordered for you, we highly recommend you participate. If you have problems arranging your cardiac rehab, please call [phone] for all locations, with the exception of [place], please call [phone] and [place], please call [phone].    Avoid sitting for prolonged periods of time, try to walk every hour during the day. If you have a leg incision, elevate your leg often when you are not walking.  Your goal is to work up to 30 minutes of physical activity per day.     Check your weight when you get home from the hospital and continue to check it daily through your recovery for at least a month. If you notice a weight gain of 2-3 pounds overnight, or 5 pounds in a week, notify your primary care physician,  cardiologist, or surgeon. Check your blood pressure daily about one hour after you have taken your morning medications. Call you primary care doctor, cardiologist, or cardiac surgeon if your systolic blood pressure (top number) is consistently over 140. Write down your daily weight and blood pressure and bring this information to your follow up appointment.     Bowel activity may be slow after surgery. If necessary, you may take an over the counter laxative such as Milk of Magnesia or Miralax. You may have stool softeners prescribed (docusate sodium, Senokot). We recommend using stool softeners while using narcotics for pain (oxycodone/percocet, hydrocodone/vicodin, hydromorphone/dilaudid).      Wean OFF of narcotics (oxycodone, dilaudid, hydrocodone) as soon as possible. You should continue taking acetaminophen as long as you have any surgical pain as the first choice for pain control and add narcotics as necessary for pain to be tolerable.      DENTAL VISITS AFTER SURGERY  If you have had your heart valve repaired or replaced, we do not recommend having any dental work done for 6 months and you will need to take an antibiotic prior to dental visits from now on.  Please notify your dentist before any procedure for the proper treatment needed. The antibiotic is taken by mouth one hour prior to visit. This includes routine cleanings.    REGARDING PRESCRIPTION REFILLS.  If you need a refill on your pain medication contact us to discuss your pain and a possible one time refill.   All other medications will be adjusted, discontinued and re-filled by your primary care physician and/or your cardiologist as they were prior to your surgery. We have given you enough for one to three month with possibly one refill. You may have refills available to you for some of your medications through the [facility]. If you would like your refills sent to a different a different pharmacy, please contact your  preferred pharmacy and let them know that you have prescriptions at [place] that you would like transferred.    POST-OPERATIVE CLINIC VISITS  Primary Care Doctor, within a week from discharge from [facility]  Cardiology, Ana Maria Reese, on 12/16/2024 at the [facility] [place]   Cardiology, Dr. Guillory, on 03/24/2025 at the [facility] [place]   If you need to cancel or reschedule your cardiology appointments please call [phone].     SURGICAL QUESTIONS  Please call our nurse coordinators, [place], at [phone] with questions or concerns related to surgery. For other non-urgent questions and requests, our nurse coordinators can also be reached via email or fax.   Email: [email or web address] or [email or web address]   Fax: [phone]    On weekends or after hours, please call [phone] and ask the  to page the Cardiothoracic Surgeon on-call.      Thank you,    Your Cardiothoracic Surgery Team          Wound care  EVERY OTHER DAY      Comments: Left scapula wound(s): Every other day and prn:  1. Cleanse with Vashe-moist gauze.  Allow the moist gauze to soak for at least 2 minutes, then remove and wipe clean.  Let dry.  2. Apply Aquacel Ag to wound, cut to fit.  3. Cover with Mepilex 4x4. No need to peel back for skin checks between wound cares.    4. Offload/cushion area with pillows as able.

## 2024-11-12 NOTE — PROGRESS NOTES
[facility]  Cardiovascular and Thoracic Surgery Daily Note    Today's Plans: 1. TCU placement in [place]   2. OMFS to remove packing this afternoon in OR    Assessment and Plan  Mr. Donahue is a very pleasant 84-year-old gentleman with a PMH of prostate cancer who was recently admitted to the hospital and was diagnosed with Corynebacterium bacteremia and urinary tract infection. 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     POD # 8 s/p Right mini-thoracotomy mitral valve replacement with a 31 mm Quinonez MITRIS bovine pericardial valve, right common femoral arterial and venous cannulation for cardiopulmonary bypass, intraoperative LINDSEY on 11/4/24 with Dr. Alexa Ellison.    - CVS: Pre-op TTE with preserved biventricular funtion. Postop vasoplegic shock, resolved. HD stable. BB discontinued d/t to pauses. EKG with type II AV block this am-resolved now nsr with 1st deg block, trial 6.25 metoprolol, Amiodarone gtt started 11/9 transitioned to PO 11/10. Initiated Midodrine TID pod#3 due to low BPs.  ASA 81 mg daily, no statin indicated. Appears euvolemic. Defer on volume or diuresis today. No PTA meds on hold. Chest tubes removed pod#4 without issue. Small ptx on cxr, repeat stable. Post op echo stable, nml MV gradients    - Resp: Postoperative mechanical ventilation, resolved. Extubated POD 0, now saturating well on RA. cannula. Encourage aggressive IS, pulmonary toilet.    - Neuro: Neuros intact, pain controlled on current regimen    - Renal: No history of significant renal disease. Cr stable. Trend BMP.  Volume management as above. 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 Salt tabs resumed by nephrology.  Recent Labs   Lab 11/12/24  0515 11/11/24  0658 11/10/24  0654   CR 0.60* 0.53* 0.58*       - GI: + BM, +flatus, continue bowel regimen. Cdiff neg as having multiple stools    - : voiding on own. Hx prostate cancer s/p radiation. PTA  flomax resumed.    - Endo: Postop stress hyperglycemia. Insulin infusion transitioned to sliding scale insulin. Since stopped as euglycemic with no insulin needs  Hemoglobin A1C   Date Value Ref Range Status   10/31/2024 5.3 <5.7 % Final     Comment:     Normal <5.7%   Prediabetes 5.7-6.4%    Diabetes 6.5% or higher     Note: Adopted from ADA consensus guidelines.        - FEN: Replace electrolytes as needed. ADAT    - ID: Postop leukocytosis. Afebrile. Infectious endocarditis: had Corynebacterium bacteremia and urinary tract infection. OR gram stain positive thus far for 3+ GPB. ID following. Currently on Vancomycin.  PICC placed 11/11. Completed perioperative Cefazolin. Trend CBC and fever curve.   Recent Labs   Lab 11/12/24  0515 11/11/24  0658 11/10/24  0654   WBC 7.5 6.9 6.1       - Heme: Acute blood loss anemia and thrombocytopenia due to surgery. Hgb and PLT stable. Trend CBC, transfuse PRN. Skin biopsy on back positive for basal cell carcinoma.  Recent Labs   Lab 11/12/24  0515 11/11/24  0658 11/10/24  0654   HGB 8.8* 8.7* 8.4*    321 276       - Proph: SCD, subcutaneous heparin, PPI    - Other:  Clinically Significant Risk Factors         # Hyponatremia: Lowest Na = 130 mmol/L in last 2 days, will monitor as appropriate       # Hypoalbuminemia: Lowest albumin = 2.5 g/dL at 11/4/2024  8:12 PM, will monitor as appropriate                   # Severe Malnutrition: based on nutrition assessment    # Financial/Environmental Concerns:           - Dispo: [address]. Therapies recommending TCU- social work consulted and following. PICC placed and medically ready however, OMFS had to take back to OR this afternoon for packing removal prior to discharge. Hopeful tomorrow if bed ready at TCU    Medically Ready for Discharge: Anticipated Tomorrow    Interval History    Sitting in chair, doing well. Plan to get packing removed in OR with OMFS today at 4pm then will be able to discharge likely tomorrow.         Medications  Current Facility-Administered Medications   Medication Dose Route Frequency Provider Last Rate Last Admin    amiodarone (PACERONE) tablet 400 mg  400 mg Oral BID Yana Rapp PA-C   400 mg at 11/12/24 0817    aspirin EC tablet 81 mg  81 mg Oral Daily Genna Sutton DO   81 mg at 11/12/24 0817    heparin ANTICOAGULANT injection 5,000 Units  5,000 Units Subcutaneous Q8H Kerri Steen NP   5,000 Units at 11/12/24 0817    Lidocaine (LIDOCARE) 4 % Patch 2 patch  2 patch Transdermal Q24H Kerri Steen NP   2 patch at 11/09/24 0035    metoprolol tartrate (LOPRESSOR) quarter-tab 6.25 mg  6.25 mg Oral BID Yana Rapp PA-C   6.25 mg at 11/12/24 0818    midodrine (PROAMATINE) tablet 5 mg  5 mg Oral TID w/meals Brigitte García PA-C   5 mg at 11/12/24 1153    pantoprazole (PROTONIX) EC tablet 40 mg  40 mg Oral Daily Kerri Steen NP   40 mg at 11/12/24 0817    sodium chloride (PF) 0.9% PF flush 3 mL  3 mL Intracatheter Q8H Kerri Steen NP   3 mL at 11/12/24 1153    sodium chloride tablet 2 g  2 g Oral TID w/meals Yana Rapp PA-C   2 g at 11/12/24 1153    tamsulosin (FLOMAX) capsule 0.4 mg  0.4 mg Oral Daily Kerri Steen NP   0.4 mg at 11/12/24 0817    vancomycin (VANCOCIN) 1,250 mg in 0.9% NaCl 262.5 mL intermittent infusion  1,250 mg Intravenous Q12H Genna Sutton DO   1,250 mg at 11/12/24 0516     Current Facility-Administered Medications   Medication Dose Route Frequency Provider Last Rate Last Admin    acetaminophen (TYLENOL) tablet 650 mg  650 mg Oral Q4H PRN Kerri Steen NP   650 mg at 11/10/24 1638    benzocaine-menthol (CHLORASEPTIC) 6-10 MG lozenge 1 lozenge  1 lozenge Buccal Q1H PRN Kerri Steen, NP   1 lozenge at 11/04/24 0935    bisacodyl (DULCOLAX) suppository 10 mg  10 mg Rectal Daily PRN Kerri Steen, NP        calcium carbonate (TUMS) chewable tablet 1,000 mg  1,000 mg Oral Daily PRN Kerri Steen, NP        glucose gel 15-30  g  15-30 g Oral Q15 Min PRN Kerri Steen NP        Or    dextrose 50 % injection 25-50 mL  25-50 mL Intravenous Q15 Min PRN Kerri Steen NP        Or    glucagon injection 1 mg  1 mg Subcutaneous Q15 Min PRN Kerri Steen NP        hydrALAZINE (APRESOLINE) injection 10 mg  10 mg Intravenous Q30 Min PRN Kerri Steen NP        HYDROmorphone (DILAUDID) injection 0.2 mg  0.2 mg Intravenous Q2H PRN Kerri Steen NP   0.2 mg at 11/05/24 0450    Or    HYDROmorphone (DILAUDID) injection 0.4 mg  0.4 mg Intravenous Q2H PRN Kerri Steen NP   0.4 mg at 11/05/24 0031    lidocaine (LMX4) cream   Topical Q1H PRN Caroline Monae PA-C        lidocaine (LMX4) cream   Topical Q1H PRN Kerri Steen NP        lidocaine 1 % 0.1-1 mL  0.1-1 mL Other Q1H PRN Kerri Steen NP        lidocaine 1 % 0.1-5 mL  0.1-5 mL Other Q1H PRN Caroline Monae PA-C   1 mL at 11/11/24 1843    loperamide (IMODIUM) capsule 2 mg  2 mg Oral 4x Daily PRN Yana Rapp PA-C   2 mg at 11/12/24 0817    magnesium hydroxide (MILK OF MAGNESIA) suspension 30 mL  30 mL Oral Daily PRN Kerri Steen NP        methocarbamol (ROBAXIN) tablet 500 mg  500 mg Oral Q6H PRN Kerri Steen NP   500 mg at 11/08/24 2235    naloxone (NARCAN) injection 0.2 mg  0.2 mg Intravenous Q2 Min PRN Kerri Steen NP        Or    naloxone (NARCAN) injection 0.4 mg  0.4 mg Intravenous Q2 Min PRN Kerri Steen NP        Or    naloxone (NARCAN) injection 0.2 mg  0.2 mg Intramuscular Q2 Min PRN Kerri Steen NP        Or    naloxone (NARCAN) injection 0.4 mg  0.4 mg Intramuscular Q2 Min PRN Kerri Steen NP        ondansetron (ZOFRAN ODT) ODT tab 4 mg  4 mg Oral Q6H PRN Kerri Steen NP        Or    ondansetron (ZOFRAN) injection 4 mg  4 mg Intravenous Q6H PRN Kerri Steen, NP        oxyCODONE (ROXICODONE) tablet 5 mg  5 mg Oral Q4H PRN Kerri Steen NP   5 mg at 11/08/24 2235    Or    oxyCODONE IR (ROXICODONE) half-tab 7.5 mg  7.5 mg Oral Q4H PRN  Kerri Steen NP   7.5 mg at 11/05/24 0105    polyethylene glycol (MIRALAX) Packet 17 g  17 g Oral BID PRN Genna Sutton DO        Reason beta blocker order not selected   Does not apply DOES NOT GO TO Kerri Odell NP        senna-docusate (SENOKOT-S/PERICOLACE) 8.6-50 MG per tablet 1 tablet  1 tablet Oral At Bedtime PRN Genna Sutton DO        sodium chloride (PF) 0.9% PF flush 3 mL  3 mL Intracatheter q1 min prn Kerri Steen NP             Physical Exam  Vitals were reviewed  Blood pressure 114/47, pulse 75, temperature 97.7  F (36.5  C), temperature source Axillary, resp. rate 15, weight 62.7 kg (138 lb 3.2 oz), SpO2 97%.    Rhythm: nsr 1d AVB    Lungs: diminished throughout on NC    Cardiovascular: S1, S2, no m/r/g    Abdomen: soft, NT, ND, rare BS    Extremeties: warm, no LE edema    Incisions: Right thoracotomy and right femoral incisions CDI    CT: na    Weight:   Vitals:    11/08/24 0613 11/09/24 0500 11/10/24 0300 11/11/24 0520   Weight: 61.7 kg (136 lb) 62.7 kg (138 lb 3.2 oz) 64.5 kg (142 lb 1.6 oz) 63.9 kg (140 lb 12.8 oz)    11/12/24 0336   Weight: 62.7 kg (138 lb 3.2 oz)         Data  Recent Labs   Lab 11/12/24  0515 11/11/24  2201 11/11/24  0658 11/10/24  0654   WBC 7.5  --  6.9 6.1   HGB 8.8*  --  8.7* 8.4*   MCV 90  --  88 89     --  321 276   *  --  130* 132*   POTASSIUM 4.4  --  4.3 4.3   CHLORIDE 100  --  99 99   CO2 27  --  25 28   BUN 20.6  --  21.8 25.7*   CR 0.60*  --  0.53* 0.58*   ANIONGAP 7  --  6* 5*   AMALIA 8.6*  --  8.4* 8.1*   GLC 92 100* 98 91       Imaging:  No results found for this or any previous visit (from the past 24 hours).          Patient seen and discussed with Dr. Mulvihill Alexandra Murphy, PA-C  Cardiothoracic Surgery  Pager: [phone]     CV Surgery Rounding Pager: [phone]  After hours please page surgeon on-call

## 2024-11-12 NOTE — PROGRESS NOTES
Care Management Follow Up    Length of Stay (days): 18    Expected Discharge Date: 11/12/2024     Concerns to be Addressed: discharge planning     Patient plan of care discussed at interdisciplinary rounds: Yes    Anticipated Discharge Disposition: Transitional Care              Anticipated Discharge Services:    Anticipated Discharge DME:      Patient/family educated on Medicare website which has current facility and service quality ratings: yes  Education Provided on the Discharge Plan: Yes  Patient/Family in Agreement with the Plan: yes    Referrals Placed by CM/SW: Post Acute Facilities  Private pay costs discussed: Not applicable    Discussed  Partnership in Safe Discharge Planning  document with patient/family: No     Handoff Completed: No, handoff not indicated or clinically appropriate    Additional Information:  Writer spoke with charge Rn who states that pt will not discharge today. She states that oral surgery is keeping pt to perform a surgery at 4pm today. Earliest discharge would be tomorrow. Writer is able to see in Oral surgery note of provider Hakeem that in her note it stats pt will be ok for discharge on 11/13 from oral surgery stand point.    Writer spoke with [facility]. She state that they are able to take pt pt tomorrow. She would like noon transport time. However, she states that she will need tomorrow's  to call her in the morning and fax over updates of oral surgery right away in the morning to ensure that they are still able to take pt. Writer agreeable to leave that notice for unit .    Writer called  [facility] and spoke with Geeta. Writer  cancelled transport for today and had it switched to same transport for tomorrow [phone] 11/13/24.     Addendum: Writer left notice for unit  to follow up with [facility] tomorrow morning right away and provide them oral surg notes; verify with cv surg and hospitalist that pt is stable  for discharge; if so update pt/family that discharge is occurring. Transport time is set for [phone]      Next Steps: awaiting medical stability; have to verify tomorrow morning if [facility] is able to take pt.     Radha Moore, BSW  Social Work  [facility]

## 2024-11-13 ENCOUNTER — TRANSFERRED RECORDS (OUTPATIENT)
Dept: HEALTH INFORMATION MANAGEMENT | Facility: CLINIC | Age: 84
End: 2024-11-13

## 2024-11-13 VITALS
TEMPERATURE: 97.6 F | BODY MASS INDEX: 22.31 KG/M2 | WEIGHT: 138.2 LBS | SYSTOLIC BLOOD PRESSURE: 118 MMHG | RESPIRATION RATE: 18 BRPM | OXYGEN SATURATION: 96 % | DIASTOLIC BLOOD PRESSURE: 68 MMHG | HEART RATE: 78 BPM

## 2024-11-13 LAB
ANION GAP SERPL CALCULATED.3IONS-SCNC: 6 MMOL/L (ref 7–15)
BACTERIA SPEC CULT: ABNORMAL
BUN SERPL-MCNC: 18.8 MG/DL (ref 8–23)
CALCIUM SERPL-MCNC: 8.2 MG/DL (ref 8.8–10.4)
CHLORIDE SERPL-SCNC: 100 MMOL/L (ref 98–107)
CREAT SERPL-MCNC: 0.66 MG/DL (ref 0.67–1.17)
EGFRCR SERPLBLD CKD-EPI 2021: >90 ML/MIN/1.73M2
ERYTHROCYTE [DISTWIDTH] IN BLOOD BY AUTOMATED COUNT: 16.7 % (ref 10–15)
GLUCOSE SERPL-MCNC: 124 MG/DL (ref 70–99)
GRAM STAIN RESULT: ABNORMAL
HCO3 SERPL-SCNC: 28 MMOL/L (ref 22–29)
HCT VFR BLD AUTO: 24.9 % (ref 40–53)
HGB BLD-MCNC: 8.5 G/DL (ref 13.3–17.7)
MAGNESIUM SERPL-MCNC: 2.1 MG/DL (ref 1.7–2.3)
MCH RBC QN AUTO: 30.7 PG (ref 26.5–33)
MCHC RBC AUTO-ENTMCNC: 34.1 G/DL (ref 31.5–36.5)
MCV RBC AUTO: 90 FL (ref 78–100)
PHOSPHATE SERPL-MCNC: 3.6 MG/DL (ref 2.5–4.5)
PLATELET # BLD AUTO: 402 10E3/UL (ref 150–450)
POTASSIUM SERPL-SCNC: 4.5 MMOL/L (ref 3.4–5.3)
RBC # BLD AUTO: 2.77 10E6/UL (ref 4.4–5.9)
SODIUM SERPL-SCNC: 134 MMOL/L (ref 135–145)
WBC # BLD AUTO: 6.9 10E3/UL (ref 4–11)

## 2024-11-13 PROCEDURE — 250N000013 HC RX MED GY IP 250 OP 250 PS 637: Performed by: INTERNAL MEDICINE

## 2024-11-13 PROCEDURE — 84100 ASSAY OF PHOSPHORUS: CPT | Performed by: SURGERY

## 2024-11-13 PROCEDURE — 250N000011 HC RX IP 250 OP 636: Performed by: INTERNAL MEDICINE

## 2024-11-13 PROCEDURE — 250N000013 HC RX MED GY IP 250 OP 250 PS 637: Performed by: PHYSICIAN ASSISTANT

## 2024-11-13 PROCEDURE — 250N000013 HC RX MED GY IP 250 OP 250 PS 637: Performed by: NURSE PRACTITIONER

## 2024-11-13 PROCEDURE — 82310 ASSAY OF CALCIUM: CPT | Performed by: PHYSICIAN ASSISTANT

## 2024-11-13 PROCEDURE — 85027 COMPLETE CBC AUTOMATED: CPT | Performed by: PHYSICIAN ASSISTANT

## 2024-11-13 PROCEDURE — 99232 SBSQ HOSP IP/OBS MODERATE 35: CPT | Performed by: INTERNAL MEDICINE

## 2024-11-13 PROCEDURE — 250N000011 HC RX IP 250 OP 636: Performed by: NURSE PRACTITIONER

## 2024-11-13 PROCEDURE — 258N000003 HC RX IP 258 OP 636: Performed by: INTERNAL MEDICINE

## 2024-11-13 PROCEDURE — 80048 BASIC METABOLIC PNL TOTAL CA: CPT | Performed by: PHYSICIAN ASSISTANT

## 2024-11-13 PROCEDURE — 83735 ASSAY OF MAGNESIUM: CPT | Performed by: SURGERY

## 2024-11-13 RX ORDER — AMIODARONE HYDROCHLORIDE 200 MG/1
TABLET ORAL
DISCHARGE
Start: 2024-11-13 | End: 2024-12-01

## 2024-11-13 RX ORDER — METOPROLOL TARTRATE 25 MG/1
6.25 TABLET, FILM COATED ORAL 2 TIMES DAILY
DISCHARGE
Start: 2024-11-13

## 2024-11-13 RX ORDER — MIDODRINE HYDROCHLORIDE 5 MG/1
5 TABLET ORAL
DISCHARGE
Start: 2024-11-13

## 2024-11-13 RX ORDER — LOPERAMIDE HYDROCHLORIDE 2 MG/1
2 CAPSULE ORAL 4 TIMES DAILY PRN
DISCHARGE
Start: 2024-11-13

## 2024-11-13 RX ORDER — METHOCARBAMOL 500 MG/1
500 TABLET, FILM COATED ORAL EVERY 6 HOURS PRN
DISCHARGE
Start: 2024-11-13

## 2024-11-13 RX ORDER — SODIUM CHLORIDE 1 G/1
2 TABLET ORAL
DISCHARGE
Start: 2024-11-13

## 2024-11-13 RX ORDER — LIDOCAINE 4 G/G
2 PATCH TOPICAL EVERY 24 HOURS
DISCHARGE
Start: 2024-11-14

## 2024-11-13 RX ADMIN — MIDODRINE HYDROCHLORIDE 5 MG: 5 TABLET ORAL at 08:51

## 2024-11-13 RX ADMIN — ASPIRIN 81 MG: 81 TABLET, COATED ORAL at 08:51

## 2024-11-13 RX ADMIN — METOPROLOL TARTRATE 6.25 MG: 25 TABLET, FILM COATED ORAL at 09:04

## 2024-11-13 RX ADMIN — SODIUM CHLORIDE TAB 1 GM 2 G: 1 TAB at 08:51

## 2024-11-13 RX ADMIN — HEPARIN SODIUM 5000 UNITS: 5000 INJECTION, SOLUTION INTRAVENOUS; SUBCUTANEOUS at 00:32

## 2024-11-13 RX ADMIN — VANCOMYCIN HYDROCHLORIDE 1250 MG: 10 INJECTION, POWDER, LYOPHILIZED, FOR SOLUTION INTRAVENOUS at 05:50

## 2024-11-13 RX ADMIN — TAMSULOSIN HYDROCHLORIDE 0.4 MG: 0.4 CAPSULE ORAL at 08:51

## 2024-11-13 RX ADMIN — PANTOPRAZOLE SODIUM 40 MG: 40 TABLET, DELAYED RELEASE ORAL at 08:51

## 2024-11-13 RX ADMIN — HEPARIN SODIUM 5000 UNITS: 5000 INJECTION, SOLUTION INTRAVENOUS; SUBCUTANEOUS at 08:51

## 2024-11-13 RX ADMIN — AMIODARONE HYDROCHLORIDE 400 MG: 200 TABLET ORAL at 08:51

## 2024-11-13 ASSESSMENT — ACTIVITIES OF DAILY LIVING (ADL)
ADLS_ACUITY_SCORE: 0

## 2024-11-13 NOTE — PLAN OF CARE
A/ox4. Tele NSR. VSS on room air. LS clear. Up with AX1 GB and walker. Ambulated the halls x1. Mechanical soft diet with fair appetite. 1,200 ml fluid restriction. Denies pain Incisions WDL YUNG. Mild facial swelling and BLE edema. Voiding via urinal, loose stools x2. Discharged to TCU with EMS. Packet and orders sent with patient as well as belongings.

## 2024-11-13 NOTE — PLAN OF CARE
Cardiac Rehab Discharge Summary    Reason for therapy discharge:    Discharged to transitional care facility.    Progress towards therapy goal(s). See goals on Care Plan in [facility] electronic health record for goal details.  Goals partially met.  Barriers to achieving goals:   discharge from facility.    Therapy recommendation(s):    Continued therapy is recommended.  Rationale/Recommendations:   . Pt currently below baseline level of mobility, currently requiring CGAx1 and FWW for transfers and amb. At baseline, pt is ind with all mobility. Pt would benefit from additional skilled rehab services provided by TCU in order to regain functional mobility ind. If pt is able to return to baseline prior to d/c, can consider home with OP cardiac rehab.  PT Brief overview of current status: CGA with chair follow        Recommendation above provided by last treating therapist.

## 2024-11-13 NOTE — OP NOTE
Oral & Maxillofacial Surgery Operative Note:      Procedure:   Removal of foreign body from extraction sites     Pre-Operative Diagnosis:   Foreign body present in extraction sites #3, 5, 6, 7, 8, 9, 10, 11, 12, 13,14, 20, 21, 23, 24, 25, 26, 27, 28, 29     Post-Operative Diagnosis: Same      LOCAL ANESTHESIA: 15 cc total of 2% lidocaine with 1:200,000 epinephrine delivered via bilateral LEATHA, long buccal nerve blocks and local infiltration      SPECIMENS: Foreign body material removed from extraction site, sent for culture and gross pathology     COMPLICATIONS: None     DRAINS: None     INDICATIONS FOR PROCEDURE: 83 y/o male with a history of endocarditis and recently full mouth tooth extraction on 10/31/24 in preparation for mitral valve surgery on 11/4/24. During tooth extraction surgery gel foam was requested to place into extraction sites, instead a non-resorbable material was placed into extraction site. This came to the surgeons attention on 11/11/24 and patient was then planned to go to the OR on 11/12/24 for removal of foreign bodies, irrigation and replaced with gel foam. Due to recent mitral valve surgery and pain control it was decided that the patient would be best served in the OR under general anesthesia.       DESCRIPTION OF PROCEDURE: The patient was met preoperatively and the treatment plan was confirmed with the patient.  Patient was brought back to the operating room by the Anesthesia Service. Patient stayed on his bed and was induced to a MAC anesthesia. Surgeon stepped out to scrub and returned to don sterile gown and gloves. At that time, the surgical site was squared off and a split sheet drape was placed.      Oral cavity was rinsed with chlorhexidine mouth rinse and local anesthetic, 2% lidocaine with 1:200,000 epi used for local anesthetic. Attention to oral cavity, bite block placed on right and extraction sites #5, 6, 7, 8, 9, 10, 11, 12, 13, 14, 20, 21, 23, 24, 25, 26, 27 were re-opened.  A 15 blade was used to create crestal incision, FTMP flap raised with [place] and #9 periosteal. Each extraction site was curetted and noted to be clear of any foreign material. All sites irrigated with copious amounts of sterile saline, gel foam placed into extraction sites. Prior to case reviewed with scrub tech and nurse that correct gel foam was present in OR. Sites closed with running locking 3-0 chromic gut suture. Bite block switched the left side, extraction sites #3, 29 and 28 were opened, 15 blade used at sites #28 and 29, and gingiva released and FTMP raised at extraction sites #3, 28 and 29 with #9 periosteal and Scooby. Extraction sites curetted and noted to be clear of any foreign material. Copious irrigation and gel foam placed into extraction sites, sites closed with 3-0 chromic gut in a  running locking and figure of 8 fashion    Sinus floor noted at site #14, two layers of gel foam placed and sites #14 closed with horizontal mattress and simple interrupted sutures.     The patient's oral cavity was suctioned. The throat pack was removed.  An OG tube was passed. The patient was turned over to the Anesthesia Service and was awakened in the OR and transferred stable to the PACU.     Attending surgeon was present for the entirety of the procedure.     Complications: None.      Gabbie Palacio DDS  Oral and Maxillofacial Surgery

## 2024-11-13 NOTE — PROGRESS NOTES
"ORAL & MAXILLOFACIAL SURGERY   PROGRESS NOTE  Jayme Donahue,  MRN: [id number],  : 1940           ASSESSMENT:  84 year old male with history of prostate cancer admitted for bacteremia and endocarditis. Patient is now s/p full mouth tooth extraction on 10/31/24 and mitral valve replacement on 24. During extraction procedure a material was placed into extraction sockets that was not gel foam and does not dissolve, this was brought to the surgeons attention on 24. Patient returned to the OR on 24, foreign bodies removed from extraction sites, gel foam placed.       PLAN:  - No further intervention from OMFS standpoint   - OK for discharge from OMFS standpoint  - Sinus precautions for 6 weeks   - Sneeze with mouth open   - Do not blow nose, dab nose if needed   - No straws   - No smoking  - Please contact OMFS surgeon with any questions    Gabbie Palacio DDS    Please contact the OMFS surgeon on-call with questions or concerns.  ____________________________________      SUBJECTIVE:  \"I'm doing well\" Patient is sitting up in bed, denies any pain or symptoms with mouth.     PHYSICAL EXAM:   Vitals:    24 2330 24 0018 24 0219 24 0413   BP:    137/63   BP Location:       Patient Position:       Cuff Size:       Pulse:    83   Resp:    16   Temp:    98.2  F (36.8  C)   TempSrc:    Oral   SpO2: 95% 94% 94% 94%   Weight:            GEN: WD/WN male, NAD, pleasant, no extraoral asymmetry  HEENT: NC/AT, EOMI, PERRL, edentulous maxilla and mandible, sutures present, extraction sites hemostatic, small amount of ecchymosis intraorally, no erythema, edema or purulence. No vestibular edema, FOM soft, granulation tissue present over some extraction sites.   CV: RRR  PULM: breathing comfortably on room air  MSK: HOLLIDAY, no peripheral extremity edema  NEURO: AAOx4, CN II-XII intact bilaterally  PSYCH: Appropriate mood and affect    LABS:   CBC RESULTS:   Recent Labs   Lab Test " 11/12/24  0515   WBC 7.5   RBC 2.91*   HGB 8.8*   HCT 26.1*   MCV 90   MCH 30.2   MCHC 33.7   RDW 16.6*         Last Comprehensive Metabolic Panel:  Sodium   Date Value Ref Range Status   11/13/2024 134 (L) 135 - 145 mmol/L Final     Potassium   Date Value Ref Range Status   11/13/2024 4.5 3.4 - 5.3 mmol/L Final     Potassium POCT   Date Value Ref Range Status   11/04/2024 4.1 3.4 - 5.3 mmol/L Final     Chloride   Date Value Ref Range Status   11/13/2024 100 98 - 107 mmol/L Final     Carbon Dioxide (CO2)   Date Value Ref Range Status   11/13/2024 28 22 - 29 mmol/L Final     Anion Gap   Date Value Ref Range Status   11/13/2024 6 (L) 7 - 15 mmol/L Final     Glucose   Date Value Ref Range Status   11/13/2024 124 (H) 70 - 99 mg/dL Final     GLUCOSE BY METER POCT   Date Value Ref Range Status   11/12/2024 107 (H) 70 - 99 mg/dL Final     Urea Nitrogen   Date Value Ref Range Status   11/13/2024 18.8 8.0 - 23.0 mg/dL Final     Creatinine   Date Value Ref Range Status   11/13/2024 0.66 (L) 0.67 - 1.17 mg/dL Final     GFR Estimate   Date Value Ref Range Status   11/13/2024 >90 >60 mL/min/1.73m2 Final     Comment:     eGFR calculated using 2021 CKD-EPI equation.   03/31/2021 81 >60 mL/min/[1.73_m2] Final     Calcium   Date Value Ref Range Status   11/13/2024 8.2 (L) 8.8 - 10.4 mg/dL Final     Comment:     Reference intervals for this test were updated on 7/16/2024 to reflect our healthy population more accurately. There may be differences in the flagging of prior results with similar values performed with this method. Those prior results can be interpreted in the context of the updated reference intervals.

## 2024-11-13 NOTE — ANESTHESIA PREPROCEDURE EVALUATION
Anesthesia Pre-Procedure Evaluation    Patient: Jayme Donahue   MRN: [id number] : 1940        Procedure : Procedure(s):  removal of foreign body          Past Medical History:   Diagnosis Date    Cataract     Iliac artery aneurysm (H)     Prostate cancer (H)       Past Surgical History:   Procedure Laterality Date    CV CORONARY ANGIOGRAM N/A 10/31/2024    Procedure: Coronary Angiogram;  Surgeon: Shane Ortega MD;  Location:  [facility]ARD[facility] CATH LAB    EXTRACTION(S) DENTAL Bilateral 10/31/2024    Procedure: EXTRACTION OF TEETH 3, 5, 6, 7, 8, 9, 10, 11, 12, 13, 14, 20, 21, 23, 24, 25, 26, 27, 28, AND 29;  Surgeon: Gabbie Neville DDS;  Location:  OR    REPLACE VALVE MITRAL MINIMALLY INVASIVE Right 2024    Procedure: MINIMALLY INVASIVE MITRAL VALVE REPLACEMENT WITH TISSUE HEART VALVE MITRIS  RESILIA  MITRAL VALVE SIZE: 31MM, AND ON CARDIOPULMONARY PUMP OXYGENATOR  (INTRAOPERATIVE TRANSESOPHAGEAL ECHOCARDIOGRAM BY ANESTHESIOLOGIST);  Surgeon: Alexa Ellison MD;  Location:  OR      No Known Allergies   Social History     Tobacco Use    Smoking status: Never    Smokeless tobacco: Never   Substance Use Topics    Alcohol use: Not Currently      Wt Readings from Last 1 Encounters:   24 62.7 kg (138 lb 3.2 oz)        Anesthesia Evaluation   Pt has had prior anesthetic. Type: General.    No history of anesthetic complications       ROS/MED HX  ENT/Pulmonary:    (-) asthma and sleep apnea   Neurologic:  - neg neurologic ROS     Cardiovascular: Comment: S/p MVR 24    (+)  - Peripheral Vascular Disease-- Carotid Stenosis and Non Symptomatic.   -  - -                                 Previous cardiac testing   Echo: Date: 24 Results:  MINIMALLY INVASIVE MITRAL VALVE REPLACEMENT WITH TISSUE HEART VALVE MITRISâ    RESILIAâ   MITRAL VALVE SIZE: 31MM 2024  Normal prosthetic mitral valve gradients. MDG 6.7mmHg @HR 84  The rhythm was sinus with wide QRS with first degree  AV block.  Left ventricular systolic function is normal.  The visual ejection fraction is 60-65%.  The left ventricle is normal in size.  The right ventricle is normal in structure, function and size.  Normal prosthetic mitral valve gradients.     Since 10/23/2024 thre has been interim placement of a normally functioning  bioprosthetic mitral valve.    Stress Test:  Date: Results:    ECG Reviewed:  Date: Results:    Cath:  Date: Results:   (-) CAD   METS/Exercise Tolerance: >4 METS    Hematologic:     (+)      anemia,          Musculoskeletal: Comment: malnutrition      GI/Hepatic:    (-) GERD and liver disease   Renal/Genitourinary: Comment: hyponatremia   (-) renal disease   Endo:    (-) Type I DM and Type II DM   Psychiatric/Substance Use:       Infectious Disease: Comment: Bacteremia, recent hospitalization for sepsis      Malignancy:   (+) Malignancy, History of Prostate.Prostate CA Remission status post Radiation.      Other:            Physical Exam    Airway  airway exam normal      Mallampati: II   TM distance: > 3 FB   Neck ROM: full   Mouth opening: > 3 cm    Respiratory Devices and Support         Dental       (+) Edentulous      Cardiovascular   cardiovascular exam normal          Pulmonary   pulmonary exam normal                OUTSIDE LABS:  CBC:   Lab Results   Component Value Date    WBC 7.5 11/12/2024    WBC 6.9 11/11/2024    HGB 8.8 (L) 11/12/2024    HGB 8.7 (L) 11/11/2024    HCT 26.1 (L) 11/12/2024    HCT 25.2 (L) 11/11/2024     11/12/2024     11/11/2024     BMP:   Lab Results   Component Value Date     (L) 11/12/2024     (L) 11/11/2024    POTASSIUM 4.4 11/12/2024    POTASSIUM 4.3 11/11/2024    CHLORIDE 100 11/12/2024    CHLORIDE 99 11/11/2024    CO2 27 11/12/2024    CO2 25 11/11/2024    BUN 20.6 11/12/2024    BUN 21.8 11/11/2024    CR 0.60 (L) 11/12/2024    CR 0.53 (L) 11/11/2024    GLC 92 11/12/2024     (H) 11/11/2024     COAGS:   Lab Results   Component Value  "Date    PTT 47 (H) 11/04/2024    INR 1.80 (H) 11/04/2024    FIBR 242 11/04/2024     POC: No results found for: \"BGM\", \"HCG\", \"HCGS\"  HEPATIC:   Lab Results   Component Value Date    ALBUMIN 2.5 (L) 11/04/2024    PROTTOTAL 4.3 (L) 11/04/2024    ALT 19 11/04/2024    AST 34 11/04/2024    ALKPHOS 83 11/04/2024    BILITOTAL 0.6 11/04/2024     OTHER:   Lab Results   Component Value Date    PH 7.42 11/05/2024    LACT 2.5 (H) 11/05/2024    A1C 5.3 10/31/2024    AMALIA 8.6 (L) 11/12/2024    PHOS 3.5 11/12/2024    MAG 2.0 11/12/2024    TSH 2.65 10/29/2024       Anesthesia Plan    ASA Status:  3    NPO Status:  NPO Appropriate    Anesthesia Type: MAC.              Consents    Anesthesia Plan(s) and associated risks, benefits, and realistic alternatives discussed. Questions answered and patient/representative(s) expressed understanding.     - Discussed:     - Discussed with:  Patient      - Specific Concerns: Specific risks discussed (but not limited to): Possibility of intraoperative awareness..           Postoperative Care       PONV prophylaxis: Ondansetron (or other 5HT-3)     Comments:               Lasha Keating MD    I have reviewed the pertinent notes and labs in the chart from the past 30 days and (re)examined the patient.  Any updates or changes from those notes are reflected in this note.     # Hyponatremia: Lowest Na = 130 mmol/L in last 2 days, will monitor as appropriate       # Hypoalbuminemia: Lowest albumin = 2.5 g/dL at 11/4/2024  8:12 PM, will monitor as appropriate                 # Severe Malnutrition: based on nutrition assessment    # Financial/Environmental Concerns:          "

## 2024-11-13 NOTE — PROGRESS NOTES
Care Management Discharge Note    Discharge Date: 11/13/2024       Discharge Disposition: Transitional Care    Discharge Services:      Discharge DME:      Discharge Transportation: agency    Private pay costs discussed: transportation costs    Does the patient's insurance plan have a 3 day qualifying hospital stay waiver?  No    PAS Confirmation Code: [id number]  Patient/family educated on Medicare website which has current facility and service quality ratings: yes    Education Provided on the Discharge Plan: Yes  Persons Notified of Discharge Plans: RN, facility, PA  Patient/Family in Agreement with the Plan: yes    Handoff Referral Completed: No, handoff not indicated or clinically appropriate    Additional Information:  Pt will discharge today to [facility] bed via [facility] between [id number], as planned. Orders faxed and facility updated. Nursing aware.    FANTASMA Ruggiero, [facility]  [phone] Desk phone  [phone] Cell/text (Preferred)  [facility]    PAS-RR    D: Per DHS regulation, SW completed and submitted PAS-RR to [place] [employer] Direct Connect via the Senior LinkAge Line.  PAS-RR confirmation # is : [id number]    P: Further questions may be directed to [facility] LinkAge Line at #[phone], option #4 for PAS-RR staff.

## 2024-11-13 NOTE — DISCHARGE SUMMARY
Discharge Summary    Jayme Donahue MRN# [id number]   YOB: 1940 Age: 84 year old     Date of Admission:  10/25/2024  Date of Discharge:  11/13/2024  Admitting Physician:  Yovani Aldrich MD  Discharge Physician:  Alexa Ellison,*  Discharging Service:  Cardiovascular and Thoracic Surgery     [facility] [place] [place]  Primary Provider: [facility]    Admission weight: 134 lbs  Discharge weight: 138 lbs          Admission Diagnoses:   Septic shock (H) 2/2 UTI  Bacteremia  Nausea   Hyponatremia   Severe mitral valve regurgitation w/mobile echodensity in setting of sepsis  Infective endocarditis of MV, s/p mitral valve replacement through mini-thoracotomy           Discharge Diagnosis:     Patient Active Problem List   Diagnosis    Prostate cancer (H)    Septic shock (H)    Bacteremia                Discharge Disposition:     Discharged to rehabilitation facility           Condition on Discharge:     Discharge condition: Stable   Discharge vitals: Blood pressure 118/68, pulse 78, temperature 97.6  F (36.4  C), temperature source Oral, resp. rate 18, weight 62.7 kg (138 lb 3.2 oz), SpO2 96%.   Code status on discharge: Full Code      DAY OF DISCHARGE PHYSICAL EXAM:  Gen: A&Ox4, NAD  Neuro: no focal deficits   CV: NSR, normal S1S2, no murmurs, rubs or gallops  Pulm: diminished bases bilaterally, no wheezing or rhonchi, unlabored work of breathing on RA  Abd: soft, non-tender, non-distended, BS present  Ext: 1-2+ peripheral edema, no pitting  Incision: sternal incision c/d/i and well-approximated, no erythema, sternum stable.   Tubes/drain sites: dressing clean and dry without shadowing         Procedures:     On 11/04/2024 underwent right mini-thoracotomy mitral valve replacement with Quinonez MITRIS bovine pericardial valve with Dr. Ellison.    Operative Findings:   The patient had an overall normal LV systolic size and function.  He had severe mitral valve  regurgitation and he had fresh and friable vegetations on both leaflets of the mitral valve.  The vegetation also appeared to involve the subvalvular apparatus of the posterior leaflet and so we completely removed the chordal structures of the posterior leaflet down to the papillary muscles.            Medications Prior to Admission:     Medications Prior to Admission   Medication Sig Dispense Refill Last Dose/Taking    Capsicum, Cayenne, (CAYENNE PEPPER PO) Take 1 capsule by mouth daily.   Past Month    [facility] Natural Products (SAW PALMETTO) CAPS Take 1 capsule by mouth daily.   Past Month    multivitamin  with lutein (OCUVITE WITH LUTEIN) CAPS per capsule Take 1 capsule by mouth daily.   Past Month             Discharge Medications:        Review of your medicines        START taking        Dose / Directions   acetaminophen 325 MG tablet  Commonly known as: TYLENOL  Used for: Endocarditis of mitral valve, S/P MVR (mitral valve replacement)      Dose: 650 mg  Take 2 tablets (650 mg) by mouth every 4 hours as needed for other (For optimal non-opioid multimodal pain management to improve pain control.).  Refills: 0     amiodarone 200 MG tablet  Commonly known as: PACERONE  Used for: Endocarditis of mitral valve      Start taking on: November 13, 2024  Take 2 tablets (400 mg) by mouth 2 times daily for 4 days, THEN 1 tablet (200 mg) 2 times daily for 7 days, THEN 1 tablet (200 mg) daily for 7 days.  Refills: 0     aspirin 81 MG EC tablet  Used for: S/P MVR (mitral valve replacement)      Dose: 81 mg  Take 1 tablet (81 mg) by mouth daily.  Refills: 0     Lidocaine 4 % Patch  Commonly known as: LIDOCARE  Used for: Endocarditis of mitral valve      Dose: 2 patch  Start taking on: November 14, 2024  Place 2 patches over 12 hours onto the skin every 24 hours. To prevent lidocaine toxicity, patient should be patch free for 12 hrs daily.  Refills: 0     loperamide 2 MG capsule  Commonly known as: IMODIUM  Used for:  Endocarditis of mitral valve      Dose: 2 mg  Take 1 capsule (2 mg) by mouth 4 times daily as needed for diarrhea.  Refills: 0     methocarbamol 500 MG tablet  Commonly known as: ROBAXIN  Used for: Endocarditis of mitral valve      Dose: 500 mg  Take 1 tablet (500 mg) by mouth every 6 hours as needed for muscle spasms.  Refills: 0     metoprolol tartrate 25 MG tablet  Commonly known as: LOPRESSOR  Used for: Endocarditis of mitral valve      Dose: 6.25 mg  Take 0.25 tablets (6.25 mg) by mouth 2 times daily.  Refills: 0     midodrine 5 MG tablet  Commonly known as: PROAMATINE  Used for: Endocarditis of mitral valve      Dose: 5 mg  Take 1 tablet (5 mg) by mouth 3 times daily (with meals).  Refills: 0     pantoprazole 40 MG EC tablet  Commonly known as: PROTONIX  Used for: Preventive measure      Dose: 40 mg  Take 1 tablet (40 mg) by mouth daily.  Refills: 0     sodium chloride 1 GM tablet  Used for: Endocarditis of mitral valve      Dose: 2 g  Take 2 tablets (2 g) by mouth 3 times daily (with meals).  Refills: 0     tamsulosin 0.4 MG capsule  Commonly known as: FLOMAX  Used for: Urinary retention      Dose: 0.4 mg  Take 1 capsule (0.4 mg) by mouth daily.  Refills: 0     vancomycin 1g in dextrose 5% (200ML) infusion  Commonly known as: VANCOCIN  Indication: Bacteria in the Blood  Used for: Endocarditis of mitral valve      Dose: 1,000 mg  Inject 1,000 mg at 200 mL/hr over 1 hours into the vein every 12 hours. Please check CBC with diff, AST, and CRP weekly and Creatinine and vancomycin level twice weekly with pharmacist to monitor/dose based on AUC. Please fax lab results to [facility].  Refills: 0            CONTINUE these medicines which have NOT CHANGED        Dose / Directions   CAYENNE PEPPER PO      Dose: 1 capsule  Take 1 capsule by mouth daily.  Refills: 0     multivitamin  with lutein Caps per capsule      Dose: 1 capsule  Take 1 capsule by mouth daily.  Refills: 0     Saw Palmetto Caps      Dose: 1  capsule  Take 1 capsule by mouth daily.  Refills: 0                     Consultations:     Nutrition, Intensivist, [facility], RNCC, SW, PT/OT, OMFS, Cardiology, General Surgery, WOC RN, Nephrology, Infectious Disease             Brief History of Illness:     Jayme Donahue is a 84 year old male with a past medical history of prostate cancer who was admitted on 10/25/24 from [facility] presented initially with nausea and poor appetite on 10/18/2024. He presented to an outside hospital and was found to be hyponatremic with a sodium of 118 and diagnosed with sepsis secondary to a UTI. Blood cultures were obtained and returned positive for corynebacterium striatum. Subsequently became hypotensive and was transferred to [facility] ICU and started on vasopressors on 10/23/2024. He was treated with vancomycin and Rocephin. Levophed weaned off on the night of 10/23/2024. Blood cultures returned positive for corynebacterium striatum. TTE on 10/23/2024 showed severe mitral regurgitation and a mobile echodensity on the atrial side of the mitral valve that likely represents flail scallop although vegetation is also in the differential. Complained of low back pain, lumbar spine MRI on 10/24/2024 showed some likely degenerative endplate changes at right L4-L5 and left L5-S1, although early changes of discitis could also be in the differential. ID was consulted on 10/25/2024 and patient transferred to [facility] for LINDSEY. LINDSEY on 10/29 shows mitral valve endocarditis with a large vegetation noted on the atrial aspect of the MV measuring 1.5-1.7 cm with severe mitral regurg. Normal LV EF and RV is normal in size and function. CT surgery was consulted due to severe mitral regurgitation and large vegetation. Risk and benefits were discussed and decision was made to take patient to the operating room to replace his mitral valve through a mini thoracotomy approach.           Hospital Course:     Patient was  able to tolerate the surgery well and was weaned off vasoactive infusions by POD#1. Patient remained in the ICU due to bed availability but was ready to transfer out on POD#1.     He was initiated on low dose beta blocker prior to discharge. With initiation of BB he did have a type II AV block, so BB stopped and Midodrine was started POD#3 for low BP. The type 2 AV block resolved on POD#6 and then patient went into Afib with -110s. He was started on Amio bolus followed by gtt and converted to PO formulation the following day. Patient then converted back into NSR with no block so re-trialed on BB at low dose and had no more blocks or arrhythmias recurrences, so was discharged on this dosing.     Patient had some fluid overload treated with diuretic medication. At discharge he is above his pre-op weight, and appears to still have some swelling in the legs (which is less than he had at admission. He was not sent with additional diuresis as stable and low pressures. Instructed to trend daily weight and blood pressure until cardiac surgery follow up appointment.     He was extubated on POD #0 and weaned off supplemental O2 following institution of pulmonary toilet and diuresis.     Patient had some transient hyperglycemia which was initially treated with an insulin infusion, then transitioned to sliding scale insulin, and has no further insulin need at discharge.    By the time of discharge of patient had full return of bowel and bladder function, his pain was adequately controlled, he progressed well with cardiac rehab, and is discharged to TCU  to home on POD #9.         Chest tube sites are to remain open to air. Not to be covered with occlusive dressing.    Patient discharged on aspirin:  Yes 81 mg  Patient discharged on beta blocker: yes Lopressor 6.25mg BID   Patient discharged on ACE Inhibitor/ARB: no      Patient discharged on statin: no   Patient discharged on anticoagulation: no               "Significant Results:     Lab Results   Component Value Date    WBC 6.9 11/13/2024     Lab Results   Component Value Date    RBC 2.77 11/13/2024     Lab Results   Component Value Date    HGB 8.5 11/13/2024     Lab Results   Component Value Date    HCT 24.9 11/13/2024     No components found for: \"MCT\"  Lab Results   Component Value Date    MCV 90 11/13/2024     Lab Results   Component Value Date    MCH 30.7 11/13/2024     Lab Results   Component Value Date    MCHC 34.1 11/13/2024     Lab Results   Component Value Date    RDW 16.7 11/13/2024     Lab Results   Component Value Date     11/13/2024       Last Basic Metabolic Panel:  Lab Results   Component Value Date     11/13/2024      Lab Results   Component Value Date    POTASSIUM 4.5 11/13/2024    POTASSIUM 4.1 11/04/2024     Lab Results   Component Value Date    CHLORIDE 100 11/13/2024     Lab Results   Component Value Date    AMALIA 8.2 11/13/2024     Lab Results   Component Value Date    CO2 28 11/13/2024     Lab Results   Component Value Date    BUN 18.8 11/13/2024     Lab Results   Component Value Date    CR 0.66 11/13/2024     Lab Results   Component Value Date     11/13/2024     11/12/2024                  Pending Results:     None           Discharge Instructions and Follow-Up:     Discharge diet: Regular   Discharge activity: Daily weights: Call if weight gain 2-3 lbs over 24 hours or if greater than 5 lbs in 1 week.  No lifting more than 10 lbs for 8-12 weeks.  No driving for 1 month.  Call for pain medication refill.  Call for temperature greater than 101.0  Daily shower with antibacterial soap.   Discharge follow-up: Primary Care Doctor, within a week from discharge from [facility]  Cardiology, Ana Maria Reese, on 12/16/2024 at [facility]   Cardiology, Dr. Guillory, on 03/24/2025 at the [facility]   If you need to cancel or reschedule your cardiology appointments please call [phone].    Outpatient " therapy: Cardiac rehab   Home Care agency: None    Supplies and equipment: None   Lines and drains: None    Wound care: Wash incision daily with antibacterial soap   Other instructions: None        Imaging:  Results for orders placed or performed during the hospital encounter of 10/25/24   CT Dental wo Contrast    Narrative    EXAM: CT DENTAL WO CONTRAST  LOCATION: [facility]  DATE: 10/30/2024    INDICATION: pre op for mitral valve replacement  COMPARISON: None.  TECHNIQUE: Routine CT Maxillofacial without IV contrast. Multiplanar reformats. Dose reduction techniques were used.     FINDINGS:  OSSEOUS STRUCTURES/SOFT TISSUES: No localized soft tissue swelling/inflammation. No facial bone fracture or malalignment. Multiple dental caries. Periapical lucencies associated with multiple maxillary teeth, representing periodontitis or periapical   abscess. Bilateral temporomandibular joint degeneration.    ORBITAL CONTENTS: No acute abnormality.    SINUSES: No paranasal sinus mucosal disease.    VISUALIZED INTRACRANIAL CONTENTS: No acute abnormality.       Impression    IMPRESSION:   1.  Periapical lucencies associated with multiple maxillary teeth, representing periodontitis or periapical abscess. Multiple dental caries.     US Carotid Bilateral    Narrative    [place] [facility]  LOCATION: [facility]  DATE: 10/30/2024    INDICATION: pre op for mitral valve replacement  TECHNIQUE: Duplex exam performed utilizing 2D gray-scale imaging, Doppler interrogation with color-flow and spectral waveform analysis.  COMPARISON: None.    FINDINGS:  RIGHT: There is a mild amount of atheromatous plaque.     LEFT: There is a mild amount of atheromatous plaque.     Antegrade flow within the bilateral vertebral arteries    VELOCITY CHART:   The following velocities were obtained in the RIGHT carotid system.  CCA: 128 cm/s  ICA: 133 cm/s  ECA: 215 cm/s  ICA/CCA: PS 1.04    The following  velocities were obtained in the LEFT carotid system.  CCA: 156 cm/s  ICA: 139 cm/s  ECA: 203 cm/s  ICA/CCA: PS 0.89                             Impression    CONCLUSION:  1.  On the right there is a mild amount of atheromatous plaque.  Peak systolic velocities in the ICA are 133 cm/s which correspond to the 50-69% stenosis range based on NASCET criteria.  2.  On the left there is a mild amount of atheromatous plaque.  Peak systolic velocities in the ICA are 139 cm/s which correspond to the 50-69% stenosis range based on NASCET criteria.  3.  Antegrade flow within the vertebral arteries bilaterally.    Evaluation based on velocities and NASCET criteria.   XR Chest 2 Views    Narrative    EXAM: XR CHEST 2 VIEWS  LOCATION: [facility]  DATE: 10/30/2024    INDICATION: hyponatremia r o mass  COMPARISON: None.      Impression    IMPRESSION: Heart size and pulmonary vessels are normal. Small bilateral pleural effusions. Mild apical capping on the right likely benign pleural thickening. Lungs otherwise clear.   CT Chest/Abdomen/Pelvis w Contrast    Narrative    EXAM: CT CHEST/ABDOMEN/PELVIS W CONTRAST  LOCATION: [facility]  DATE: 11/1/2024    INDICATION: pre op MVR, possible minimally invasive eval aorta IVC anf femoral vessels for peripheral bypass  COMPARISON: Abdomen pelvis CT March 31, 2021.  TECHNIQUE: CT scan of the chest, abdomen, and pelvis was performed following injection of IV contrast. Multiplanar reformats were obtained. Dose reduction techniques were used.   CONTRAST: 63 mL Isovue 370.    FINDINGS:   LUNGS AND PLEURA: Small posterior layering pleural effusions with adjacent dependent lower lobe atelectasis. Bilateral apical pleural scarring. Bronchiectasis.    MEDIASTINUM/AXILLAE: Normal caliber, patent thoracic and abdominal aorta. Central pulmonary arteries enhance normally as well. The superior vena cava is patent. There is no mediastinal lymphadenopathy.  Normal appearance of the breasts esophagus.    CORONARY ARTERY CALCIFICATION: None.    HEPATOBILIARY: Normal.    PANCREAS: Normal.    SPLEEN: Normal.    ADRENAL GLANDS: Normal.    KIDNEYS/BLADDER: Normal kidneys and ureters. Grossly normal urinary bladder decompressed by Palmer catheter.    BOWEL: Normal.    LYMPH NODES: Normal.    VASCULATURE: The common and external iliac arteries are tortuous and widely patent. Common femoral arteries are also widely patent, measuring 12 mm axial diameter each. The abdominal and pelvic major venous structures are not opacified with contrast but   appear normal in caliber. The suprarenal IVC is opacified with contrast and appears normal. And external compression device overlies the right femoral artery, presumably secondary to the coronary angiogram performed at around 11:15 PM earlier tonight.   There is no adjacent soft tissue hemorrhage, dissection, or fistula.    PELVIC ORGANS: Normal.    MUSCULOSKELETAL: Degenerative change in the spine without compression deformity or significant bone lesion. Scattered small bone islands are similar as on the remote prior CT, of no significance.    Diffuse third spacing throughout the body wall.      Impression    IMPRESSION:  1.  Small simple bilateral pleural effusions and diffuse third spacing.  2.  Mild atheromatous vascular changes without stenosis or aneurysm of the major arteries in the chest, abdomen, or the pelvis. The IVC and the major pelvic venous structures have a normal noncontrast appearance.  3.  No incidental finding requiring any additional evaluation is identified.   XR Chest Port 1 View    Narrative    EXAM: XR CHEST PORT 1 VIEW  LOCATION: [facility]  DATE: 11/4/2024    INDICATION: Post Op CVTS Surgery  COMPARISON: CT chest 11/1/2024, radiograph 10/30/2024      Impression    IMPRESSION: Interval mitral valve replacement. Endotracheal tube terminates approximately 3 cm above the chandu. Right IJ  line in the mid SVC. Nasogastric tube tip in the stomach. There are 2 right-sided chest tubes, one terminating medially the level   the chandu, the other near the apex. No pneumothorax. Mild subcutaneous emphysema along the right lateral chest wall. Small right pleural effusion.   XR Chest Port 1 View    Narrative    EXAM: XR CHEST PORT 1 VIEW  LOCATION: [facility]  DATE: 2024    INDICATION: Post Op CVTS Surgery  COMPARISON: 2024.      Impression    IMPRESSION: Right IJ central line and drains in the right hemithorax as well as cardiac valve hardware remain. No pneumothorax. Vascular volume is normal and the lungs are clear.   XR Chest Port 1 View    Narrative    EXAM: XR CHEST PORT 1 VIEW  LOCATION: [facility]  DATE: 2024    INDICATION: s p CT removal  COMPARISON: 2024      Impression    IMPRESSION: Interval removal of right chest tube with new small right pneumothorax measuring 2.8 cm at the right lung apex. Left lung and pleural space are clear. Normal cardiomediastinal silhouette. Mitral valve prosthesis.   XR Chest 2 Views    Narrative    EXAM: XR CHEST 2 VIEWS  LOCATION: [facility]  DATE: 2024    INDICATION: s p mini MVR, pnx noted on prior cxr  COMPARISON: 2024      Impression    IMPRESSION: Small right apical pneumothorax persists though somewhat improved currently measuring 2 cm, previously measuring 2.8 cm. Slight blunting costophrenic angles bilaterally consistent with small pleural effusions. Heart size normal with   prosthetic cardiac valve present. No new abnormality.   Transesophageal Echocardiogram    Narrative    [id number]  [id number]  [id number]^EKLOF^PARKER^SAJI     [facility]  Echocardiography Laboratory  [address] [place] [address]     Name: FABIOLA ELI  MRN: [id number]  : 1940  Study Date: 10/29/2024 01:02 PM  Age: 84 yrs  Gender:  Male  Patient Location: [facility]  Ordering Physician: PARKER NGUYEN  Referring Physician: JACQUELYN KISER  Performed By: Christin Bunch     BSA: 1.7 m2  Height: 66 in  Weight: 134 lb  HR: 90  BP: 132/83 mmHg  ______________________________________________________________________________  Procedure  Complete LINDSEY Adult. LINDSEY Probe serial #[id number] was used during the procedure.  The heart rate, respiratory rate and response to care were monitored  throughout the procedure with the assistance of the nurse.  ______________________________________________________________________________  Interpretation Summary     This was a transesophageal echocardiogram done for evaluation of endocarditis  & MR. Study was technically difficult. Probe insertion was technically quite  challenging. Patient was short of breath and coughing a lot despite sedation.  I had to request one of my partners Dr. Galicia who was kind to come and assist  with LINDSEY probe insertion.     Normal left ventricular size and systolic function.  Normal RV size and systolic function.  No evidence of left atrial appendage thrombus. No color shunt across the  interatrial septum.  Large vegetation noted on the atrial aspect of the mitral valve. They measure  about 1.5 to 1.7 cm. There is associated flail segment and distorted mitral  valve leaflet anatomy. There seems to be prolapse of the posterior mitral  valve leaflet. 3D imaging was quite challenging due to patient tolerance of  the procedure. We had to terminate prematurely.  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 I was not able  to get 2D and 3D quantification of the mitral regurgitation due to patient  tolerance of the procedure but visually MR is clearly severe.  No pericardial effusion.  ______________________________________________________________________________  LINDSEY  Versed (2.5mg) was given intravenously.  Fentanyl (75mcg) was given  intravenously. I determined this patient to be an appropriate candidate for  the planned sedation and procedure and have reassessed the patient immediately  prior to sedation and procedure. Total sedation time: 19 minutes minutes of  continuous bedside 1:1 monitoring. Consent to the procedure was obtained prior  to sedation. Prior to the exam, the oral cavity was checked and no  overcrowding was noted. The transesophageal probe insertion was technically  difficult. There were no complications associated with this procedure. The LINDSEY  was terminated due to patient intolerance of procedure.     Left Ventricle  The left ventricle is normal in size. Left ventricular systolic function is  normal.     Right Ventricle  The right ventricle is normal in size and function.     Atria  The left atrium is moderately dilated. Right atrial size is normal. There is  no color Doppler evidence of an atrial shunt. No thrombus is detected in the  left atrial appendage.     Mitral Valve  There is severe (4+) mitral regurgitation.     Tricuspid Valve  The tricuspid valve is normal in structure and function.     Aortic Valve  The aortic valve is normal in structure and function.     Pulmonic Valve  The pulmonic valve is not well seen, but is grossly normal.     Vessels  The aortic root is normal size. Normal size ascending aorta.     Pericardial/Pleural  There is no pericardial effusion.  ______________________________________________________________________________  Report approved by: Shemar Chan 10/29/2024 01:45 PM     ______________________________________________________________________________      Echocardiogram Limited     Value    LVEF  60-65%    Narrative    [id number]  [id number]  [id number]  [id number]^FERNANDA^CESAR^RYLEE     [facility]  Echocardiography Laboratory  [address] [place] [id number]     Name: FABIOLA ELI  MRN: [id number]  : 1940  Study Date:  11/10/2024 02:49 PM  Age: 84 yrs  Gender: Male  Patient Location: [facility]  Reason For Study: Mitral Valve Replacement  Ordering Physician: CESAR MICHAEL  Referring Physician: JACQUELYN KISER  Performed By: Dillon Justin     BSA: 1.7 m2  Height: 66 in  Weight: 138 lb  HR: 96  BP: 91/58 mmHg  ______________________________________________________________________________  Procedure  Limited Portable Echo Adult.  ______________________________________________________________________________  Interpretation Summary     MINIMALLY INVASIVE MITRAL VALVE REPLACEMENT WITH TISSUE HEART VALVE MITRISâ    RESILIAâ   MITRAL VALVE SIZE: 31MM 11/4/2024  Normal prosthetic mitral valve gradients. MDG 6.7mmHg @HR 84  The rhythm was sinus with wide QRS with first degree AV block.  Left ventricular systolic function is normal.  The visual ejection fraction is 60-65%.  The left ventricle is normal in size.  The right ventricle is normal in structure, function and size.  Normal prosthetic mitral valve gradients.     Since 10/23/2024 thre has been interim placement of a normally functioning  bioprosthetic mitral valve.  ______________________________________________________________________________  Left Ventricle  The left ventricle is normal in size. There is normal left ventricular wall  thickness. Left ventricular systolic function is normal. The visual ejection  fraction is 60-65%. Septal motion is consistent with post-operative state.  There is no thrombus seen in the left ventricle.     Right Ventricle  The right ventricle is normal in structure, function and size. There is no  mass or thrombus in the right ventricle.     Atria  Normal left atrial size. Right atrial size is normal. There is no atrial shunt  seen. The left atrial appendage is not well visualized.     Mitral Valve  There is no mitral regurgitation noted. The mean mitral valve gradient is 6.7  mmHg. There is a prosthetic mitral valve. Normal prosthetic mitral  valve  gradients.     Tricuspid Valve  Normal tricuspid valve. The right ventricular systolic pressure is  approximated at 25.7 mmHg plus the right atrial pressure. Right ventricle  systolic pressure estimate normal. There is no tricuspid stenosis.     Aortic Valve  The aortic valve is trileaflet. No aortic regurgitation is present. No aortic  stenosis is present.     Pulmonic Valve  Normal pulmonic valve. There is no pulmonic valvular regurgitation. There is  no pulmonic valvular stenosis.     Vessels  The aortic root is normal size. Normal size ascending aorta. The inferior vena  cava is normal. The pulmonary artery is normal size.     Pericardium  The pericardium appears normal. There is no pleural effusion.     Rhythm  The rhythm was sinus with wide QRS.  ______________________________________________________________________________  MMode/2D Measurements & Calculations  IVSd: 0.82 cm     LVIDd: 4.2 cm  LVIDs: 2.7 cm  LVPWd: 1.0 cm  FS: 36.6 %  LV mass(C)d: 126.0 grams  LV mass(C)dI: 73.7 grams/m2  asc Aorta Diam: 2.9 cm  Asc Ao diam index BSA (cm/m2): 1.7  Asc Ao diam index Ht(cm/m): 1.7  RWT: 0.50     Doppler Measurements & Calculations  MV max P.5 mmHg  MV mean P.7 mmHg  MV V2 VTI: 36.9 cm  TR max rupa: 253.5 cm/sec  TR max P.7 mmHg     ______________________________________________________________________________  Report approved by: Dr. Nash Zimmerman 11/10/2024 06:02 PM         Cardiac Catheterization    Narrative    1.  Normal coronary angiography          CC:s  [place], [facility],  Caroline Monae PA-C     [facility]   Cardiothoracic Surgery  Office phone: [phone]  Office fax: [phone]

## 2024-11-13 NOTE — PLAN OF CARE
"Patient Name: Rusty  MRN: [id number]  Date of Admission: 10/25/2024  Reason for Admission: POD 8 MVR, bacteremia  Level of Care: Medical     Vitals:   BP Readings from Last 1 Encounters:   11/12/24 117/69     Pulse Readings from Last 1 Encounters:   11/12/24 79     Wt Readings from Last 1 Encounters:   11/12/24 62.7 kg (138 lb 3.2 oz)     Ht Readings from Last 1 Encounters:   10/23/24 1.676 m (5' 6\")     Estimated body mass index is 22.31 kg/m  as calculated from the following:    Height as of 10/23/24: 1.676 m (5' 6\").    Weight as of this encounter: 62.7 kg (138 lb 3.2 oz).  Temp Readings from Last 1 Encounters:   11/12/24 97  F (36.1  C) (Oral)       Pain: Pain goal 0 Pain Rating 0 Effective pain medication/regimen NA    CV Surgery Patient: Yes Post Op Day #: 8    Assessment    General: Afebrile yes  Rhythm: normal sinus rhythm  Blood Pressure Medications given/held: Amio Given Beta blocker Given Other NA  Resp: Oxygen Status: RA  Patient slept last night Yes Approx hours slept SABINE  Incentive Spirometry Q 1-2 hour when awake: yes Volume: self-administered  Neuro: Alert yes Orientation: self, person, time, and place  GI/:          Bowel Activity: yes if yes indicate when: 11.12          Bowel Medications: no          Urinary Catheter: no  Skin:          Incision: Incision status: healing well          Epicardial Pacing Wires: no  Chest Tubes   Pleural: no Draining: not applicable               Suction: no              Mediastinal: no Draining: not applicable               Suction: no   Dressing Change Daily: not applicable    Assessment    Resp: LS diminished on room air  Telemetry: SR w/ 1* AVB  Neuro: A&Ox4  GI/: Imodium given x1 for loose stools. AUO. NPO for procedure   Skin/Wounds: R mini thora site and lap sites. Redness to bottom.   Lines/Drains: R PIV, R PICC. IV abx   Activity: Ax1 GBW  Sleep: Intermittent   Abnormal Labs: Mg 2.0 - replaced, recheck in AM.     Aggression Stop Light: Green        "   Patient Care Plan: Plans to go to OR this evening for oral packing removal. Timeline TBD. Anticipate discharge tomorrow to TCU, transport arranged by [facility]

## 2024-11-13 NOTE — ANESTHESIA CARE TRANSFER NOTE
Patient: Jayme Donahue    Procedure: Procedure(s):  removal of foreign body, closure with hemostatic agent, other procedures as necessary in oral cavity       Diagnosis: Periapical abscess [K04.7]  Diagnosis Additional Information: No value filed.    Anesthesia Type:   MAC     Note:    Oropharynx: oropharynx clear of all foreign objects and spontaneously breathing  Level of Consciousness: awake  Oxygen Supplementation: room air    Independent Airway: airway patency satisfactory and stable  Dentition: dentition unchanged  Vital Signs Stable: post-procedure vital signs reviewed and stable  Report to RN Given: handoff report given  Patient transferred to: PACU    Handoff Report: Identifed the Patient, Identified the Reponsible Provider, Reviewed the pertinent medical history, Discussed the surgical course, Reviewed Intra-OP anesthesia mangement and issues during anesthesia, Set expectations for post-procedure period and Allowed opportunity for questions and acknowledgement of understanding      Vitals:  Vitals Value Taken Time   /66 11/12/24 2233   Temp     Pulse 86 11/12/24 2235   Resp 22 11/12/24 2235   SpO2 93 % 11/12/24 2235   Vitals shown include unfiled device data.    Electronically Signed By: GLEN Zelaya CRNA  November 12, 2024  10:37 PM

## 2024-11-13 NOTE — PLAN OF CARE
"Patient Name: Rusty  MRN: [id number]  Date of Admission: 10/25/2024  Reason for Admission: POD9 MVR, bacteremia  Level of Care: Med-surg    Vitals:   BP Readings from Last 1 Encounters:   11/12/24 131/78     Pulse Readings from Last 1 Encounters:   11/12/24 83     Wt Readings from Last 1 Encounters:   11/12/24 62.7 kg (138 lb 3.2 oz)     Ht Readings from Last 1 Encounters:   10/23/24 1.676 m (5' 6\")     Estimated body mass index is 22.31 kg/m  as calculated from the following:    Height as of 10/23/24: 1.676 m (5' 6\").    Weight as of this encounter: 62.7 kg (138 lb 3.2 oz).  Temp Readings from Last 1 Encounters:   11/12/24 97.8  F (36.6  C) (Oral)       Pain: Pain goal 0 Pain Rating 2 Effective pain medication/regimen Ice chips, ice packs    CV Surgery Patient: Yes Post Op Day #: POD9    Assessment    General: Afebrile yes  Rhythm: normal sinus rhythm  Blood Pressure Medications given/held: Amio Held Beta blocker Held pt down in OR  Resp: Oxygen Status: RA  Patient slept last night Yes Approx hours slept 6  Incentive Spirometry Q 1-2 hour when awake: yes Volume:   Neuro: Alert yes Orientation: self, person, time, and place  GI/:          Bowel Activity: yes if yes indicate when: 11/12          Bowel Medications: no          Urinary Catheter: no  Skin:          Incision: Incision status: healing well          Epicardial Pacing Wires: no  Chest Tubes   Pleural: not applicable Draining: not applicable               Suction: not applicable              Mediastinal: not applicable Draining: not applicable               Suction: not applicable   Dressing Change Daily: not applicable If no, why?     Assessment    Resp: LS diminished, VSS on RA  Telemetry: SR w/1*AVB and BBB  Neuro: A&Ox4, alert and oriented after surgery. Oxygen saturation 94% on RA throughout shift. Pt refused continuous pulse oximetry, spot checks performed throughout shift, pt alert during all checks.    GI/: Clear liquid diet, can advance to " mechanical soft diet during day per report from PACU RN. 1200mL FR, education given on FR, pt agreeable but frequently asks for fluids. Continent, using urinal at bedside, adequate UO, -BM, +flatus.  Skin/Wounds: R thora incision and multiple lap sites, YUNG, WNL. L scapula lesion, dressing CDI. Blanchable redness on coccyx and buttocks, pt using own cushion while up in chair, frequent weight shifting encouraged, refused repositioning.   Lines/Drains: R single lumen PICC SL, blood return noted. R  forearm PIV SL  Activity: Assist x1 w/GBW  Sleep: Slept comfortably between cares  Abnormal Labs:     Aggression Stop Light: Green          Patient Care Plan: Gauze packing from previous tooth extraction removed in OR this shift, transported down to pre-op approx 2028 by pre-op RN. Back to room approx 2300. Plan to discharge to TCU today pending bed availability.       Goal Outcome Evaluation:      Plan of Care Reviewed With: patient    Overall Patient Progress: improving

## 2024-11-13 NOTE — ANESTHESIA POSTPROCEDURE EVALUATION
Patient: Jayme Donahue    Procedure: Procedure(s):  removal of foreign body, closure with hemostatic agent, other procedures as necessary in oral cavity       Anesthesia Type:  MAC    Note:  Disposition: Inpatient   Postop Pain Control: Uneventful            Sign Out: Well controlled pain   PONV: No   Neuro/Psych: Uneventful            Sign Out: Acceptable/Baseline neuro status   Airway/Respiratory: Uneventful            Sign Out: Acceptable/Baseline resp. status   CV/Hemodynamics: Uneventful            Sign Out: Acceptable CV status   Other NRE: NONE   DID A NON-ROUTINE EVENT OCCUR? No           Last vitals:  Vitals Value Taken Time   /83 11/12/24 2245   Temp 37.2  C (98.9  F) 11/12/24 2233   Pulse 84 11/12/24 2256   Resp 16 11/12/24 2256   SpO2 97 % 11/12/24 2256   Vitals shown include unfiled device data.    Electronically Signed By: Lasha Keating MD  November 13, 2024  12:26 AM

## 2024-11-13 NOTE — BRIEF OP NOTE
[facility]    Brief Operative Note    Pre-operative diagnosis: Periapical abscess [K04.7]  Post-operative diagnosis Same as pre-operative diagnosis    Procedure: removal of foreign body, closure with hemostatic agent, other procedures as necessary in oral cavity, Bilateral - Mouth    Surgeon: Surgeons and Role:     * Gabbie Neville DDS - Primary  Anesthesia: Choice   Estimated Blood Loss: 15 ml    Drains: None  Specimens:   ID Type Source Tests Collected by Time Destination   1 : foreign body in mouth Foreign Body Other SURGICAL PATHOLOGY EXAM Gabbie Neville DDS 11/12/2024 10:24 PM      Findings:   None.  Complications: None.  Implants: * No implants in log *

## 2024-11-13 NOTE — PROGRESS NOTES
[facility]    Hospitalist Progress Note    Interval History   - Doing well today, appears stable to discharge to TCU  - Disussed with CV surgery they will discharge patient. Discharge orders were entered from my end    Assessment & Plan   Summary: Jayme Donahue is a 84 year old male with PMH prostate cancer who was admitted on 10/25/2024 from [facility] with Corynebactermia bacteremia and endocarditis. He was evaluated by CV surgery during his stay.  Prior to undergoing valve procedures he underwent extraction of numerous teeth.  He ultimately underwent minimally invasive mitral valve replacement with tissue valve on 11/4/24.     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  He was found to be hyponatremic with a sodium 118 and diagnosed with sepsis due to UTI.  He became tensive and transferred to the ICU for initiation of vasopressors.  He was placed on broad-spectrum antibiotics with vancomycin and ceftriaxone.  Pressors were quickly weaned.  During this time blood cultures returned positive for corynebacterium striatum.  TTE showed severe mitral regurgitation and a mobile echodensity on the atrial side of the mitral valve (flail scallop vs vegetation).  Given some concurrent complaints of low back pain, MRI of the lumbar spine was obtained possibly suggestive of early changes of discitis.  ID was contacted, recommended transfer to [facility] for further cardiac evaluation.  *TTE this stay showed a large vegetation on atrial aspect of mitral valve, prolapse of posterior mitral valve leaflet, severe mitral  regurgitation.  *ID, cardiology and CV surgery following this stay.   *Repeat blood cultures have remained negative as of 10/26.  *Underwent preoperative evaluation with coronary angiogram on 10/31 that showed normal coronary arteries  *Ultimately underwent Right mini-thoracotomy mitral valve replacement with a 31 mm Quinonez MITRIS bovine pericardial valve on 11/4/24  -- routine postop cares per CV surgery including pain mgmt, postop arrhythmias  -- placed on midodrine on 11/7 for postop hypotension  -- conts on IV vancomycin per ID, anticipate prolonged course of IV abx on discharge; plan for PICC line placement on 11/11  -- conts on ASA, no indication for statin  -- PT/OT recommending TCU stay at discharge     Postoperative A-fib  *Started on amiodarone gtt on 11/9 per CV surgery. Converted back to NSR.  Changed to oral amiodarone.  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  Initially felt to be secondary to dehydration and had mild improvement after some IV fluids.  Urine studies obtained and appeared consistent with SIADH.  He was placed on a fluid restriction and started on salt tablets.  Sodium stabilized at 119-123.  *Na 125 on transfer to [facility], though began to trend back down with continued fluid restriction.  *Nephrology consulted and following this day. Started on UreNa on 10/29, then switched to salt tablets on 10/30.    *Slight drop Bee from 11/7-11/9 felt to be due in part to IVFs that were given for hypotension during this time.  -- Na improving; monitor daily BMP  -- cont salt tabs TID  -- fluid restriction intensified from 1200ml/d to 1000ml/d per CV surgery on 11/10  -- per nephrology, once Na approaches 135 can liberalize fluid restriction and continue salt tabs  - back to 1200mL daily on 11/12     Loose  "stools: Improved  *Noted postop.  Suspected due to scheduled bowel regimen.    *Held sched bowel regimen and oral Mag.  C. difficile testing done 11/10 was negative.  -- stools improving off sched bowel regimen. monitor     Left scapular lesion, biopsy positive for basal cell carcinoma  *Left scapular lesion was noted on admission.  Appearance was concerning for possible malignancy question (see photos in epic from 10/23/24).  Wound culture obtained at the time of admission and grew stenotrophomonas maltophilia.  Was initially placed on a of doxycycline, however this was ultimately stopped after it was felt that findings represented chronic skin colonization and not an acute infection.  *Seen by general surgery, underwent punch biopsy on 10/31/24. Pathology showed \"Basal cell carcinoma with keratotic features, incompletely excised\"  Surgery noted patient would require full thickness resection with 4mm margins and that this would require general anesthesia. No plans to pursue further surgical mgmt this stay.   *Sutures removed on 11/7. [facility].   *[facility] RN saw patient, local cares advised  - Outpatient surgery follow up     History of prostate cancer, s/p treatment in 2020.   Urinary retention s/p mclain placement 10/29 and removal on 11/8:  Resolved  *Had reportedly underwent treatment for prostate cancer in 2020.  PSA levels elevated to 50s but trend was generally flat from 7/2024-9/2024. Reported some urgency this stay.   *Developed urinary retention this day requiring placement of a Mclain catheter.  Placed on empiric Flomax.  *Mclain catheter removed 11/8. No issues with persistent retention.  -- cont Flomax     Moderate protein calorie malnutrition  *Nutritionist following. Encourage po intake.     S/p extraction of numerous teeth (#3, 5-14, 20-21, 23-29) on 10/31/24   *Underwent teeth extraction prior to undergoing valve surgery  - Change packing 11/12       Clinically Significant " Risk Factors         # Hyponatremia: Lowest Na = 134 mmol/L in last 2 days, will monitor as appropriate       # Hypoalbuminemia: Lowest albumin = 2.5 g/dL at 11/4/2024  8:12 PM, will monitor as appropriate                 # Severe Malnutrition: based on nutrition assessment    # Financial/Environmental Concerns:            PT/OT: ordered  Diet: Snacks/Supplements Adult: Ensure Enlive; With Meals  Fluid restriction 1200 ML FLUID  Mechanical/Dental Soft Diet  Diet  Discharge Instruction - Cardiac Diet    DVT Prophylaxis: heparin SQ  Palmer Catheter: Not present  Lines: PRESENT      PICC 11/11/24 Single Lumen Right Basilic-Site Assessment: WDL      Cardiac Monitoring: None  Code Status: Full Code    Medically Ready for Discharge: Ready Now to TCU      Shin Rodríguez MD  Hospitalist Service  [facility]  Securely message with [employer] (more info)  Text page via [facility] Paging/Directory     - Total time spent on encounter is 35 minutes, which includes counseling, coordination of care, time spent discussing with family, and/or time spent discussing with consultants.    Data reviewed today: I reviewed all new labs and imaging results over the last 24 hours.    Physical Exam   Temp: 97.6  F (36.4  C) Temp src: Oral BP: 118/68 Pulse: 78   Resp: 18 SpO2: 96 % O2 Device: None (Room air) Oxygen Delivery: 3 LPM  Vitals:    11/10/24 0300 11/11/24 0520 11/12/24 0336   Weight: 64.5 kg (142 lb 1.6 oz) 63.9 kg (140 lb 12.8 oz) 62.7 kg (138 lb 3.2 oz)     Vital Signs with Ranges  Temp:  [97  F (36.1  C)-98.9  F (37.2  C)] 97.6  F (36.4  C)  Pulse:  [72-86] 78  Resp:  [16-19] 18  BP: (117-138)/(63-83) 118/68  SpO2:  [90 %-97 %] 96 %  I/O last 3 completed shifts:  In: 1722 [P.O.:1040; I.V.:682]  Out: 450 [Urine:450]  O2 requirements: none    Constitutional: Male in NAD  HEENT: Eyes nonicteric, oral mucosa moist  Cardiovascular: RRR, normal S1/2, no m/r/g  Respiratory: CTAB, no wheezing or crackles  Vascular: 2+  BLE pitting edema  GI: Normoactive bowel sounds, nontender  Skin/Integumen: left scapular lesion healing  Neuro/Psych: Appropriate affect and mood. A&Ox3, moves all extremities    Medications   Current Facility-Administered Medications   Medication Dose Route Frequency Provider Last Rate Last Admin    Reason beta blocker order not selected   Does not apply DOES NOT GO TO Kerri Odell NP         Current Facility-Administered Medications   Medication Dose Route Frequency Provider Last Rate Last Admin    amiodarone (PACERONE) tablet 400 mg  400 mg Oral BID Yana Rapp PA-C   400 mg at 11/13/24 0851    aspirin EC tablet 81 mg  81 mg Oral Daily Genna Sutton DO   81 mg at 11/13/24 0851    heparin ANTICOAGULANT injection 5,000 Units  5,000 Units Subcutaneous Q8H Kerri Steen NP   5,000 Units at 11/13/24 0851    Lidocaine (LIDOCARE) 4 % Patch 2 patch  2 patch Transdermal Q24H Kerri Steen NP   2 patch at 11/09/24 0035    metoprolol tartrate (LOPRESSOR) quarter-tab 6.25 mg  6.25 mg Oral BID Yana Rapp PA-C   6.25 mg at 11/13/24 0904    midodrine (PROAMATINE) tablet 5 mg  5 mg Oral TID w/meals Brigitte García PA-C   5 mg at 11/13/24 0851    pantoprazole (PROTONIX) EC tablet 40 mg  40 mg Oral Daily Kerri Steen NP   40 mg at 11/13/24 0851    sodium chloride (PF) 0.9% PF flush 3 mL  3 mL Intracatheter Q8H Kerri Steen NP   3 mL at 11/13/24 0550    sodium chloride tablet 2 g  2 g Oral TID w/meals Yana Rapp PA-C   2 g at 11/13/24 0851    tamsulosin (FLOMAX) capsule 0.4 mg  0.4 mg Oral Daily Kerri Steen NP   0.4 mg at 11/13/24 0851    vancomycin (VANCOCIN) 1,250 mg in 0.9% NaCl 262.5 mL intermittent infusion  1,250 mg Intravenous Q12H Genna Sutton DO   1,250 mg at 11/13/24 0550       Data   Recent Labs   Lab 11/13/24  0556 11/12/24  2341 11/12/24  0515 11/11/24  2201 11/11/24  0658   WBC 6.9  --  7.5  --  6.9   HGB 8.5*  --  8.8*  --  8.7*   MCV 90  --   90  --  88     --  368  --  321   *  --  134*  --  130*   POTASSIUM 4.5  --  4.4  --  4.3   CHLORIDE 100  --  100  --  99   CO2 28  --  27  --  25   BUN 18.8  --  20.6  --  21.8   CR 0.66*  --  0.60*  --  0.53*   ANIONGAP 6*  --  7  --  6*   AMALIA 8.2*  --  8.6*  --  8.4*   * 107* 92   < > 98    < > = values in this interval not displayed.       Imaging:   No results found for this or any previous visit (from the past 24 hours).

## 2024-11-14 ENCOUNTER — TRANSFERRED RECORDS (OUTPATIENT)
Dept: HEALTH INFORMATION MANAGEMENT | Facility: CLINIC | Age: 84
End: 2024-11-14

## 2024-11-15 LAB
BACTERIA TISS BX CULT: ABNORMAL
BACTERIA TISS BX CULT: ABNORMAL

## 2024-11-16 LAB — BACTERIA TISS BX CULT: NORMAL

## 2024-11-18 ENCOUNTER — TELEPHONE (OUTPATIENT)
Dept: OTHER | Facility: CLINIC | Age: 84
End: 2024-11-18
Payer: MEDICARE

## 2024-11-18 ENCOUNTER — TRANSFERRED RECORDS (OUTPATIENT)
Dept: HEALTH INFORMATION MANAGEMENT | Facility: CLINIC | Age: 84
End: 2024-11-18

## 2024-11-18 NOTE — TELEPHONE ENCOUNTER
Update done with RN at [facility]. Patient doing well, ambulation independently, pain in good control. No acute issues. Is receiving his IV antibiotics. CVTS follow up on 11/25.

## 2024-11-20 ENCOUNTER — TRANSFERRED RECORDS (OUTPATIENT)
Dept: HEALTH INFORMATION MANAGEMENT | Facility: CLINIC | Age: 84
End: 2024-11-20

## 2024-11-21 LAB — BACTERIA TISS BX CULT: NORMAL

## 2024-11-26 LAB
PATH REPORT.COMMENTS IMP SPEC: NORMAL
PATH REPORT.COMMENTS IMP SPEC: NORMAL
PATH REPORT.FINAL DX SPEC: NORMAL
PATH REPORT.GROSS SPEC: NORMAL
PATH REPORT.MICROSCOPIC SPEC OTHER STN: NORMAL
PATH REPORT.RELEVANT HX SPEC: NORMAL
PHOTO IMAGE: NORMAL

## 2024-11-26 PROCEDURE — 88300 SURGICAL PATH GROSS: CPT | Mod: 26 | Performed by: PATHOLOGY

## 2024-11-28 LAB — BACTERIA TISS BX CULT: NORMAL

## 2024-12-02 LAB — BACTERIA TISS BX CULT: NO GROWTH

## 2024-12-16 ENCOUNTER — OFFICE VISIT (OUTPATIENT)
Dept: CARDIOLOGY | Facility: CLINIC | Age: 84
End: 2024-12-16
Payer: MEDICARE

## 2024-12-16 VITALS
SYSTOLIC BLOOD PRESSURE: 111 MMHG | DIASTOLIC BLOOD PRESSURE: 68 MMHG | WEIGHT: 131.3 LBS | BODY MASS INDEX: 21.1 KG/M2 | HEART RATE: 95 BPM | HEIGHT: 66 IN | OXYGEN SATURATION: 96 %

## 2024-12-16 DIAGNOSIS — I48.91 POSTOPERATIVE ATRIAL FIBRILLATION (H): Primary | ICD-10-CM

## 2024-12-16 DIAGNOSIS — Z95.2 S/P MVR (MITRAL VALVE REPLACEMENT): ICD-10-CM

## 2024-12-16 DIAGNOSIS — I97.89 POSTOPERATIVE ATRIAL FIBRILLATION (H): Primary | ICD-10-CM

## 2024-12-16 PROCEDURE — 99213 OFFICE O/P EST LOW 20 MIN: CPT | Mod: 24

## 2024-12-16 RX ORDER — METOPROLOL SUCCINATE 25 MG/1
12.5 TABLET, EXTENDED RELEASE ORAL DAILY
COMMUNITY

## 2024-12-16 RX ORDER — ASPIRIN 81 MG/1
81 TABLET ORAL DAILY
COMMUNITY
Start: 2024-12-16

## 2024-12-16 NOTE — PATIENT INSTRUCTIONS
Thank you for your visit with the [facility] today.    Today's plan:   Medication changes:   Start Aspirin 81 mg daily   Call the clinic if you are having any procedures to see if you need antibiotics prior to the procedure   Follow up with Dr. Guillory in March at our [facility]     If you have questions or concerns please call the nurse team at [phone] or send a [facility] message.     Scheduling phone number: [phone]    It was a pleasure seeing you today!     Ana Maria Reese PA-C   Physician Assistant   [facility]

## 2024-12-16 NOTE — LETTER
12/16/2024    [facility]  [address] [address]    RE: Jayme Donahue       Dear Colleague,     I had the pleasure of seeing Jayme Donahue in the [facility].              ~Cardiology Clinic Visit~    Jayme Donahue MRN# [id number]   YOB: 1940 Age: 84 year old   Primary Cardiologist: Dr. 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 His ASA was discontinued and he was transitioned to metoprolol XL 12.5  mg daily. Encouraged patient to restart ASA 81 mg daily and to continue the metoprolol XL 12.5 mg daily    - Discussed the option of wearing a heart monitor to determine if he has had recurrence of atrial fibrillation. Patient declined         Follow up with Dr. Guillory in March       Ana Maria Reese PA-C  Physician Assistant   [facility]  Pager: [phone]          History of Presenting Illness:    Jayme Donahue is a very pleasant 84 year old male with a history of prostate cancer with radiation in the early 2000s with recent rising PSA's, and severe mitral valve regurgitations and bacterial endocarditis (s/p mitral valve replacement with Quinonez MITRIS bovine pericardial valve on 11/4/2024).    In brief, patient was hospitalized at [facility] in October 2024 for urosepsis with hypotension hyponatremia  at [facility] and later transferred to [facility] on 10/25/24 for further workup with LINDSEY. Cardiology was consulted for endocarditis.  LINDSEY showed a large vegetation on the atrial aspect of the mitral valve measuring about 1.5 to 1.7 cm with a normal LVEF.  Coronary angiogram demonstrated normal coronary arteries. On 11/4/2024 he underwent right mini-thoracotomy mitral valve replacement with Quinonez MITRIS bovine pericardial valve with Dr. Ellison.  Postoperatively he was initiated on a low-dose beta-blocker.  He developed a type II AV block so beta-blocker was discontinued.  Midodrine was started for low blood pressure.  Patient did have atrial fibrillation with heart rates in the low 100-1110s.  He was started on amnio bolus followed by drip and converted by time of discharge.  He did convert to normal sinus rhythm with no AV block so beta-blocker was retrialed at a low dose and no more blocks or arrhythmias occurred. He was discharged to a TCU.     Patient is accompanied with his wife today. His mini-thoracotomy incision site healed well. He has discharged from the TCU and returned home. He uses a lift chair to go from sitting to standing at home, and uses a walker to assist with ambulation when mobilizing outside the home. He regularly elevates his lower extremities which manages his edema. Lasix gave him loose bowels.     Denies shortness of breath, orthopnea and PND. Denies chest pain, palpitations, lightheadedness, dizziness, near syncope and syncope.     Taking medications daily as prescribed.      Blood pressure 111/68 and HR 95 in clinic today.           Social History       Social History     Socioeconomic History     Marital status:      Spouse name: Not on file     Number of children: Not on file     Years of education: Not on file     Highest education level: Not on file   Occupational History     Not on file   Tobacco Use     Smoking status: Never      Smokeless tobacco: Never   Substance and Sexual Activity     Alcohol use: Not Currently     Drug use: Never     Sexual activity: Not on file   Other Topics Concern     Not on file   Social History Narrative     Not on file     Social Drivers of Health     Financial Resource Strain: Low Risk  (10/28/2024)    Financial Resource Strain      Within the past 12 months, have you or your family members you live with been unable to get utilities (heat, electricity) when it was really needed?: No   Food Insecurity: Low Risk  (10/28/2024)    Food Insecurity      Within the past 12 months, did you worry that your food would run out before you got money to buy more?: No      Within the past 12 months, did the food you bought just not last and you didn t have money to get more?: No   Transportation Needs: Low Risk  (10/28/2024)    Transportation Needs      Within the past 12 months, has lack of transportation kept you from medical appointments, getting your medicines, non-medical meetings or appointments, work, or from getting things that you need?: No   Physical Activity: Not on file   Stress: Not on file   Social Connections: Not on file   Interpersonal Safety: Low Risk  (10/31/2024)    Interpersonal Safety      Do you feel physically and emotionally safe where you currently live?: Yes      Within the past 12 months, have you been hit, slapped, kicked or otherwise physically hurt by someone?: No      Within the past 12 months, have you been humiliated or emotionally abused in other ways by your partner or ex-partner?: No   Recent Concern: Interpersonal Safety - High Risk (10/23/2024)    Interpersonal Safety      Do you feel physically and emotionally safe where you currently live?: No      Within the past 12 months, have you been hit, slapped, kicked or otherwise physically hurt by someone?: No      Within the past 12 months, have you been humiliated or emotionally abused in other ways by your partner or ex-partner?: No  "  Housing Stability: Low Risk  (10/28/2024)    Housing Stability      Do you have housing? : Yes      Are you worried about losing your housing?: No            Review of Systems:   Please see HPI         Physical Exam:   Vitals: /68 (BP Location: Left arm, Patient Position: Sitting)   Pulse 95   Ht 1.676 m (5' 6\")   Wt 59.6 kg (131 lb 4.8 oz)   SpO2 96%   BMI 21.19 kg/m     Wt Readings from Last 4 Encounters:   11/12/24 62.7 kg (138 lb 3.2 oz)   10/23/24 59 kg (130 lb)     GEN: well nourished, in no acute distress.  NECK: Supple. JVP was not appreciated.  C/V:  Regular rate and rhythm, no murmur, rub or gallop.    RESP: Respirations are unlabored. Clear to auscultation bilaterally without wheezing, rales, or rhonchi.  EXTREM: Bilateral lower extremities with 1+ edema.   SKIN: Warm and dry.        Data:   LIPID RESULTS:  No results found for: \"CHOL\", \"HDL\", \"LDL\", \"TRIG\", \"CHOLHDLRATIO\"  LIVER ENZYME RESULTS:  Lab Results   Component Value Date    AST 34 11/04/2024    ALT 19 11/04/2024     CBC RESULTS:  Lab Results   Component Value Date    WBC 6.9 11/13/2024    RBC 2.77 (L) 11/13/2024    HGB 8.5 (L) 11/13/2024    HCT 24.9 (L) 11/13/2024    MCV 90 11/13/2024    MCH 30.7 11/13/2024    MCHC 34.1 11/13/2024    RDW 16.7 (H) 11/13/2024     11/13/2024     BMP RESULTS:  Lab Results   Component Value Date     (L) 11/13/2024    POTASSIUM 4.5 11/13/2024    POTASSIUM 4.1 11/04/2024    CHLORIDE 100 11/13/2024    CO2 28 11/13/2024    ANIONGAP 6 (L) 11/13/2024     (H) 11/13/2024     (H) 11/12/2024    BUN 18.8 11/13/2024    CR 0.66 (L) 11/13/2024    GFRESTIMATED >90 11/13/2024    GFRESTIMATED 81 03/31/2021    GFRESTBLACK >90 03/31/2021    AMALIA 8.2 (L) 11/13/2024      A1C RESULTS:  Lab Results   Component Value Date    A1C 5.3 10/31/2024     INR RESULTS:  Lab Results   Component Value Date    INR 1.80 (H) 11/04/2024    INR 2.04 (H) 11/04/2024            Medications     Current Outpatient " Medications   Medication Sig Dispense Refill     acetaminophen (TYLENOL) 325 MG tablet Take 2 tablets (650 mg) by mouth every 4 hours as needed for other (For optimal non-opioid multimodal pain management to improve pain control.). (Patient not taking: Reported on 11/25/2024)       amiodarone (PACERONE) 200 MG tablet Take 2 tablets (400 mg) by mouth 2 times daily for 4 days, THEN 1 tablet (200 mg) 2 times daily for 7 days, THEN 1 tablet (200 mg) daily for 7 days.       aspirin 81 MG EC tablet Take 1 tablet (81 mg) by mouth daily.       Capsicum, Cayenne, (CAYENNE PEPPER PO) Take 1 capsule by mouth daily. (Patient not taking: Reported on 11/25/2024)       Lidocaine (LIDOCARE) 4 % Patch Place 2 patches over 12 hours onto the skin every 24 hours. To prevent lidocaine toxicity, patient should be patch free for 12 hrs daily. (Patient not taking: Reported on 11/25/2024)       loperamide (IMODIUM) 2 MG capsule Take 1 capsule (2 mg) by mouth 4 times daily as needed for diarrhea. (Patient not taking: Reported on 11/25/2024)       methocarbamol (ROBAXIN) 500 MG tablet Take 1 tablet (500 mg) by mouth every 6 hours as needed for muscle spasms. (Patient not taking: Reported on 11/25/2024)       metoprolol tartrate (LOPRESSOR) 25 MG tablet Take 0.25 tablets (6.25 mg) by mouth 2 times daily.       midodrine (PROAMATINE) 5 MG tablet Take 1 tablet (5 mg) by mouth 3 times daily (with meals).       Misc Natural Products (SAW PALMETTO) CAPS Take 1 capsule by mouth daily.       multivitamin  with lutein (OCUVITE WITH LUTEIN) CAPS per capsule Take 1 capsule by mouth daily.       pantoprazole (PROTONIX) 40 MG EC tablet Take 1 tablet (40 mg) by mouth daily.       sodium chloride 1 GM tablet Take 2 tablets (2 g) by mouth 3 times daily (with meals).       tamsulosin (FLOMAX) 0.4 MG capsule Take 1 capsule (0.4 mg) by mouth daily.       vancomycin (VANCOCIN) 1000 mg in dextrose 5% 200 mL PREMIX Inject 1,000 mg at 200 mL/hr over 1 hours into  the vein every 12 hours. Please check CBC with diff, AST, and CRP weekly and Creatinine and vancomycin level twice weekly with pharmacist to monitor/dose based on AUC. Please fax lab results to [facility].            Past Medical History     Past Medical History:   Diagnosis Date     Cataract      Iliac artery aneurysm (H)      Prostate cancer (H)      Past Surgical History:   Procedure Laterality Date     CV CORONARY ANGIOGRAM N/A 10/31/2024    Procedure: Coronary Angiogram;  Surgeon: Shane Ortega MD;  Location:  HEART CARDIAC CATH LAB     EXTRACTION(S) DENTAL Bilateral 10/31/2024    Procedure: EXTRACTION OF TEETH 3, 5, 6, 7, 8, 9, 10, 11, 12, 13, 14, 20, 21, 23, 24, 25, 26, 27, 28, AND 29;  Surgeon: Gabbie Neville DDS;  Location:  OR     EXTRACTION(S) DENTAL Bilateral 11/12/2024    Procedure: removal of foreign body, closure with hemostatic agent, other procedures as necessary in oral cavity;  Surgeon: Gabbie Neville DDS;  Location:  OR     REPLACE VALVE MITRAL MINIMALLY INVASIVE Right 11/4/2024    Procedure: MINIMALLY INVASIVE MITRAL VALVE REPLACEMENT WITH TISSUE HEART VALVE MITRIS  RESILIA  MITRAL VALVE SIZE: 31MM, AND ON CARDIOPULMONARY PUMP OXYGENATOR  (INTRAOPERATIVE TRANSESOPHAGEAL ECHOCARDIOGRAM BY ANESTHESIOLOGIST);  Surgeon: Alexa Ellison MD;  Location:  OR     No family history on file.         Allergies   Patient has no known allergies.      This note was completed in part using dictation via the Dragon voice recognition software. Some word and grammatical errors may occur and must be interpreted in the appropriate clinical context.  If there are any questions pertaining to this issue, please contact me for further clarification.      Thank you for allowing me to participate in the care of your patient.      Sincerely,     Ana Maria Reese PA-C     [facility]  cc:   No referring provider  defined for this encounter.

## 2024-12-16 NOTE — PROGRESS NOTES
~Cardiology Clinic Visit~    Jayme Donahue MRN# [id number]   YOB: 1940 Age: 84 year old   Primary Cardiologist: Dr. 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 His ASA was discontinued and he was transitioned to metoprolol XL 12.5  mg daily. Encouraged patient to restart ASA 81 mg daily and to continue the metoprolol XL 12.5 mg daily    - Discussed the option of wearing a heart monitor to determine if he has had recurrence of atrial fibrillation. Patient declined         Follow up with Dr. Guillory in March       Ana Maria Reese PA-C  Physician Assistant   [facility]  Pager: [phone]          History of Presenting Illness:    Jayme Donahue is a very pleasant 84 year old male with a history of prostate cancer with radiation in the early 2000s with recent rising PSA's, and severe mitral valve regurgitations and bacterial endocarditis (s/p mitral valve replacement with Quinonez MITRIS bovine pericardial valve on 11/4/2024).    In brief, patient was hospitalized at [facility] in October 2024 for urosepsis with hypotension hyponatremia at [facility] and later transferred to [facility] on 10/25/24 for further workup with LINDSEY. Cardiology was consulted for endocarditis.  LINDSEY showed a large vegetation on the  atrial aspect of the mitral valve measuring about 1.5 to 1.7 cm with a normal LVEF.  Coronary angiogram demonstrated normal coronary arteries. On 11/4/2024 he underwent right mini-thoracotomy mitral valve replacement with Quinonez MITRIS bovine pericardial valve with Dr. Ellison.  Postoperatively he was initiated on a low-dose beta-blocker.  He developed a type II AV block so beta-blocker was discontinued.  Midodrine was started for low blood pressure.  Patient did have atrial fibrillation with heart rates in the low 100-1110s.  He was started on amnio bolus followed by drip and converted by time of discharge.  He did convert to normal sinus rhythm with no AV block so beta-blocker was retrialed at a low dose and no more blocks or arrhythmias occurred. He was discharged to a TCU.     Patient is accompanied with his wife today. His mini-thoracotomy incision site healed well. He has discharged from the TCU and returned home. He uses a lift chair to go from sitting to standing at home, and uses a walker to assist with ambulation when mobilizing outside the home. He regularly elevates his lower extremities which manages his edema. Lasix gave him loose bowels.     Denies shortness of breath, orthopnea and PND. Denies chest pain, palpitations, lightheadedness, dizziness, near syncope and syncope.     Taking medications daily as prescribed.      Blood pressure 111/68 and HR 95 in clinic today.           Social History       Social History     Socioeconomic History    Marital status:      Spouse name: Not on file    Number of children: Not on file    Years of education: Not on file    Highest education level: Not on file   Occupational History    Not on file   Tobacco Use    Smoking status: Never    Smokeless tobacco: Never   Substance and Sexual Activity    Alcohol use: Not Currently    Drug use: Never    Sexual activity: Not on file   Other Topics Concern    Not on file   Social History Narrative    Not on file      Social Drivers of Health     Financial Resource Strain: Low Risk  (10/28/2024)    Financial Resource Strain     Within the past 12 months, have you or your family members you live with been unable to get utilities (heat, electricity) when it was really needed?: No   Food Insecurity: Low Risk  (10/28/2024)    Food Insecurity     Within the past 12 months, did you worry that your food would run out before you got money to buy more?: No     Within the past 12 months, did the food you bought just not last and you didn t have money to get more?: No   Transportation Needs: Low Risk  (10/28/2024)    Transportation Needs     Within the past 12 months, has lack of transportation kept you from medical appointments, getting your medicines, non-medical meetings or appointments, work, or from getting things that you need?: No   Physical Activity: Not on file   Stress: Not on file   Social Connections: Not on file   Interpersonal Safety: Low Risk  (10/31/2024)    Interpersonal Safety     Do you feel physically and emotionally safe where you currently live?: Yes     Within the past 12 months, have you been hit, slapped, kicked or otherwise physically hurt by someone?: No     Within the past 12 months, have you been humiliated or emotionally abused in other ways by your partner or ex-partner?: No   Recent Concern: Interpersonal Safety - High Risk (10/23/2024)    Interpersonal Safety     Do you feel physically and emotionally safe where you currently live?: No     Within the past 12 months, have you been hit, slapped, kicked or otherwise physically hurt by someone?: No     Within the past 12 months, have you been humiliated or emotionally abused in other ways by your partner or ex-partner?: No   Housing Stability: Low Risk  (10/28/2024)    Housing Stability     Do you have housing? : Yes     Are you worried about losing your housing?: No            Review of Systems:   Please see HPI         Physical Exam:   Vitals: /68  "(BP Location: Left arm, Patient Position: Sitting)   Pulse 95   Ht 1.676 m (5' 6\")   Wt 59.6 kg (131 lb 4.8 oz)   SpO2 96%   BMI 21.19 kg/m     Wt Readings from Last 4 Encounters:   11/12/24 62.7 kg (138 lb 3.2 oz)   10/23/24 59 kg (130 lb)     GEN: well nourished, in no acute distress.  NECK: Supple. JVP was not appreciated.  C/V:  Regular rate and rhythm, no murmur, rub or gallop.    RESP: Respirations are unlabored. Clear to auscultation bilaterally without wheezing, rales, or rhonchi.  EXTREM: Bilateral lower extremities with 1+ edema.   SKIN: Warm and dry.        Data:   LIPID RESULTS:  No results found for: \"CHOL\", \"HDL\", \"LDL\", \"TRIG\", \"CHOLHDLRATIO\"  LIVER ENZYME RESULTS:  Lab Results   Component Value Date    AST 34 11/04/2024    ALT 19 11/04/2024     CBC RESULTS:  Lab Results   Component Value Date    WBC 6.9 11/13/2024    RBC 2.77 (L) 11/13/2024    HGB 8.5 (L) 11/13/2024    HCT 24.9 (L) 11/13/2024    MCV 90 11/13/2024    MCH 30.7 11/13/2024    MCHC 34.1 11/13/2024    RDW 16.7 (H) 11/13/2024     11/13/2024     BMP RESULTS:  Lab Results   Component Value Date     (L) 11/13/2024    POTASSIUM 4.5 11/13/2024    POTASSIUM 4.1 11/04/2024    CHLORIDE 100 11/13/2024    CO2 28 11/13/2024    ANIONGAP 6 (L) 11/13/2024     (H) 11/13/2024     (H) 11/12/2024    BUN 18.8 11/13/2024    CR 0.66 (L) 11/13/2024    GFRESTIMATED >90 11/13/2024    GFRESTIMATED 81 03/31/2021    GFRESTBLACK >90 03/31/2021    AMALIA 8.2 (L) 11/13/2024      A1C RESULTS:  Lab Results   Component Value Date    A1C 5.3 10/31/2024     INR RESULTS:  Lab Results   Component Value Date    INR 1.80 (H) 11/04/2024    INR 2.04 (H) 11/04/2024            Medications     Current Outpatient Medications   Medication Sig Dispense Refill    acetaminophen (TYLENOL) 325 MG tablet Take 2 tablets (650 mg) by mouth every 4 hours as needed for other (For optimal non-opioid multimodal pain management to improve pain control.). (Patient not " taking: Reported on 11/25/2024)      amiodarone (PACERONE) 200 MG tablet Take 2 tablets (400 mg) by mouth 2 times daily for 4 days, THEN 1 tablet (200 mg) 2 times daily for 7 days, THEN 1 tablet (200 mg) daily for 7 days.      aspirin 81 MG EC tablet Take 1 tablet (81 mg) by mouth daily.      Capsicum, Cayenne, (CAYENNE PEPPER PO) Take 1 capsule by mouth daily. (Patient not taking: Reported on 11/25/2024)      Lidocaine (LIDOCARE) 4 % Patch Place 2 patches over 12 hours onto the skin every 24 hours. To prevent lidocaine toxicity, patient should be patch free for 12 hrs daily. (Patient not taking: Reported on 11/25/2024)      loperamide (IMODIUM) 2 MG capsule Take 1 capsule (2 mg) by mouth 4 times daily as needed for diarrhea. (Patient not taking: Reported on 11/25/2024)      methocarbamol (ROBAXIN) 500 MG tablet Take 1 tablet (500 mg) by mouth every 6 hours as needed for muscle spasms. (Patient not taking: Reported on 11/25/2024)      metoprolol tartrate (LOPRESSOR) 25 MG tablet Take 0.25 tablets (6.25 mg) by mouth 2 times daily.      midodrine (PROAMATINE) 5 MG tablet Take 1 tablet (5 mg) by mouth 3 times daily (with meals).      Misc Natural Products (SAW PALMETTO) CAPS Take 1 capsule by mouth daily.      multivitamin  with lutein (OCUVITE WITH LUTEIN) CAPS per capsule Take 1 capsule by mouth daily.      pantoprazole (PROTONIX) 40 MG EC tablet Take 1 tablet (40 mg) by mouth daily.      sodium chloride 1 GM tablet Take 2 tablets (2 g) by mouth 3 times daily (with meals).      tamsulosin (FLOMAX) 0.4 MG capsule Take 1 capsule (0.4 mg) by mouth daily.      vancomycin (VANCOCIN) 1000 mg in dextrose 5% 200 mL PREMIX Inject 1,000 mg at 200 mL/hr over 1 hours into the vein every 12 hours. Please check CBC with diff, AST, and CRP weekly and Creatinine and vancomycin level twice weekly with pharmacist to monitor/dose based on AUC. Please fax lab results to [facility].            Past Medical History     Past  Medical History:   Diagnosis Date    Cataract     Iliac artery aneurysm (H)     Prostate cancer (H)      Past Surgical History:   Procedure Laterality Date    CV CORONARY ANGIOGRAM N/A 10/31/2024    Procedure: Coronary Angiogram;  Surgeon: Shane Ortega MD;  Location:  HEART CARDIAC CATH LAB    EXTRACTION(S) DENTAL Bilateral 10/31/2024    Procedure: EXTRACTION OF TEETH 3, 5, 6, 7, 8, 9, 10, 11, 12, 13, 14, 20, 21, 23, 24, 25, 26, 27, 28, AND 29;  Surgeon: Gabbie Neville DDS;  Location:  OR    EXTRACTION(S) DENTAL Bilateral 11/12/2024    Procedure: removal of foreign body, closure with hemostatic agent, other procedures as necessary in oral cavity;  Surgeon: Gabbie Neville DDS;  Location:  OR    REPLACE VALVE MITRAL MINIMALLY INVASIVE Right 11/4/2024    Procedure: MINIMALLY INVASIVE MITRAL VALVE REPLACEMENT WITH TISSUE HEART VALVE MITRIS  RESILIA  MITRAL VALVE SIZE: 31MM, AND ON CARDIOPULMONARY PUMP OXYGENATOR  (INTRAOPERATIVE TRANSESOPHAGEAL ECHOCARDIOGRAM BY ANESTHESIOLOGIST);  Surgeon: Alexa Ellison MD;  Location:  OR     No family history on file.         Allergies   Patient has no known allergies.      This note was completed in part using dictation via the Dragon voice recognition software. Some word and grammatical errors may occur and must be interpreted in the appropriate clinical context.  If there are any questions pertaining to this issue, please contact me for further clarification.

## 2024-12-21 PROBLEM — I48.91 POSTOPERATIVE ATRIAL FIBRILLATION (H): Status: ACTIVE | Noted: 2024-12-21

## 2024-12-21 PROBLEM — I97.89 POSTOPERATIVE ATRIAL FIBRILLATION (H): Status: ACTIVE | Noted: 2024-12-21

## 2024-12-21 PROBLEM — Z95.2 S/P MVR (MITRAL VALVE REPLACEMENT): Status: ACTIVE | Noted: 2024-12-21

## 2025-03-24 ENCOUNTER — OFFICE VISIT (OUTPATIENT)
Dept: CARDIOLOGY | Facility: CLINIC | Age: 85
End: 2025-03-24
Payer: MEDICARE

## 2025-03-24 VITALS
HEIGHT: 66 IN | BODY MASS INDEX: 21.78 KG/M2 | OXYGEN SATURATION: 97 % | DIASTOLIC BLOOD PRESSURE: 72 MMHG | SYSTOLIC BLOOD PRESSURE: 118 MMHG | WEIGHT: 135.5 LBS | RESPIRATION RATE: 18 BRPM | HEART RATE: 84 BPM

## 2025-03-24 DIAGNOSIS — Z95.2 S/P MVR (MITRAL VALVE REPLACEMENT): Primary | ICD-10-CM

## 2025-03-24 DIAGNOSIS — I10 BENIGN ESSENTIAL HYPERTENSION: ICD-10-CM

## 2025-03-24 LAB
BASOPHILS # BLD AUTO: 0 10E3/UL (ref 0–0.2)
BASOPHILS NFR BLD AUTO: 1 %
EOSINOPHIL # BLD AUTO: 0.2 10E3/UL (ref 0–0.7)
EOSINOPHIL NFR BLD AUTO: 3 %
ERYTHROCYTE [DISTWIDTH] IN BLOOD BY AUTOMATED COUNT: 16.7 % (ref 10–15)
HCT VFR BLD AUTO: 35.7 % (ref 40–53)
HGB BLD-MCNC: 12 G/DL (ref 13.3–17.7)
IMM GRANULOCYTES # BLD: 0 10E3/UL
IMM GRANULOCYTES NFR BLD: 0 %
LYMPHOCYTES # BLD AUTO: 2 10E3/UL (ref 0.8–5.3)
LYMPHOCYTES NFR BLD AUTO: 31 %
MCH RBC QN AUTO: 28.8 PG (ref 26.5–33)
MCHC RBC AUTO-ENTMCNC: 33.6 G/DL (ref 31.5–36.5)
MCV RBC AUTO: 86 FL (ref 78–100)
MONOCYTES # BLD AUTO: 0.6 10E3/UL (ref 0–1.3)
MONOCYTES NFR BLD AUTO: 10 %
NEUTROPHILS # BLD AUTO: 3.6 10E3/UL (ref 1.6–8.3)
NEUTROPHILS NFR BLD AUTO: 56 %
NRBC # BLD AUTO: 0 10E3/UL
NRBC BLD AUTO-RTO: 0 /100
PLATELET # BLD AUTO: 346 10E3/UL (ref 150–450)
RBC # BLD AUTO: 4.16 10E6/UL (ref 4.4–5.9)
WBC # BLD AUTO: 6.5 10E3/UL (ref 4–11)

## 2025-03-24 PROCEDURE — 1126F AMNT PAIN NOTED NONE PRSNT: CPT | Performed by: INTERNAL MEDICINE

## 2025-03-24 PROCEDURE — 3074F SYST BP LT 130 MM HG: CPT | Performed by: INTERNAL MEDICINE

## 2025-03-24 PROCEDURE — 36415 COLL VENOUS BLD VENIPUNCTURE: CPT | Performed by: INTERNAL MEDICINE

## 2025-03-24 PROCEDURE — 93000 ELECTROCARDIOGRAM COMPLETE: CPT | Performed by: INTERNAL MEDICINE

## 2025-03-24 PROCEDURE — 3078F DIAST BP <80 MM HG: CPT | Performed by: INTERNAL MEDICINE

## 2025-03-24 PROCEDURE — 99203 OFFICE O/P NEW LOW 30 MIN: CPT | Performed by: INTERNAL MEDICINE

## 2025-03-24 PROCEDURE — 85025 COMPLETE CBC W/AUTO DIFF WBC: CPT | Performed by: INTERNAL MEDICINE

## 2025-03-24 ASSESSMENT — PAIN SCALES - GENERAL: PAINLEVEL_OUTOF10: NO PAIN (0)

## 2025-03-24 NOTE — LETTER
3/24/2025    [facility]  [address] [address]    RE: Jayme Donahue       Dear Colleague,     I had the pleasure of seeing Jayme Donahue in the [facility].  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    No orders of the defined types were placed in this encounter.    There are no discontinued medications.    No diagnosis found.    CURRENT MEDICATIONS:  Current Outpatient Medications   Medication Sig Dispense Refill     Capsicum, Cayenne, (CAYENNE PEPPER PO) Take 1 capsule by mouth daily.       Cholecalciferol (VITAMIN D-3 PO) Take 5,000 Units by mouth.       FERROUS SULFATE PO Take by mouth.       Misc Natural Products (SAW PALMETTO) CAPS Take 1 capsule by mouth daily.       multivitamin  with lutein (OCUVITE WITH LUTEIN) CAPS per capsule Take 1 capsule by mouth daily.       acetaminophen (TYLENOL) 325 MG tablet Take 2 tablets (650 mg) by mouth every 4 hours as needed for other (For optimal non-opioid multimodal pain management to improve pain control.). (Patient not taking: Reported on 11/25/2024)       aspirin 81 MG EC tablet Take 1 tablet (81 mg) by mouth daily. (Patient not taking: Reported on 3/24/2025)       Lidocaine (LIDOCARE) 4 % Patch Place 2 patches over 12 hours onto the skin every 24 hours. To prevent lidocaine toxicity, patient should be patch free for 12 hrs daily. (Patient not taking: Reported on 11/25/2024)       loperamide (IMODIUM) 2 MG capsule Take 1 capsule (2 mg) by mouth 4 times daily as needed for diarrhea. (Patient not taking: Reported on  "11/25/2024)       metoprolol succinate ER (TOPROL XL) 25 MG 24 hr tablet Take 12.5 mg by mouth daily. (Patient not taking: Reported on 3/24/2025)       pantoprazole (PROTONIX) 40 MG EC tablet Take 1 tablet (40 mg) by mouth daily. (Patient not taking: Reported on 3/24/2025)       tamsulosin (FLOMAX) 0.4 MG capsule Take 1 capsule (0.4 mg) by mouth daily. (Patient not taking: Reported on 3/24/2025)         ALLERGIES   No Known Allergies    PAST MEDICAL, SURGICAL, FAMILY, SOCIAL HISTORY:  History was reviewed and updated as needed, see medical record.        Review of Systems:  A 12-point review of systems was completed, see medical record for detailed review of systems information.    Physical Exam:  Vitals: /72 (BP Location: Right arm, Patient Position: Sitting, Cuff Size: Adult Regular)   Pulse 84   Resp 18   Ht 1.676 m (5' 6\")   Wt 61.5 kg (135 lb 8 oz)   SpO2 97%   BMI 21.87 kg/m      Constitutional: No apparent distress    HEENT: pupils equal round reactive to light, thyroid normal size, JVP is normal    Chest: Clear to percussion and auscultation    Cardiac: Normal S1, normal S2 no S3, no murmur or click    Abdomen: Scaphoid.  Liver percusses to 6 cm spleen is not palpable aorta is not tender or enlarged    Extremitiies: no edema.    Neurological:  Cranial nerves II through XII are intact, strength equal and symmetrical, displays normal insight and judgment        ASSESSMENT: Jayme Donahue is a 84 year old male with          We reviewed the benefits of a regular exercise program, a Mediterranean-style weight loss diet, and contacting us for any changes in between now and the time of her next visit.     RECOMMENDATIONS:   1) Needs a primary care MD to follow on regular basis  2) ECG today  3) follow up with [facility] in 3 months with TTE        Recent Lab Results:  LIPID RESULTS:  No results found for: \"CHOL\", \"HDL\", \"LDL\", \"TRIG\", \"CHOLHDLRATIO\"    LIVER ENZYME RESULTS:  Lab Results   Component Value Date "    AST 34 11/04/2024    ALT 19 11/04/2024       CBC RESULTS:  Lab Results   Component Value Date    WBC 6.9 11/13/2024    RBC 2.77 (L) 11/13/2024    HGB 8.5 (L) 11/13/2024    HCT 24.9 (L) 11/13/2024    MCV 90 11/13/2024    MCH 30.7 11/13/2024    MCHC 34.1 11/13/2024    RDW 16.7 (H) 11/13/2024     11/13/2024       BMP RESULTS:  Lab Results   Component Value Date     (L) 11/13/2024    POTASSIUM 4.5 11/13/2024    POTASSIUM 4.1 11/04/2024    CHLORIDE 100 11/13/2024    CO2 28 11/13/2024    ANIONGAP 6 (L) 11/13/2024     (H) 11/13/2024     (H) 11/12/2024    BUN 18.8 11/13/2024    CR 0.66 (L) 11/13/2024    GFRESTIMATED >90 11/13/2024    GFRESTIMATED 81 03/31/2021    GFRESTBLACK >90 03/31/2021    AMALIA 8.2 (L) 11/13/2024        A1C RESULTS:  Lab Results   Component Value Date    A1C 5.3 10/31/2024       INR RESULTS:  Lab Results   Component Value Date    INR 1.80 (H) 11/04/2024    INR 2.04 (H) 11/04/2024       We greatly appreciate the opportunity to be involved in the care of your patient, Jayme Donahue.    Sincerely,  Nash Guillory MD      CC  Referred Self, MD  No address on file                                                                       Thank you for allowing me to participate in the care of your patient.      Sincerely,     Nash Guillory MD     [facility]  cc:   Geovany Rivas MD  No address on file

## 2025-03-24 NOTE — PROGRESS NOTES
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  He has completed a course of postoperative antibiotic therapy with meropenem and has now stopped all medications except for aspirin and vitamins.  He denies palpitation focal neurologic deficit dyspnea or edema.  [facility] on at least an annual basis for history of prostate cancer.  I have no details regarding his previous treatment at time of our visit today.  An echocardiogram performed in October 2024 after valve replacement showed normal prosthetic valve function.    PAST MEDICAL HISTORY  1) Corynebacterium mitral  valve endocarditis.  Status post mitral valve replacement 10/2024.  Immediate postoperative echo showing normal valve function.  Preoperative coronary angiography showing no significant coronary disease.  2) prostate cancer: Status post previous radiation therapy.  [facility]  [facility] records regarding prostate cancer unavailable at time of visit.  3) postoperative atrial fibrillation: Currently in sinus rhythm on ECG today.  4) RBBB/LAFB      Orders this Visit:  No orders of the defined types were placed in this encounter.    No orders of the defined types were placed in this encounter.    There are no discontinued medications.    No diagnosis found.    CURRENT MEDICATIONS:  Current Outpatient Medications   Medication Sig Dispense Refill    Capsicum, Cayenne, (CAYENNE PEPPER PO) Take 1 capsule by mouth daily.      Cholecalciferol (VITAMIN D-3 PO) Take 5,000 Units by mouth.      FERROUS SULFATE PO Take  "by mouth.      [facility] Natural Products (SAW PALMETTO) CAPS Take 1 capsule by mouth daily.      multivitamin  with lutein (OCUVITE WITH LUTEIN) CAPS per capsule Take 1 capsule by mouth daily.      acetaminophen (TYLENOL) 325 MG tablet Take 2 tablets (650 mg) by mouth every 4 hours as needed for other (For optimal non-opioid multimodal pain management to improve pain control.). (Patient not taking: Reported on 11/25/2024)      aspirin 81 MG EC tablet Take 1 tablet (81 mg) by mouth daily. (Patient not taking: Reported on 3/24/2025)      Lidocaine (LIDOCARE) 4 % Patch Place 2 patches over 12 hours onto the skin every 24 hours. To prevent lidocaine toxicity, patient should be patch free for 12 hrs daily. (Patient not taking: Reported on 11/25/2024)      loperamide (IMODIUM) 2 MG capsule Take 1 capsule (2 mg) by mouth 4 times daily as needed for diarrhea. (Patient not taking: Reported on 11/25/2024)      metoprolol succinate ER (TOPROL XL) 25 MG 24 hr tablet Take 12.5 mg by mouth daily. (Patient not taking: Reported on 3/24/2025)      pantoprazole (PROTONIX) 40 MG EC tablet Take 1 tablet (40 mg) by mouth daily. (Patient not taking: Reported on 3/24/2025)      tamsulosin (FLOMAX) 0.4 MG capsule Take 1 capsule (0.4 mg) by mouth daily. (Patient not taking: Reported on 3/24/2025)         ALLERGIES   No Known Allergies    PAST MEDICAL, SURGICAL, FAMILY, SOCIAL HISTORY:  History was reviewed and updated as needed, see medical record.        Review of Systems:  A 12-point review of systems was completed, see medical record for detailed review of systems information.    Physical Exam:  Vitals: /72 (BP Location: Right arm, Patient Position: Sitting, Cuff Size: Adult Regular)   Pulse 84   Resp 18   Ht 1.676 m (5' 6\")   Wt 61.5 kg (135 lb 8 oz)   SpO2 97%   BMI 21.87 kg/m      Constitutional: No apparent distress    HEENT: pupils equal round reactive to light, thyroid normal size, JVP is normal    Chest: Clear to " "percussion and auscultation    Cardiac: Crisp prosthetic S1.  Normal S2.  No murmur or click.    Extremitiies: no edema.    Neurological:  Cranial nerves II through XII are intact, strength equal and symmetrical, displays normal insight and judgment        ASSESSMENT: The patient is asymptomatic and remains in a sinus rhythm off all medical therapy.  His most recent echocardiogram, performed just after surgery showed a normally functioning prosthesis.  There has been no recent hemoglobin and the patient states [facility] basis for follow-up of his prostate cancer..    RECOMMENDATIONS:   1) Needs a primary care MD to follow on regular basis  2) CBC today:  addendum HB now normal 12.  3) follow up with [facility] in 3 months with TTE        Recent Lab Results:  LIPID RESULTS:  No results found for: \"CHOL\", \"HDL\", \"LDL\", \"TRIG\", \"CHOLHDLRATIO\"    LIVER ENZYME RESULTS:  Lab Results   Component Value Date    AST 34 11/04/2024    ALT 19 11/04/2024       CBC RESULTS:  Lab Results   Component Value Date    WBC 6.9 11/13/2024    RBC 2.77 (L) 11/13/2024    HGB 8.5 (L) 11/13/2024    HCT 24.9 (L) 11/13/2024    MCV 90 11/13/2024    MCH 30.7 11/13/2024    MCHC 34.1 11/13/2024    RDW 16.7 (H) 11/13/2024     11/13/2024       BMP RESULTS:  Lab Results   Component Value Date     (L) 11/13/2024    POTASSIUM 4.5 11/13/2024    POTASSIUM 4.1 11/04/2024    CHLORIDE 100 11/13/2024    CO2 28 11/13/2024    ANIONGAP 6 (L) 11/13/2024     (H) 11/13/2024     (H) 11/12/2024    BUN 18.8 11/13/2024    CR 0.66 (L) 11/13/2024    GFRESTIMATED >90 11/13/2024    GFRESTIMATED 81 03/31/2021    GFRESTBLACK >90 03/31/2021    AMALIA 8.2 (L) 11/13/2024        A1C RESULTS:  Lab Results   Component Value Date    A1C 5.3 10/31/2024       INR RESULTS:  Lab Results   Component Value Date    INR 1.80 (H) 11/04/2024    INR 2.04 (H) 11/04/2024       We greatly appreciate the opportunity to be " involved in the care of your patient, Jayme Donahue.    Sincerely,  Nash Guillory MD      CC  Referred Self, MD  No address on file

## (undated) DEVICE — CATH ANGIO INFINITI 3DRC 4FRX100CM 538476

## (undated) DEVICE — RX SURGIFLO HEMOSTATIC MATRIX W/THROMBIN 8ML 2994

## (undated) DEVICE — SOL WATER IRRIG 1000ML BOTTLE 2F7114

## (undated) DEVICE — SU VICRYL 3-0 FS-1 27" J442H

## (undated) DEVICE — SUCTION CANISTER MEDIVAC LINER 3000ML W/LID 65651-530

## (undated) DEVICE — RAD INTRODUCER KIT MICRO 5FRX10CM .018 NITINOL G/W

## (undated) DEVICE — PUMP CP37 QUANTUM CENTRIFUGAL BLOOD CP37V-V0

## (undated) DEVICE — SOMASENSOR CEREBRAL OXIMETER ADULT SAFB-SM

## (undated) DEVICE — PACK SURGICAL PROCEDURE CUSTOM 3/8IN X 3/8IN BB11W21R

## (undated) DEVICE — SPONGE PACK VAGINAL 2X36"

## (undated) DEVICE — Device

## (undated) DEVICE — TOTE ANGIO CORP PC15AT SAN32CC83O

## (undated) DEVICE — PACK HEAD NECK SEN15HNFSF

## (undated) DEVICE — SPONGE PACK VAGINAL 2"X9

## (undated) DEVICE — SU DEVICE ENDO COR KNOT QUICK LOAD 030850

## (undated) DEVICE — DRAIN CHEST TUBE 28FR STR 8028

## (undated) DEVICE — SUCTION DRY CHEST DRAIN OASIS 3600-100

## (undated) DEVICE — OXYGENATOR PERFUSION AFFINITY FUSION BB841

## (undated) DEVICE — DECANTER BAG 2002S

## (undated) DEVICE — DECANTER VIAL 2006S

## (undated) DEVICE — ADAPTER CARDIOPLG CLAMP 7.5" DLP .25" CON 10005

## (undated) DEVICE — SYR 10ML FINGER CONTROL W/O NDL 309695

## (undated) DEVICE — SURGICEL HEMOSTAT 2X14" 1951

## (undated) DEVICE — DEVICE ASSEMBLY SUCTION/ANTI COAG BTC93

## (undated) DEVICE — SOL NACL 0.9% IRRIG 1000ML BOTTLE 2F7124

## (undated) DEVICE — LINEN TOWEL PACK X5 5464

## (undated) DEVICE — DRAPE NEW SLUSH/WARMER HUSHSLUSH 2.0 ESD340 ESD340

## (undated) DEVICE — COVER TABLE POLY 65X90" 8186

## (undated) DEVICE — ESU ELEC BLADE 6" COATED E1450-6

## (undated) DEVICE — SU PLEDGET SOFT TFE 3/8"X3/26"X1/16" PCP40

## (undated) DEVICE — SU VICRYL 0 CTX 36" J370H

## (undated) DEVICE — DRAIN SUMP INTRACARDIAC 20FR 12" POOL TIP 12112

## (undated) DEVICE — SU PROLENE 4-0 RB-1DA 36" 8557H

## (undated) DEVICE — TUBING SUCTION MEDI-VAC SOFT 3/16"X20' N520A

## (undated) DEVICE — INTRO SHEATH 4FRX25CM PINNACLE MARKER RSB403

## (undated) DEVICE — SU ETHIBOND 3-0 BBDA 36" X588H

## (undated) DEVICE — INTRO SHEATH 4FRX10CM PINNACLE RSS402

## (undated) DEVICE — CONNECTOR BLAKE DRAIN SGL BCC1

## (undated) DEVICE — DRAPE MAYO STAND 23X54 8337

## (undated) DEVICE — ANTIFOG SOLUTION SEE SHARP 150M TROCAR SWABS 30978

## (undated) DEVICE — SU DEVICE ENDO COR KNOT QUICK LOAD RELOAD 030874

## (undated) DEVICE — CABLE MYO/LEAD PACING WHITE DISP 019-530

## (undated) DEVICE — SU CHROMIC 3-0 SH 27" G122H

## (undated) DEVICE — KIT HAND CONTROL ANGIOTOUCH ACIST 65CM AT-P65

## (undated) DEVICE — DRSG GAUZE 4X4" 3033

## (undated) DEVICE — DEFIB PRO-PADZ LVP LQD GEL ADULT 8900-2105-01

## (undated) DEVICE — INSERT INTRAC 66MM XT CYGNET N-10174

## (undated) DEVICE — CATH ANGIO INFINITI JL4 4FRX100CM 538420

## (undated) DEVICE — CATH DIAG 4FR JL 4.5 538417

## (undated) DEVICE — LEAD PACER MYOCARDIAL BIPOLAR TEMPORARY 53CM 6495F

## (undated) DEVICE — ESU ELEC BLADE 6" COATED/INSULATED E1455-6

## (undated) DEVICE — SU VICRYL 2-0 CT-1 27" UND J259H

## (undated) DEVICE — ANTIFOG SOLUTION SEE SHARP 150M TROCAR SWABS 30978 (COI)

## (undated) DEVICE — KIT VASC DILATOR ESTECH 200-120

## (undated) DEVICE — ENDO DISSECTOR BLUNT 10MM CHERRY BCD10

## (undated) DEVICE — CANNULA 25FR MULIT-STAGE 96880-25

## (undated) DEVICE — SPONGE SURGIFOAM 12 1972

## (undated) DEVICE — PACK OPEN HEART PV12OH524

## (undated) DEVICE — SU PROLENE 5-0 RB-2DA 30" 8710H

## (undated) DEVICE — SU MONOCRYL 4-0 PS-1 27'  UND Y935H

## (undated) DEVICE — SU ETHIBOND 5 V-37 4X30" MB66G

## (undated) DEVICE — SU DEVICE COR-KNOT MINI 4X14MM 031350

## (undated) DEVICE — ENDO TROCAR FIRST ENTRY KII FIOS Z-THRD 05X100MM CTF03

## (undated) DEVICE — KIT WASH CELL SAVING ATL2001

## (undated) DEVICE — SU ETHIBOND 2-0 SH-2 DA 30" PXX80

## (undated) DEVICE — ORGANIZER SUTURE CIRCUMFERENTIAL BELT MI-ISBA-001

## (undated) DEVICE — CANNULA PERFUSION FEM/ART/JUG 17FR 96570-117

## (undated) DEVICE — CANNULA AORTIC ROOT 12GA 11012L

## (undated) DEVICE — RESERVOIR CELL SAVING BLOOD COLLECTION EL2120

## (undated) DEVICE — ESU GROUND PAD UNIVERSAL W/O CORD

## (undated) DEVICE — SU PROLENE 4-0 SHDA 36" 8521H

## (undated) DEVICE — LINEN TOWEL PACK X30 5481

## (undated) DEVICE — SUCTION IRR STRYKERFLOW II W/TIP 250-070-520

## (undated) DEVICE — MANIFOLD KIT ANGIO AUTOMATED 014613

## (undated) DEVICE — INSERT FOGARTY 86MM TRACTION DBL SAFEJAW DSAFE86

## (undated) RX ORDER — VANCOMYCIN HYDROCHLORIDE 1 G/200ML
INJECTION, SOLUTION INTRAVENOUS
Status: DISPENSED
Start: 2024-11-04

## (undated) RX ORDER — PHENYLEPHRINE HCL IN 0.9% NACL 1 MG/10 ML
SYRINGE (ML) INTRAVENOUS
Status: DISPENSED

## (undated) RX ORDER — ONDANSETRON 2 MG/ML
INJECTION INTRAMUSCULAR; INTRAVENOUS
Status: DISPENSED
Start: 2024-11-12

## (undated) RX ORDER — PROPOFOL 10 MG/ML
INJECTION, EMULSION INTRAVENOUS
Status: DISPENSED
Start: 2024-10-31

## (undated) RX ORDER — FENTANYL CITRATE 50 UG/ML
INJECTION, SOLUTION INTRAMUSCULAR; INTRAVENOUS
Status: DISPENSED
Start: 2024-10-31

## (undated) RX ORDER — CHLORHEXIDINE GLUCONATE ORAL RINSE 1.2 MG/ML
SOLUTION DENTAL
Status: DISPENSED
Start: 2024-11-12

## (undated) RX ORDER — FENTANYL CITRATE 0.05 MG/ML
INJECTION, SOLUTION INTRAMUSCULAR; INTRAVENOUS
Status: DISPENSED
Start: 2024-11-04

## (undated) RX ORDER — FENTANYL CITRATE 50 UG/ML
INJECTION, SOLUTION INTRAMUSCULAR; INTRAVENOUS
Status: DISPENSED
Start: 2024-10-29

## (undated) RX ORDER — ONDANSETRON 2 MG/ML
INJECTION INTRAMUSCULAR; INTRAVENOUS
Status: DISPENSED
Start: 2024-10-31

## (undated) RX ORDER — MAGNESIUM SULFATE HEPTAHYDRATE 500 MG/ML
INJECTION, SOLUTION INTRAMUSCULAR; INTRAVENOUS
Status: DISPENSED

## (undated) RX ORDER — PROPOFOL 10 MG/ML
INJECTION, EMULSION INTRAVENOUS
Status: DISPENSED
Start: 2024-11-12

## (undated) RX ORDER — FENTANYL CITRATE 50 UG/ML
INJECTION, SOLUTION INTRAMUSCULAR; INTRAVENOUS
Status: DISPENSED
Start: 2024-11-04

## (undated) RX ORDER — ONDANSETRON 2 MG/ML
INJECTION INTRAMUSCULAR; INTRAVENOUS
Status: DISPENSED
Start: 2024-11-04

## (undated) RX ORDER — HEPARIN SODIUM 1000 [USP'U]/ML
INJECTION, SOLUTION INTRAVENOUS; SUBCUTANEOUS
Status: DISPENSED

## (undated) RX ORDER — SODIUM CHLORIDE 9 MG/ML
INJECTION, SOLUTION INTRAVENOUS
Status: DISPENSED

## (undated) RX ORDER — GINSENG 100 MG
CAPSULE ORAL
Status: DISPENSED
Start: 2024-11-12

## (undated) RX ORDER — CIPROFLOXACIN 2 MG/ML
INJECTION, SOLUTION INTRAVENOUS
Status: DISPENSED
Start: 2024-10-31

## (undated) RX ORDER — CEFAZOLIN SODIUM 1 G/3ML
INJECTION, POWDER, FOR SOLUTION INTRAMUSCULAR; INTRAVENOUS
Status: DISPENSED
Start: 2024-11-04

## (undated) RX ORDER — NALOXONE HYDROCHLORIDE 0.4 MG/ML
INJECTION, SOLUTION INTRAMUSCULAR; INTRAVENOUS; SUBCUTANEOUS
Status: DISPENSED
Start: 2024-10-29

## (undated) RX ORDER — FENTANYL CITRATE 50 UG/ML
INJECTION, SOLUTION INTRAMUSCULAR; INTRAVENOUS
Status: DISPENSED
Start: 2024-11-12

## (undated) RX ORDER — HEPARIN SODIUM 200 [USP'U]/100ML
INJECTION, SOLUTION INTRAVENOUS
Status: DISPENSED
Start: 2024-10-31

## (undated) RX ORDER — LIDOCAINE HYDROCHLORIDE 10 MG/ML
INJECTION, SOLUTION EPIDURAL; INFILTRATION; INTRACAUDAL; PERINEURAL
Status: DISPENSED
Start: 2024-10-31

## (undated) RX ORDER — LIDOCAINE HCL/EPINEPHRINE/PF 2%-1:200K
VIAL (ML) INJECTION
Status: DISPENSED
Start: 2024-11-12

## (undated) RX ORDER — HEPARIN SODIUM 1000 [USP'U]/ML
INJECTION, SOLUTION INTRAVENOUS; SUBCUTANEOUS
Status: DISPENSED
Start: 2024-10-31

## (undated) RX ORDER — VASOPRESSIN 20 U/ML
INJECTION PARENTERAL
Status: DISPENSED
Start: 2024-11-04

## (undated) RX ORDER — GLYCOPYRROLATE 0.2 MG/ML
INJECTION, SOLUTION INTRAMUSCULAR; INTRAVENOUS
Status: DISPENSED
Start: 2024-10-29

## (undated) RX ORDER — SODIUM CHLORIDE, SODIUM GLUCONATE, SODIUM ACETATE, POTASSIUM CHLORIDE AND MAGNESIUM CHLORIDE 526; 502; 368; 37; 30 MG/100ML; MG/100ML; MG/100ML; MG/100ML; MG/100ML
INJECTION, SOLUTION INTRAVENOUS
Status: DISPENSED

## (undated) RX ORDER — BUPIVACAINE HYDROCHLORIDE 5 MG/ML
INJECTION, SOLUTION EPIDURAL; INTRACAUDAL
Status: DISPENSED
Start: 2024-11-04

## (undated) RX ORDER — BUPIVACAINE HYDROCHLORIDE AND EPINEPHRINE 2.5; 5 MG/ML; UG/ML
INJECTION, SOLUTION EPIDURAL; INFILTRATION; INTRACAUDAL; PERINEURAL
Status: DISPENSED
Start: 2024-10-31

## (undated) RX ORDER — PROTAMINE SULFATE 10 MG/ML
INJECTION, SOLUTION INTRAVENOUS
Status: DISPENSED
Start: 2024-11-04

## (undated) RX ORDER — METOPROLOL TARTRATE 1 MG/ML
INJECTION, SOLUTION INTRAVENOUS
Status: DISPENSED
Start: 2024-11-04

## (undated) RX ORDER — PROPOFOL 10 MG/ML
INJECTION, EMULSION INTRAVENOUS
Status: DISPENSED
Start: 2024-11-04

## (undated) RX ORDER — CHLORHEXIDINE GLUCONATE ORAL RINSE 1.2 MG/ML
SOLUTION DENTAL
Status: DISPENSED
Start: 2024-10-31

## (undated) RX ORDER — FLUMAZENIL 0.1 MG/ML
INJECTION, SOLUTION INTRAVENOUS
Status: DISPENSED
Start: 2024-10-29

## (undated) RX ORDER — LIDOCAINE HYDROCHLORIDE 40 MG/ML
SOLUTION TOPICAL
Status: DISPENSED
Start: 2024-10-29

## (undated) RX ORDER — HEPARIN SODIUM 1000 [USP'U]/ML
INJECTION, SOLUTION INTRAVENOUS; SUBCUTANEOUS
Status: DISPENSED
Start: 2024-11-04

## (undated) RX ORDER — VECURONIUM BROMIDE 1 MG/ML
INJECTION, POWDER, LYOPHILIZED, FOR SOLUTION INTRAVENOUS
Status: DISPENSED
Start: 2024-11-04

## (undated) RX ORDER — CEFAZOLIN SODIUM/WATER 2 G/20 ML
SYRINGE (ML) INTRAVENOUS
Status: DISPENSED
Start: 2024-11-04

## (undated) RX ORDER — CALCIUM CHLORIDE 100 MG/ML
INJECTION INTRAVENOUS; INTRAVENTRICULAR
Status: DISPENSED

## (undated) RX ORDER — DIPHENHYDRAMINE HYDROCHLORIDE 50 MG/ML
INJECTION INTRAMUSCULAR; INTRAVENOUS
Status: DISPENSED
Start: 2024-11-04